# Patient Record
Sex: FEMALE | Race: WHITE | NOT HISPANIC OR LATINO | Employment: FULL TIME | URBAN - METROPOLITAN AREA
[De-identification: names, ages, dates, MRNs, and addresses within clinical notes are randomized per-mention and may not be internally consistent; named-entity substitution may affect disease eponyms.]

---

## 2018-07-26 ENCOUNTER — AMBULATORY - HEALTHEAST (OUTPATIENT)
Dept: MULTI SPECIALTY CLINIC | Facility: CLINIC | Age: 38
End: 2018-07-26

## 2018-07-26 LAB
HPV_EXT - HISTORICAL: NORMAL
PAP SMEAR - HIM PATIENT REPORTED: NORMAL

## 2019-04-19 ENCOUNTER — COMMUNICATION - HEALTHEAST (OUTPATIENT)
Dept: FAMILY MEDICINE | Facility: CLINIC | Age: 39
End: 2019-04-19

## 2019-04-29 ENCOUNTER — COMMUNICATION - HEALTHEAST (OUTPATIENT)
Dept: FAMILY MEDICINE | Facility: CLINIC | Age: 39
End: 2019-04-29

## 2019-04-29 ENCOUNTER — OFFICE VISIT - HEALTHEAST (OUTPATIENT)
Dept: FAMILY MEDICINE | Facility: CLINIC | Age: 39
End: 2019-04-29

## 2019-04-29 DIAGNOSIS — N91.2 AMENORRHEA: ICD-10-CM

## 2019-04-29 DIAGNOSIS — Z34.90 ENCOUNTER FOR SUPERVISION OF NORMAL PREGNANCY, ANTEPARTUM, UNSPECIFIED GRAVIDITY: ICD-10-CM

## 2019-04-29 DIAGNOSIS — B96.89 BACTERIAL VAGINOSIS: ICD-10-CM

## 2019-04-29 DIAGNOSIS — I10 ESSENTIAL HYPERTENSION: ICD-10-CM

## 2019-04-29 DIAGNOSIS — N89.8 VAGINAL DISCHARGE: ICD-10-CM

## 2019-04-29 DIAGNOSIS — N76.0 BACTERIAL VAGINOSIS: ICD-10-CM

## 2019-04-29 LAB
CLUE CELLS: ABNORMAL
HCG UR QL: POSITIVE
TRICHOMONAS, WET PREP: ABNORMAL
YEAST, WET PREP: ABNORMAL

## 2019-04-29 RX ORDER — PNV 119/IRON FUM/FOLIC ACID 29 MG-1 MG
TABLET ORAL
Status: SHIPPED | COMMUNITY
Start: 2019-04-29 | End: 2022-01-21

## 2019-05-08 ENCOUNTER — COMMUNICATION - HEALTHEAST (OUTPATIENT)
Dept: FAMILY MEDICINE | Facility: CLINIC | Age: 39
End: 2019-05-08

## 2019-05-08 DIAGNOSIS — L29.9 ITCHING: ICD-10-CM

## 2019-05-10 ENCOUNTER — HOSPITAL ENCOUNTER (OUTPATIENT)
Dept: ULTRASOUND IMAGING | Facility: CLINIC | Age: 39
Discharge: HOME | End: 2019-05-10
Attending: FAMILY MEDICINE

## 2019-05-10 ENCOUNTER — OFFICE VISIT - HEALTHEAST (OUTPATIENT)
Dept: FAMILY MEDICINE | Facility: CLINIC | Age: 39
End: 2019-05-10

## 2019-05-10 DIAGNOSIS — O20.0 THREATENED ABORTION: ICD-10-CM

## 2019-05-10 DIAGNOSIS — R21 RASH: ICD-10-CM

## 2019-05-10 DIAGNOSIS — I10 ESSENTIAL HYPERTENSION: ICD-10-CM

## 2019-05-10 LAB — HCG SERPL-ACNC: 3148 MLU/ML (ref 0–4)

## 2019-05-11 LAB
ABO/RH(D): NORMAL
ABORH REPEAT: NORMAL

## 2019-05-31 ENCOUNTER — OFFICE VISIT - HEALTHEAST (OUTPATIENT)
Dept: FAMILY MEDICINE | Facility: CLINIC | Age: 39
End: 2019-05-31

## 2019-05-31 DIAGNOSIS — O03.9 MISCARRIAGE: ICD-10-CM

## 2019-05-31 DIAGNOSIS — I10 ESSENTIAL HYPERTENSION: ICD-10-CM

## 2019-05-31 LAB — HCG UR QL: NEGATIVE

## 2019-05-31 ASSESSMENT — MIFFLIN-ST. JEOR: SCORE: 1372.61

## 2019-09-10 ENCOUNTER — COMMUNICATION - HEALTHEAST (OUTPATIENT)
Dept: FAMILY MEDICINE | Facility: CLINIC | Age: 39
End: 2019-09-10

## 2019-09-10 DIAGNOSIS — I10 ESSENTIAL HYPERTENSION: ICD-10-CM

## 2019-09-12 ENCOUNTER — COMMUNICATION - HEALTHEAST (OUTPATIENT)
Dept: FAMILY MEDICINE | Facility: CLINIC | Age: 39
End: 2019-09-12

## 2019-09-23 ENCOUNTER — COMMUNICATION - HEALTHEAST (OUTPATIENT)
Dept: FAMILY MEDICINE | Facility: CLINIC | Age: 39
End: 2019-09-23

## 2020-05-12 ENCOUNTER — COMMUNICATION - HEALTHEAST (OUTPATIENT)
Dept: FAMILY MEDICINE | Facility: CLINIC | Age: 40
End: 2020-05-12

## 2020-05-12 DIAGNOSIS — N96 HISTORY OF RECURRENT MISCARRIAGES: ICD-10-CM

## 2020-05-14 ENCOUNTER — COMMUNICATION - HEALTHEAST (OUTPATIENT)
Dept: FAMILY MEDICINE | Facility: CLINIC | Age: 40
End: 2020-05-14

## 2020-05-15 ENCOUNTER — COMMUNICATION - HEALTHEAST (OUTPATIENT)
Dept: ADMINISTRATIVE | Facility: CLINIC | Age: 40
End: 2020-05-15

## 2020-06-09 ENCOUNTER — RECORDS - HEALTHEAST (OUTPATIENT)
Dept: ADMINISTRATIVE | Facility: OTHER | Age: 40
End: 2020-06-09

## 2020-07-07 ENCOUNTER — RECORDS - HEALTHEAST (OUTPATIENT)
Dept: ADMINISTRATIVE | Facility: OTHER | Age: 40
End: 2020-07-07

## 2020-07-13 ENCOUNTER — RECORDS - HEALTHEAST (OUTPATIENT)
Dept: ADMINISTRATIVE | Facility: OTHER | Age: 40
End: 2020-07-13

## 2020-07-13 LAB — HBA1C MFR BLD: 4.8 % (ref 0–5.6)

## 2020-07-28 ENCOUNTER — AMBULATORY - HEALTHEAST (OUTPATIENT)
Dept: FAMILY MEDICINE | Facility: CLINIC | Age: 40
End: 2020-07-28

## 2020-07-28 ENCOUNTER — AMBULATORY - HEALTHEAST (OUTPATIENT)
Dept: NURSING | Facility: CLINIC | Age: 40
End: 2020-07-28

## 2020-07-28 DIAGNOSIS — Z23 NEED FOR MMR VACCINE: ICD-10-CM

## 2020-07-31 ENCOUNTER — RECORDS - HEALTHEAST (OUTPATIENT)
Dept: ADMINISTRATIVE | Facility: OTHER | Age: 40
End: 2020-07-31

## 2020-09-09 ENCOUNTER — RECORDS - HEALTHEAST (OUTPATIENT)
Dept: ADMINISTRATIVE | Facility: OTHER | Age: 40
End: 2020-09-09

## 2020-09-16 ENCOUNTER — RECORDS - HEALTHEAST (OUTPATIENT)
Dept: ADMINISTRATIVE | Facility: OTHER | Age: 40
End: 2020-09-16

## 2020-09-23 ENCOUNTER — RECORDS - HEALTHEAST (OUTPATIENT)
Dept: ADMINISTRATIVE | Facility: OTHER | Age: 40
End: 2020-09-23

## 2020-09-30 ENCOUNTER — COMMUNICATION - HEALTHEAST (OUTPATIENT)
Dept: FAMILY MEDICINE | Facility: CLINIC | Age: 40
End: 2020-09-30

## 2020-10-19 ENCOUNTER — AMBULATORY - HEALTHEAST (OUTPATIENT)
Dept: FAMILY MEDICINE | Facility: CLINIC | Age: 40
End: 2020-10-19

## 2020-10-19 DIAGNOSIS — Z34.01 ENCOUNTER FOR SUPERVISION OF NORMAL FIRST PREGNANCY IN FIRST TRIMESTER: ICD-10-CM

## 2020-10-20 ENCOUNTER — COMMUNICATION - HEALTHEAST (OUTPATIENT)
Dept: FAMILY MEDICINE | Facility: CLINIC | Age: 40
End: 2020-10-20

## 2020-10-20 DIAGNOSIS — I10 ESSENTIAL HYPERTENSION: ICD-10-CM

## 2020-10-26 ENCOUNTER — HOSPITAL ENCOUNTER (OUTPATIENT)
Dept: ULTRASOUND IMAGING | Facility: CLINIC | Age: 40
Discharge: HOME | End: 2020-10-26
Attending: FAMILY MEDICINE

## 2020-10-26 DIAGNOSIS — Z34.01 ENCOUNTER FOR SUPERVISION OF NORMAL FIRST PREGNANCY IN FIRST TRIMESTER: ICD-10-CM

## 2020-11-10 ENCOUNTER — PRENATAL OFFICE VISIT - HEALTHEAST (OUTPATIENT)
Dept: FAMILY MEDICINE | Facility: CLINIC | Age: 40
End: 2020-11-10

## 2020-11-10 DIAGNOSIS — Z34.01 ENCOUNTER FOR SUPERVISION OF NORMAL FIRST PREGNANCY IN FIRST TRIMESTER: ICD-10-CM

## 2020-11-10 DIAGNOSIS — Z23 NEEDS FLU SHOT: ICD-10-CM

## 2020-11-10 DIAGNOSIS — I10 ESSENTIAL HYPERTENSION: ICD-10-CM

## 2020-11-10 LAB
ALBUMIN SERPL-MCNC: 3.6 G/DL (ref 3.5–5)
ALBUMIN UR-MCNC: NEGATIVE MG/DL
ALP SERPL-CCNC: 61 U/L (ref 45–120)
ALT SERPL W P-5'-P-CCNC: 17 U/L (ref 0–45)
ANION GAP SERPL CALCULATED.3IONS-SCNC: 10 MMOL/L (ref 5–18)
APPEARANCE UR: CLEAR
AST SERPL W P-5'-P-CCNC: 14 U/L (ref 0–40)
BASOPHILS # BLD AUTO: 0 THOU/UL (ref 0–0.2)
BASOPHILS NFR BLD AUTO: 1 % (ref 0–2)
BILIRUB SERPL-MCNC: 0.3 MG/DL (ref 0–1)
BILIRUB UR QL STRIP: NEGATIVE
BUN SERPL-MCNC: 6 MG/DL (ref 8–22)
CALCIUM SERPL-MCNC: 9.2 MG/DL (ref 8.5–10.5)
CHLORIDE BLD-SCNC: 106 MMOL/L (ref 98–107)
CO2 SERPL-SCNC: 21 MMOL/L (ref 22–31)
COLOR UR AUTO: YELLOW
CREAT SERPL-MCNC: 0.62 MG/DL (ref 0.6–1.1)
EOSINOPHIL # BLD AUTO: 0.2 THOU/UL (ref 0–0.4)
EOSINOPHIL NFR BLD AUTO: 3 % (ref 0–6)
ERYTHROCYTE [DISTWIDTH] IN BLOOD BY AUTOMATED COUNT: 12.8 % (ref 11–14.5)
GFR SERPL CREATININE-BSD FRML MDRD: >60 ML/MIN/1.73M2
GLUCOSE BLD-MCNC: 133 MG/DL (ref 70–125)
GLUCOSE UR STRIP-MCNC: NEGATIVE MG/DL
HCT VFR BLD AUTO: 34.1 % (ref 35–47)
HGB BLD-MCNC: 11.9 G/DL (ref 12–16)
HGB UR QL STRIP: NEGATIVE
HIV 1+2 AB+HIV1 P24 AG SERPL QL IA: NEGATIVE
IMM GRANULOCYTES # BLD: 0 THOU/UL
IMM GRANULOCYTES NFR BLD: 1 %
KETONES UR STRIP-MCNC: ABNORMAL MG/DL
LEUKOCYTE ESTERASE UR QL STRIP: NEGATIVE
LYMPHOCYTES # BLD AUTO: 0.9 THOU/UL (ref 0.8–4.4)
LYMPHOCYTES NFR BLD AUTO: 15 % (ref 20–40)
MCH RBC QN AUTO: 28.7 PG (ref 27–34)
MCHC RBC AUTO-ENTMCNC: 34.9 G/DL (ref 32–36)
MCV RBC AUTO: 82 FL (ref 80–100)
MONOCYTES # BLD AUTO: 0.5 THOU/UL (ref 0–0.9)
MONOCYTES NFR BLD AUTO: 8 % (ref 2–10)
NEUTROPHILS # BLD AUTO: 4.6 THOU/UL (ref 2–7.7)
NEUTROPHILS NFR BLD AUTO: 74 % (ref 50–70)
NITRATE UR QL: NEGATIVE
PH UR STRIP: 5.5 [PH] (ref 5–8)
PLATELET # BLD AUTO: 197 THOU/UL (ref 140–440)
PMV BLD AUTO: 11.8 FL (ref 8.5–12.5)
POTASSIUM BLD-SCNC: 4.1 MMOL/L (ref 3.5–5)
PROT SERPL-MCNC: 5.4 G/DL (ref 6–8)
RBC # BLD AUTO: 4.14 MILL/UL (ref 3.8–5.4)
SODIUM SERPL-SCNC: 137 MMOL/L (ref 136–145)
SP GR UR STRIP: >=1.03 (ref 1–1.03)
TSH SERPL DL<=0.005 MIU/L-ACNC: 0.88 UIU/ML (ref 0.3–5)
UROBILINOGEN UR STRIP-ACNC: ABNORMAL
WBC: 6.2 THOU/UL (ref 4–11)

## 2020-11-10 RX ORDER — HYDROCORTISONE 1 %
OINTMENT (GRAM) TOPICAL
Status: SHIPPED | COMMUNITY
Start: 2020-11-10 | End: 2022-01-21

## 2020-11-11 LAB
25(OH)D3 SERPL-MCNC: 31.6 NG/ML (ref 30–80)
25(OH)D3 SERPL-MCNC: 31.6 NG/ML (ref 30–80)
ABO/RH(D): NORMAL
ABORH REPEAT: NORMAL
ANTIBODY SCREEN: NEGATIVE
BACTERIA SPEC CULT: NO GROWTH
HBA1C MFR BLD: 4.8 %
HBV SURFACE AG SERPL QL IA: NEGATIVE
HCV AB SERPL QL IA: NEGATIVE
RUBV IGG SERPL QL IA: NEGATIVE
T PALLIDUM AB SER QL: NEGATIVE

## 2020-11-12 LAB
HPV SOURCE: NORMAL
HUMAN PAPILLOMA VIRUS 16 DNA: NEGATIVE
HUMAN PAPILLOMA VIRUS 18 DNA: NEGATIVE
HUMAN PAPILLOMA VIRUS FINAL DIAGNOSIS: NORMAL
HUMAN PAPILLOMA VIRUS OTHER HR: NEGATIVE
SPECIMEN DESCRIPTION: NORMAL

## 2020-11-13 ENCOUNTER — COMMUNICATION - HEALTHEAST (OUTPATIENT)
Dept: FAMILY MEDICINE | Facility: CLINIC | Age: 40
End: 2020-11-13

## 2020-11-18 LAB
BKR LAB AP ABNORMAL BLEEDING: NO
BKR LAB AP BIRTH CONTROL/HORMONES: NORMAL
BKR LAB AP CERVICAL APPEARANCE: NORMAL
BKR LAB AP GYN ADEQUACY: NORMAL
BKR LAB AP GYN INTERPRETATION: NORMAL
BKR LAB AP HPV REFLEX: NORMAL
BKR LAB AP LMP: NORMAL
BKR LAB AP PATIENT STATUS: NORMAL
BKR LAB AP PREVIOUS ABNORMAL: NORMAL
BKR LAB AP PREVIOUS NORMAL: 2018
HIGH RISK?: NO
PATH REPORT.COMMENTS IMP SPEC: NORMAL
RESULT FLAG (HE HISTORICAL CONVERSION): NORMAL

## 2020-11-23 ENCOUNTER — RECORDS - HEALTHEAST (OUTPATIENT)
Dept: HEALTH INFORMATION MANAGEMENT | Facility: CLINIC | Age: 40
End: 2020-11-23

## 2020-12-08 ENCOUNTER — AMBULATORY - HEALTHEAST (OUTPATIENT)
Dept: MATERNAL FETAL MEDICINE | Facility: HOSPITAL | Age: 40
End: 2020-12-08

## 2020-12-08 ENCOUNTER — PRENATAL OFFICE VISIT - HEALTHEAST (OUTPATIENT)
Dept: FAMILY MEDICINE | Facility: CLINIC | Age: 40
End: 2020-12-08

## 2020-12-08 DIAGNOSIS — O26.90 PREGNANCY, ANTEPARTUM, COMPLICATIONS: ICD-10-CM

## 2020-12-08 DIAGNOSIS — Z34.02 ENCOUNTER FOR SUPERVISION OF NORMAL FIRST PREGNANCY IN SECOND TRIMESTER: ICD-10-CM

## 2020-12-08 DIAGNOSIS — O09.512 PRIMIGRAVIDA OF ADVANCED MATERNAL AGE IN SECOND TRIMESTER: ICD-10-CM

## 2020-12-08 DIAGNOSIS — I10 ESSENTIAL HYPERTENSION: ICD-10-CM

## 2020-12-21 ENCOUNTER — AMBULATORY - HEALTHEAST (OUTPATIENT)
Dept: MATERNAL FETAL MEDICINE | Facility: HOSPITAL | Age: 40
End: 2020-12-21

## 2020-12-23 ENCOUNTER — OFFICE VISIT - HEALTHEAST (OUTPATIENT)
Dept: MATERNAL FETAL MEDICINE | Facility: HOSPITAL | Age: 40
End: 2020-12-23

## 2020-12-23 ENCOUNTER — RECORDS - HEALTHEAST (OUTPATIENT)
Dept: ADMINISTRATIVE | Facility: OTHER | Age: 40
End: 2020-12-23

## 2020-12-23 ENCOUNTER — RECORDS - HEALTHEAST (OUTPATIENT)
Dept: ULTRASOUND IMAGING | Facility: HOSPITAL | Age: 40
End: 2020-12-23

## 2020-12-23 DIAGNOSIS — O16.9 HYPERTENSION AFFECTING PREGNANCY: ICD-10-CM

## 2020-12-23 DIAGNOSIS — O26.90 PREGNANCY RELATED CONDITIONS, UNSPECIFIED, UNSPECIFIED TRIMESTER: ICD-10-CM

## 2020-12-23 DIAGNOSIS — O09.519 ADVANCED MATERNAL AGE, 1ST PREGNANCY: ICD-10-CM

## 2021-01-08 ENCOUNTER — PRENATAL OFFICE VISIT - HEALTHEAST (OUTPATIENT)
Dept: FAMILY MEDICINE | Facility: CLINIC | Age: 41
End: 2021-01-08

## 2021-01-08 DIAGNOSIS — Z34.02 ENCOUNTER FOR SUPERVISION OF NORMAL FIRST PREGNANCY IN SECOND TRIMESTER: ICD-10-CM

## 2021-01-08 DIAGNOSIS — O09.512 PRIMIGRAVIDA OF ADVANCED MATERNAL AGE IN SECOND TRIMESTER: ICD-10-CM

## 2021-01-20 ENCOUNTER — RECORDS - HEALTHEAST (OUTPATIENT)
Dept: ULTRASOUND IMAGING | Facility: HOSPITAL | Age: 41
End: 2021-01-20

## 2021-01-20 ENCOUNTER — RECORDS - HEALTHEAST (OUTPATIENT)
Dept: ADMINISTRATIVE | Facility: OTHER | Age: 41
End: 2021-01-20

## 2021-01-20 ENCOUNTER — OFFICE VISIT - HEALTHEAST (OUTPATIENT)
Dept: MATERNAL FETAL MEDICINE | Facility: HOSPITAL | Age: 41
End: 2021-01-20

## 2021-01-20 DIAGNOSIS — O09.512 PRIMIGRAVIDA OF ADVANCED MATERNAL AGE IN SECOND TRIMESTER: ICD-10-CM

## 2021-01-20 DIAGNOSIS — O16.9 UNSPECIFIED MATERNAL HYPERTENSION, UNSPECIFIED TRIMESTER: ICD-10-CM

## 2021-01-20 DIAGNOSIS — O09.519 SUPERVISION OF ELDERLY PRIMIGRAVIDA, UNSPECIFIED TRIMESTER: ICD-10-CM

## 2021-01-20 DIAGNOSIS — O16.2 HYPERTENSION AFFECTING PREGNANCY IN SECOND TRIMESTER: ICD-10-CM

## 2021-01-26 ENCOUNTER — COMMUNICATION - HEALTHEAST (OUTPATIENT)
Dept: FAMILY MEDICINE | Facility: CLINIC | Age: 41
End: 2021-01-26

## 2021-02-02 ENCOUNTER — COMMUNICATION - HEALTHEAST (OUTPATIENT)
Dept: FAMILY MEDICINE | Facility: CLINIC | Age: 41
End: 2021-02-02

## 2021-02-19 ENCOUNTER — PRENATAL OFFICE VISIT - HEALTHEAST (OUTPATIENT)
Dept: FAMILY MEDICINE | Facility: CLINIC | Age: 41
End: 2021-02-19

## 2021-02-19 DIAGNOSIS — Z34.02 ENCOUNTER FOR SUPERVISION OF NORMAL FIRST PREGNANCY IN SECOND TRIMESTER: ICD-10-CM

## 2021-02-19 DIAGNOSIS — I10 ESSENTIAL HYPERTENSION: ICD-10-CM

## 2021-02-19 DIAGNOSIS — O09.512 PRIMIGRAVIDA OF ADVANCED MATERNAL AGE IN SECOND TRIMESTER: ICD-10-CM

## 2021-02-19 LAB
ALBUMIN UR-MCNC: NEGATIVE MG/DL
APPEARANCE UR: CLEAR
BILIRUB UR QL STRIP: NEGATIVE
COLOR UR AUTO: YELLOW
FASTING STATUS PATIENT QL REPORTED: NO
GLUCOSE 1H P 50 G GLC PO SERPL-MCNC: 127 MG/DL (ref 70–139)
GLUCOSE UR STRIP-MCNC: NEGATIVE MG/DL
HGB BLD-MCNC: 10.6 G/DL (ref 12–16)
HGB UR QL STRIP: NEGATIVE
KETONES UR STRIP-MCNC: NEGATIVE MG/DL
LEUKOCYTE ESTERASE UR QL STRIP: NEGATIVE
NITRATE UR QL: NEGATIVE
PH UR STRIP: 6 [PH] (ref 5–8)
SP GR UR STRIP: >=1.03 (ref 1–1.03)
UROBILINOGEN UR STRIP-ACNC: NORMAL

## 2021-02-20 ENCOUNTER — AMBULATORY - HEALTHEAST (OUTPATIENT)
Dept: FAMILY MEDICINE | Facility: CLINIC | Age: 41
End: 2021-02-20

## 2021-02-20 DIAGNOSIS — O16.2 ELEVATED BLOOD PRESSURE AFFECTING PREGNANCY IN SECOND TRIMESTER, ANTEPARTUM: ICD-10-CM

## 2021-02-20 DIAGNOSIS — I10 ESSENTIAL HYPERTENSION: ICD-10-CM

## 2021-02-20 RX ORDER — LABETALOL 300 MG/1
300 TABLET, FILM COATED ORAL 2 TIMES DAILY
Qty: 180 TABLET | Refills: 0 | Status: SHIPPED | OUTPATIENT
Start: 2021-02-20 | End: 2022-01-21

## 2021-02-21 LAB — BACTERIA SPEC CULT: NO GROWTH

## 2021-02-22 ENCOUNTER — PRENATAL OFFICE VISIT - HEALTHEAST (OUTPATIENT)
Dept: FAMILY MEDICINE | Facility: CLINIC | Age: 41
End: 2021-02-22

## 2021-02-22 DIAGNOSIS — I10 ESSENTIAL HYPERTENSION: ICD-10-CM

## 2021-02-22 DIAGNOSIS — Z34.00 SUPERVISION OF NORMAL FIRST PREGNANCY, ANTEPARTUM: ICD-10-CM

## 2021-02-22 DIAGNOSIS — O16.2 ELEVATED BLOOD PRESSURE AFFECTING PREGNANCY IN SECOND TRIMESTER, ANTEPARTUM: ICD-10-CM

## 2021-02-22 RX ORDER — FERROUS SULFATE 325(65) MG
1 TABLET ORAL
Status: SHIPPED | COMMUNITY
Start: 2021-02-22 | End: 2022-01-21

## 2021-02-24 ENCOUNTER — PRENATAL OFFICE VISIT - HEALTHEAST (OUTPATIENT)
Dept: FAMILY MEDICINE | Facility: CLINIC | Age: 41
End: 2021-02-24

## 2021-02-24 ENCOUNTER — COMMUNICATION - HEALTHEAST (OUTPATIENT)
Dept: FAMILY MEDICINE | Facility: CLINIC | Age: 41
End: 2021-02-24

## 2021-02-24 ENCOUNTER — COMMUNICATION - HEALTHEAST (OUTPATIENT)
Dept: SCHEDULING | Facility: CLINIC | Age: 41
End: 2021-02-24

## 2021-02-24 DIAGNOSIS — O16.3 ELEVATED BLOOD PRESSURE AFFECTING PREGNANCY IN THIRD TRIMESTER, ANTEPARTUM: ICD-10-CM

## 2021-02-24 DIAGNOSIS — O09.513 PRIMIGRAVIDA OF ADVANCED MATERNAL AGE IN THIRD TRIMESTER: ICD-10-CM

## 2021-02-24 DIAGNOSIS — Z34.03 ENCOUNTER FOR SUPERVISION OF NORMAL FIRST PREGNANCY IN THIRD TRIMESTER: ICD-10-CM

## 2021-02-24 DIAGNOSIS — I10 ESSENTIAL HYPERTENSION: ICD-10-CM

## 2021-03-02 ENCOUNTER — COMMUNICATION - HEALTHEAST (OUTPATIENT)
Dept: FAMILY MEDICINE | Facility: CLINIC | Age: 41
End: 2021-03-02

## 2021-05-28 NOTE — TELEPHONE ENCOUNTER
New Appointment Needed  What is the reason for the visit:    Pregnancy Confirmation Appt Needed  When was the first day of your last menstrual cycle?: 03/18/19  Have you done a home pregnancy test?: Yes home test + 4/14/19    Provider Preference: any female provider at [facility]   How soon do you need to be seen?: as able -- Patient is NEW to [facility]  Waitlist offered?: No  Okay to leave a detailed message:  Yes

## 2021-05-28 NOTE — PROGRESS NOTES
PROGRESS NOTE   5/10/2019    SUBJECTIVE:  Arely Saldaña is a 38 y.o. female  who presents for   Chief Complaint   Patient presents with     Rash     Started 5/7, Rash on stomach and arms     Vaginal Bleeding     Started last night, cramping and bleeding like a normal period     Patient came in today for evaluation of a rash.  She got the rash 3 days ago and is wondering whether is due to the new blood pressure medication that we started her on.  It is not really painful or itching for her but it is located on her elbow region bilaterally.  She also has had a little bit on her stomach as well.  She has continued on her new blood pressure medication as when I spoke to her I indicated that it was unlikely that this was the cause of the rash.  She has not had worsening of the rash and has not spread anywhere.  She does feel like the rash is gotten better today as opposed to the last couple of days.  She continues to tolerate labetalol without problems.  She was seen a couple of weeks ago for pregnancy confirmation her blood pressure at that time was quite elevated.  She had been previously on blood pressure medication but had not been taking it for about the last 6 weeks or so.  Her blood pressure today looks much better at 132/70.  She notes that she did start bleeding like.  Yesterday.  She had some spotting yesterday morning when she first woke up but just kind of pinkish in color as the day went on it kept getting heavier.  Overnight she noted bleeding that was entirely red and pretty much like her menstrual cycle.  All day today is been continuing to be bleeding like a menstrual cycle.  She notes that she is cramping like when she has her menstrual cycle but is not excruciatingly painful.  She was supposed to have an ultrasound on Monday.  She should be about 7-1/2 weeks gestation currently.  She is uncertain what her blood type is.  This is her first pregnancy.    Patient Active Problem List   Diagnosis      Essential hypertension       Current Outpatient Medications   Medication Sig Dispense Refill     labetalol (TRANDATE; NORMODYNE) 100 MG tablet Take 1 tablet (100 mg total) by mouth 2 (two) times a day. 60 tablet 0     prenatal no115/iron/folic acid (PRENATAL 19 ORAL)        No current facility-administered medications for this visit.        Allergies   Allergen Reactions     Penicillins Hives     Sulfamethoxazole-Trimethoprim Rash       Past Medical History:   Diagnosis Date     Hypertension     was on nifedipine and HCTZ before pregnancy, had since age early 20's       Past Surgical History:   Procedure Laterality Date     FOOT SURGERY Left age 20    bursa removed from left foot     WISDOM TOOTH EXTRACTION  age 19       Social History     Tobacco Use   Smoking Status Never Smoker   Smokeless Tobacco Never Used       OBJECTIVE:     /70   Pulse 74   Wt 151 lb (68.5 kg)   LMP 03/20/2019 (Exact Date)   SpO2 100%     Physical Exam:  GENERAL APPEARANCE: A&A, NAD, well hydrated, well nourished  SKIN:  Normal skin turgor, no lesions  Exam of her elbow region bilaterally she has a red faint nonraised rash based physically surrounding both of the elbow regions.  The right side is much worse than the left.  There is no evidence for secondary infection.  There is no breaks for excoriation.  She has a very faint rash on her lower abdomen as well.  All of this is very faint in color and appears to be disappearing.  EXTREMITY: no swelling noted.  Full range of motion of all 4 extremities.   NEURO: no gross deficits   PSYCHIATRIC;  Mood appropriate, memory intact.  Appropriately tearful given impending miscarriage and heavy bleeding that she is having during pregnancy.    LABS:     Recent Results (from the past 240 hour(s))   HML ABO/Rh Typing   Result Value Ref Range    HML ABO/Rh Typing B POS     HML ABO/Rh Repeat Typing B POS    Beta-hCG, Quantitative   Result Value Ref Range    Beta-hCG, Quantitative 3,148 (H) 0 - 4  mlU/mL       ASSESSMENT/PLAN:     Threatened  [O20.0]      1. Threatened   - HML ABO/Rh Typing  - Beta-hCG, Quantitative  - US OB < 14 Weeks With Transvaginal; Future    2. Rash    3. Essential hypertension    Patient overall is doing okay.  In terms of the rash I think this is probably her body's response to what is happening which I suspect is probably a miscarriage.  I doubt very much that this is related to her new blood pressure medication especially given the fact that she has continued on blood pressure medication and the rash is gradually improving.  At this point I do not think we need to do anything additional with the rash other than make sure that it continues to improve.  Patient is in agreement with this.  She is not having any itching and is not in distress as a result of the rash.  Her blood pressure looks excellent today compared to what it was a couple of weeks ago.  I would like her to continue on the labetalol twice a day as she has been because that is been really helpful for her blood pressure.  I doubt that very much that is related to the rash that she is had.  The vast majority of her conversation today was spent regarding the bleeding that she is having.  This bleeding started yesterday and has now progressed to basically bleeding similar to her menstrual cycle.  I was very honest with her and told her that I would suspect that she is miscarrying.  She is aware of that as well.  She is not bleeding so bad that she would need to be seen in the emergency room.  We went over the amount of bleeding that she would need to be seen in the emergency room for.  Essentially if she bleeds more than a pad an hour for more than 2 hours in a row she needs to be seen in the emergency room.  Her cramping is that of her regular menstrual cycle but is not excruciating at this point.  Certainly if she gets excruciating cramping she needs to be seen in the emergency room as well.  We discussed  the fact that at this point there really is not a whole lot that any was can do to prevent a miscarriage.  The best thing that I can do for her is to get her hopefully knowing which direction this is going to be.  We discussed the fact that certainly that you can have heavy bleeding and still have an healthy pregnancy but more likely this is going to miscarry.  I am going to draw an hCG today to see what it is and I will also draw blood type.  We discussed the fact that if she is Rh- blood type she does need to come back in for a RhoGam shot.  I will contact her once we know her blood type.  I also did go ahead today and order an ultrasound to be done later today to evaluate fetal well-being.  Her and her  had difficulty getting pregnant and so she is quite devastated by the fact that she is having a miscarriage.  We spent a long time today discussing the fact that we need to get through this miscarriage and then we can deal with why it took them so long to get pregnant and any special treatment that we need with future pregnancies.  She was encouraged that sometimes getting pregnant will cause things to regulate and make it easier for them to get pregnant in the future.  We will hope that that is the case.  Patient did have an ultrasound which did not show any gestational products in the uterus.  It is thought that she is actively miscarrying at this point.  I did call and discussed that with the patient.  All of her questions were answered today.  We should see her back in about 3 weeks for follow-up.  Again I went over things to watch for and things that she should go to the emergency room for.  Her blood type did return to be positive and therefore she does not need RhoGam.  We will see her back in about 3 weeks for follow-up after miscarriage. Total time spent with patient was at least 25 minutes,  of which greater than fifty percent was spent in counselling and coordination of care of the above medical  problems.  Ashely Larios MD

## 2021-05-28 NOTE — TELEPHONE ENCOUNTER
I did call and speak with patient regarding the results of her lab work.  A wet prep did show bacterial vaginosis.  Prescription medication was sent to the pharmacy.  She should call if she has additional problems or concerns.  She does have a follow-up appointment in 2 weeks and will follow-up at that time.  If she has additional problems or concerns prior to that will let me know

## 2021-05-28 NOTE — TELEPHONE ENCOUNTER
Patient's lab results from this morning in regards to her vaginal discharge confirmed that she does have bacterial vaginosis.  I did send a prescription for clindamycin 300 mg tablets to take twice a day for the next 7 days to the pharmacy.  Prescription was sent to [facility] here in [place].  If patient has questions or concerns should certainly let me know otherwise we will see her in follow-up in 2 weeks as previously discussed.

## 2021-05-28 NOTE — PROGRESS NOTES
SUBJECTIVE:   Date of visit: 2019    Arely Saldaña is a 38 y.o.  presents c/o missed period, nausea, breast tenderness for past 1 weeks. Patient's last menstrual period was 2019 (exact date). (sure, normal, came at expected time). Her periods are regular. Home UPT x 2 on 19 were positive, so she believes she is pregnant. She is happy about this.  This is her first pregnancy.  They have been trying for 2 years and had not been unsuccessful with conceiving.  They were in the process of beginning infertility work-up.  She did have an HSG in September and again did not successfully conceived following that.  This is a spontaneous pregnancy and they are overjoyed.  She has started prenatal vitamins.  No FM yet. No bleeding or cramping.  She does not have any exposure to cat litter.  She is aware to avoid using hot tubs or jacuzzi's for which the temperature cannot be adjusted.  She has a history of hypertension but has not been taking her blood pressure medications recently.  She has been on hydrochlorothiazide as well as nifedipine but again has not been taking these for at least the last 2 months.  She also notes that she is been having some vaginal discharge for the last couple of months as well and would like that evaluated.  Please see note below for details of the blood pressure evaluation as well as the vaginal discharge evaluation.    Patient is new patient to the clinic. Please see past medical history, surgical history, social history and family history, all of which were completed in their entirety today.      Current Outpatient Medications:      prenatal no115/iron/folic acid (PRENATAL 19 ORAL), , Disp: , Rfl:      clindamycin (CLEOCIN) 300 MG capsule, Take 1 capsule (300 mg total) by mouth 2 (two) times a day for 7 days., Disp: 14 capsule, Rfl: 0     labetalol (TRANDATE; NORMODYNE) 100 MG tablet, Take 1 tablet (100 mg total) by mouth 2 (two) times a day., Disp: 60 tablet, Rfl: 0  Past  Medical History:   Diagnosis Date     Hypertension     was on nifedipine and HCTZ before pregnancy, had since age early 20's     Social History     Socioeconomic History     Marital status:      Spouse name: Arvin Saldaña     Number of children: 0     Years of education: Not on file     Highest education level: Not on file   Occupational History     Occupation:      Comment: [employer]   Social Needs     Financial resource strain: Not on file     Food insecurity:     Worry: Not on file     Inability: Not on file     Transportation needs:     Medical: Not on file     Non-medical: Not on file   Tobacco Use     Smoking status: Never Smoker     Smokeless tobacco: Never Used   Substance and Sexual Activity     Alcohol use: Not Currently     Comment: once every 2-3 months prior to pregnancy     Drug use: Never     Sexual activity: Yes     Partners: Male     Comment: pregnancy   Lifestyle     Physical activity:     Days per week: Not on file     Minutes per session: Not on file     Stress: Not on file   Relationships     Social connections:     Talks on phone: Not on file     Gets together: Not on file     Attends [employer] service: Not on file     Active member of club or organization: Not on file     Attends meetings of clubs or organizations: Not on file     Relationship status: Not on file     Intimate partner violence:     Fear of current or ex partner: Not on file     Emotionally abused: Not on file     Physically abused: Not on file     Forced sexual activity: Not on file   Other Topics Concern     Not on file   Social History Narrative     Not on file     OB History    Para Term  AB Living   1 0 0 0 0 0   SAB TAB Ectopic Multiple Live Births   0 0 0 0 0      # Outcome Date GA Lbr Sigifredo/2nd Weight Sex Delivery Anes PTL Lv   1 Current                OBJECTIVE:   /90   Pulse 76   Wt 151 lb (68.5 kg)   LMP 2019 (Exact Date)   SpO2 99%   Breastfeeding? No   NAD, A&Ox3.  Normal affect. No respiratory distress. VS normal (see above).    REVIEW OF SYSTEMS:  General:  Denies fever, chills, HA, fatigue, myalgias, weight change    Eyes: Denies vision changes   Ears/Nose/Throat: Denies nasal congestion, rhinorrhea, ear pain or discharge, sore throat, swollen glands  Cardiovascular: Denies CP, palpitations  Respiratory: Denies SOB, cough  Gastrointestinal:  Denies changes in bowel habits, melena, rectal bleeding,  Genitourinary: Denies changes in urine habits/frequency/dysuria, hematuria   Musculoskeletal:  Denies  joint pain or swelling or erythema, edema  Skin: Denies rashes   Neurologic: Denies weakness, paresthesia  Psychiatric: Denies mood changes   Endocrine: Denies polyuria, polydipsia, polyphagia  Heme/Lymphatic: Denies problem with bleeding   Allergic/Immunologic: Denies problem       Recent Results (from the past 240 hour(s))   Pregnancy (Beta-hCG, Qual), Urine   Result Value Ref Range    Pregnancy Test, Urine Positive (!) Negative   Wet Prep, Vaginal   Result Value Ref Range    Yeast Result No yeast seen No yeast seen    Trichomonas No Trichomonas seen No Trichomonas seen    Clue Cells, Wet Prep Clue cells seen (!) No Clue cells seen        ASSESSMENT/PLAN:     Encounter for supervision of normal pregnancy, antepartum, unspecified  [Z34.90]    1. Encounter for supervision of normal pregnancy, antepartum, unspecified   - US OB < 14 Weeks With Transvaginal; Future    2. Amenorrhea  - Pregnancy (Beta-hCG, Qual), Urine    3. Vaginal discharge  - Wet Prep, Vaginal    4. Essential hypertension  - labetalol (TRANDATE; NORMODYNE) 100 MG tablet; Take 1 tablet (100 mg total) by mouth 2 (two) times a day.  Dispense: 60 tablet; Refill: 0    5. Bacterial vaginosis  - clindamycin (CLEOCIN) 300 MG capsule; Take 1 capsule (300 mg total) by mouth 2 (two) times a day for 7 days.  Dispense: 14 capsule; Refill: 0        1) Amenorrhea: secondary to pregnancy - see #2 below.     2)  PRENATAL:  at 5w5d by LMP, giving Estimated Date of Delivery: 19. Declined 1st trimester genetic screening. Education: discussed usual 1st OB education; reviewed prenatal folder, info given on [facility] website. Avoid alcohol and tobacco; minimize caffeine to <2 servings per day; advised regular physical activity; avoid abdominal trauma;  drink >/= 8 cups water/fluids daily; continue PNV.  Discussed morning sickness and the importance of frequent small meals in order to decrease nausea.  If she gets to the point that she is unable to keep food/liquids down, she should contact me as we can provide her with medications to help.    Patient given information on pregnancy today.  Next visit 6 weeks.  Will get an ultrasound to establish EDC.  Order placed today.  Patient will call and schedule ultrasound for about 2 to 3 weeks from now when she is about 8 weeks gestation.  Patient does have exposure to cat litter but her  will clean the cat litter now that she is pregnant.  She overall is feeling well currently.  We talked about eating frequent small meals as a way to avoid morning sickness and she gets to the point that she can eat or drink anything she certainly should let me know.  She does not eat meat and so we talked a lot today about other sources of protein to make sure that she is getting enough protein.  Her  was here today and is very concerned about making sure that she gets enough protein.  We talked about using beans legumes peanut butter etc. to try to make sure that she is getting enough protein intake.    Ashely Larios MD    PROGRESS NOTE   2019    SUBJECTIVE:  Arely Saldaña is a 38 y.o. female  who presents for   Chief Complaint   Patient presents with     Amenorrhea     LMP 3/20/2019     Vaginal Discharge     Discharge for a few months     Hypertension     Has not been taking BP meds for a couple months - patient states they make her constipated     Patient comes in  today for pregnancy confirmation.  Her pregnancy test today was indeed positive.  She notes that she has a history of hypertension.  She had previously been on nifedipine and hydrochlorothiazide but stopped that a couple of months ago.  Her blood pressure today was 142/90.  She notes that she has had high blood pressure since about the age of 27.  She is been on medication since about that time as well.  She is feeling well and has no concerns or complaints today in terms of cardiovascular health.  She denies that she is had any chest pain or shortness of breath or problems with swelling.  She does note that she is had some vaginal discharge for about the last 2 months or so as well.  She is had bacterial vaginosis in the past and this seems very typical for that.  She has a yellow smelly discharge.  It seems like is getting worse but then it also seem like he gets better occasionally.    There is no problem list on file for this patient.      Current Outpatient Medications   Medication Sig Dispense Refill     prenatal no115/iron/folic acid (PRENATAL 19 ORAL)        clindamycin (CLEOCIN) 300 MG capsule Take 1 capsule (300 mg total) by mouth 2 (two) times a day for 7 days. 14 capsule 0     labetalol (TRANDATE; NORMODYNE) 100 MG tablet Take 1 tablet (100 mg total) by mouth 2 (two) times a day. 60 tablet 0     No current facility-administered medications for this visit.        Allergies   Allergen Reactions     Penicillins Hives     Sulfamethoxazole-Trimethoprim Rash       Past Medical History:   Diagnosis Date     Hypertension     was on nifedipine and HCTZ before pregnancy, had since age early 20's       Past Surgical History:   Procedure Laterality Date     FOOT SURGERY Left age 20    bursa removed from left foot     WISDOM TOOTH EXTRACTION  age 19       Social History     Tobacco Use   Smoking Status Never Smoker   Smokeless Tobacco Never Used       OBJECTIVE:     /90   Pulse 76   Wt 151 lb (68.5 kg)   LMP  2019 (Exact Date)   SpO2 99%   Breastfeeding? No     Physical Exam:  GENERAL APPEARANCE: A&A, NAD, well hydrated, well nourished  SKIN:  Normal skin turgor, no lesions/rashes   CV: RRR, no M/G/R   LUNGS: CTAB  Pelvic exam was done.  External genitalia appeared normal.  Speculum was introduced and small amount of vaginal discharge is on the vaginal vault.  This was sent for wet prep.  Discharge had the characteristic spell of bacterial vaginosis.  EXTREMITY: no swelling noted.  Full range of motion of all 4 extremities.   NEURO: no gross deficits   PSYCHIATRIC;  Mood appropriate, memory intact    LABS:     Recent Results (from the past 240 hour(s))   Pregnancy (Beta-hCG, Qual), Urine   Result Value Ref Range    Pregnancy Test, Urine Positive (!) Negative   Wet Prep, Vaginal   Result Value Ref Range    Yeast Result No yeast seen No yeast seen    Trichomonas No Trichomonas seen No Trichomonas seen    Clue Cells, Wet Prep Clue cells seen (!) No Clue cells seen       ASSESSMENT/PLAN:     Encounter for supervision of normal pregnancy, antepartum, unspecified  [Z34.90]      1. Encounter for supervision of normal pregnancy, antepartum, unspecified   - US OB < 14 Weeks With Transvaginal; Future    2. Amenorrhea  - Pregnancy (Beta-hCG, Qual), Urine    3. Vaginal discharge  - Wet Prep, Vaginal    4. Essential hypertension  - labetalol (TRANDATE; NORMODYNE) 100 MG tablet; Take 1 tablet (100 mg total) by mouth 2 (two) times a day.  Dispense: 60 tablet; Refill: 0    5. Bacterial vaginosis  - clindamycin (CLEOCIN) 300 MG capsule; Take 1 capsule (300 mg total) by mouth 2 (two) times a day for 7 days.  Dispense: 14 capsule; Refill: 0    Patient overall seems to be doing okay.  She came in today for pregnancy confirmation but also notes that she had a history of hypertension and has been off of her blood pressure medication for the last 2 months.  Her blood pressure today is elevated at 142/90.  We talked  about the fact that the nifedipine and hydrochlorothiazide that she is been on are not ideal in pregnancy and therefore I would rather have her on labetalol.  We did give her a prescription for labetalol 100 mg twice a day.  I would like to have her come back in in about 2 weeks for recheck of her blood pressure.  If she has additional problems or concerns prior to that we will certainly let me know.  We talked about the importance of making sure that she keeps her blood pressure stable while she is pregnant.  Certainly she is at increased risk for preeclampsia and other complications of the pregnancy both given the fact that she is aged to the 38 as well as the fact that she is starting out with hypertension.  She voiced understanding of that.  In terms of the discharge wet prep did show positive clue cells.  That would fit with the odor that was noted during exam as well.  We will go ahead and treat bacterial vaginosis with clindamycin today.  She should take her oral clindamycin pill twice a day for the next 7 days.  This should resolve the discharge.  If it does not resolve the discharge she certainly should let me know.  We talked about the fact that this can lead to  labor if left alone and so we definitely need to keep treating it if indeed it does not seem like this resolves.  All of their questions were answered today.  We will see her back in 2 weeks for follow-up of her blood pressure as well as her bacterial vaginosis.    Total time spent with patient was at least 45 minutes,  of which greater than fifty percent was spent in counselling and coordination of care regarding her pretension, bacterial vaginosis, and pregnancy.  Ashely Larios MD

## 2021-05-29 ENCOUNTER — HEALTH MAINTENANCE LETTER (OUTPATIENT)
Age: 41
End: 2021-05-29

## 2021-05-29 NOTE — PROGRESS NOTES
PROGRESS NOTE   5/31/2019    SUBJECTIVE:  Arely Saldaña is a 38 y.o. female  who presents for   Chief Complaint   Patient presents with     Miscarriage     She states that she had a miscarriage at the beginning of the month.      Blood Pressure Check     Needs bp med refill.      Patient comes in today for recheck after having had a miscarriage.  She also needs a refill of her blood pressure medications.  Patient notes that she had a miscarriage on 5/10/2019.  She began bleeding spontaneously and bled for about 5 days or so.  She has lots of small clots but then has had no bleeding since then.  She feels like this has completed itself.  She feels like she is back to herself physically and emotionally.  She did have some tough times to begin with emotionally but overall is doing well now.  They have resumed intercourse without problems.  She has not yet gotten her menstrual cycle.  Her and her  had a lot of difficulty getting pregnant.  This was her first pregnancy.  She has always had regular cycles but they have had difficulty conceiving.  It took them over a year of trying before they achieved this pregnancy.  She is continued on prenatal vitamins because she would like to try to continue to have another baby.  She does have a history of hypertension.  When she saw me for pregnancy confirmation we switched her to labetalol.  She seems to be tolerating labetalol well.  Her blood pressure today looks excellent at 136/86.  She does need a refill of her medication today.    Patient Active Problem List   Diagnosis     Essential hypertension       Current Outpatient Medications   Medication Sig Dispense Refill     labetalol (TRANDATE; NORMODYNE) 100 MG tablet Take 1 tablet (100 mg total) by mouth 2 (two) times a day. 180 tablet 0     prenatal no115/iron/folic acid (PRENATAL 19 ORAL)        No current facility-administered medications for this visit.        Allergies   Allergen Reactions     Penicillins Hives      "Sulfamethoxazole-Trimethoprim Rash       Past Medical History:   Diagnosis Date     Hypertension     was on nifedipine and HCTZ before pregnancy, had since age early 20's       Past Surgical History:   Procedure Laterality Date     FOOT SURGERY Left age 20    bursa removed from left foot     WISDOM TOOTH EXTRACTION  age 19       Social History     Tobacco Use   Smoking Status Never Smoker   Smokeless Tobacco Never Used       OBJECTIVE:     /86   Pulse 68   Temp 98.5  F (36.9  C) (Oral)   Ht 5' 6\" (1.676 m)   Wt 149 lb (67.6 kg)   SpO2 97%   BMI 24.05 kg/m      Physical Exam:  GENERAL APPEARANCE: A&A, NAD, well hydrated, well nourished  SKIN:  Normal skin turgor, no lesions/rashes   CV: RRR, no M/G/R   LUNGS: CTAB  ABDOMEN: S&NT, no masses or organomegaly  Pelvic exam was done.  External genitalia appeared normal.  Bimanual exam revealed uterus to be normal size, adnexa without palpable masses.  EXTREMITY: no swelling noted.  Full range of motion of all 4 extremities.   NEURO: no gross deficits   PSYCHIATRIC;  Mood appropriate, memory intact    LABS:     Recent Results (from the past 240 hour(s))   Pregnancy (Beta-hCG, Qual), Urine   Result Value Ref Range    Pregnancy Test, Urine Negative Negative       ASSESSMENT/PLAN:     Miscarriage [O03.9]      1. Miscarriage  - Pregnancy (Beta-hCG, Qual), Urine    2. Essential hypertension  - labetalol (TRANDATE; NORMODYNE) 100 MG tablet; Take 1 tablet (100 mg total) by mouth 2 (two) times a day.  Dispense: 180 tablet; Refill: 0    Patient overall seems to be doing well.  She seems to be better physically as well as emotionally.  She bled for about a week or so after her miscarriage and is not any bleeding since then.  We talked about the fact that she can get a menstrual cycle anywhere between 4 and 6 weeks after her miscarriage.  If she does not have a menstrual cycle within a month from today's date she certainly should let me know.  We talked about the fact that " often if you do conceive a baby that will not things back in the cultures I am hopeful that they will be able to get pregnant again in the near future.  Certainly they need to wait until they are ready to try to go go through a miscarriage again potentially but certainly one miscarriage does not put her at increased risk for another one.  Emotionally she seems to be doing well as is her .  She feels like she is open to pregnancy at this point.  If she were to achieve a pregnancy it would be a new pregnancy as today her pregnancy test was negative.  All of this was explained to her in great detail.  All of her questions were answered.  She does continue on labetalol for her hypertension.  Since they are going to continue to try to have a baby we will keep her on the labetalol.  It seems to be working well and she is tolerating it fine.  Refill of labetalol was given to her today.  We discussed the fact that she does not achieve a pregnancy within 6 months she should definitely make an appointment for follow-up as she is already 38 years of age.  I am hopeful that she will get pregnant in the near future and I will see her as a pregnancy confirmation whenever that happens.  Again if it does not happen she should return in about 6 months for follow-up of her hypertension as well as for further work-up regarding difficulty getting pregnant. Total time spent with patient was at least 25 minutes,  of which greater than fifty percent was spent in counselling and coordination of care of the above medical problems.  Ashely Larios MD

## 2021-06-01 NOTE — TELEPHONE ENCOUNTER
Who is calling:  Patient   Reason for Call:  Lmtcb,  Placed caller in [facility] for transfer to PCP . CMA did relay msg as well.  Date of last appointment with primary care: 05/31/19  Okay to leave a detailed message: Yes

## 2021-06-01 NOTE — TELEPHONE ENCOUNTER
Spoke with patient. We went over the options below from Dr Larios regarding appt times and dates. She would like to keep her appt for the 23rd. H.DeGree, CMA

## 2021-06-01 NOTE — TELEPHONE ENCOUNTER
I called and left message for patient to call back. I would like to speak to her when she returns the call.    I see that she is on my schedule on 9/23/19.  Wondering if she would like to be fit into my schedule earlier?  I can see her on Monday morning (I will be in clinic all morning) or any time I have an opening next week. I can also add her on to the beginning or the end of any day next week that does not already have someone coming in.

## 2021-06-01 NOTE — TELEPHONE ENCOUNTER
Patient had an appointment for pregnancy confirmation today and canceled this appointment.  I did see that on the cancellation and notes that she started bleeding this past weekend.  I did call patient and indeed she did start bleeding this weekend.  She had a miscarriage this spring and her bleeding over the weekend was very typical of that.  It was not quite as heavy as a last miscarriage that she had.  She estimated that she will probably have been about 6 weeks gestation at this point.  The bleeding has decreased significantly and she feels like she is passing this spontaneously.  She overall feels like things are doing okay.  We discussed the fact that she has the positive blood type and therefore does not need any further intervention as regards to that.  I would like to see her back in about 3 weeks for follow-up and to make sure that her pregnancy test is negative once again.  She will call back to make that appointment.  I did tell her that if she was unable to find an appointment she should leave a message for me and I be happy to work her in sometime the week of 10/21/2019.  All of her questions were answered today.  She has additional questions or concerns should certainly let me know.

## 2021-06-01 NOTE — TELEPHONE ENCOUNTER
Refill Approved    Rx renewed per Medication Renewal Policy. Medication was last renewed on 5/31/19.    Martha Norris, [facility] Triage/Med Refill 9/11/2019     Requested Prescriptions   Pending Prescriptions Disp Refills     labetalol (TRANDATE; NORMODYNE) 100 MG tablet 180 tablet 0     Sig: Take 1 tablet (100 mg total) by mouth 2 (two) times a day.       Beta-Blockers Refill Protocol Passed - 9/10/2019 10:27 AM        Passed - PCP or prescribing provider visit in past 12 months or next 3 months     Last office visit with prescriber/PCP: 5/31/2019 Ashely Larios MD OR same dept: 5/31/2019 Ashely Larios MD OR same specialty: 5/31/2019 Ashely Larios MD  Last physical: Visit date not found Last MTM visit: Visit date not found   Next visit within 3 mo: Visit date not found  Next physical within 3 mo: Visit date not found  Prescriber OR PCP: Ashely Larios MD  Last diagnosis associated with med order: 1. Essential hypertension  - labetalol (TRANDATE; NORMODYNE) 100 MG tablet; Take 1 tablet (100 mg total) by mouth 2 (two) times a day.  Dispense: 180 tablet; Refill: 0    If protocol passes may refill for 12 months if within 3 months of last provider visit (or a total of 15 months).             Passed - Blood pressure filed in past 12 months     BP Readings from Last 1 Encounters:   05/31/19 136/86

## 2021-06-03 VITALS — BODY MASS INDEX: 23.95 KG/M2 | WEIGHT: 149 LBS | HEIGHT: 66 IN

## 2021-06-03 VITALS — WEIGHT: 151 LBS

## 2021-06-05 VITALS
BODY MASS INDEX: 28.08 KG/M2 | HEART RATE: 77 BPM | DIASTOLIC BLOOD PRESSURE: 84 MMHG | SYSTOLIC BLOOD PRESSURE: 134 MMHG | WEIGHT: 174 LBS | OXYGEN SATURATION: 98 %

## 2021-06-05 VITALS
DIASTOLIC BLOOD PRESSURE: 78 MMHG | BODY MASS INDEX: 26.47 KG/M2 | SYSTOLIC BLOOD PRESSURE: 134 MMHG | OXYGEN SATURATION: 99 % | WEIGHT: 164 LBS | HEART RATE: 84 BPM

## 2021-06-05 VITALS
BODY MASS INDEX: 28.25 KG/M2 | WEIGHT: 175 LBS | OXYGEN SATURATION: 95 % | SYSTOLIC BLOOD PRESSURE: 118 MMHG | HEART RATE: 85 BPM | DIASTOLIC BLOOD PRESSURE: 82 MMHG

## 2021-06-05 VITALS
OXYGEN SATURATION: 99 % | SYSTOLIC BLOOD PRESSURE: 136 MMHG | BODY MASS INDEX: 25.82 KG/M2 | WEIGHT: 160 LBS | HEART RATE: 103 BPM | DIASTOLIC BLOOD PRESSURE: 76 MMHG

## 2021-06-05 VITALS
SYSTOLIC BLOOD PRESSURE: 136 MMHG | WEIGHT: 157 LBS | BODY MASS INDEX: 25.34 KG/M2 | DIASTOLIC BLOOD PRESSURE: 82 MMHG | OXYGEN SATURATION: 98 % | HEART RATE: 84 BPM

## 2021-06-05 VITALS
BODY MASS INDEX: 28.33 KG/M2 | DIASTOLIC BLOOD PRESSURE: 100 MMHG | SYSTOLIC BLOOD PRESSURE: 160 MMHG | WEIGHT: 175.5 LBS

## 2021-06-08 NOTE — TELEPHONE ENCOUNTER
I called and spoke with patient regarding her message.  I do think she would be best to be evaluated with the fertility clinic given the fact that she is 37-1/2 years old.  Will place referral for fertility clinic.  I did discuss with her that she could use [facility] in [place] or she can [facility].  She would prefer to go to to reproductive medicine in [place] if possible since it is a lot closer to her house.  Will place referral today.  Someone from our office should contact you regarding getting the appointment set up.  If she does not hear from someone in regards to this appointment she certainly should let me know.

## 2021-06-10 NOTE — PROGRESS NOTES
Patient presents to the office today requesting MMR vaccination.  She is in the process of being worked up for infertility and the infertility doctors did a blood test that showed that she was not immune to rubella.  They recommended that she have a MMR vaccination.  That will be given today.  Order was placed.  Patient was reminded that she cannot get pregnant for 3 months after receiving this immunization due to the possibility of birth defects.  Patient continues to work through infertility doctors and thus I suspect the possibility of pregnancy is rather remote but she absolutely needs to use something for birth control for the next 3 months and cannot risk spontaneously getting pregnant having just received the MMR.

## 2021-06-10 NOTE — PROGRESS NOTES
"Patient here for MMR vaccination per infertility clinic. Dr. Larios put in order. Vaccination was given subcutaneously in the left arm. Dr. Larios told me to tell the patient to use protection during intercourse for 3 months which I did but the patient said \"The infertility clinic told me 30 days\" I ask the patient if she would like me to go clarify with Dr. Larios to which she said she did not want me to and that she would clarify with the infertility clinic.       Ann Prince, [facility]    "

## 2021-06-12 NOTE — TELEPHONE ENCOUNTER
Patient has upcoming appointment 11/10/2020.    Please call and let patient know refill has been sent to pharmacy.

## 2021-06-12 NOTE — TELEPHONE ENCOUNTER
I called and spoke with patient today.  She notes that by her ultrasound yesterday she would be 8 weeks 3 days today which gives her an EDC of 5/24/2021.  They were able to see heart tones at 6 weeks as well as 8 weeks gestation.  Patient has been working with the infertility doctors to achieve a pregnancy.  She has had records sent to me in which I will review and will give her a call back.  We have set her up for first OB appointment when she will be 12 weeks gestation and that is on 11/10/2020 at 2 pm.  All of her questions were answered today.

## 2021-06-12 NOTE — TELEPHONE ENCOUNTER
RN cannot approve Refill Request    RN can NOT refill this medication Protocol failed and NO refill given. Last office visit: 5/31/2019 Ashely Larios MD Last Physical: Visit date not found Last MTM visit: Visit date not found Last visit same specialty: 5/31/2019 Ashely Larios MD.  Next visit within 3 mo: Visit date not found  Next physical within 3 mo: Visit date not found      Martha Norris, [facility] Triage/Med Refill 10/22/2020    Requested Prescriptions   Pending Prescriptions Disp Refills     labetaloL (TRANDATE; NORMODYNE) 100 MG tablet [Pharmacy Med Name: LABETALOL HCL 100MG TABS] 180 tablet 1     Sig: TAKE ONE TABLET BY MOUTH TWICE A DAY       Beta-Blockers Refill Protocol Failed - 10/20/2020  9:11 AM        Failed - PCP or prescribing provider visit in past 12 months or next 3 months     Last office visit with prescriber/PCP: 5/31/2019 Ashely Larios MD OR same dept: Visit date not found OR same specialty: 5/31/2019 Ashely Larios MD  Last physical: Visit date not found Last MTM visit: Visit date not found   Next visit within 3 mo: Visit date not found  Next physical within 3 mo: Visit date not found  Prescriber OR PCP: Ashely Larios MD  Last diagnosis associated with med order: 1. Essential hypertension  - labetaloL (TRANDATE; NORMODYNE) 100 MG tablet [Pharmacy Med Name: LABETALOL HCL 100MG TABS]; TAKE ONE TABLET BY MOUTH TWICE A DAY  Dispense: 180 tablet; Refill: 1    If protocol passes may refill for 12 months if within 3 months of last provider visit (or a total of 15 months).             Failed - Blood pressure filed in past 12 months     BP Readings from Last 1 Encounters:   05/31/19 136/86                    Yes

## 2021-06-12 NOTE — PROGRESS NOTES
Patient is currently 9 weeks pregnant after having achieved pregnancy while going to the reproductive endocrinologist at [facility].  She was seen by [facility].  She notes that they were going to do an IUI however she never got her period and was found to be pregnant.  She is now 8 weeks pregnant and therefore is transferring care to me for the rest of her prenatal visits.  Will order ultrasound to establish viability and check fetal wellbeing.  Patient was given the phone number to call and schedule that sometime next week.  She already has a first OB appointment scheduled for about 3 weeks from now.  Patient overall notes that she is feeling okay.  She is feeling tired and her breasts are very sore.  She has occasional periods of nausea but not any can a consistent basis.  She overall is feeling very positive about this.  She will call and schedule for an ultrasound.  If she has troubles scheduling it will certainly let me know via [employer].  I will contact her with results of the ultrasound when it returns.  All of her questions were answered today.

## 2021-06-12 NOTE — PROGRESS NOTES
Infection History   - Live with someone with TB or exposed to TB No  - Patient reported with history of genital herpes No  - Rash or viral illness since LMP   No  - Prior GBS infected child    No  - Hepatitis B or C     No  - Gonorrhea      No  - Chlamydia      No  - HPV       No  - HIV       No  - Syphilis      No  - Other STI's      No  - Other (see comments)    No      Genetic Screening/Teratology Counseling  - Patient's age 35 years or older as of estimated date of delivery  No  - Thalassemia    (Italian, Greek, Mediterranean, or  background) No  - MCV less than 80       No   - Neural tube defects    (meningomyelocele, spina bifida, or anencephaly)  No  - Congenital heart defect      No  - Down syndrome       No  - Dillon-Sachs (Ashkenazi Mosque, [place], [place])   No  - Canavan Disease (Ashkenazi Mosque)    No  - Familial Dysautonomia (Ashkenazi Mosque)    No  - Sickle cell disease or trait ()     No  - Hemophilia or other blood disorders    No  - Muscular dystrophy       No  - Cystic fibrosis       No  - Patric's chorea       No  - Mental retardation/autism       No   (If yes, was the patient tested for fragile X?)     - Other inherited genetic or chromosomal disorders   No  - Maternal metabolic disorders (type 1 diabetes, PKU)  No  - Patient or baby's father had a child with birth defects   No  - Recurrent pregnancy loss, or stillbirth    No  - Medications   (including supplements, vitamins, herbs, OTC drugs)   /illicit/recreational drugs/alcohol since last LMP    list agents and strength, dosing    No  Any other (see comments)      No      Discussed in today's office visit  HIV and other routine prenatal tests     Discussed, will get hepatitis B, HIV, syphilis testing today.  We will also obtain chlamydia and gonorrhea testing at a future time due to shortage of test kits.   Risk Factors Identified by Prenatal history   None identified  Anticipated course of prenatal care    Discussed,  need to see me every 4 weeks until 24 weeks gestation then every 2 weeks until 36 weeks gestation and then every week until delivery was discussed.  Nutrition and weight gain counseling: special diet   Discussed  Toxoplasmosis precautions (Cats/Raw meat)  Discussed, no exposure to cat litter.  Sexual activity       Discussed  Exercise       Discussed  Influenza vaccine      Discussed, will give today.   Smoking counseling      Non smoker  Environmental/work hazards     None identifed  Travel        Discussed  Tobacco (asked, advise, assess, assist and arrange) No tobacco use  Alcohol       No alcohol  Illicit/recreational drugs     Patient denies  Use of any meds   (including supplements, vitamins, herbs, OTC drugs) Discussed  Indications for ultrasound     Discussed, will plan to get ultrasound at 20 weeks gestation to check fetal anatomy.  Domestic violence      Patient denies  Seat Belt Use        Discussed  Childbirth classes/hospital facilities    Discussed, plans to deliver at [facility].  Dental care       Discussed  Avoidance of saunas or hot tubs    Discussed  Teratogens        Discussed  Breast-feeding       Discussed, would like to attempt breast-feeding.  Screening for aneuploidy     Discussed, discussed at length today, will discuss with significant other and if interested will let me know.       Patient is a 40-year-old comes in today for first OB appointment.  She is 12 weeks 1 day today.  She overall feels like things are doing well.  She has not had much illness at all.  She had a little bit of nausea but not to any significant extent.  They have had 2 previous miscarriages.  She did go to the infertility doctor and had testing done.  They were just about to start IUI and then she got pregnant spontaneously.  She was followed with the infertility doctors until she got to 8 weeks longer than her pregnancy care was transferred to me.  She was on progesterone until 10 weeks gestation she overall has  been feeling fine.  We did talk about genetic testing at length today.  We discussed all the different types of genetic testing that there are.  She is age 40 and so does meet the criteria for advanced maternal age.  We will plan to get a level 2 ultrasound at 20 weeks gestation due to the fact that she is age 40.  We talked about the increased risk of chromosomal abnormalities over the age of 35 but certainly the age of 38 is at less risk than the age of 41 or 42.  There are many women who have normal healthy babies at 30-41 or 42 as well.  We discussed paternity testing as well as testing during the second trimester.  She is going to discuss this further with her  and if she is interested in that will certainly let me know.  She did get an MMR vaccination 1 month prior to conception which was given and supervised by the infertility doctors.  She did not get varicella vaccination as she did not want a wait that long before trying to conceive and so we will need a varicella vaccination after pregnancy.  She is not at high risk for chlamydia or gonorrhea and therefore we will delayed getting that testing done until further on in the pregnancy due to the shortage of testing kits currently.  Patient and her  did have genetic testing done and so she is not certain as to whether or not they will get genetic testing done on the baby.  Risk assessment was done today.  Flu shot was given today.  Full physical exam was done today.  We did review all of the records from the infertility doctor at length today as well.  Patient does have a history of hypertension and is on labetalol.  Her blood pressure today looks fine.  We will need to monitor this carefully.  We discussed certainly with her advanced maternal age and the fact that she has chronic hypertension she is at high risk for preeclampsia.  Will have patient start on 81 mg aspirin and take it on a daily basis now that she is 12 weeks gestation.  We will  do metabolic panel today to follow-up on her labetalol usage as well.  She was on progesterone until 10 weeks gestation but now has gone off of that.  All of her questions were answered today.  We will see her back in 4 weeks for her next OB visit. Total time spent with patient was at least 45 minutes,  of which greater than fifty percent was spent in counselling and coordination of care regarding her pregnancy, chronic hypertension, advanced maternal age.    Voice recognition software was used in the creation of this note. Any grammatical or nonsensical errors are due to inherent errors with the software and are not the intention of the writer.     PRENATAL VISIT   FIRST OBSTETRICAL EXAM - OB    Assessment / Impression     Normal first prenatal visit at 12w1d  Discussed orientation, general information, lifestyle, nutrition, exercise,warning signs, resources, lab testing, risk screening discussed with patient.  Questions answered.    Plan:      Initial labs drawn.  Patient will continue on prenatal vitamins.  Problem list reviewed and updated.  Genetic screening test options discussed:  Patient discussed with her  and if they are interested in any kind of genetic testing will certainly let me know.  Role of ultrasound in pregnancy discussed; fetal survey: Plan to get level 2 ultrasound at 20 weeks gestation due to advanced maternal age.  Follow up: Return in about 4 weeks (around 2020) for next prenatal visit.      Subjective:    Arely Saldaña is a 40 y.o.  here today for her First Obstetrical Exam.   OB History    Para Term  AB Living   3 0 0 0 2 0   SAB TAB Ectopic Multiple Live Births   2 0 0 0 0      # Outcome Date GA Lbr Sigifredo/2nd Weight Sex Delivery Anes PTL Lv   3 Current            2 SAB 19 6w0d             Birth Comments: pos test at home, then had bleeding before even seen for pregnancy confirmation   1 SAB 05/10/19 7w2d    SAB         Birth Comments: bled spontaneously, u/s  confirmed no IUP, no ectopic, no complications       Expected Date of Delivery: 5/24/2021, by Patient Reported    Past Medical History:   Diagnosis Date     Hypertension     was on nifedipine and HCTZ before pregnancy, had since age early 20's     Past Surgical History:   Procedure Laterality Date     FOOT SURGERY Left age 20    bursa removed from left foot     WISDOM TOOTH EXTRACTION  age 19     Social History     Tobacco Use     Smoking status: Never Smoker     Smokeless tobacco: Never Used   Substance Use Topics     Alcohol use: Not Currently     Comment: once every 2-3 months prior to pregnancy     Drug use: Never     Current Outpatient Medications   Medication Sig Dispense Refill     cholecalciferol, vitamin D3, (VITAMIN D3) 100 mcg (4,000 unit) cap Take by mouth.       labetaloL (TRANDATE; NORMODYNE) 100 MG tablet TAKE ONE TABLET BY MOUTH TWICE A  tablet 1     prenatal no115/iron/folic acid (PRENATAL 19 ORAL)        No current facility-administered medications for this visit.      Allergies   Allergen Reactions     Penicillins Hives     Sulfamethoxazole-Trimethoprim Rash             High Risk Behavior: Age is <18 or >35 and Hypertension or pre-eclampsia    Review of Systems  General:  Denies problem  Eyes: Denies problem  Ears/Nose/Throat: Denies problem  Cardiovascular: Denies problem  Respiratory:  Denies problem  Gastrointestinal:  Denies problem, Genitourinary: Denies problem  Musculoskeletal:  Denies problem  Skin: Denies problem  Neurologic: Denies problem  Psychiatric: Denies problem  Endocrine: Denies problem  Heme/Lymphatic: Denies problem   Allergic/Immunologic: Denies problem       Objective:   Objective    Vitals:    11/10/20 1355   BP: 136/82   Pulse: 84   SpO2: 98%   Weight: 157 lb (71.2 kg)     Physical Exam:  General Appearance: Alert, cooperative, no distress, appears stated age  Head: Normocephalic, without obvious abnormality, atraumatic  Eyes: PERRL, conjunctiva/corneas clear, EOM's  intact  Ears: Normal TM's and external ear canals, both ears  Nose: Nares normal, septum midline,mucosa normal, no drainage  Throat: Lips, mucosa, and tongue normal; teeth and gums normal  Neck: Supple, symmetrical, trachea midline, no adenopathy;  thyroid: not enlarged, symmetric, no tenderness/mass/nodules  Back: Symmetric, no curvature, ROM normal, no CVA tenderness  Lungs: Clear to auscultation bilaterally, respirations unlabored  Breasts: No breast masses, tenderness, asymmetry, or nipple discharge.  Heart: Regular rate and rhythm, S1 and S2 normal, no murmur, rub, or gallop  Abdomen: Soft, non-tender, bowel sounds active all four quadrants,  no masses, no organomegaly  Pelvic:Normally developed genitalia with no external lesions or eruptions. Vagina and cervix show no lesions, inflammation, discharge or tenderness. No cystocele, No rectocele. Uterus 12 week size. No adnexal mass or tenderness.  Extremities: Extremities normal, atraumatic, no cyanosis or edema  Skin: Skin color, texture, turgor normal, no rashes or lesions  Lymph nodes: Cervical, supraclavicular, and axillary nodes normal  Neurologic: Normal     Lab:   Results for orders placed or performed in visit on 11/10/20   Culture, Urine    Specimen: Urine   Result Value Ref Range    Culture No Growth    Hepatitis C Antibody (Anti-HCV)   Result Value Ref Range    Hepatitis C Ab Negative Negative   HIV Antigen/Antibody Screening Cascade   Result Value Ref Range    HIV Antigen / Antibody Negative Negative   Thyroid Stimulating Hormone (TSH)   Result Value Ref Range    TSH 0.88 0.30 - 5.00 uIU/mL   Urinalysis Macroscopic   Result Value Ref Range    Color, UA Yellow Colorless, Yellow, Straw, Light Yellow    Clarity, UA Clear Clear    Glucose, UA Negative Negative    Bilirubin, UA Negative Negative    Ketones, UA Trace (!) Negative    Specific Gravity, UA >=1.030 1.005 - 1.030    Blood, UA Negative Negative    pH, UA 5.5 5.0 - 8.0    Protein, UA Negative  Negative mg/dL    Urobilinogen, UA 0.2 E.U./dL 0.2 E.U./dL, 1.0 E.U./dL    Nitrite, UA Negative Negative    Leukocytes, UA Negative Negative   Vitamin D, Total (25-Hydroxy)   Result Value Ref Range    Vitamin D, Total (25-Hydroxy) 31.6 30.0 - 80.0 ng/mL   Comprehensive Metabolic Panel   Result Value Ref Range    Sodium 137 136 - 145 mmol/L    Potassium 4.1 3.5 - 5.0 mmol/L    Chloride 106 98 - 107 mmol/L    CO2 21 (L) 22 - 31 mmol/L    Anion Gap, Calculation 10 5 - 18 mmol/L    Glucose 133 (H) 70 - 125 mg/dL    BUN 6 (L) 8 - 22 mg/dL    Creatinine 0.62 0.60 - 1.10 mg/dL    GFR MDRD Af Amer >60 >60 mL/min/1.73m2    GFR MDRD Non Af Amer >60 >60 mL/min/1.73m2    Bilirubin, Total 0.3 0.0 - 1.0 mg/dL    Calcium 9.2 8.5 - 10.5 mg/dL    Protein, Total 5.4 (L) 6.0 - 8.0 g/dL    Albumin 3.6 3.5 - 5.0 g/dL    Alkaline Phosphatase 61 45 - 120 U/L    AST 14 0 - 40 U/L    ALT 17 0 - 45 U/L   Syphilis Screen, Cascade   Result Value Ref Range    Treponema Antibody (Syphilis) Negative Negative   HM1 (CBC with Diff)   Result Value Ref Range    WBC 6.2 4.0 - 11.0 thou/uL    RBC 4.14 3.80 - 5.40 mill/uL    Hemoglobin 11.9 (L) 12.0 - 16.0 g/dL    Hematocrit 34.1 (L) 35.0 - 47.0 %    MCV 82 80 - 100 fL    MCH 28.7 27.0 - 34.0 pg    MCHC 34.9 32.0 - 36.0 g/dL    RDW 12.8 11.0 - 14.5 %    Platelets 197 140 - 440 thou/uL    MPV 11.8 8.5 - 12.5 fL    Neutrophils % 74 (H) 50 - 70 %    Lymphocytes % 15 (L) 20 - 40 %    Monocytes % 8 2 - 10 %    Eosinophils % 3 0 - 6 %    Basophils % 1 0 - 2 %    Immature Granulocyte % 1 (H) <=0 %    Neutrophils Absolute 4.6 2.0 - 7.7 thou/uL    Lymphocytes Absolute 0.9 0.8 - 4.4 thou/uL    Monocytes Absolute 0.5 0.0 - 0.9 thou/uL    Eosinophils Absolute 0.2 0.0 - 0.4 thou/uL    Basophils Absolute 0.0 0.0 - 0.2 thou/uL    Immature Granulocyte Absolute 0.0 <=0.0 thou/uL   Rubella Antibody, IgG   Result Value Ref Range    Rubella Antibody, IgG Negative    Hepatitis B Surface Antigen (HBsAG)   Result Value Ref  Range    Hepatitis B Surface Ag Negative Negative   HML ABO/Rh Typing   Result Value Ref Range    HML ABO/Rh Typing B POS     HML ABO/Rh Repeat Typing B POS    HML Antibody Screen   Result Value Ref Range    HML Antibody Screen Negative    Glycosylated Hemoglobin A1C   Result Value Ref Range    Hemoglobin A1c 4.8 <=5.6 %

## 2021-06-13 NOTE — PROGRESS NOTES
Patient is overall doing well.  She is feeling a lot better than at her last visit and is having more energy.  She is not yet feeling baby movement.  We discussed that that is not that uncommon at this stage of pregnancy with your first child.  Often is between 18 and 20 weeks before you start to feel baby movement with your first baby and sometimes it is even 22 weeks.  I would suspect she was started to feel baby movement over the next few weeks.  She denies that she is having any bleeding.  She denies that she is having any cramping but she thinks she has a little bit of round ligament pain is when she turns in certain directions she has little bit of pain in the lower abdomen area.  We talked about round ligament pain again today and all of her questions were answered.  Patient continues on labetalol for chronic hypertension.  Her blood pressure today is excellent at 136/76.  She is age 40 and does need a level 2 ultrasound at 18 to 20 weeks for chronic hypertension as well as for advanced maternal age.  We will go ahead and place that order today.  Someone should contact her regarding getting that appointment scheduled.  She has started on a 81 mg aspirin a day to try to prevent preeclampsia.  She overall feels like things are doing well.  All of her and her 's questions were answered today.  We will see them back in 4 weeks for her next OB visit.  I am fairly certain that she will have an ultrasound between now and the next time I see her and when I get those ultrasound results back I certainly will let them know.  I did explain to them that [facility] ultrasounds are different and that they will go and get the testing done and then they will leave knowing exactly what this ultrasound showed.  They were quite thankful for that.  Again we will see them back in about 4 weeks for her next OB visit.

## 2021-06-14 NOTE — TELEPHONE ENCOUNTER
I called and spoke with patient. Will reschedule her appointment from 2/5/2021 to 2/19/2021 at 320 pm. She will call if she has any additional problems prior to this. She is overall doing ok and denies any shortness of breath or other worrisome signs due to COVID.

## 2021-06-14 NOTE — PROGRESS NOTES
Patient overall seems to doing well.  She thinks that she started to feel baby movement about a week or week and a half ago or so.  She is not entirely convinced that its baby movement but I am fairly certain that it is.  She denies that she is having any bleeding.  She denies that she is having any contractions.  She did have an ultrasound at [facility] due to advanced maternal age as well as chronic hypertension.  Everything looked fine.  She does need ultrasounds on a regular basis because of her advanced maternal age as well as her chronic hypertension.  Her next ultrasound has been scheduled for 1/20/2021.  She is to continues on labetalol for blood pressure and that seems to be working well.  Her blood pressure today is 134/78.  She also continues on a baby aspirin a day.  She will continue that until 36 weeks gestation at least.  They do not know the sex of this baby.  They have it in an envelope at home but have not yet looked at it.  They are still debating whether or not they want to know if they wanted to be a surprise.  Patient overall seems to be doing well.  We will plan to do 1 hour glucose tolerance test as well as UA UC and hemoglobin next time she is here.  We discussed that at length.  We discussed the reason for increased ultrasound and increased monitoring with this pregnancy as well.  All of both her and her 's questions were answered today.  We will see her back in about 4 weeks for her next OB visit.

## 2021-06-14 NOTE — PROGRESS NOTES
"Please see \"Imaging\" tab under Chart Review for full report.  This ultrasound was performed in the [facility], and may be located under Care Everywhere.    Kitty Roque MD  Maternal Fetal Medicine    "

## 2021-06-15 NOTE — PROGRESS NOTES
Patient comes in today for her regular OB visit.  She overall feels like things are doing okay.  She did have COVID-19 about 2 weeks ago.  She feels like overall things are doing okay.  She feels like she is recovered fairly well from that.  She is feeling some baby movement but she notes that over the last for 5 days it seems like the baby is not moving quite as much as it had in the past.  She continues to feel some little movements here there but not the big kicks that she was feeling before.  Just overall does not feel like baby is moving nearly as much as has in the past.  She denies that she is having any bleeding.  She denies that she is having any contractions or cramping at all.  Her blood pressure initially today was 160/90.  On recheck was 134/84.  She continues on labetalol 100 mg twice a day.  She has history of chronic hypertension.  She denies that she has had any symptoms that be suggestive of preeclampsia.  She is had some mild swelling of her ankles at night but it goes down by the next morning.  She denies that she is having any headache or vision changes or right upper quadrant pain.  1 hour glucose tolerance test as well as UA UC and hemoglobin were done today.  Risk assessment was done today.  Patient did make appointments for the rest of her pregnancy today as well.  We did discuss that she will need Tdap vaccination as well as RPR to be drawn at her next visit.  Patient was sent to labor and delivery from the clinic for evaluation of the decreased fetal movement.  I also did asked them to recheck her blood pressure at that time we may need to do preeclamptic labs.  It is better to do the preeclamptic labs at the hospital as they will get them back much quicker than if I did would to draw them here.  This was all explained to the patient.  When she has her 1 hour glucose tolerance test drawn today she will go to labor and delivery for evaluation of the decreased fetal movement.  While she was  here in the office I did feel baby movement and fetal heart tones were easily appreciated today.  All of her questions were answered today.  If she has additional questions or concerns will let me know.  Patient will be directed to labor and delivery following her lab draw here.

## 2021-06-15 NOTE — TELEPHONE ENCOUNTER
Reason for Call:  Other   Detailed comments: bebeto glass n.p calling from [facility] pt-  Remaining in hospital due to worsening hypertension in pregnancy  Placed on 3 meds otherwise doing well      Phone Number Patient can be reached at: Other phone number:  [phone]    Best Time:     Can we leave a detailed message on this number?: No call back needed    Call taken on 3/2/2021 at 9:51 AM by Comfort Quispe

## 2021-06-15 NOTE — TELEPHONE ENCOUNTER
I did talk with Deepika Perez NP with [facility]. Patient was hospitalized last weekend for elevated blood pressures. She remains hospitalized and is on 3 antihypertensives and her blood pressures are still borderline elevated. They plan to keep her hospitalized until delivery unless her blood pressures improve remarkably. They will continue to keep me posted. Growth ultrasound yesterday showed baby at 24%ile.

## 2021-06-15 NOTE — PROGRESS NOTES
PROGRESS NOTE   2/22/2021    SUBJECTIVE:  Arely Saldaña is a 40 y.o. female  who presents for   Chief Complaint   Patient presents with     Routine Prenatal Visit     27 wk ob     Blood Pressure Check     Patient comes in today for recheck of her blood pressure.  I saw her on Friday for her regular OB visit.  Initially her blood pressure here was elevated but on recheck it was okay.  She was sent to labor and delivery because of decreased fetal movement.  The biophysical profile and NST were entirely normal however her blood pressure was elevated.  Her blood pressure ultimately went up to 212/117.  She was observed overnight and her labetalol was increased from 100 mg twice a day up to 300 mg twice a day.  She comes in today for follow-up.  She notes that she continues to be totally asymptomatic.  She did have a little bit of a headache in the hospital but she thought that was due to not eating.  Since she has been home her headache is gone away and she has not had any right upper quadrant tenderness no swelling no vision changes or other symptoms that would be suggestive of preeclampsia.  All of her preeclamptic labs were negative on Friday evening.  Her blood pressure today looks excellent at 118/82.  She is feeling well.  Baby is back to moving as it normally did prior to the last few days before Friday of last week.     Patient Active Problem List   Diagnosis     Essential hypertension     Supervision of normal first pregnancy, antepartum     ASCUS of cervix with negative high risk HPV     Advanced maternal age, primigravida, antepartum     Elevated blood pressure affecting pregnancy in second trimester, antepartum       Current Outpatient Medications   Medication Sig Dispense Refill     cholecalciferol, vitamin D3, (VITAMIN D3) 100 mcg (4,000 unit) cap Take by mouth.       ferrous sulfate (IRON) 325 (65 FE) MG tablet Take 1 tablet by mouth daily with breakfast.       labetaloL (NORMODYNE) 300 MG tablet Take  1 tablet (300 mg total) by mouth 2 (two) times a day. 180 tablet 0     prenatal no115/iron/folic acid (PRENATAL 19 ORAL)        No current facility-administered medications for this visit.        Allergies   Allergen Reactions     Penicillins Hives     Sulfamethoxazole-Trimethoprim Rash       Past Medical History:   Diagnosis Date     ASCUS of cervix with negative high risk HPV 7/26/2018 2008-2012 NIL annual pap 4/13/15 NIL pap, neg HPV 7/26/18 ASCUS, neg HPV 11/10/20 NIL pap, neg HPV     Hypertension     was on nifedipine and HCTZ before pregnancy, had since age early 20's       Past Surgical History:   Procedure Laterality Date     FOOT SURGERY Left age 20    bursa removed from left foot     WISDOM TOOTH EXTRACTION  age 19       Social History     Tobacco Use   Smoking Status Never Smoker   Smokeless Tobacco Never Used       OBJECTIVE:     /82   Pulse 85   Wt 175 lb (79.4 kg)   SpO2 95%   BMI 28.25 kg/m      Physical Exam:  GENERAL APPEARANCE: A&A, NAD, well hydrated, well nourished  SKIN:  Normal skin turgor, no lesions/rashes   ABDOMEN: S&NT, no masses or organomegaly, gravid uterus appropriate size for gestation, fetal heart tones 148.  EXTREMITY: no swelling noted.  Full range of motion of all 4 extremities.   NEURO: no gross deficits   PSYCHIATRIC;  Mood appropriate, memory intact    LABS:     Recent Results (from the past 240 hour(s))   Culture, Urine    Specimen: Urine   Result Value Ref Range    Culture No Growth    Urinalysis Macroscopic   Result Value Ref Range    Color, UA Yellow Colorless, Yellow, Straw, Light Yellow    Clarity, UA Clear Clear    Glucose, UA Negative Negative    Bilirubin, UA Negative Negative    Ketones, UA Negative Negative    Specific Gravity, UA >=1.030 1.005 - 1.030    Blood, UA Negative Negative    pH, UA 6.0 5.0 - 8.0    Protein, UA Negative Negative mg/dL    Urobilinogen, UA 0.2 E.U./dL 0.2 E.U./dL, 1.0 E.U./dL    Nitrite, UA Negative Negative    Leukocytes, UA  Negative Negative   Glucose,Gestational Challenge (1 Hour)   Result Value Ref Range    Glucose, Gestational Challenge 1hr 127 70 - 139 mg/dL    Patient Fasting > 8hrs? No    Hemoglobin   Result Value Ref Range    Hemoglobin 10.6 (L) 12.0 - 16.0 g/dL   ALT (current @ALT(SGPT)   Result Value Ref Range    ALT 17 0 - 45 U/L   AST (current @AST(SGOT)   Result Value Ref Range    AST 20 0 - 40 U/L   INR   Result Value Ref Range    INR 0.95 0.90 - 1.10   APTT(PTT)   Result Value Ref Range    PTT 24 24 - 37 seconds   HM2(CBC w/o Differential)   Result Value Ref Range    WBC 8.9 4.0 - 11.0 thou/uL    RBC 3.94 3.80 - 5.40 mill/uL    Hemoglobin 11.5 (L) 12.0 - 16.0 g/dL    Hematocrit 33.2 (L) 35.0 - 47.0 %    MCV 84 80 - 100 fL    MCH 29.2 27.0 - 34.0 pg    MCHC 34.6 32.0 - 36.0 g/dL    RDW 12.7 11.0 - 14.5 %    Platelets 221 140 - 440 thou/uL    MPV 11.8 8.5 - 12.5 fL   Type and Screen   Result Value Ref Range    ABORh B POS     Antibody Screen Negative Negative   Creatinine   Result Value Ref Range    Creatinine 0.64 0.60 - 1.10 mg/dL    GFR MDRD Af Amer >60 >60 mL/min/1.73m2    GFR MDRD Non Af Amer >60 >60 mL/min/1.73m2   Uric Acid   Result Value Ref Range    Uric Acid 4.5 2.0 - 7.5 mg/dL   Protein/Creatinine Ratio, Urine Random   Result Value Ref Range     Protein, Random Urine <7 mg/dL    Creatinine, Urine 58.1 mg/dL    Protein/Creatinine Ratio, Random Urine         ASSESSMENT/PLAN:     Essential hypertension [I10]      1. Essential hypertension    2. Elevated blood pressure affecting pregnancy in second trimester, antepartum    3. Supervision of normal first pregnancy, antepartum    Patient comes in today for recheck of her blood pressure.  She is currently 27 weeks gestation in her pregnancy.  She was seen in labor and delivery for decreased fetal movement on Friday night and was found to have elevation in her blood pressure.  She does have a history of essential hypertension and had been on labetalol 100 mg twice a day.   Her blood pressure was monitored after being given 2 doses of IV hydralazine on Friday night.  Saturday morning her blood pressure remained in the acceptable range and she was changed to labetalol 300 mg twice a day.  She has been on labetalol 300 mg twice a day since that time and comes in today for follow-up.  Blood pressure today looks fine at 118/82.  She is tolerating the increase in the medication without problems.  We will continue the medications at labetalol 300 mg twice a day I will have her return to clinic on Friday for recheck of her blood pressure again.  She denies that she is having any symptoms that be suggestive of preeclampsia.  Her preeclamptic labs were negative on Friday night.  If she develops any new symptoms and we did go over all of the symptoms that she can be watching for she should let me know immediately.  All of her questions were answered today.  If she has additional questions or concerns should let me know.  Ashely Larios MD    This note was dictated using voice recognition software. Any grammatical errors, context distortions, or spelling errors are not intentional

## 2021-06-15 NOTE — TELEPHONE ENCOUNTER
Patient is on the Labor and delivery unit now@ [facility] mother and baby unit. (she was originally to be a [facility] patient.)  They are requesting medical records. Patient had an office visit today @ [facility]. Call transferred to  Medical records department.      Nelsy Robledo RN Triage Nurse Advisor 6:49 PM 2/24/2021

## 2021-06-15 NOTE — PROGRESS NOTES
PROGRESS NOTE   2/24/2021    SUBJECTIVE:  Arely Saldaña is a 40 y.o. female  who presents for   Chief Complaint   Patient presents with     Routine Prenatal Visit     27 weeks, 2 days. Blood pressure recheck. Patient is checking her blood pressure at home reading on Saturday was in the 150's/90's range.      Patient comes in today for evaluation and recheck of her blood pressure.  She is currently 27 weeks 2 days gestation.  Patient has a history of chronic hypertension and had been on labetalol 100 mg twice a day.  She came in for her routine OB visit last Friday and was noted to have elevation in blood pressure to 160/90 but on recheck was 134/84.  On that day she also was complaining of decreased fetal movement so she was sent to labor and delivery for evaluation of the decreased fetal movement.  On labor and delivery her blood pressure was once again elevated.  Biophysical profile was done on that day as well as preeclamptic labs which were all normal.  Baby did start to move normally while she was on labor and delivery however patient's blood pressure reached maximum of 212/117.  Patient was given 2 doses of IV hydralazine and monitored overnight.  She was discharged from the hospital on Saturday with increased dose of labetalol 300 mg twice a day for her blood pressure as her blood pressure was in an acceptable range throughout the night and into the early morning on Saturday 2/20/2021.  Patient was seen on Monday morning for recheck of her blood pressure and at that time her blood pressure was 118/82.  Patient comes in today after calling earlier today and stating that she felt lightheaded after she took her blood pressure medication yesterday morning as well as this morning.  When asked her further about that she said she just felt like she had to sit down.  It was not really that the room was spinning just felt like she had to sit down or she was going to fall.  This lasted about 20 minutes each morning  but she called because of the fact that it had been repetitive now for 2 days.  Initially her blood pressure today was 184/100.  On recheck it was 160/100.  She denies that she is having any symptoms of preeclampsia including no swelling no vision changes no right upper quadrant tenderness and she overall is feeling fine.  She is feeling normal baby movement and actually is surprised at how much this baby is moving compared to what it was moving in the middle part of last week.  She denies that she is having any bleeding.  She denies that she is having any contractions.  She did have a pain that sounds like it was more of a stretching pain with it was a constant pain for a couple of hours on Monday night into Tuesday but then the pain went away and she has not had recurrence of that.  She has had no symptoms that would be suggestive of come and go pain or contraction-like pain.    Patient Active Problem List   Diagnosis     Essential hypertension     Supervision of normal first pregnancy, antepartum     ASCUS of cervix with negative high risk HPV     Advanced maternal age, primigravida, antepartum     Elevated blood pressure affecting pregnancy in second trimester, antepartum       Current Outpatient Medications   Medication Sig Dispense Refill     aspirin (ASPIR-81 ORAL) Take by mouth daily.       cholecalciferol, vitamin D3, (VITAMIN D3) 100 mcg (4,000 unit) cap Take by mouth.       ferrous sulfate (IRON) 325 (65 FE) MG tablet Take 1 tablet by mouth daily with breakfast.       labetaloL (NORMODYNE) 300 MG tablet Take 1 tablet (300 mg total) by mouth 2 (two) times a day. 180 tablet 0     prenatal no115/iron/folic acid (PRENATAL 19 ORAL)        No current facility-administered medications for this visit.        Allergies   Allergen Reactions     Penicillins Hives     Sulfamethoxazole-Trimethoprim Rash       Past Medical History:   Diagnosis Date     ASCUS of cervix with negative high risk HPV 7/26/2018 2008-2012  NIL annual pap 4/13/15 NIL pap, neg HPV 7/26/18 ASCUS, neg HPV 11/10/20 NIL pap, neg HPV     Hypertension     was on nifedipine and HCTZ before pregnancy, had since age early 20's       Past Surgical History:   Procedure Laterality Date     FOOT SURGERY Left age 20    bursa removed from left foot     WISDOM TOOTH EXTRACTION  age 19       Social History     Tobacco Use   Smoking Status Never Smoker   Smokeless Tobacco Never Used       OBJECTIVE:     BP (!) 160/100   Wt 175 lb 8 oz (79.6 kg)   Breastfeeding No   BMI 28.33 kg/m      Physical Exam:  GENERAL APPEARANCE: A&A, NAD, well hydrated, well nourished  SKIN:  Normal skin turgor, no lesions/rashes   ABDOMEN: S&NT, no masses or organomegaly, gravid uterus appropriate for gestational age, fetal heart tones 152.  EXTREMITY: Minimal swelling noted.  Full range of motion of all extremities.   NEURO: no gross deficits   PSYCHIATRIC;  Mood appropriate, memory intact    LABS:     Recent Results (from the past 240 hour(s))   Culture, Urine    Specimen: Urine   Result Value Ref Range    Culture No Growth    Urinalysis Macroscopic   Result Value Ref Range    Color, UA Yellow Colorless, Yellow, Straw, Light Yellow    Clarity, UA Clear Clear    Glucose, UA Negative Negative    Bilirubin, UA Negative Negative    Ketones, UA Negative Negative    Specific Gravity, UA >=1.030 1.005 - 1.030    Blood, UA Negative Negative    pH, UA 6.0 5.0 - 8.0    Protein, UA Negative Negative mg/dL    Urobilinogen, UA 0.2 E.U./dL 0.2 E.U./dL, 1.0 E.U./dL    Nitrite, UA Negative Negative    Leukocytes, UA Negative Negative   Glucose,Gestational Challenge (1 Hour)   Result Value Ref Range    Glucose, Gestational Challenge 1hr 127 70 - 139 mg/dL    Patient Fasting > 8hrs? No    Hemoglobin   Result Value Ref Range    Hemoglobin 10.6 (L) 12.0 - 16.0 g/dL   ALT (current @ALT(SGPT)   Result Value Ref Range    ALT 17 0 - 45 U/L   AST (current @AST(SGOT)   Result Value Ref Range    AST 20 0 - 40 U/L    INR   Result Value Ref Range    INR 0.95 0.90 - 1.10   APTT(PTT)   Result Value Ref Range    PTT 24 24 - 37 seconds   HM2(CBC w/o Differential)   Result Value Ref Range    WBC 8.9 4.0 - 11.0 thou/uL    RBC 3.94 3.80 - 5.40 mill/uL    Hemoglobin 11.5 (L) 12.0 - 16.0 g/dL    Hematocrit 33.2 (L) 35.0 - 47.0 %    MCV 84 80 - 100 fL    MCH 29.2 27.0 - 34.0 pg    MCHC 34.6 32.0 - 36.0 g/dL    RDW 12.7 11.0 - 14.5 %    Platelets 221 140 - 440 thou/uL    MPV 11.8 8.5 - 12.5 fL   Type and Screen   Result Value Ref Range    ABORh B POS     Antibody Screen Negative Negative   Creatinine   Result Value Ref Range    Creatinine 0.64 0.60 - 1.10 mg/dL    GFR MDRD Af Amer >60 >60 mL/min/1.73m2    GFR MDRD Non Af Amer >60 >60 mL/min/1.73m2   Uric Acid   Result Value Ref Range    Uric Acid 4.5 2.0 - 7.5 mg/dL   Protein/Creatinine Ratio, Urine Random   Result Value Ref Range     Protein, Random Urine <7 mg/dL    Creatinine, Urine 58.1 mg/dL    Protein/Creatinine Ratio, Random Urine         ASSESSMENT/PLAN:     Elevated blood pressure affecting pregnancy in third trimester, antepartum [O16.3]      1. Elevated blood pressure affecting pregnancy in third trimester, antepartum    2. Primigravida of advanced maternal age in third trimester    3. Encounter for supervision of normal first pregnancy in third trimester    4. Essential hypertension    Patient is a 40-year-old -0-0-0 at 27 weeks 2 days gestation who presents today because of lightheadedness after taking her labetalol yesterday morning and this morning.  The lightheadedness lasted for about 20 minutes.  She felt like she just needed to sit down although she denies that the room was spinning at all.  She has felt well otherwise.  She does have a history of chronic hypertension and had been on labetalol 100 mg twice a day until last Friday when she came in for regular OB visit and her blood pressure was elevated.  Her blood pressure medication was increased to labetalol 300  mg twice a day on Saturday morning, 2021, after being on labor and delivery and her blood pressure being significantly elevated.  Patient was seen on Monday in the clinic here and her blood pressure was better at 118/82.  Patient came in today and blood pressure initially was 184/100 and on repeat was 160/100.  Patient denies that she is having any symptoms of preeclampsia but given the significant elevation in her blood pressure despite the fact that she has been on labetalol 300 mg on a twice a day basis I do think she needs further evaluation.  She will need lab work to be done and unfortunately given the time of day and the urgency to get this lab work done as quickly as possible I am unable to draw the lab work here and I think she does need to be evaluated on labor and delivery.  Patient was directed to [facility] labor and delivery given her gestational age of 27 weeks for further evaluation.  We did discuss that they would monitor her baby as well as monitor her blood pressure.  We will also draw labs again to evaluate for potential preeclampsia in addition to the chronic hypertension.  Patient again denies that she is having any kind of symptoms of preeclampsia currently.  Patient will present now to labor and delivery for further evaluation.  I did call [facility] for OB consult as they had seen her over the weekend when she was on [facility] labor and delivery and did speak with Dr. Vásquez who suggested patient probably needed a MFM consult and therefore I did talk with Dr. Li of [place]  physicians who will follow patient while at [facility].  Did also talk with the charge nurse on [facility] labor and delivery to let them know that she is coming now for evaluation.  Again patient was directed now to labor and delivery at [facility] for further evaluation. At least 70 minutes spent on the day of encounter doing chart review, history and exam, counseling, coordination of care, including discussions  with OB and MFM physicians, documentation and other activities as noted.   Ashely Larios MD    This note was dictated using voice recognition software. Any grammatical errors, context distortions, or spelling errors are not intentional      0 50

## 2021-06-16 PROBLEM — R87.610 ASCUS OF CERVIX WITH NEGATIVE HIGH RISK HPV: Status: ACTIVE | Noted: 2018-07-26

## 2021-06-16 PROBLEM — Z34.00 SUPERVISION OF NORMAL FIRST PREGNANCY, ANTEPARTUM: Status: ACTIVE | Noted: 2020-11-09

## 2021-06-16 PROBLEM — O09.519 ADVANCED MATERNAL AGE, PRIMIGRAVIDA, ANTEPARTUM: Status: ACTIVE | Noted: 2020-12-08

## 2021-06-23 ENCOUNTER — COMMUNICATION - HEALTHEAST (OUTPATIENT)
Dept: FAMILY MEDICINE | Facility: CLINIC | Age: 41
End: 2021-06-23

## 2021-06-23 DIAGNOSIS — I10 ESSENTIAL HYPERTENSION: ICD-10-CM

## 2021-06-26 NOTE — TELEPHONE ENCOUNTER
Patient was in for daughter's well child check at 2 months of age. She continues on nifedipine for her blood pressure. Refill given today to get her through to her postpartum check.   She is feeling well. /76.

## 2021-06-30 ENCOUNTER — MEDICAL CORRESPONDENCE (OUTPATIENT)
Dept: HEALTH INFORMATION MANAGEMENT | Facility: CLINIC | Age: 41
End: 2021-06-30

## 2021-07-03 NOTE — ADDENDUM NOTE
Addendum Note by Re Calabrese at 7/13/2020 11:59 PM     Author: Re Calabrese Service: -- Author Type: --    Filed: 11/23/2020  7:18 AM Encounter Date: 7/13/2020 Status: Signed    : Re Calabrese    Addended by: RE CALABRESE on: 11/23/2020 07:18 AM        Modules accepted: Orders

## 2021-07-04 NOTE — ADDENDUM NOTE
Addendum Note by Ashely Larios MD at 2/24/2021  2:40 PM     Author: Ashely Larios MD Service: -- Author Type: Physician    Filed: 2/25/2021  2:03 PM Encounter Date: 2/24/2021 Status: Signed    : Ashely Larios MD (Physician)    Addended by: ASHELY LARIOS on: 2/25/2021 02:03 PM        Modules accepted: Level of Service

## 2021-07-05 ENCOUNTER — AMBULATORY - HEALTHEAST (OUTPATIENT)
Dept: FAMILY MEDICINE | Facility: CLINIC | Age: 41
End: 2021-07-05

## 2021-07-05 DIAGNOSIS — Z23 NEED FOR VARICELLA VACCINE: ICD-10-CM

## 2021-08-16 ENCOUNTER — MYC MEDICAL ADVICE (OUTPATIENT)
Dept: FAMILY MEDICINE | Facility: CLINIC | Age: 41
End: 2021-08-16

## 2021-08-16 DIAGNOSIS — I10 ESSENTIAL HYPERTENSION: ICD-10-CM

## 2021-08-18 ENCOUNTER — MYC MEDICAL ADVICE (OUTPATIENT)
Dept: FAMILY MEDICINE | Facility: CLINIC | Age: 41
End: 2021-08-18

## 2021-08-18 DIAGNOSIS — I10 ESSENTIAL HYPERTENSION: ICD-10-CM

## 2021-08-25 ENCOUNTER — E-VISIT (OUTPATIENT)
Dept: FAMILY MEDICINE | Facility: CLINIC | Age: 41
End: 2021-08-25
Payer: COMMERCIAL

## 2021-08-25 DIAGNOSIS — J01.90 ACUTE SINUSITIS WITH SYMPTOMS > 10 DAYS: Primary | ICD-10-CM

## 2021-08-25 PROCEDURE — 99421 OL DIG E/M SVC 5-10 MIN: CPT | Performed by: PHYSICIAN ASSISTANT

## 2021-08-25 RX ORDER — DOXYCYCLINE HYCLATE 100 MG
100 TABLET ORAL 2 TIMES DAILY
Qty: 14 TABLET | Refills: 0 | Status: SHIPPED | OUTPATIENT
Start: 2021-08-25 | End: 2021-09-01

## 2021-08-25 NOTE — PATIENT INSTRUCTIONS
Dear Arely Saldaña    After reviewing your responses, I've been able to diagnose you with?sinusitis    Based on your responses and diagnosis, I have prescribed the following to treat your symptoms.    The vast majority of sinus infections are caused by viruses and improve after 12-14 days. This means antibiotics will not help your symptoms and possible give you undesirable side effects like diarrhea and upset stomach.    First, improve your sinus hygiene by:  Flonase/fluticasone nasal spray 2 sprays in each nostril once a day for 1 - 4 weeks.  This may take several days to become effective.   Consider saline nasal rinses  Ibuprofen 400 mg to 600 mg 4 times a day as needed for pain relief and anti-inflammatory  Psuedofed can help if you tolerate it but do not take if you have high blood pressure    If your symptoms are not improving or worsening after 12-14 days since symptom onset and 3-5 days of trying the above then try the Antibiotic (Doxycycline) prescribed because the sinus infection is likely to be bacterial at that point.    It is important that you take?all of?your prescribed medication even if your symptoms are improving after a few doses.? Taking?all of?your medicine helps prevent the symptoms from returning.?     If your symptoms worsen, you develop severe headache, vomiting, high fever (>102), or are not improving in 7 days, please contact your primary care provider for an appointment or visit any of our convenient Walk-in Care or Urgent Care Centers to be seen which can be found on our website?here.?     Thanks again for choosing?us?as your health care partner,?   ?  Ulices Samuel PA-C?     When to Use Antibiotics    Antibiotics are medicines used to treat infections caused by bacteria. They don t work for an illness caused by a virus. And they don't work for an allergic reaction. In fact, taking antibiotics for reasons other than an infection by bacteria can cause problems. You may have side  effects from the medicine. And if you need an antibiotic in the future, it may not work well. This is because the bacteria can become immune to the medicine. You can also get a type of diarrhea that's hard to treat. This diarrhea is called C. diff.   When antibiotics likely won t help  Your healthcare provider won t usually give you antibiotics for the conditions listed below. You can help by not asking for them if you have:     A cold. This type of illness is caused by a virus. It can cause a runny nose, stuffed-up nose, sneezing, coughing, and headache. You may also have mild body aches and low fever. A cold gets better on its own in a few days to a week.    The flu (influenza). This is a respiratory illness caused by a virus. The flu usually goes away on its own in a week or so. It can cause fever, body aches, sore throat, and tiredness.    Bronchitis. This is an infection in the lungs. It is most often caused by a virus. You may have coughing, phlegm, body aches, and a low fever. A common type of bronchitis is known as a chest cold. This is called acute bronchitis. This often happens after you have a respiratory infection like a cold. Bronchitis can take weeks to go away. Antibiotics often don t help.    Most sore throats. Sore throats are most often caused by viruses. Your throat may feel scratchy or achy. It may hurt to swallow. You may also have a low fever and body aches. A sore throat usually gets better in a few days.    Most outer ear infections. An ear infection may be caused by a virus or bacteria. It causes pain in the ear. Antibiotics by mouth usually don t help. Low-dose antibiotic ear drops work much better.    Some inner ear infections. An inner ear infection (otitis media) can be caused by a virus in the ear. It can also cause pain and a high fever. Most older children with low-grade fever don't need to be treated with antibiotics.    Most sinus infections. This is also known as sinusitis. This  kind of infection causes sinus pain and swelling, and a runny nose. In most cases, it goes away on its own. Antibiotics don t make recovery quicker.    Allergic rhinitis. This is a set of symptoms caused by an allergic reaction. You may have sneezing, a runny nose, itchy or watery eyes, or a sore throat. Allergies are not treated with antibiotics.    Low fever. A mild fever that s less than 100.4 F (38 C) most likely doesn t need to be treated with antibiotics.   When antibiotics can help  Antibiotics can be used to treat:                                                       Strep throat. This is a throat infection caused by a certain type of bacteria. Symptoms of strep throat include a sore throat, white patches on the tonsils, red spots on the roof of the mouth, fever, body aches, and nausea and vomiting. Strep throat almost never causes a cough.    Urinary tract infection (UTI). This is an infection of the bladder and the tube that takes urine out of the body. It is caused by bacteria. It can cause burning pain and urine that s cloudy or tinted with blood. UTIs are very common. Antibiotics usually help treat them.    Some outer ear infections. In some cases, a healthcare provider may prescribe antibiotics by mouth for an ear infection. You may need a test to show the cause of the ear infection.    Some sinus infections. In some cases, your healthcare provider may give you antibiotics. He or she may first need to make sure your symptoms aren t caused by something else. This may be a virus, fungus, allergies, or air pollutants such as smoke.   Your healthcare provider may give you antibiotics if you have a condition that can affect your immune system. This includes diabetes or cancer.  Self-care at home  If your infection can t be treated with antibiotics, you can take other steps to feel better. Try the remedies below. In general:     Rest and sleep as much as needed.    Drink water and other clear  fluids.    Don t smoke. Stay away from smoke from other people.    Use over-the-counter medicine such as acetaminophen or ibuprofen to ease pain or fever, as directed by your healthcare provider.  To treat sinus pain or nasal stuffiness:    Put a warm, moist cloth on your face where you feel sinus pain or pressure.    Try a nasal spray with medicine or saline. Use as directed by your healthcare provider.    Breathe in steam from a hot shower.    Use a humidifier or cool mist vaporizer.   To quiet a cough:     Use a humidifier or cool mist vaporizer.    Breathe in steam from a hot shower.    Suck on cough lozenges.   To sooth a sore throat:     Suck on ice chips, frozen ice pops, or lozenges.    Use a sore throat spray.    Use a humidifier or cool mist vaporizer.    Gargle with saltwater.    Drink warm liquids.    Take ibuprofen to reduce swelling and pain.  To ease ear pain:     Hold a warm, moist washcloth on the ear for 10 minutes at a time.  [employer] last reviewed this educational content on 12/1/2019 2000-2021 [employer]. All rights reserved. This information is not intended as a substitute for professional medical care. Always follow your healthcare professional's instructions.          Sinusitis (Antibiotic Treatment)    The sinuses are air-filled spaces within the bones of the face. They connect to the inside of the nose. Sinusitis is an inflammation of the tissue that lines the sinuses. Sinusitis can occur during a cold. It can also happen due to allergies to pollens and other particles in the air. Sinusitis can cause symptoms of sinus congestion and a feeling of fullness. A sinus infection causes fever, headache, and facial pain. There is often green or yellow fluid draining from the nose or into the back of the throat (post-nasal drip). You have been given antibiotics to treat this condition.   Home care    Take the full course of antibiotics as instructed. Don't stop taking them, even  when you feel better.    Drink plenty of water, hot tea, and other liquids as directed by the healthcare provider. This may help thin nasal mucus. It also may help your sinuses drain fluids.    Heat may help soothe painful areas of your face. Use a towel soaked in hot water. Or,  the shower and direct the warm spray onto your face. Using a vaporizer along with a menthol rub at night may also help soothe symptoms.     An expectorant with guaifenesin may help thin nasal mucus and help your sinuses drain fluids. Talk with your provider or pharmacists before taking an over-the-counter (OTC) medicine if you have any questions about it or its side effects..    You can use an OTC decongestant, unless a similar medicine was prescribed to you. Nasal sprays work the fastest. Use one that contains phenylephrine or oxymetazoline. First blow your nose gently. Then use the spray. Don't use these medicines more often than directed on the label. If you do, your symptoms may get worse. You may also take pills that contain pseudoephedrine. Don t use products that combine multiple medicines. This is because side effects may be increased. Read labels. You can also ask the pharmacist for help. (People with high blood pressure should not use decongestants. They can raise blood pressure.) Talk with your provider or pharmacist if you have any questions about the medicine..    OTC antihistamines may help if allergies contributed to your sinusitis. Talk with your provider or pharmacist if you have any questions about the medicine..    Don't use nasal rinses or irrigation during an acute sinus infection, unless your healthcare provider tells you to. Rinsing may spread the infection to other areas in your sinuses.    Use acetaminophen or ibuprofen to control pain, unless another pain medicine was prescribed to you. If you have chronic liver or kidney disease or ever had a stomach ulcer, talk with your healthcare provider before using  these medicines. Never give aspirin to anyone under age 18 who is ill with a fever. It may cause severe liver damage.    Don't smoke. This can make symptoms worse.    Follow-up care  Follow up with your healthcare provider, or as advised.   When to seek medical advice  Call your healthcare provider if any of these occur:     Facial pain or headache that gets worse    Stiff neck    Unusual drowsiness or confusion    Swelling of your forehead or eyelids    Symptoms don't go away in 10 days    Vision problems, such as blurred or double vision    Fever of 100.4 F (38 C) or higher, or as directed by your healthcare provider  Call 911  Call 911 if any of these occur:     Seizure    Trouble breathing    Feeling dizzy or faint    Fingernails, skin or lips look blue, purple , or gray  Prevention  Here are steps you can take to help prevent an infection:     Keep good hand washing habits.    Don t have close contact with people who have sore throats, colds, or other upper respiratory infections.    Don t smoke, and stay away from secondhand smoke.    Stay up to date with of your vaccines.  [employer] app last reviewed this educational content on 12/1/2019 2000-2021 [employer]. All rights reserved. This information is not intended as a substitute for professional medical care. Always follow your healthcare professional's instructions.

## 2021-09-18 ENCOUNTER — HEALTH MAINTENANCE LETTER (OUTPATIENT)
Age: 41
End: 2021-09-18

## 2021-10-29 ENCOUNTER — MEDICAL CORRESPONDENCE (OUTPATIENT)
Dept: HEALTH INFORMATION MANAGEMENT | Facility: CLINIC | Age: 41
End: 2021-10-29
Payer: COMMERCIAL

## 2022-01-21 ENCOUNTER — OFFICE VISIT (OUTPATIENT)
Dept: FAMILY MEDICINE | Facility: CLINIC | Age: 42
End: 2022-01-21
Payer: COMMERCIAL

## 2022-01-21 VITALS
DIASTOLIC BLOOD PRESSURE: 88 MMHG | SYSTOLIC BLOOD PRESSURE: 136 MMHG | HEART RATE: 95 BPM | BODY MASS INDEX: 25.24 KG/M2 | WEIGHT: 156.4 LBS | OXYGEN SATURATION: 99 %

## 2022-01-21 DIAGNOSIS — J01.01 ACUTE RECURRENT MAXILLARY SINUSITIS: Primary | ICD-10-CM

## 2022-01-21 DIAGNOSIS — I10 ESSENTIAL HYPERTENSION: ICD-10-CM

## 2022-01-21 DIAGNOSIS — M25.50 MULTIPLE JOINT PAIN: ICD-10-CM

## 2022-01-21 LAB
ALBUMIN SERPL-MCNC: 3.7 G/DL (ref 3.5–5)
ALP SERPL-CCNC: 123 U/L (ref 45–120)
ALT SERPL W P-5'-P-CCNC: 19 U/L (ref 0–45)
ANION GAP SERPL CALCULATED.3IONS-SCNC: 9 MMOL/L (ref 5–18)
AST SERPL W P-5'-P-CCNC: 15 U/L (ref 0–40)
BILIRUB SERPL-MCNC: 0.3 MG/DL (ref 0–1)
BUN SERPL-MCNC: 9 MG/DL (ref 8–22)
C REACTIVE PROTEIN LHE: 1.7 MG/DL (ref 0–0.8)
CALCIUM SERPL-MCNC: 10.1 MG/DL (ref 8.5–10.5)
CHLORIDE BLD-SCNC: 106 MMOL/L (ref 98–107)
CHOLEST SERPL-MCNC: 178 MG/DL
CO2 SERPL-SCNC: 26 MMOL/L (ref 22–31)
CREAT SERPL-MCNC: 0.71 MG/DL (ref 0.6–1.1)
ERYTHROCYTE [DISTWIDTH] IN BLOOD BY AUTOMATED COUNT: 14.5 % (ref 10–15)
ERYTHROCYTE [SEDIMENTATION RATE] IN BLOOD BY WESTERGREN METHOD: 14 MM/HR (ref 0–20)
FASTING STATUS PATIENT QL REPORTED: ABNORMAL
GFR SERPL CREATININE-BSD FRML MDRD: >90 ML/MIN/1.73M2
GLUCOSE BLD-MCNC: 108 MG/DL (ref 70–125)
HCT VFR BLD AUTO: 34.4 % (ref 35–47)
HDLC SERPL-MCNC: 50 MG/DL
HGB BLD-MCNC: 11 G/DL (ref 11.7–15.7)
LDLC SERPL CALC-MCNC: 73 MG/DL
MCH RBC QN AUTO: 24.4 PG (ref 26.5–33)
MCHC RBC AUTO-ENTMCNC: 32 G/DL (ref 31.5–36.5)
MCV RBC AUTO: 76 FL (ref 78–100)
PLATELET # BLD AUTO: 320 10E3/UL (ref 150–450)
POTASSIUM BLD-SCNC: 4.4 MMOL/L (ref 3.5–5)
PROT SERPL-MCNC: 6 G/DL (ref 6–8)
RBC # BLD AUTO: 4.5 10E6/UL (ref 3.8–5.2)
RHEUMATOID FACT SER NEPH-ACNC: <15 IU/ML (ref 0–30)
SODIUM SERPL-SCNC: 141 MMOL/L (ref 136–145)
TRIGL SERPL-MCNC: 277 MG/DL
TSH SERPL DL<=0.005 MIU/L-ACNC: 1.49 UIU/ML (ref 0.3–5)
WBC # BLD AUTO: 7.3 10E3/UL (ref 4–11)

## 2022-01-21 PROCEDURE — 86140 C-REACTIVE PROTEIN: CPT | Performed by: FAMILY MEDICINE

## 2022-01-21 PROCEDURE — 86618 LYME DISEASE ANTIBODY: CPT | Performed by: FAMILY MEDICINE

## 2022-01-21 PROCEDURE — 99214 OFFICE O/P EST MOD 30 MIN: CPT | Performed by: FAMILY MEDICINE

## 2022-01-21 PROCEDURE — 82306 VITAMIN D 25 HYDROXY: CPT | Performed by: FAMILY MEDICINE

## 2022-01-21 PROCEDURE — 85652 RBC SED RATE AUTOMATED: CPT | Performed by: FAMILY MEDICINE

## 2022-01-21 PROCEDURE — 80061 LIPID PANEL: CPT | Performed by: FAMILY MEDICINE

## 2022-01-21 PROCEDURE — 85027 COMPLETE CBC AUTOMATED: CPT | Performed by: FAMILY MEDICINE

## 2022-01-21 PROCEDURE — 80053 COMPREHEN METABOLIC PANEL: CPT | Performed by: FAMILY MEDICINE

## 2022-01-21 PROCEDURE — 86431 RHEUMATOID FACTOR QUANT: CPT | Performed by: FAMILY MEDICINE

## 2022-01-21 PROCEDURE — 86038 ANTINUCLEAR ANTIBODIES: CPT | Performed by: FAMILY MEDICINE

## 2022-01-21 PROCEDURE — 36415 COLL VENOUS BLD VENIPUNCTURE: CPT | Performed by: FAMILY MEDICINE

## 2022-01-21 PROCEDURE — 84443 ASSAY THYROID STIM HORMONE: CPT | Performed by: FAMILY MEDICINE

## 2022-01-21 RX ORDER — LISINOPRIL 20 MG/1
20 TABLET ORAL DAILY
Qty: 90 TABLET | Refills: 0 | Status: ON HOLD | OUTPATIENT
Start: 2022-01-21 | End: 2022-04-08

## 2022-01-21 RX ORDER — CEFUROXIME AXETIL 500 MG/1
500 TABLET ORAL 2 TIMES DAILY
Qty: 42 TABLET | Refills: 0 | Status: SHIPPED | OUTPATIENT
Start: 2022-01-21 | End: 2022-02-11

## 2022-01-22 LAB — B BURGDOR IGG+IGM SER QL: 0.03

## 2022-01-23 LAB — DEPRECATED CALCIDIOL+CALCIFEROL SERPL-MC: 25 UG/L (ref 30–80)

## 2022-01-23 NOTE — PROGRESS NOTES
PROGRESS NOTE   1/21/2022    SUBJECTIVE:  Arely Saldaña is a 41 year old female who presents for     Chief Complaint   Patient presents with     Health Maintenance     completed two weeks of antibiotics for sinus infection and no improvement.      Patient comes in today for evaluation of multiple problems.  She has a history of hypertension.  She has chronic hypertension and even before she was pregnant and gave birth in April she had problems with her high blood pressure.  She was on medication prior to pregnancy.  During pregnancy she got preeclampsia superimposed on chronic hypertension.  She was hospitalized for 10 weeks and then gave birth at 34 weeks gestation.  She was discharged on nifedipine.  She was taking that probably for the first couple of months after she had a given birth however she stopped that because of side effects that she was having.  She has not been on any medications for her blood pressure since then.  Prior to pregnancy and and in the early part of pregnancy she had been on labetalol.  She did take labetalol yesterday and today 1 dose each day and her blood pressure today looks okay at 136/88 however she admits that she did take the medication so she is wondering how much that had an effect on it.  She notes that when she was on nifedipine she had red skin and it felt hot to the touch.  There was also insurance issues and she could not get it refilled and so she just completely went off of it.  She has checked her blood pressures at home and they have been in the 150s over 90 range or so.  When she was on labetalol she seemed to tolerate that well.  She denies that she is having any chest pain or shortness of breath.  She did have a tubal ligation after the birth of her baby.  She also notes that she has been sick with congestion and what was thought to be a sinus infection since about August or so.  At the end of July or the first part of August when her child started  she  started with congestion.  Baby originally got the infection and then passed it to her and she has been sick since then.  She notes that she did an E-visit on 8/25/2021 which I did review and was given doxycycline.  She took that for 7 days and she got a little bit better but it never went away completely.  She continued to have a cough and a runny nose at that time.  She also had a lot of congestion.  She dealt with that it did not really seem like it was getting any worse but it also did not seem like it was getting any better but then last month the congestion got a lot worse.  She again had the facial pain and the eye pain and so she did virtual well on 12/1/2021.  They gave her doxycycline for 7 days and she also took Mucinex and ibuprofen.  She did not seem to get any better so she contacted them and was given another 7 days of the doxycycline.  She notes that she felt better for about 3 days and then all of her symptoms came back and they have continued.  She notes that the mucus she is having is thinner than it was before but she still has a little bit of a cough and sometimes has a low-grade fever.  Low-grade fever was primarily when she was having symptoms about a month ago it does not seem like that is nearly as bad however she does note that this past Saturday she had shaking chills and then had a fever all of a sudden up to 102.9 but then it went away by the next day.  She has not had any fevers since then.  Her third issue today is that she is 3 if she does not have psoriatic arthritis.  She has never been diagnosed with psoriasis in the past but she has had a flareup of lots of dry skin and 2 of her knuckles seem to have swollen up.  She notes that overall she has more hand pain that she thinks would be normal and knuckle pain especially.  She has some swelling in a lot of her joints and she does not want it to get any worse so she wanted to evaluation before it got much worse.  She notes that her  wrist also clicks more than she thinks that should.  She has not really noticed any pain on a chronic basis.  She notes that she is able to do what she needs to do but is concerned because the joints are swelling and she is young and does not feel like that is something that would be normal at her age.    Patient Active Problem List   Diagnosis     Essential hypertension     ASCUS of cervix with negative high risk HPV       Current Outpatient Medications   Medication Sig Dispense Refill     Calcium Carb-Cholecalciferol (CALTRATE 600+D3 SOFT) 600-800 MG-UNIT CHEW 1 tablet       cefuroxime (CEFTIN) 500 MG tablet Take 1 tablet (500 mg) by mouth 2 times daily for 21 days 42 tablet 0     lisinopril (ZESTRIL) 20 MG tablet Take 1 tablet (20 mg) by mouth daily 90 tablet 0       Allergies   Allergen Reactions     Penicillins Hives     Sulfamethoxazole-Trimethoprim [Sulfamethoxazole W/Trimethoprim] Rash       Past Medical History:   Diagnosis Date     ASCUS of cervix with negative high risk HPV 2018-2012 NIL annual pap 4/13/15 NIL pap, neg HPV 18 ASCUS, neg HPV 11/10/20 NIL pap, neg HPV     Hypertension     was on nifedipine and HCTZ before pregnancy, had since age early 20's       Past Surgical History:   Procedure Laterality Date     C/SECTION, LOW TRANSVERSE  2021    with PPTL     FOOT SURGERY Left age 20    bursa removed from left foot     SALPINGECTOMY Bilateral 2021    with  section     WISDOM TOOTH EXTRACTION  age 19       History   Smoking Status     Never Smoker   Smokeless Tobacco     Never Used       OBJECTIVE:     /88   Pulse 95   Wt 70.9 kg (156 lb 6.4 oz)   LMP 2022 (Exact Date)   SpO2 99%   Breastfeeding No   BMI 25.24 kg/m      Physical Exam:  GENERAL APPEARANCE: A&A, NAD, well hydrated, well nourished  SKIN:  Normal skin turgor, no lesions/rashes   HEENT: moist mucous membranes, no rhinorrhea, PERRLA, TM's clear bilaterally, Throat without significant  erythema or exudate.  She did have some tenderness especially over the frontal sinuses bilaterally.  She had mild tenderness over the maxillary sinuses bilaterally.  NECK: Supple, full ROM, no significant lymphadenopathy or thyromegaly  CV: RRR, no M/G/R   LUNGS: CTAB  She has some swelling over the DIP and the PIP joints on assorted fingers of both hands.  There is no redness or warmth to the touch.  She has full range of motion of all of her fingers and her wrists bilaterally.  EXTREMITY: no swelling noted.  Full range of motion of all 4 extremities.   NEURO: no gross deficits   PSYCHIATRIC;  Mood appropriate, memory intact    LABS:     Recent Results (from the past 240 hour(s))   Comprehensive metabolic panel    Collection Time: 01/21/22  2:35 PM   Result Value Ref Range    Sodium 141 136 - 145 mmol/L    Potassium 4.4 3.5 - 5.0 mmol/L    Chloride 106 98 - 107 mmol/L    Carbon Dioxide (CO2) 26 22 - 31 mmol/L    Anion Gap 9 5 - 18 mmol/L    Urea Nitrogen 9 8 - 22 mg/dL    Creatinine 0.71 0.60 - 1.10 mg/dL    Calcium 10.1 8.5 - 10.5 mg/dL    Glucose 108 70 - 125 mg/dL    Alkaline Phosphatase 123 (H) 45 - 120 U/L    AST 15 0 - 40 U/L    ALT 19 0 - 45 U/L    Protein Total 6.0 6.0 - 8.0 g/dL    Albumin 3.7 3.5 - 5.0 g/dL    Bilirubin Total 0.3 0.0 - 1.0 mg/dL    GFR Estimate >90 >60 mL/min/1.73m2   Lipid panel reflex to direct LDL Fasting    Collection Time: 01/21/22  2:35 PM   Result Value Ref Range    Cholesterol 178 <=199 mg/dL    Triglycerides 277 (H) <=149 mg/dL    Direct Measure HDL 50 >=50 mg/dL    LDL Cholesterol Calculated 73 <=129 mg/dL    Patient Fasting > 8hrs? Unknown    CBC with platelets    Collection Time: 01/21/22  2:35 PM   Result Value Ref Range    WBC Count 7.3 4.0 - 11.0 10e3/uL    RBC Count 4.50 3.80 - 5.20 10e6/uL    Hemoglobin 11.0 (L) 11.7 - 15.7 g/dL    Hematocrit 34.4 (L) 35.0 - 47.0 %    MCV 76 (L) 78 - 100 fL    MCH 24.4 (L) 26.5 - 33.0 pg    MCHC 32.0 31.5 - 36.5 g/dL    RDW 14.5 10.0 -  15.0 %    Platelet Count 320 150 - 450 10e3/uL   CRP inflammation    Collection Time: 01/21/22  2:35 PM   Result Value Ref Range    CRP 1.7 (H) 0.0-<0.8 mg/dL   Erythrocyte sedimentation rate auto    Collection Time: 01/21/22  2:35 PM   Result Value Ref Range    Erythrocyte Sedimentation Rate 14 0 - 20 mm/hr   Lyme Disease Kelly with reflex to WB Serum    Collection Time: 01/21/22  2:35 PM   Result Value Ref Range    Lyme Disease Antibodies Total 0.03 <0.90   Rheumatoid factor    Collection Time: 01/21/22  2:35 PM   Result Value Ref Range    Rheumatoid Factor <15 0 - 30 IU/mL   TSH with free T4 reflex    Collection Time: 01/21/22  2:35 PM   Result Value Ref Range    TSH 1.49 0.30 - 5.00 uIU/mL   Vitamin D Deficiency    Collection Time: 01/21/22  2:35 PM   Result Value Ref Range    Vitamin D, Total (25-Hydroxy) 25 (L) 30 - 80 ug/L      I did thoroughly review the E visit from 8/25/2021 as well as the virtual visit from 12/1/2021 noted in the chart via care everywhere.       ASSESSMENT/PLAN:     Acute recurrent maxillary sinusitis  - CBC with platelets  - cefuroxime (CEFTIN) 500 MG tablet  Dispense: 42 tablet; Refill: 0  - CBC with platelets    Multiple joint pain  - Comprehensive metabolic panel  - CBC with platelets  - Anti Nuclear Kelly IgG by IFA with Reflex  - CRP inflammation  - Erythrocyte sedimentation rate auto  - Lyme Disease Kelly with reflex to WB Serum  - Rheumatoid factor  - TSH with free T4 reflex  - Vitamin D Deficiency  - Comprehensive metabolic panel  - CBC with platelets  - Anti Nuclear Kelly IgG by IFA with Reflex  - CRP inflammation  - Erythrocyte sedimentation rate auto  - Lyme Disease Kelly with reflex to WB Serum  - Rheumatoid factor  - TSH with free T4 reflex  - Vitamin D Deficiency    Essential hypertension  - Comprehensive metabolic panel  - Lipid panel reflex to direct LDL Fasting  - lisinopril (ZESTRIL) 20 MG tablet  Dispense: 90 tablet; Refill: 0  - Comprehensive metabolic panel  - Lipid panel  reflex to direct LDL Fasting    Patient comes in today with several different issues.  She has recurrent issues with pain in her sinuses.  She has a facial pain and out of the blue had a fever about 4 days ago.  She does appear to have an acute recurrent sinusitis.  I am going to go ahead and draw a CBC today to make sure that there is no sign of infection and that I think we should treat her for an extended course with antibiotics and see if that is helpful.  I did give her a prescription for Ceftin 500 mg to take twice a day for 21 days.  She does have an allergy to penicillin but does not member what that caused when she took it and it was several years ago.  She thinks that she has probably been on cephalosporins in the past and so we will treat her with the Ceftin.  If she has difficulties or if she has any kind of a reaction to that we will certainly let me know.  She thinks she may have gotten hives from penicillin but she is not exactly positive.  It definitely was several years ago that she had her reaction.  If the antibiotic is not helpful for her sinus issues then she probably needs to see ENT and we did discuss that in terms of the joints that are swollen and the wrist pain that she is experiencing we will go ahead and draw some lab work and see if we can determine what might be causing this.  We did talk about the fact that psoriatic arthritis usually is associated with psoriasis and psoriasis patches on her skin and I do not see any evidence for that.  We will go ahead today and draw metabolic panel as well as a CBC and a CRP sed rate Lyme titer rheumatoid factor thyroid level vitamin D level and we will contact her with results of those when they return.  In terms of her blood pressures now had a tubal ligation and so is not concerned about the possibility of pregnancy.  We talked about the fact that since she had side effects from nifedipine and she really like to be on a medication that she only  has to take once a day instead of twice a day I think starting on lisinopril might be an appropriate plan.  We will go ahead and check metabolic panel as well as lipid panel today as those have not been checked for quite some time.  I did give her a prescription for lisinopril 20 mg today.  I did give her a 3-month supply but I asked her to take her blood pressures at home and then to MyChart me in about a week and then in 2 weeks with how they are doing.  I am hopeful that this will be helpful for her and she will not have nearly as many side effects.  We did talk about the potential side effects that she could have from lisinopril and she voiced understanding of those.  She has additional problems or concerns will let me know.  We should see her back in about 2 months or so for recheck of the chronic medical issues.  Appointment was made as such. 35 minutes spent on the day of encounter doing chart review, history and exam, counseling, coordination of care, documentation and other activities as noted.   Ashely Larios MD    This note was dictated using voice recognition software. Any grammatical errors, context distortions, or spelling errors are not intentional

## 2022-01-25 DIAGNOSIS — D64.9 ANEMIA, UNSPECIFIED TYPE: Primary | ICD-10-CM

## 2022-01-25 LAB — ANA SER QL IF: NEGATIVE

## 2022-02-14 ENCOUNTER — MYC MEDICAL ADVICE (OUTPATIENT)
Dept: FAMILY MEDICINE | Facility: CLINIC | Age: 42
End: 2022-02-14
Payer: COMMERCIAL

## 2022-02-14 DIAGNOSIS — J32.0 CHRONIC MAXILLARY SINUSITIS: Primary | ICD-10-CM

## 2022-03-10 ENCOUNTER — APPOINTMENT (OUTPATIENT)
Dept: CT IMAGING | Facility: CLINIC | Age: 42
End: 2022-03-10
Attending: EMERGENCY MEDICINE
Payer: COMMERCIAL

## 2022-03-10 ENCOUNTER — APPOINTMENT (OUTPATIENT)
Dept: ULTRASOUND IMAGING | Facility: CLINIC | Age: 42
End: 2022-03-10
Attending: EMERGENCY MEDICINE
Payer: COMMERCIAL

## 2022-03-10 ENCOUNTER — HOSPITAL ENCOUNTER (EMERGENCY)
Facility: CLINIC | Age: 42
Discharge: HOME | End: 2022-03-10
Attending: EMERGENCY MEDICINE | Admitting: EMERGENCY MEDICINE
Payer: COMMERCIAL

## 2022-03-10 VITALS
WEIGHT: 150 LBS | TEMPERATURE: 99.4 F | HEART RATE: 90 BPM | RESPIRATION RATE: 12 BRPM | HEIGHT: 66 IN | DIASTOLIC BLOOD PRESSURE: 75 MMHG | SYSTOLIC BLOOD PRESSURE: 122 MMHG | BODY MASS INDEX: 24.11 KG/M2 | OXYGEN SATURATION: 95 %

## 2022-03-10 DIAGNOSIS — J18.9 PNEUMONIA OF LEFT LUNG DUE TO INFECTIOUS ORGANISM, UNSPECIFIED PART OF LUNG: ICD-10-CM

## 2022-03-10 DIAGNOSIS — K80.20 GALLSTONES: ICD-10-CM

## 2022-03-10 DIAGNOSIS — D64.9 ANEMIA, UNSPECIFIED TYPE: ICD-10-CM

## 2022-03-10 LAB
ALBUMIN SERPL-MCNC: 2.5 G/DL (ref 3.5–5)
ALP SERPL-CCNC: 196 U/L (ref 45–120)
ALT SERPL W P-5'-P-CCNC: 48 U/L (ref 0–45)
ANION GAP SERPL CALCULATED.3IONS-SCNC: 11 MMOL/L (ref 5–18)
AST SERPL W P-5'-P-CCNC: 42 U/L (ref 0–40)
ATRIAL RATE - MUSE: 109 BPM
BASOPHILS # BLD AUTO: 0 10E3/UL (ref 0–0.2)
BASOPHILS NFR BLD AUTO: 0 %
BILIRUB DIRECT SERPL-MCNC: 0.5 MG/DL
BILIRUB SERPL-MCNC: 1.1 MG/DL (ref 0–1)
BUN SERPL-MCNC: 11 MG/DL (ref 8–22)
CALCIUM SERPL-MCNC: 9 MG/DL (ref 8.5–10.5)
CHLORIDE BLD-SCNC: 100 MMOL/L (ref 98–107)
CO2 SERPL-SCNC: 26 MMOL/L (ref 22–31)
CREAT SERPL-MCNC: 0.68 MG/DL (ref 0.6–1.1)
D DIMER PPP FEU-MCNC: 0.92 UG/ML FEU (ref 0–0.5)
DIASTOLIC BLOOD PRESSURE - MUSE: 79 MMHG
EOSINOPHIL # BLD AUTO: 0 10E3/UL (ref 0–0.7)
EOSINOPHIL NFR BLD AUTO: 0 %
ERYTHROCYTE [DISTWIDTH] IN BLOOD BY AUTOMATED COUNT: 15.1 % (ref 10–15)
FLUAV RNA SPEC QL NAA+PROBE: NEGATIVE
FLUBV RNA RESP QL NAA+PROBE: NEGATIVE
GFR SERPL CREATININE-BSD FRML MDRD: >90 ML/MIN/1.73M2
GLUCOSE BLD-MCNC: 115 MG/DL (ref 70–125)
HCG SERPL QL: NEGATIVE
HCT VFR BLD AUTO: 29.7 % (ref 35–47)
HGB BLD-MCNC: 9.4 G/DL (ref 11.7–15.7)
IMM GRANULOCYTES # BLD: 0.2 10E3/UL
IMM GRANULOCYTES NFR BLD: 1 %
INTERPRETATION ECG - MUSE: NORMAL
LIPASE SERPL-CCNC: <9 U/L (ref 0–52)
LYMPHOCYTES # BLD AUTO: 0.5 10E3/UL (ref 0.8–5.3)
LYMPHOCYTES NFR BLD AUTO: 3 %
MCH RBC QN AUTO: 23.8 PG (ref 26.5–33)
MCHC RBC AUTO-ENTMCNC: 31.6 G/DL (ref 31.5–36.5)
MCV RBC AUTO: 75 FL (ref 78–100)
MONOCYTES # BLD AUTO: 0.4 10E3/UL (ref 0–1.3)
MONOCYTES NFR BLD AUTO: 3 %
NEUTROPHILS # BLD AUTO: 13.8 10E3/UL (ref 1.6–8.3)
NEUTROPHILS NFR BLD AUTO: 93 %
NRBC # BLD AUTO: 0 10E3/UL
NRBC BLD AUTO-RTO: 0 /100
P AXIS - MUSE: 29 DEGREES
PLATELET # BLD AUTO: 200 10E3/UL (ref 150–450)
POTASSIUM BLD-SCNC: 3.7 MMOL/L (ref 3.5–5)
PR INTERVAL - MUSE: 124 MS
PROT SERPL-MCNC: 5.4 G/DL (ref 6–8)
QRS DURATION - MUSE: 74 MS
QT - MUSE: 308 MS
QTC - MUSE: 414 MS
R AXIS - MUSE: 38 DEGREES
RBC # BLD AUTO: 3.95 10E6/UL (ref 3.8–5.2)
SARS-COV-2 RNA RESP QL NAA+PROBE: NEGATIVE
SODIUM SERPL-SCNC: 137 MMOL/L (ref 136–145)
SYSTOLIC BLOOD PRESSURE - MUSE: 141 MMHG
T AXIS - MUSE: -18 DEGREES
TROPONIN I SERPL-MCNC: <0.01 NG/ML (ref 0–0.29)
TROPONIN T BLD-MCNC: 0.02 UG/L
VENTRICULAR RATE- MUSE: 109 BPM
WBC # BLD AUTO: 14.9 10E3/UL (ref 4–11)

## 2022-03-10 PROCEDURE — C9803 HOPD COVID-19 SPEC COLLECT: HCPCS

## 2022-03-10 PROCEDURE — 84703 CHORIONIC GONADOTROPIN ASSAY: CPT | Performed by: EMERGENCY MEDICINE

## 2022-03-10 PROCEDURE — 250N000013 HC RX MED GY IP 250 OP 250 PS 637: Performed by: EMERGENCY MEDICINE

## 2022-03-10 PROCEDURE — 83690 ASSAY OF LIPASE: CPT | Performed by: EMERGENCY MEDICINE

## 2022-03-10 PROCEDURE — 93005 ELECTROCARDIOGRAM TRACING: CPT | Performed by: EMERGENCY MEDICINE

## 2022-03-10 PROCEDURE — 85379 FIBRIN DEGRADATION QUANT: CPT | Performed by: EMERGENCY MEDICINE

## 2022-03-10 PROCEDURE — 250N000011 HC RX IP 250 OP 636: Performed by: EMERGENCY MEDICINE

## 2022-03-10 PROCEDURE — 99285 EMERGENCY DEPT VISIT HI MDM: CPT | Mod: 25

## 2022-03-10 PROCEDURE — 36415 COLL VENOUS BLD VENIPUNCTURE: CPT | Performed by: EMERGENCY MEDICINE

## 2022-03-10 PROCEDURE — 84484 ASSAY OF TROPONIN QUANT: CPT | Performed by: EMERGENCY MEDICINE

## 2022-03-10 PROCEDURE — 258N000003 HC RX IP 258 OP 636: Performed by: EMERGENCY MEDICINE

## 2022-03-10 PROCEDURE — 96361 HYDRATE IV INFUSION ADD-ON: CPT

## 2022-03-10 PROCEDURE — 76705 ECHO EXAM OF ABDOMEN: CPT

## 2022-03-10 PROCEDURE — 82310 ASSAY OF CALCIUM: CPT | Performed by: EMERGENCY MEDICINE

## 2022-03-10 PROCEDURE — 85025 COMPLETE CBC W/AUTO DIFF WBC: CPT | Performed by: EMERGENCY MEDICINE

## 2022-03-10 PROCEDURE — 87636 SARSCOV2 & INF A&B AMP PRB: CPT | Performed by: EMERGENCY MEDICINE

## 2022-03-10 PROCEDURE — 96374 THER/PROPH/DIAG INJ IV PUSH: CPT | Mod: 59

## 2022-03-10 PROCEDURE — 82248 BILIRUBIN DIRECT: CPT | Performed by: EMERGENCY MEDICINE

## 2022-03-10 PROCEDURE — 71275 CT ANGIOGRAPHY CHEST: CPT

## 2022-03-10 RX ORDER — ACETAMINOPHEN 325 MG/1
975 TABLET ORAL ONCE
Status: COMPLETED | OUTPATIENT
Start: 2022-03-10 | End: 2022-03-10

## 2022-03-10 RX ORDER — IOPAMIDOL 755 MG/ML
100 INJECTION, SOLUTION INTRAVASCULAR ONCE
Status: COMPLETED | OUTPATIENT
Start: 2022-03-10 | End: 2022-03-10

## 2022-03-10 RX ORDER — LEVOFLOXACIN 750 MG/1
750 TABLET, FILM COATED ORAL DAILY
Qty: 7 TABLET | Refills: 0 | Status: SHIPPED | OUTPATIENT
Start: 2022-03-10 | End: 2022-03-17

## 2022-03-10 RX ORDER — KETOROLAC TROMETHAMINE 15 MG/ML
15 INJECTION, SOLUTION INTRAMUSCULAR; INTRAVENOUS ONCE
Status: COMPLETED | OUTPATIENT
Start: 2022-03-10 | End: 2022-03-10

## 2022-03-10 RX ADMIN — ACETAMINOPHEN 975 MG: 325 TABLET ORAL at 10:52

## 2022-03-10 RX ADMIN — KETOROLAC TROMETHAMINE 15 MG: 15 INJECTION, SOLUTION INTRAMUSCULAR; INTRAVENOUS at 09:07

## 2022-03-10 RX ADMIN — SODIUM CHLORIDE 1000 ML: 9 INJECTION, SOLUTION INTRAVENOUS at 10:53

## 2022-03-10 RX ADMIN — SODIUM CHLORIDE 1000 ML: 9 INJECTION, SOLUTION INTRAVENOUS at 09:07

## 2022-03-10 RX ADMIN — IOPAMIDOL 75 ML: 755 INJECTION, SOLUTION INTRAVENOUS at 10:15

## 2022-03-10 ASSESSMENT — ENCOUNTER SYMPTOMS
DIZZINESS: 0
HEMATURIA: 0
ABDOMINAL PAIN: 0
FEVER: 0
CONFUSION: 0
DYSURIA: 0
SORE THROAT: 0
JOINT SWELLING: 0
VOMITING: 0
COUGH: 1
NAUSEA: 0
SHORTNESS OF BREATH: 0
DIARRHEA: 0
CHILLS: 0

## 2022-03-10 NOTE — ED NOTES
Pt agree's with discharge instructions. Will take antibiotic as prescribed, and follow up with PCP and surgery.

## 2022-03-10 NOTE — DISCHARGE INSTRUCTIONS
Take new antibiotic as directed until gone.  Take your iron supplement daily.  Follow up with primary clinic and see ENT as scheduled.  Make an appointment with general surgery eventually regarding your gallstones.  Return for worsening symptoms as we discussed. Tylenol/ibuprofen for symptoms.

## 2022-03-10 NOTE — ED TRIAGE NOTES
Seen at [facility] 2/20 and diagnosed with pneumonia. covid negative at that.  Has been vaccinated for  Covid.  Started on antibiotics and has been compliant.  Has not been getting any better and now continues to run a fever. Fever has been as high 103.3.  Difficultly swallowing so taking tylenol/ibuprofen has been difficult.  Cough with green sputum

## 2022-03-10 NOTE — ED PROVIDER NOTES
"  Emergency Department Encounter     Evaluation Date & Time:   3/10/2022  7:57 AM    CHIEF COMPLAINT:  Shortness of Breath      Triage Note:Seen at [facility] 2/20 and diagnosed with pneumonia. covid negative at that.  Has been vaccinated for  Covid.  Started on antibiotics and has been compliant.  Has not been getting any better and now continues to run a fever. Fever has been as high 103.3.  Difficultly swallowing so taking tylenol/ibuprofen has been difficult.  Cough with green sputum           ED COURSE & MEDICAL DECISION MAKING:     ED Course as of 03/10/22 1617   Thu Mar 10, 2022   0944 D-dimer elevated, will get CTA to rule out PE.    LFTs mildly elevated.  Lipase and US ordered.  WBC 14 and anemic at 9.4.  Chemistry unremarkable.   1016 Flu and Covid negative. Trop and lipase wnl.     1105 CT neg for PE, but does show left side pneumonia and small right pleural effusion.  US showing gallstones/sludge, but no cholecystitis/obstruction.  Repeat exam shows negative Jaramillo's with benign abdomen.  Pt is primarily chest wall/back with tenderness along these regions.  Pt instructed on close follow up, return precautions at length.     Will start on levaquin for continued PNA, encouraged her to take iron supplement.         Pt here with symptoms ongoing for past couple weeks at least, but also associated with \"sinus infection\"/symptoms since last August, intermittently on antibiotics and recently finished Zpak with another unknown antibx.  Pt still having cough and congestion, but now having right sided chest/back pain that started yesterday, worse with breathing, but also reproduced with movement and palpation of chest wall.  No rash, no leg pain/swelling.  Pt is not hypoxic, but tachy here. Will get labs, d-dimer, EKG.  Ultimately will need pumonary imaging, but hold on ordering until d-dimer returns.  Pt being treated with IVF and toradol. She does have appointment with ENT next week to discuss persistent sinus " symptoms for the first time.    8:11 AM I met with the patient to gather history and to perform my initial exam. I discussed the plan for care while in the Emergency Department. PPE (gloves, face shield, surgical mask, N95 mask) was worn by me during patient encounters while patient wore mask.   9:47 AM I re-evaluated and updated patient.  10:55 AM I re-evaluated and updated patient. We discussed plans for discharge and patient is agreeable.      At the conclusion of the encounter I discussed the results of all the tests and the disposition. The questions were answered. The patient or family acknowledged understanding and was agreeable with the care plan.      MEDICATIONS GIVEN IN THE EMERGENCY DEPARTMENT:  Medications   0.9% sodium chloride BOLUS (0 mLs Intravenous Stopped 3/10/22 1040)   ketorolac (TORADOL) injection 15 mg (15 mg Intravenous Given 3/10/22 0907)   iopamidol (ISOVUE-370) solution 100 mL (75 mLs Intravenous Given 3/10/22 1015)   0.9% sodium chloride BOLUS (0 mLs Intravenous Stopped 3/10/22 1123)   acetaminophen (TYLENOL) tablet 975 mg (975 mg Oral Given 3/10/22 1052)       NEW PRESCRIPTIONS STARTED AT TODAY'S ED VISIT:  Discharge Medication List as of 3/10/2022 11:24 AM      START taking these medications    Details   levofloxacin (LEVAQUIN) 750 MG tablet Take 1 tablet (750 mg) by mouth daily for 7 days, Disp-7 tablet, R-0, E-Prescribe             HPI   HPI     Arely Saldaña is a 41 year old female with a pertinent history of HTN who presents to this ED by walk in for evaluation of shortness of breath.    Per chart review, patient was seen at Urgent care on 2/21/22 for cough, fever, chills, and body aches. It was noted she finished a course of Ceftin on 2/12/22. X ray showed pneumonia of right lower lobe and she was prescribed cefdinir (300 mg_ and azithromycin (250mg).    Patient reports she has had a sinus infection since August and has been follow with her PCP. She has been on various courses of  antibiotics, most recently on a course of azithromycin and another antibiotic that she can not recall that she completed a few days ago. Yesterday, patient developed right sided chest pain that is worse with breathing, movement, and palpation. Associated persistent cough and postnasal drainage. Patient states she has difficulty taking pills at baseline and with increased mucous she has had more difficulty taking medications. She last took Tylenol at 0200 today. No known sick contacts. Patient last tested negative for COVID 1 month ago. She is fully vaccinated for COVID but is not boosted. No history of DVT or PE, no recent travel or surgery. Denies a chance of pregnancy. She otherwise denies nausea, vomiting, diarrhea, abdominal pain, or additional medical concerns or complaints at this time.      REVIEW OF SYSTEMS:  Review of Systems   Constitutional: Negative for chills and fever.   HENT: Positive for postnasal drip. Negative for sore throat.    Eyes: Negative for visual disturbance.   Respiratory: Positive for cough. Negative for shortness of breath.    Cardiovascular: Positive for chest pain (right sided).   Gastrointestinal: Negative for abdominal pain, diarrhea, nausea and vomiting.   Endocrine: Negative for polyuria.   Genitourinary: Negative for dysuria and hematuria.        - urinary changes     Musculoskeletal: Negative for joint swelling.   Skin: Negative for rash.   Neurological: Negative for dizziness.   Psychiatric/Behavioral: Negative for confusion.   All other systems reviewed and are negative.    Medical History     Past Medical History:   Diagnosis Date     ASCUS of cervix with negative high risk HPV 2018     Hypertension        Past Surgical History:   Procedure Laterality Date     C/SECTION, LOW TRANSVERSE  2021    with PPTL     FOOT SURGERY Left age 20    bursa removed from left foot     SALPINGECTOMY Bilateral 2021    with  section     WISDOM TOOTH EXTRACTION  age 19  "      Family History   Problem Relation Age of Onset     Hypertension Mother      Hypertension Father      Diabetes Father        Social History     Tobacco Use     Smoking status: Never Smoker     Smokeless tobacco: Never Used   Substance Use Topics     Alcohol use: Not Currently     Comment: Alcoholic Drinks/day: once every 2-3 months prior to pregnancy     Drug use: Never       levofloxacin (LEVAQUIN) 750 MG tablet  Calcium Carb-Cholecalciferol (CALTRATE 600+D3 SOFT) 600-800 MG-UNIT CHEW  lisinopril (ZESTRIL) 20 MG tablet        Physical Exam     Vitals:  /75   Pulse 90   Temp 99.4  F (37.4  C) (Oral)   Resp 12   Ht 1.676 m (5' 6\")   Wt 68 kg (150 lb)   LMP 02/10/2022 (Approximate)   SpO2 95%   BMI 24.21 kg/m      PHYSICAL EXAM:   Physical Exam  Vitals and nursing note reviewed.   Constitutional:       General: She is not in acute distress.     Appearance: Normal appearance.   HENT:      Head: Normocephalic and atraumatic.      Nose: Nose normal.      Mouth/Throat:      Mouth: Mucous membranes are moist.   Eyes:      Pupils: Pupils are equal, round, and reactive to light.   Neck:      Vascular: No JVD.   Cardiovascular:      Rate and Rhythm: Regular rhythm. Tachycardia present.      Pulses: Normal pulses.           Radial pulses are 2+ on the right side and 2+ on the left side.        Dorsalis pedis pulses are 2+ on the right side and 2+ on the left side.   Pulmonary:      Effort: Pulmonary effort is normal. No respiratory distress.      Breath sounds: Normal breath sounds.   Chest:      Comments: Tenderness to palpation right anterior chest wall, no rash.   Abdominal:      Palpations: Abdomen is soft.      Tenderness: There is no abdominal tenderness.   Musculoskeletal:      Cervical back: Full passive range of motion without pain and neck supple.      Comments: No calf tenderness or swelling b/l   Skin:     General: Skin is warm.      Findings: No rash.   Neurological:      General: No focal " deficit present.      Mental Status: She is alert. Mental status is at baseline.      Comments: Fluent speech, no acute lateralizing deficits   Psychiatric:         Mood and Affect: Mood normal.         Behavior: Behavior normal.         Results     LAB:  All pertinent labs reviewed and interpreted  Labs Ordered and Resulted from Time of ED Arrival to Time of ED Departure   HEPATIC FUNCTION PANEL - Abnormal       Result Value    Bilirubin Total 1.1 (*)     Bilirubin Direct 0.5      Protein Total 5.4 (*)     Albumin 2.5 (*)     Alkaline Phosphatase 196 (*)     AST 42 (*)     ALT 48 (*)    D DIMER QUANTITATIVE - Abnormal    D-Dimer Quantitative 0.92 (*)    CBC WITH PLATELETS AND DIFFERENTIAL - Abnormal    WBC Count 14.9 (*)     RBC Count 3.95      Hemoglobin 9.4 (*)     Hematocrit 29.7 (*)     MCV 75 (*)     MCH 23.8 (*)     MCHC 31.6      RDW 15.1 (*)     Platelet Count 200      % Neutrophils 93      % Lymphocytes 3      % Monocytes 3      % Eosinophils 0      % Basophils 0      % Immature Granulocytes 1      NRBCs per 100 WBC 0      Absolute Neutrophils 13.8 (*)     Absolute Lymphocytes 0.5 (*)     Absolute Monocytes 0.4      Absolute Eosinophils 0.0      Absolute Basophils 0.0      Absolute Immature Granulocytes 0.2      Absolute NRBCs 0.0     BASIC METABOLIC PANEL - Normal    Sodium 137      Potassium 3.7      Chloride 100      Carbon Dioxide (CO2) 26      Anion Gap 11      Urea Nitrogen 11      Creatinine 0.68      Calcium 9.0      Glucose 115      GFR Estimate >90     TROPONIN I - Normal    Troponin I <0.01     HCG QUALITATIVE PREGNANCY - Normal    hCG Serum Qualitative Negative     INFLUENZA A/B & SARS-COV2 PCR MULTIPLEX - Normal    Influenza A PCR Negative      Influenza B PCR Negative      SARS CoV2 PCR Negative     ISTAT TROPONIN POCT - Normal    TROPPC POCT 0.02     LIPASE - Normal    Lipase <9         RADIOLOGY:  Abdomen US, limited (RUQ only)   Final Result   IMPRESSION:   1.  Cholelithiasis and  gallbladder sludge are present. No evidence of cholecystitis nor bile duct dilatation.            CT Chest Pulmonary Embolism w Contrast   Final Result   IMPRESSION:      1.  No pulmonary embolism.      2.  Moderate to large amount of left lung consolidation suggestive of pneumonia. Recommend 6-8 week follow-up PA and lateral radiographs for clearing. A few other mild bilateral opacities.      3.  Small right pleural effusion.      4.  Hepatic steatosis.      5.  Incompletely seen splenomegaly.                         ECG:  Sinus tach, rate 109, normal intervals, no ischemia    I have independently reviewed and interpreted the EKG(s) documented above     PROCEDURES:  Procedures:  none      FINAL IMPRESSION:    ICD-10-CM    1. Pneumonia of left lung due to infectious organism, unspecified part of lung  J18.9    2. Gallstones  K80.20    3. Anemia, unspecified type  D64.9        0 minutes of critical care time      I, Ellen Rebollar, am serving as a scribe to document services personally performed by Dr. Jaime Park, based on my observations and the provider's statements to me. I, Jaime Park, DO attest that Ellen Rebollar is acting in a scribe capacity, has observed my performance of the services and has documented them in accordance with my direction.      Jaime Park DO  Emergency Medicine  [facility] EMERGENCY ROOM  3/10/2022  8:03 AM          Jaime Park MD  03/10/22 1551

## 2022-03-16 ENCOUNTER — OFFICE VISIT (OUTPATIENT)
Dept: OTOLARYNGOLOGY | Facility: CLINIC | Age: 42
End: 2022-03-16
Attending: FAMILY MEDICINE
Payer: COMMERCIAL

## 2022-03-16 DIAGNOSIS — J32.0 CHRONIC MAXILLARY SINUSITIS: ICD-10-CM

## 2022-03-16 PROCEDURE — 99203 OFFICE O/P NEW LOW 30 MIN: CPT | Performed by: OTOLARYNGOLOGY

## 2022-03-16 RX ORDER — LABETALOL 100 MG/1
100 TABLET, FILM COATED ORAL
COMMUNITY
Start: 2022-02-21 | End: 2022-04-03

## 2022-03-16 NOTE — PROGRESS NOTES
HPI: This patient is a 40yo F who presents for evaluation of the sinuses at the request of Dr. Larios. The patient reports chronic sinus infections for several months, the symptoms of which are nasal congestion and rhinorrhea. There have not really been episodes of high fever, localized sinus pain, tooth pain, and pururlent drainage. She states that she has sounded congested throughout this time also. Denies dental grinding/clenching. She did try flonase for <2weeks without any effect.    Past medical history, surgical history, social history, family history, medications, and allergies have been reviewed with the patient and are documented above.    Review of Systems: a 10-system review was performed. Pertinent positives are noted in the HPI and on a separate scanned document in the chart.    PHYSICAL EXAMINATION:  GEN: no acute distress, normocephalic  EYES: extraocular movements are intact, pupils are equal and round. Sclera clear.   EARS: auricles are normally formed. The external auditory canals are clear with minimal to no cerumen. Tympanic membranes are intact bilaterally with no signs of infection, effusion, retractions, or perforations.  NOSE: very hyponasal voice. Anterior nares are patent. There are no masses or lesions. The septum is non-obstructing. No percussive tenderness over the maxillary or frontal sinuses. Polypoid like change of the left middle turbinate vs actual polyp  OC/OP: clear, dentition is in good repair but with wear that is consistent with clenching/grinding. The tongue and palate are fully mobile and symmetric. No masses or lesions.  NECK: soft and supple. No lymphadenopathy or masses. Airway is midline.   NEURO: CN VII and XII symmetric. alert and oriented. No spontaneous nystagmus. Gait is normal.  PULM: breathing comfortably on room air, normal chest expansion with respiration  CARDS: no cyanosis or clubbing. Normal carotid pulses.    IMAGING: none    MEDICAL DECISION-MAKING: This  patient is a 42yo F with chronic nasal congestion and rhinorrhea suggestive of possible obstruction vs polyps. Will send for a CT of the sinuses and will call with the results.

## 2022-03-16 NOTE — LETTER
3/16/2022         RE: Arely Saldaña  [address] [address]        Dear Colleague,    Thank you for referring your patient, Arely Saldaña, to the [facility]. Please see a copy of my visit note below.    HPI: This patient is a 42yo F who presents for evaluation of the sinuses at the request of Dr. Larios. The patient reports chronic sinus infections for several months, the symptoms of which are nasal congestion and rhinorrhea. There have not really been episodes of high fever, localized sinus pain, tooth pain, and pururlent drainage. She states that she has sounded congested throughout this time also. Denies dental grinding/clenching. She did try flonase for <2weeks without any effect.    Past medical history, surgical history, social history, family history, medications, and allergies have been reviewed with the patient and are documented above.    Review of Systems: a 10-system review was performed. Pertinent positives are noted in the HPI and on a separate scanned document in the chart.    PHYSICAL EXAMINATION:  GEN: no acute distress, normocephalic  EYES: extraocular movements are intact, pupils are equal and round. Sclera clear.   EARS: auricles are normally formed. The external auditory canals are clear with minimal to no cerumen. Tympanic membranes are intact bilaterally with no signs of infection, effusion, retractions, or perforations.  NOSE: very hyponasal voice. Anterior nares are patent. There are no masses or lesions. The septum is non-obstructing. No percussive tenderness over the maxillary or frontal sinuses. Polypoid like change of the left middle turbinate vs actual polyp  OC/OP: clear, dentition is in good repair but with wear that is consistent with clenching/grinding. The tongue and palate are fully mobile and symmetric. No masses or lesions.  NECK: soft and supple. No lymphadenopathy or masses. Airway is midline.   NEURO: CN VII and XII  symmetric. alert and oriented. No spontaneous nystagmus. Gait is normal.  PULM: breathing comfortably on room air, normal chest expansion with respiration  CARDS: no cyanosis or clubbing. Normal carotid pulses.    IMAGING: none    MEDICAL DECISION-MAKING: This patient is a 40yo F with chronic nasal congestion and rhinorrhea suggestive of possible obstruction vs polyps. Will send for a CT of the sinuses and will call with the results.          Again, thank you for allowing me to participate in the care of your patient.        Sincerely,        Elvi Rodriguez MD

## 2022-03-29 ENCOUNTER — TELEPHONE (OUTPATIENT)
Dept: OTOLARYNGOLOGY | Facility: CLINIC | Age: 42
End: 2022-03-29
Payer: COMMERCIAL

## 2022-03-29 NOTE — TELEPHONE ENCOUNTER
Spoke with Arely dow regarding surgery scheduling   with Dr. Rodriguez   at [facility] on  date: 2022 Estimated arrival 9:00 AM    Patient was informed of the followin. Patient has been informed to consult their PCP/Cardiologist about stopping their blood thinners about a week before surgery.  2. Pre Op Physical will need to be done within 30 days prior to surgery  3. Required Covid Test with in 4 days prior to surgery: Date: 2022 at 4:00 pm, Location: [facility]  4. Ride required after surgery, can not use [employer] or public transportation.    Patient was informed that failure to do so may result in cancellation of surgery      Post Op: Date: 6/3/2022 at 8:45 am, Location: [facility]      Surgery Letter sent via [facility]

## 2022-03-29 NOTE — LETTER
We've received instruction to get you scheduled for surgery with Dr Rodriguez. We have that arranged as follows:     Surgery Date: 5/23/2022  Location: [address], [address] (3rd floor) [facility]  Arrival Time: 9:00 am (Unless instructed differently by the pre-op call nurse)     Post op Appointment: 6/3/2022 at 8:45 am with      Prep Instructions:     1. Please schedule a pre-op physical with your primary care doctor. This may be virtual or face-to-face depending on your doctors preference. Call them right away to schedule this.    2. PCR-Rated COVID19 testing is required within 4 days of surgery. We have this scheduled for you at [facility], [address] [place] [address], 1st Floor on 5/19/2022 at 4:00 pm. Follow the specific instructions you receive by Mc. If your test is positive, your surgery will be canceled.     3. Nothing to eat or drink for 8 hours before surgery unless instructed differently by the pre-op nurse.    4. If the community sees a new COVID19 surge, your procedure may need to be postponed. We will contact you if this happens.     5. You will need an adult to drive you home and stay with you 24 hours after surgery. Public transportation or [employer] are not permitted.    6. You may have one family member wait in the lobby at the surgery center during your surgery. Visitor restrictions are subject to change, please verify with the pre-op nurse when they call.    7. You will be screened for high-risk exposure to Covid-19 during the pre-op call.  We encourage you to quarantine yourself away from any Covid-19 people for 10 days before surgery to avoid possible last minute cancellations.   When you arrive to the surgery center, you will again be screened for COVID19 symptoms. If you screen positive, your surgery will need to be postponed.    8. We always encourage you to notify your insurance any time you have medical tests or procedures scheduled  including surgery. The number is usually right on the back of your insurance card. Please call [facility] Cost of Care at [phone] for the surgeon fees, and [facility] Cost of Care [phone] for facility fees if you need pricing information.     9. You will receive a call from a pre-op call nurse 1-3 days prior to surgery. She will go over more details with you.     Call our office if you have any questions! Thank you!

## 2022-04-03 ENCOUNTER — APPOINTMENT (OUTPATIENT)
Dept: ULTRASOUND IMAGING | Facility: CLINIC | Age: 42
DRG: 177 | End: 2022-04-03
Attending: EMERGENCY MEDICINE
Payer: COMMERCIAL

## 2022-04-03 ENCOUNTER — APPOINTMENT (OUTPATIENT)
Dept: RADIOLOGY | Facility: CLINIC | Age: 42
DRG: 177 | End: 2022-04-03
Attending: EMERGENCY MEDICINE
Payer: COMMERCIAL

## 2022-04-03 ENCOUNTER — APPOINTMENT (OUTPATIENT)
Dept: CARDIOLOGY | Facility: CLINIC | Age: 42
DRG: 177 | End: 2022-04-03
Payer: COMMERCIAL

## 2022-04-03 ENCOUNTER — APPOINTMENT (OUTPATIENT)
Dept: ULTRASOUND IMAGING | Facility: CLINIC | Age: 42
DRG: 177 | End: 2022-04-03
Payer: COMMERCIAL

## 2022-04-03 ENCOUNTER — APPOINTMENT (OUTPATIENT)
Dept: RADIOLOGY | Facility: CLINIC | Age: 42
DRG: 177 | End: 2022-04-03
Payer: COMMERCIAL

## 2022-04-03 ENCOUNTER — APPOINTMENT (OUTPATIENT)
Dept: CT IMAGING | Facility: CLINIC | Age: 42
DRG: 177 | End: 2022-04-03
Payer: COMMERCIAL

## 2022-04-03 ENCOUNTER — HOSPITAL ENCOUNTER (INPATIENT)
Facility: CLINIC | Age: 42
LOS: 9 days | Discharge: ANOTHER HOSPITAL | DRG: 177 | End: 2022-04-13
Attending: EMERGENCY MEDICINE | Admitting: EMERGENCY MEDICINE
Payer: COMMERCIAL

## 2022-04-03 DIAGNOSIS — R00.0 TACHYCARDIA: ICD-10-CM

## 2022-04-03 DIAGNOSIS — R06.82 TACHYPNEA: ICD-10-CM

## 2022-04-03 DIAGNOSIS — D64.9 ANEMIA: ICD-10-CM

## 2022-04-03 DIAGNOSIS — J90 PLEURAL EFFUSION: ICD-10-CM

## 2022-04-03 DIAGNOSIS — R79.89 ELEVATED LFTS: ICD-10-CM

## 2022-04-03 PROBLEM — R06.00 DYSPNEA: Status: ACTIVE | Noted: 2022-04-03

## 2022-04-03 LAB
ABO/RH(D): NORMAL
ALBUMIN SERPL-MCNC: 2 G/DL (ref 3.5–5)
ALBUMIN SERPL-MCNC: 2.9 G/DL (ref 3.5–5)
ALP SERPL-CCNC: 200 U/L (ref 45–120)
ALP SERPL-CCNC: 268 U/L (ref 45–120)
ALT SERPL W P-5'-P-CCNC: 41 U/L (ref 0–45)
ALT SERPL W P-5'-P-CCNC: 51 U/L (ref 0–45)
ANION GAP SERPL CALCULATED.3IONS-SCNC: 11 MMOL/L (ref 5–18)
ANION GAP SERPL CALCULATED.3IONS-SCNC: 15 MMOL/L (ref 5–18)
ANTIBODY SCREEN: NEGATIVE
APPEARANCE FLD: ABNORMAL
AST SERPL W P-5'-P-CCNC: 39 U/L (ref 0–40)
AST SERPL W P-5'-P-CCNC: 92 U/L (ref 0–40)
ATRIAL RATE - MUSE: 113 BPM
BASOPHILS # BLD AUTO: 0 10E3/UL (ref 0–0.2)
BASOPHILS NFR BLD AUTO: 0 %
BILIRUB SERPL-MCNC: 1.4 MG/DL (ref 0–1)
BILIRUB SERPL-MCNC: 1.4 MG/DL (ref 0–1)
BNP SERPL-MCNC: 21 PG/ML (ref 0–64)
BUN SERPL-MCNC: 12 MG/DL (ref 8–22)
BUN SERPL-MCNC: 13 MG/DL (ref 8–22)
C REACTIVE PROTEIN LHE: 14.7 MG/DL (ref 0–0.8)
CALCIUM SERPL-MCNC: 8.1 MG/DL (ref 8.5–10.5)
CALCIUM SERPL-MCNC: 9.3 MG/DL (ref 8.5–10.5)
CELL COUNT BODY FLUID SOURCE: ABNORMAL
CHLORIDE BLD-SCNC: 104 MMOL/L (ref 98–107)
CHLORIDE BLD-SCNC: 108 MMOL/L (ref 98–107)
CLOT PRESENCE: ABNORMAL
CO2 SERPL-SCNC: 19 MMOL/L (ref 22–31)
CO2 SERPL-SCNC: 20 MMOL/L (ref 22–31)
COLOR FLD: YELLOW
CREAT SERPL-MCNC: 0.65 MG/DL (ref 0.6–1.1)
CREAT SERPL-MCNC: 0.69 MG/DL (ref 0.6–1.1)
DIASTOLIC BLOOD PRESSURE - MUSE: 71 MMHG
EOSINOPHIL # BLD AUTO: 0 10E3/UL (ref 0–0.7)
EOSINOPHIL NFR BLD AUTO: 0 %
ERYTHROCYTE [DISTWIDTH] IN BLOOD BY AUTOMATED COUNT: 15.3 % (ref 10–15)
FLUAV RNA SPEC QL NAA+PROBE: NEGATIVE
FLUBV RNA RESP QL NAA+PROBE: NEGATIVE
GFR SERPL CREATININE-BSD FRML MDRD: >90 ML/MIN/1.73M2
GFR SERPL CREATININE-BSD FRML MDRD: >90 ML/MIN/1.73M2
GLUCOSE BLD-MCNC: 116 MG/DL (ref 70–125)
GLUCOSE BLD-MCNC: 121 MG/DL (ref 70–125)
GLUCOSE BLDC GLUCOMTR-MCNC: 148 MG/DL (ref 70–99)
GLUCOSE BLDC GLUCOMTR-MCNC: 175 MG/DL (ref 70–99)
GLUCOSE BODY FLUID SOURCE: NORMAL
GLUCOSE FLD-MCNC: <5 MG/DL
HCG SERPL QL: NEGATIVE
HCT VFR BLD AUTO: 30.7 % (ref 35–47)
HGB BLD-MCNC: 9.4 G/DL (ref 11.7–15.7)
HOLD SPECIMEN: NORMAL
IMM GRANULOCYTES # BLD: 0 10E3/UL
IMM GRANULOCYTES NFR BLD: 0 %
INR PPP: 1.2 (ref 0.85–1.15)
INTERPRETATION ECG - MUSE: NORMAL
IRON SATN MFR SERPL: 3 % (ref 20–50)
IRON SERPL-MCNC: 9 UG/DL (ref 42–175)
LACTATE SERPL-SCNC: 1.4 MMOL/L (ref 0.7–2)
LACTATE SERPL-SCNC: 2 MMOL/L (ref 0.7–2)
LDH SERPL L TO P-CCNC: 333 U/L (ref 125–220)
LVEF ECHO: NORMAL
LYMPHOCYTES # BLD AUTO: 0.4 10E3/UL (ref 0.8–5.3)
LYMPHOCYTES NFR BLD AUTO: 4 %
Lab: NO
MAGNESIUM SERPL-MCNC: 1.7 MG/DL (ref 1.8–2.6)
MCH RBC QN AUTO: 22.7 PG (ref 26.5–33)
MCHC RBC AUTO-ENTMCNC: 30.6 G/DL (ref 31.5–36.5)
MCV RBC AUTO: 74 FL (ref 78–100)
MONOCYTES # BLD AUTO: 0.5 10E3/UL (ref 0–1.3)
MONOCYTES NFR BLD AUTO: 6 %
NEUTROPHILS # BLD AUTO: 7.8 10E3/UL (ref 1.6–8.3)
NEUTROPHILS NFR BLD AUTO: 90 %
NRBC # BLD AUTO: 0 10E3/UL
NRBC BLD AUTO-RTO: 0 /100
P AXIS - MUSE: 46 DEGREES
PH BODY FLUID SOURCE: NORMAL
PH FLD: 7.5 PH
PLATELET # BLD AUTO: 379 10E3/UL (ref 150–450)
POTASSIUM BLD-SCNC: 3.8 MMOL/L (ref 3.5–5)
POTASSIUM BLD-SCNC: 3.9 MMOL/L (ref 3.5–5)
PR INTERVAL - MUSE: 122 MS
PROT FLD-MCNC: 3.7 G/DL
PROT SERPL-MCNC: 4.4 G/DL (ref 6–8)
PROT SERPL-MCNC: 5.5 G/DL (ref 6–8)
PROT SERPL-MCNC: 5.8 G/DL (ref 6–8)
PROTEIN BODY FLUID SOURCE: NORMAL
QRS DURATION - MUSE: 82 MS
QT - MUSE: 316 MS
QTC - MUSE: 433 MS
R AXIS - MUSE: 29 DEGREES
RBC # BLD AUTO: 4.14 10E6/UL (ref 3.8–5.2)
RETICS # AUTO: 0.07 10E6/UL (ref 0.01–0.11)
RETICS/RBC NFR AUTO: 1.6 % (ref 0.8–2.7)
RHEUMATOID FACT SER NEPH-ACNC: <15 IU/ML (ref 0–30)
SARS-COV-2 RNA RESP QL NAA+PROBE: NEGATIVE
SODIUM SERPL-SCNC: 138 MMOL/L (ref 136–145)
SODIUM SERPL-SCNC: 139 MMOL/L (ref 136–145)
SPECIMEN EXPIRATION DATE: NORMAL
SYSTOLIC BLOOD PRESSURE - MUSE: 117 MMHG
T AXIS - MUSE: -21 DEGREES
TIBC SERPL-MCNC: 279 UG/DL (ref 313–563)
TRANSFERRIN SERPL-MCNC: 223 MG/DL (ref 212–360)
TROPONIN I SERPL-MCNC: <0.01 NG/ML (ref 0–0.29)
TROPONIN I SERPL-MCNC: <0.01 NG/ML (ref 0–0.29)
TSH SERPL DL<=0.005 MIU/L-ACNC: 1.86 UIU/ML (ref 0.3–5)
VENTRICULAR RATE- MUSE: 113 BPM
WBC # BLD AUTO: 8.8 10E3/UL (ref 4–11)

## 2022-04-03 PROCEDURE — G0378 HOSPITAL OBSERVATION PER HR: HCPCS

## 2022-04-03 PROCEDURE — 83986 ASSAY PH BODY FLUID NOS: CPT | Performed by: EMERGENCY MEDICINE

## 2022-04-03 PROCEDURE — 36415 COLL VENOUS BLD VENIPUNCTURE: CPT | Performed by: EMERGENCY MEDICINE

## 2022-04-03 PROCEDURE — 86038 ANTINUCLEAR ANTIBODIES: CPT | Performed by: EMERGENCY MEDICINE

## 2022-04-03 PROCEDURE — 87077 CULTURE AEROBIC IDENTIFY: CPT | Performed by: EMERGENCY MEDICINE

## 2022-04-03 PROCEDURE — 85025 COMPLETE CBC W/AUTO DIFF WBC: CPT | Performed by: PHYSICIAN ASSISTANT

## 2022-04-03 PROCEDURE — 83605 ASSAY OF LACTIC ACID: CPT | Performed by: EMERGENCY MEDICINE

## 2022-04-03 PROCEDURE — 93005 ELECTROCARDIOGRAM TRACING: CPT | Performed by: EMERGENCY MEDICINE

## 2022-04-03 PROCEDURE — 85610 PROTHROMBIN TIME: CPT | Performed by: EMERGENCY MEDICINE

## 2022-04-03 PROCEDURE — 83615 LACTATE (LD) (LDH) ENZYME: CPT | Performed by: EMERGENCY MEDICINE

## 2022-04-03 PROCEDURE — 82784 ASSAY IGA/IGD/IGG/IGM EACH: CPT | Performed by: PHYSICIAN ASSISTANT

## 2022-04-03 PROCEDURE — 84450 TRANSFERASE (AST) (SGOT): CPT | Performed by: EMERGENCY MEDICINE

## 2022-04-03 PROCEDURE — 83605 ASSAY OF LACTIC ACID: CPT | Performed by: PHYSICIAN ASSISTANT

## 2022-04-03 PROCEDURE — 82945 GLUCOSE OTHER FLUID: CPT | Performed by: EMERGENCY MEDICINE

## 2022-04-03 PROCEDURE — 99285 EMERGENCY DEPT VISIT HI MDM: CPT | Mod: 25

## 2022-04-03 PROCEDURE — 87205 SMEAR GRAM STAIN: CPT | Performed by: EMERGENCY MEDICINE

## 2022-04-03 PROCEDURE — 86850 RBC ANTIBODY SCREEN: CPT | Performed by: PHYSICIAN ASSISTANT

## 2022-04-03 PROCEDURE — 83516 IMMUNOASSAY NONANTIBODY: CPT | Performed by: PHYSICIAN ASSISTANT

## 2022-04-03 PROCEDURE — 87149 DNA/RNA DIRECT PROBE: CPT | Performed by: PHYSICIAN ASSISTANT

## 2022-04-03 PROCEDURE — 83540 ASSAY OF IRON: CPT | Performed by: EMERGENCY MEDICINE

## 2022-04-03 PROCEDURE — 86036 ANCA SCREEN EACH ANTIBODY: CPT | Performed by: INTERNAL MEDICINE

## 2022-04-03 PROCEDURE — 76705 ECHO EXAM OF ABDOMEN: CPT

## 2022-04-03 PROCEDURE — 83735 ASSAY OF MAGNESIUM: CPT | Performed by: EMERGENCY MEDICINE

## 2022-04-03 PROCEDURE — 258N000003 HC RX IP 258 OP 636: Performed by: EMERGENCY MEDICINE

## 2022-04-03 PROCEDURE — 85045 AUTOMATED RETICULOCYTE COUNT: CPT | Performed by: EMERGENCY MEDICINE

## 2022-04-03 PROCEDURE — 86901 BLOOD TYPING SEROLOGIC RH(D): CPT | Performed by: PHYSICIAN ASSISTANT

## 2022-04-03 PROCEDURE — 84484 ASSAY OF TROPONIN QUANT: CPT | Performed by: PHYSICIAN ASSISTANT

## 2022-04-03 PROCEDURE — 87636 SARSCOV2 & INF A&B AMP PRB: CPT | Performed by: PHYSICIAN ASSISTANT

## 2022-04-03 PROCEDURE — 86431 RHEUMATOID FACTOR QUANT: CPT | Performed by: EMERGENCY MEDICINE

## 2022-04-03 PROCEDURE — 74177 CT ABD & PELVIS W/CONTRAST: CPT

## 2022-04-03 PROCEDURE — 84703 CHORIONIC GONADOTROPIN ASSAY: CPT | Performed by: PHYSICIAN ASSISTANT

## 2022-04-03 PROCEDURE — 87077 CULTURE AEROBIC IDENTIFY: CPT | Performed by: PHYSICIAN ASSISTANT

## 2022-04-03 PROCEDURE — 84155 ASSAY OF PROTEIN SERUM: CPT | Performed by: EMERGENCY MEDICINE

## 2022-04-03 PROCEDURE — 36415 COLL VENOUS BLD VENIPUNCTURE: CPT | Performed by: PHYSICIAN ASSISTANT

## 2022-04-03 PROCEDURE — C9803 HOPD COVID-19 SPEC COLLECT: HCPCS

## 2022-04-03 PROCEDURE — 84157 ASSAY OF PROTEIN OTHER: CPT | Performed by: EMERGENCY MEDICINE

## 2022-04-03 PROCEDURE — 258N000003 HC RX IP 258 OP 636: Performed by: PHYSICIAN ASSISTANT

## 2022-04-03 PROCEDURE — 99220 PR INITIAL OBSERVATION CARE,LEVEL III: CPT | Performed by: EMERGENCY MEDICINE

## 2022-04-03 PROCEDURE — 71045 X-RAY EXAM CHEST 1 VIEW: CPT | Mod: 77

## 2022-04-03 PROCEDURE — 93306 TTE W/DOPPLER COMPLETE: CPT | Mod: 26 | Performed by: INTERNAL MEDICINE

## 2022-04-03 PROCEDURE — 272N000706 US THORACENTESIS

## 2022-04-03 PROCEDURE — 80053 COMPREHEN METABOLIC PANEL: CPT | Performed by: PHYSICIAN ASSISTANT

## 2022-04-03 PROCEDURE — 250N000013 HC RX MED GY IP 250 OP 250 PS 637: Performed by: EMERGENCY MEDICINE

## 2022-04-03 PROCEDURE — 250N000011 HC RX IP 250 OP 636: Performed by: PHYSICIAN ASSISTANT

## 2022-04-03 PROCEDURE — 999N000157 HC STATISTIC RCP TIME EA 10 MIN

## 2022-04-03 PROCEDURE — 96372 THER/PROPH/DIAG INJ SC/IM: CPT | Performed by: EMERGENCY MEDICINE

## 2022-04-03 PROCEDURE — 71045 X-RAY EXAM CHEST 1 VIEW: CPT

## 2022-04-03 PROCEDURE — 82962 GLUCOSE BLOOD TEST: CPT

## 2022-04-03 PROCEDURE — 96374 THER/PROPH/DIAG INJ IV PUSH: CPT | Mod: 59

## 2022-04-03 PROCEDURE — 250N000011 HC RX IP 250 OP 636: Performed by: EMERGENCY MEDICINE

## 2022-04-03 PROCEDURE — 83880 ASSAY OF NATRIURETIC PEPTIDE: CPT | Performed by: PHYSICIAN ASSISTANT

## 2022-04-03 PROCEDURE — 96361 HYDRATE IV INFUSION ADD-ON: CPT

## 2022-04-03 PROCEDURE — 84484 ASSAY OF TROPONIN QUANT: CPT | Performed by: EMERGENCY MEDICINE

## 2022-04-03 PROCEDURE — 0W993ZZ DRAINAGE OF RIGHT PLEURAL CAVITY, PERCUTANEOUS APPROACH: ICD-10-PCS | Performed by: RADIOLOGY

## 2022-04-03 PROCEDURE — 89050 BODY FLUID CELL COUNT: CPT | Performed by: EMERGENCY MEDICINE

## 2022-04-03 PROCEDURE — 86140 C-REACTIVE PROTEIN: CPT | Performed by: PHYSICIAN ASSISTANT

## 2022-04-03 PROCEDURE — 94660 CPAP INITIATION&MGMT: CPT

## 2022-04-03 PROCEDURE — 96375 TX/PRO/DX INJ NEW DRUG ADDON: CPT

## 2022-04-03 PROCEDURE — 84443 ASSAY THYROID STIM HORMONE: CPT | Performed by: PHYSICIAN ASSISTANT

## 2022-04-03 PROCEDURE — 87070 CULTURE OTHR SPECIMN AEROBIC: CPT | Performed by: EMERGENCY MEDICINE

## 2022-04-03 PROCEDURE — 5A09357 ASSISTANCE WITH RESPIRATORY VENTILATION, LESS THAN 24 CONSECUTIVE HOURS, CONTINUOUS POSITIVE AIRWAY PRESSURE: ICD-10-PCS | Performed by: INTERNAL MEDICINE

## 2022-04-03 PROCEDURE — 93306 TTE W/DOPPLER COMPLETE: CPT

## 2022-04-03 RX ORDER — LISINOPRIL 20 MG/1
20 TABLET ORAL DAILY
Status: DISCONTINUED | OUTPATIENT
Start: 2022-04-03 | End: 2022-04-04

## 2022-04-03 RX ORDER — KETOROLAC TROMETHAMINE 15 MG/ML
15 INJECTION, SOLUTION INTRAMUSCULAR; INTRAVENOUS ONCE
Status: COMPLETED | OUTPATIENT
Start: 2022-04-03 | End: 2022-04-03

## 2022-04-03 RX ORDER — ONDANSETRON 2 MG/ML
4 INJECTION INTRAMUSCULAR; INTRAVENOUS EVERY 6 HOURS PRN
Status: DISCONTINUED | OUTPATIENT
Start: 2022-04-03 | End: 2022-04-13 | Stop reason: HOSPADM

## 2022-04-03 RX ORDER — ONDANSETRON 4 MG/1
4 TABLET, ORALLY DISINTEGRATING ORAL EVERY 6 HOURS PRN
Status: DISCONTINUED | OUTPATIENT
Start: 2022-04-03 | End: 2022-04-13 | Stop reason: HOSPADM

## 2022-04-03 RX ORDER — LORAZEPAM 2 MG/ML
1 INJECTION INTRAMUSCULAR ONCE
Status: COMPLETED | OUTPATIENT
Start: 2022-04-03 | End: 2022-04-03

## 2022-04-03 RX ORDER — DIPHENHYDRAMINE HYDROCHLORIDE 50 MG/ML
25 INJECTION INTRAMUSCULAR; INTRAVENOUS EVERY 6 HOURS PRN
Status: DISCONTINUED | OUTPATIENT
Start: 2022-04-03 | End: 2022-04-13 | Stop reason: HOSPADM

## 2022-04-03 RX ORDER — PIPERACILLIN SODIUM, TAZOBACTAM SODIUM 3; .375 G/15ML; G/15ML
3.38 INJECTION, POWDER, LYOPHILIZED, FOR SOLUTION INTRAVENOUS EVERY 8 HOURS
Status: DISCONTINUED | OUTPATIENT
Start: 2022-04-04 | End: 2022-04-06

## 2022-04-03 RX ORDER — IOPAMIDOL 755 MG/ML
100 INJECTION, SOLUTION INTRAVASCULAR ONCE
Status: COMPLETED | OUTPATIENT
Start: 2022-04-03 | End: 2022-04-03

## 2022-04-03 RX ORDER — PIPERACILLIN SODIUM, TAZOBACTAM SODIUM 3; .375 G/15ML; G/15ML
3.38 INJECTION, POWDER, LYOPHILIZED, FOR SOLUTION INTRAVENOUS ONCE
Status: COMPLETED | OUTPATIENT
Start: 2022-04-03 | End: 2022-04-03

## 2022-04-03 RX ORDER — ACETAMINOPHEN 325 MG/1
325 TABLET ORAL EVERY 4 HOURS PRN
Status: DISCONTINUED | OUTPATIENT
Start: 2022-04-03 | End: 2022-04-06

## 2022-04-03 RX ORDER — PIPERACILLIN SODIUM, TAZOBACTAM SODIUM 3; .375 G/15ML; G/15ML
3.38 INJECTION, POWDER, LYOPHILIZED, FOR SOLUTION INTRAVENOUS EVERY 8 HOURS
Status: DISCONTINUED | OUTPATIENT
Start: 2022-04-03 | End: 2022-04-03 | Stop reason: CLARIF

## 2022-04-03 RX ORDER — FERROUS SULFATE 325(65) MG
325 TABLET ORAL
COMMUNITY

## 2022-04-03 RX ADMIN — KETOROLAC TROMETHAMINE 15 MG: 15 INJECTION, SOLUTION INTRAMUSCULAR; INTRAVENOUS at 14:09

## 2022-04-03 RX ADMIN — LISINOPRIL 20 MG: 20 TABLET ORAL at 18:39

## 2022-04-03 RX ADMIN — IOPAMIDOL 100 ML: 755 INJECTION, SOLUTION INTRAVENOUS at 12:55

## 2022-04-03 RX ADMIN — SODIUM CHLORIDE 1000 ML: 9 INJECTION, SOLUTION INTRAVENOUS at 21:04

## 2022-04-03 RX ADMIN — ACETAMINOPHEN 325 MG: 325 TABLET ORAL at 18:39

## 2022-04-03 RX ADMIN — PIPERACILLIN AND TAZOBACTAM 3.38 G: 3; .375 INJECTION, POWDER, LYOPHILIZED, FOR SOLUTION INTRAVENOUS at 19:53

## 2022-04-03 RX ADMIN — ENOXAPARIN SODIUM 40 MG: 100 INJECTION SUBCUTANEOUS at 23:29

## 2022-04-03 RX ADMIN — SODIUM CHLORIDE 1000 ML: 9 INJECTION, SOLUTION INTRAVENOUS at 11:52

## 2022-04-03 RX ADMIN — VANCOMYCIN HYDROCHLORIDE 1250 MG: 5 INJECTION, POWDER, LYOPHILIZED, FOR SOLUTION INTRAVENOUS at 20:40

## 2022-04-03 RX ADMIN — LORAZEPAM 1 MG: 2 INJECTION INTRAMUSCULAR; INTRAVENOUS at 16:48

## 2022-04-03 ASSESSMENT — ENCOUNTER SYMPTOMS
PALPITATIONS: 0
NEUROLOGICAL NEGATIVE: 1
WHEEZING: 0
SHORTNESS OF BREATH: 1
MUSCULOSKELETAL NEGATIVE: 1
FEVER: 0
GASTROINTESTINAL NEGATIVE: 1
COUGH: 1
CHILLS: 0
FATIGUE: 1

## 2022-04-03 NOTE — PHARMACY-ADMISSION MEDICATION HISTORY
Pharmacy Note - Admission Medication History    Pertinent Provider Information: pt rarely takes her medications, only takes lisinipril if she checks her BP and it is elevated.   ______________________________________________________________________    Prior To Admission (PTA) med list completed and updated in EMR.       PTA Med List   Medication Sig Last Dose     Calcium Carb-Cholecalciferol (CALTRATE 600+D3 SOFT) 600-800 MG-UNIT CHEW Take 1 tablet by mouth daily  Past Month at Unknown time     ferrous sulfate (FEROSUL) 325 (65 Fe) MG tablet Take 325 mg by mouth daily (with breakfast) Past Month at Unknown time     lisinopril (ZESTRIL) 20 MG tablet Take 1 tablet (20 mg) by mouth daily Past Month at Unknown time       Information source(s): Patient    Method of interview communication: in-person    Patient was asked about OTC/herbal products specifically.  PTA med list reflects this.    Based on the pharmacist's assessment, the PTA med list information appears reliable    Allergies were reviewed, assessed, and updated with the patient.      Patient does not use any multi-dose medications prior to admission.     Thank you for the opportunity to participate in the care of this patient.      Maria Ines Reynolds HANK     4/3/2022     3:52 PM

## 2022-04-03 NOTE — PROGRESS NOTES
Pt was transported to ICU on NC 1 lpm NC. Pt is breathing fast with a rate in the mid 40's. Pt was placed back on Bipap and she wanted to take off the Bipap. Pt states that she feels like the Bipap is making her SOB worse. BS are crackles diminished. RT will continue to monitor.    Kelly Moura, RT

## 2022-04-03 NOTE — ED NOTES
Pt was placed on Bipap 10/5 12 40% and tolerating it fairly well. Pt states that she does get clostophobic, RN aware. RT will continue to monitor.    Kelly Moura, RT

## 2022-04-03 NOTE — PROGRESS NOTES
Pulmonary holding note    Received consult.  Reviewed chart and clinical picture.  Rapid called tonight for dyspnea after thoracentesis.  No pneumothorax.    Her fluid is apparently thick and cloudy which is concerning for an empyema.  This may require a chest tube for drainage to empty.    Agree with broad spectrum antibiotics.      Interesting picture of migratory pneumonias, sinus infections, and feeling ill for many months.  Question if this is not a rheumatologic process like vasculitis.  HERBERTH, ANCA, RF, CRP all sent.    Curious tachypnea despite relatively clear lung fields and removal of 800cc.  Could have a component of re-expansion pulmonary edema.  Support with BIPAP and oxygen as needed.    Full consult to occur tomorrow morning.

## 2022-04-03 NOTE — ED TRIAGE NOTES
Arrives to ED with c/o increased SOB that began overnight. Was dx with pneumonia in February. Finished antibiotics in March. Reports CP with deep breathing. Endorses cough with green sputum. Reports low grade fevers.

## 2022-04-03 NOTE — ED PROVIDER NOTES
EMERGENCY DEPARTMENT ENCOUNTER      NAME: Arely Saldaña  AGE: 41 year old female  YOB: 1980  MRN: [id number]  EVALUATION DATE & TIME: 4/3/2022 11:04 AM    PCP: Ashely Larios    ED PROVIDER: Ulices Urbina PA-C      Chief Complaint   Patient presents with     Shortness of Breath         FINAL IMPRESSION:  1. Pleural effusion    2. Tachycardia    3. Tachypnea    4. Anemia    5. Elevated LFTs          MEDICAL DECISION MAKING:    Pertinent Labs & Imaging studies reviewed. (See chart for details)  41 year old female presents to the Emergency Department for evaluation of acute onset of left-sided pleuritic chest pain that started last night.    After obtaining history of present illness, reviewing vitals, examining patient and reviewing previous medical records plan to assess with full sets of blood work, EKG, CT scan of the chest to rule out PE.  Patient currently not hypoxic, however she is tachycardic and tachypneic.  No wheezing on examination.  CT scan returned without evidence of PE or active pneumonia however there are pleural effusions left greater than right noted.  Also evidence of cholelithiasis but no evidence of cholecystitis.  We will further assess with right upper quadrant ultrasound.      I discussed case with pulmonary medicine.  Patient continues to be tachypneic and tachycardic and overall does not appear well.  Based on the pleural effusions recommendations are made to admit the patient for further work-up which could include echocardiogram and possible thoracentesis of the pleural effusions.  Currently antibiotics are being held.  I discussed case with admitting physician, Dr. Roberts she is excepting of the admission and agrees with plan of care.    ED COURSE    I met with the patient, obtained history, performed an initial exam, and discussed options and plan for diagnostics and treatment here in the ED.    1645 patient was reassessed and is nursing staff came and reported that  vital signs continued to show worsening progression.  Heart rate is up, respiratory rate is up.  Patient reports that she is not not feeling any worse than when she came in, in fact she states that she is feeling slightly better.  I did ask the patient if she is someone that uses alcohol daily long-term and she states no she rarely drinks alcohol.  At this point in time because of the pleural effusions and her increased respiratory rate and heart rate we will attempt a short time of BiPAP therapy to see if this helps her.  I will also reassess with a portable chest x-ray to see if anything is significantly changed we will also provide a dose of Ativan so that she tolerates the BiPAP well.  At the conclusion of the encounter I discussed the results of all of the tests and the disposition. The questions were answered. The patient or family acknowledged understanding and was agreeable with the care plan.     MEDICATIONS GIVEN IN THE EMERGENCY:  Medications   0.9% sodium chloride BOLUS (0 mLs Intravenous Stopped 4/3/22 1240)   iopamidol (ISOVUE-370) solution 100 mL (100 mLs Intravenous Given 4/3/22 1255)   ketorolac (TORADOL) injection 15 mg (15 mg Intravenous Given 4/3/22 1409)   LORazepam (ATIVAN) injection 1 mg (1 mg Intravenous Given 4/3/22 1648)       NEW PRESCRIPTIONS STARTED AT TODAY'S ER VISIT  New Prescriptions    No medications on file            =================================================================    HPI    Patient information was obtained from: Patient as well as review of previous medical work-up.  Arely Saldaña is a 41 year old female with a pertinent history of bacterial pneumonia diagnosed on 3/10, as well as anemia who presents to this ED for evaluation of acute onset of left-sided pleuritic chest pain.  Patient reports that the symptoms started last night and have been progressing into this morning.  Patient does admit to a cough that has been present over the last week or so steadily  getting worse.  She denies any fever or chills.  She acknowledges that she was diagnosed with pneumonia on 3/10 finished a course of Levaquin and did show improvement of symptoms but when she stopped the antibiotics symptoms seem to come back.  She denies any lower extremity edema.  She has no previous history of blood clots or clotting disorders.  She does admit to being diagnosed with anemia, patient reports that she does not eat much meat therefore presumed iron deficiency anemia.  She denies any heavy menstrual bleeding.  No complaints of abdominal pain, nausea, vomiting, diarrhea.  Patient is a non-smoker.  No previous history of asthma.      REVIEW OF SYSTEMS   Review of Systems   Constitutional: Positive for fatigue. Negative for chills and fever.   HENT: Negative.    Respiratory: Positive for cough and shortness of breath. Negative for wheezing.    Cardiovascular: Positive for chest pain. Negative for palpitations and leg swelling.   Gastrointestinal: Negative.    Genitourinary: Negative.    Musculoskeletal: Negative.    Skin: Positive for pallor.   Neurological: Negative.    All other systems reviewed and are negative.         PAST MEDICAL HISTORY:  Past Medical History:   Diagnosis Date     ASCUS of cervix with negative high risk HPV 2018-2012 NIL annual pap 4/13/15 NIL pap, neg HPV 18 ASCUS, neg HPV 11/10/20 NIL pap, neg HPV     Hypertension     was on nifedipine and HCTZ before pregnancy, had since age early 20's       PAST SURGICAL HISTORY:  Past Surgical History:   Procedure Laterality Date     C/SECTION, LOW TRANSVERSE  2021    with PPTL     FOOT SURGERY Left age 20    bursa removed from left foot     SALPINGECTOMY Bilateral 2021    with  section     WISDOM TOOTH EXTRACTION  age 19         CURRENT MEDICATIONS:    No current facility-administered medications for this encounter.    Current Outpatient Medications:      Calcium Carb-Cholecalciferol (CALTRATE 600+D3  SOFT) 600-800 MG-UNIT CHEW, Take 1 tablet by mouth daily , Disp: , Rfl:      ferrous sulfate (FEROSUL) 325 (65 Fe) MG tablet, Take 325 mg by mouth daily (with breakfast), Disp: , Rfl:      lisinopril (ZESTRIL) 20 MG tablet, Take 1 tablet (20 mg) by mouth daily (Patient taking differently: Take 20 mg by mouth daily as needed ), Disp: 90 tablet, Rfl: 0      ALLERGIES:  Allergies   Allergen Reactions     Penicillins Hives     Tolerated cefdinir and cefuroxime in 2022     Sulfamethoxazole-Trimethoprim [Sulfamethoxazole W/Trimethoprim] Rash       FAMILY HISTORY:  Family History   Problem Relation Age of Onset     Hypertension Mother      Hypertension Father      Diabetes Father        SOCIAL HISTORY:   Social History     Socioeconomic History     Marital status:      Spouse name: Not on file     Number of children: 0     Years of education: Not on file     Highest education level: Not on file   Occupational History     Not on file   Tobacco Use     Smoking status: Never Smoker     Smokeless tobacco: Never Used   Substance and Sexual Activity     Alcohol use: Not Currently     Comment: Alcoholic Drinks/day: once every 2-3 months prior to pregnancy     Drug use: Never     Sexual activity: Yes     Partners: Male     Birth control/protection: Surgical     Comment: tubal ligation   Other Topics Concern     Not on file   Social History Narrative     Not on file     Social Determinants of Health     Financial Resource Strain: Not on file   Food Insecurity: Not on file   Transportation Needs: Not on file   Physical Activity: Not on file   Stress: Not on file   Social Connections: Not on file   Intimate Partner Violence: Not on file   Housing Stability: Not on file       VITALS:  Patient Vitals for the past 24 hrs:   BP Temp Temp src Pulse Resp SpO2 Height Weight   04/03/22 1630 (!) 140/92 -- -- (!) 137 (!) 40 94 % -- --   04/03/22 1600 138/86 -- -- (!) 129 (!) 49 99 % -- --   04/03/22 1530 135/78 -- -- (!) 125 (!) 31 97 %  "-- --   04/03/22 1500 127/85 -- -- (!) 123 (!) 41 96 % -- --   04/03/22 1430 (!) 138/93 -- -- (!) 131 (!) 36 96 % -- --   04/03/22 1330 128/78 -- -- 110 27 95 % -- --   04/03/22 1230 117/71 -- -- 110 (!) 34 98 % -- --   04/03/22 1200 103/63 -- -- 106 (!) 32 98 % -- --   04/03/22 1130 -- -- -- 100 17 95 % -- --   04/03/22 1115 117/71 -- -- 117 (!) 31 98 % -- --   04/03/22 1101 104/78 97.7  F (36.5  C) Oral 118 24 97 % 1.676 m (5' 6\") 65.8 kg (145 lb)       PHYSICAL EXAM    Physical Exam  Vitals and nursing note reviewed.   Constitutional:       General: She is not in acute distress.     Appearance: She is normal weight. She is ill-appearing. She is not toxic-appearing.   HENT:      Head: Normocephalic.      Right Ear: External ear normal.      Left Ear: External ear normal.      Nose: Nose normal.   Eyes:      Conjunctiva/sclera: Conjunctivae normal.      Pupils: Pupils are equal, round, and reactive to light.   Cardiovascular:      Rate and Rhythm: Tachycardia present.      Pulses: Normal pulses.   Pulmonary:      Effort: No respiratory distress.      Breath sounds: No stridor. No wheezing or rales.      Comments: Patient is tachypneic  Abdominal:      General: Abdomen is flat.      Tenderness: There is no abdominal tenderness. There is no guarding.   Musculoskeletal:         General: No swelling, tenderness, deformity or signs of injury. Normal range of motion.   Skin:     General: Skin is warm and dry.      Coloration: Skin is pale.      Findings: No rash.   Neurological:      General: No focal deficit present.      Mental Status: She is alert and oriented to person, place, and time. Mental status is at baseline.      Sensory: No sensory deficit.      Motor: No weakness.   Psychiatric:         Mood and Affect: Mood normal.          LAB:  All pertinent labs reviewed and interpreted.  Results for orders placed or performed during the hospital encounter of 04/03/22   CT Chest (PE) Abdomen Pelvis w Contrast    " Impression    IMPRESSION:  1.  Negative for pulmonary emboli.    2.  The previous dense consolidative infiltrates in the left lung seen on CT 03/10/2022 appear to near completely resolved.    3.  New small right and moderate left pleural effusions with moderate atelectasis in the lower lobes greater on the left side.    4.  Gallstones.    5.  Mild splenomegaly.   Abdomen US, limited (RUQ only)    Impression    IMPRESSION:  1.  Cholelithiasis. Otherwise normal right upper quadrant ultrasound.       XR Chest Port 1 View    Impression    IMPRESSION:     Moderate to large left and small right pleural effusions are not visibly changed. Related atelectasis including near complete atelectasis of the left lower lobe.    Lower left ventricular heart border is silhouetted. Cardiac silhouette is not enlarged. Azygos vein is not distended and the vascular pedicle width is normal.    No pneumothorax.   Lactic acid whole blood   Result Value Ref Range    Lactic Acid 1.4 0.7 - 2.0 mmol/L   Comprehensive metabolic panel   Result Value Ref Range    Sodium 139 136 - 145 mmol/L    Potassium 3.8 3.5 - 5.0 mmol/L    Chloride 104 98 - 107 mmol/L    Carbon Dioxide (CO2) 20 (L) 22 - 31 mmol/L    Anion Gap 15 5 - 18 mmol/L    Urea Nitrogen 12 8 - 22 mg/dL    Creatinine 0.69 0.60 - 1.10 mg/dL    Calcium 9.3 8.5 - 10.5 mg/dL    Glucose 121 70 - 125 mg/dL    Alkaline Phosphatase 268 (H) 45 - 120 U/L    AST 92 (H) 0 - 40 U/L    ALT 51 (H) 0 - 45 U/L    Protein Total 5.8 (L) 6.0 - 8.0 g/dL    Albumin 2.9 (L) 3.5 - 5.0 g/dL    Bilirubin Total 1.4 (H) 0.0 - 1.0 mg/dL    GFR Estimate >90 >60 mL/min/1.73m2   Result Value Ref Range    Troponin I <0.01 0.00 - 0.29 ng/mL   B-Type Natriuretic Peptide ( [facility] Only)   Result Value Ref Range    BNP 21 0 - 64 pg/mL   CRP inflammation   Result Value Ref Range    CRP 14.7 (H) 0.0-<0.8 mg/dL   Symptomatic; Unknown Influenza A/B & SARS-CoV2 (COVID-19) Virus PCR Multiplex Nasopharyngeal    Specimen:  Nasopharyngeal; Swab   Result Value Ref Range    Influenza A PCR Negative Negative    Influenza B PCR Negative Negative    SARS CoV2 PCR Negative Negative   HCG qualitative Blood   Result Value Ref Range    hCG Serum Qualitative Negative Negative   CBC with platelets and differential   Result Value Ref Range    WBC Count 8.8 4.0 - 11.0 10e3/uL    RBC Count 4.14 3.80 - 5.20 10e6/uL    Hemoglobin 9.4 (L) 11.7 - 15.7 g/dL    Hematocrit 30.7 (L) 35.0 - 47.0 %    MCV 74 (L) 78 - 100 fL    MCH 22.7 (L) 26.5 - 33.0 pg    MCHC 30.6 (L) 31.5 - 36.5 g/dL    RDW 15.3 (H) 10.0 - 15.0 %    Platelet Count 379 150 - 450 10e3/uL    % Neutrophils 90 %    % Lymphocytes 4 %    % Monocytes 6 %    % Eosinophils 0 %    % Basophils 0 %    % Immature Granulocytes 0 %    NRBCs per 100 WBC 0 <1 /100    Absolute Neutrophils 7.8 1.6 - 8.3 10e3/uL    Absolute Lymphocytes 0.4 (L) 0.8 - 5.3 10e3/uL    Absolute Monocytes 0.5 0.0 - 1.3 10e3/uL    Absolute Eosinophils 0.0 0.0 - 0.7 10e3/uL    Absolute Basophils 0.0 0.0 - 0.2 10e3/uL    Absolute Immature Granulocytes 0.0 <=0.4 10e3/uL    Absolute NRBCs 0.0 10e3/uL   TSH with free T4 reflex   Result Value Ref Range    TSH 1.86 0.30 - 5.00 uIU/mL   Echocardiogram Complete   Result Value Ref Range    LVEF  > 65%    Adult Type and Screen   Result Value Ref Range    ABO/RH(D) B POS     Antibody Screen Negative Negative    SPECIMEN EXPIRATION DATE [id number]        RADIOLOGY:  Reviewed all pertinent imaging. Please see official radiology report.  XR Chest Port 1 View   Final Result   IMPRESSION:       Moderate to large left and small right pleural effusions are not visibly changed. Related atelectasis including near complete atelectasis of the left lower lobe.      Lower left ventricular heart border is silhouetted. Cardiac silhouette is not enlarged. Azygos vein is not distended and the vascular pedicle width is normal.      No pneumothorax.      Echocardiogram Complete   Final Result      Abdomen US,  limited ([facility] only)   Final Result   IMPRESSION:   1.  Cholelithiasis. Otherwise normal right upper quadrant ultrasound.            CT Chest (PE) Abdomen Pelvis w Contrast   Final Result   IMPRESSION:   1.  Negative for pulmonary emboli.      2.  The previous dense consolidative infiltrates in the left lung seen on CT 03/10/2022 appear to near completely resolved.      3.  New small right and moderate left pleural effusions with moderate atelectasis in the lower lobes greater on the left side.      4.  Gallstones.      5.  Mild splenomegaly.          EKG:    Performed at: 11:16 AM    EKG showing sinus tachycardia ventricular rate of 113 bpm.  ST segments are grossly normal with no obvious elevation or depression.  T waves are inverted in lead III otherwise upright or flat.  QTC measured at 433.  When compared to previous EKG compared to 1 month ago, no change.    Ulices Urbina PA-C  Emergency Medicine  [facility]     Ulices Urbina PA-C  04/03/22 1533       Ulices Urbina PA-C  04/03/22 1701

## 2022-04-03 NOTE — ED PROVIDER NOTES
Emergency Department Midlevel Supervisory Note     I personally saw the patient and performed a substantive portion of the visit including all aspects of the medical decision making.    ED Course:  2:13 PM Ulices Urbina PA-C staffed patient with me. I agree with their assessment and plan of management, and I will see the patient.  3:15  I met with the patient to introduce myself, gather additional history, perform my initial exam, and discuss the plan.   4:25 PM Patient reevaluated.  More tachycardic and tachypneic.  She reports she feels better.  We will try some ativan and bipap to help, I would worry about respiratory failure if she goes at this rate.    Brief HPI:     Arely Saldaña is a 41 year old female who presents for evaluation of left-sided pleuritic chest pain.  Patient reports that the symptoms started last night and have been progressing into this morning.  Patient does admit to a cough that has been present over the last week or so steadily getting worse.  She denies any fever or chills.  She acknowledges that she was diagnosed with pneumonia on 3/10 finished a course of Levaquin and did show improvement of symptoms but when she stopped the antibiotics symptoms seem to come back.  She denies any lower extremity edema.  She has no previous history of blood clots or clotting disorders.  She does admit to being diagnosed with anemia, patient reports that she does not eat much meat therefore presumed iron deficiency anemia.    I, Brigitte Whitaker, am serving as a scribe to document services personally performed by Lynad Strickland DO, based on my observations and the provider's statements to me.   I, Lynda Strickland DO, attest that Brigitte Whitaker was acting in a scribe capacity, has observed my performance of the services and has documented them in accordance with my direction.    Brief Physical Exam:  Constitutional:  Alert, in no acute distress  EYES: Conjunctivae clear  HENT:  Atraumatic,  normocephalic  Respiratory:  Respirations even, Tachypneic  Cardiovascular:  Tachycardic, good peripheral perfusion  GI: Soft, nondistended, nontender, no palpable masses, no rebound, no guarding   Musculoskeletal:  No edema.  Integument: Warm, Dry, No erythema, No rash.   Neurologic:  Alert & oriented, no focal deficits noted  Psych: Normal mood and affect     MDM:  Patient presents for evaluation of left-sided pleuritic chest pain.  Symptoms started last night.  Recently finished a course of antibiotics for pneumonia.  Patient is tachycardic on arrival, tachypneic.  CT shows bilateral pleural effusions but no focal infiltrate.  No pulmonary embolism.  Troponin negative.  Patient remains quite tachycardic in the ED, tachypneic, will admit.       1. Pleural effusion    2. Tachycardia    3. Tachypnea    4. Anemia    5. Elevated LFTs        Labs and Imaging:  Results for orders placed or performed during the hospital encounter of 04/03/22   CT Chest (PE) Abdomen Pelvis w Contrast    Impression    IMPRESSION:  1.  Negative for pulmonary emboli.    2.  The previous dense consolidative infiltrates in the left lung seen on CT 03/10/2022 appear to near completely resolved.    3.  New small right and moderate left pleural effusions with moderate atelectasis in the lower lobes greater on the left side.    4.  Gallstones.    5.  Mild splenomegaly.   Abdomen US, limited (RUQ only)    Impression    IMPRESSION:  1.  Cholelithiasis. Otherwise normal right upper quadrant ultrasound.       Lactic acid whole blood   Result Value Ref Range    Lactic Acid 1.4 0.7 - 2.0 mmol/L   Comprehensive metabolic panel   Result Value Ref Range    Sodium 139 136 - 145 mmol/L    Potassium 3.8 3.5 - 5.0 mmol/L    Chloride 104 98 - 107 mmol/L    Carbon Dioxide (CO2) 20 (L) 22 - 31 mmol/L    Anion Gap 15 5 - 18 mmol/L    Urea Nitrogen 12 8 - 22 mg/dL    Creatinine 0.69 0.60 - 1.10 mg/dL    Calcium 9.3 8.5 - 10.5 mg/dL    Glucose 121 70 - 125 mg/dL     Alkaline Phosphatase 268 (H) 45 - 120 U/L    AST 92 (H) 0 - 40 U/L    ALT 51 (H) 0 - 45 U/L    Protein Total 5.8 (L) 6.0 - 8.0 g/dL    Albumin 2.9 (L) 3.5 - 5.0 g/dL    Bilirubin Total 1.4 (H) 0.0 - 1.0 mg/dL    GFR Estimate >90 >60 mL/min/1.73m2   Result Value Ref Range    Troponin I <0.01 0.00 - 0.29 ng/mL   B-Type Natriuretic Peptide ([facility] Only)   Result Value Ref Range    BNP 21 0 - 64 pg/mL   CRP inflammation   Result Value Ref Range    CRP 14.7 (H) 0.0-<0.8 mg/dL   Symptomatic; Unknown Influenza A/B & SARS-CoV2 (COVID-19) Virus PCR Multiplex Nasopharyngeal    Specimen: Nasopharyngeal; Swab   Result Value Ref Range    Influenza A PCR Negative Negative    Influenza B PCR Negative Negative    SARS CoV2 PCR Negative Negative   HCG qualitative Blood   Result Value Ref Range    hCG Serum Qualitative Negative Negative   CBC with platelets and differential   Result Value Ref Range    WBC Count 8.8 4.0 - 11.0 10e3/uL    RBC Count 4.14 3.80 - 5.20 10e6/uL    Hemoglobin 9.4 (L) 11.7 - 15.7 g/dL    Hematocrit 30.7 (L) 35.0 - 47.0 %    MCV 74 (L) 78 - 100 fL    MCH 22.7 (L) 26.5 - 33.0 pg    MCHC 30.6 (L) 31.5 - 36.5 g/dL    RDW 15.3 (H) 10.0 - 15.0 %    Platelet Count 379 150 - 450 10e3/uL    % Neutrophils 90 %    % Lymphocytes 4 %    % Monocytes 6 %    % Eosinophils 0 %    % Basophils 0 %    % Immature Granulocytes 0 %    NRBCs per 100 WBC 0 <1 /100    Absolute Neutrophils 7.8 1.6 - 8.3 10e3/uL    Absolute Lymphocytes 0.4 (L) 0.8 - 5.3 10e3/uL    Absolute Monocytes 0.5 0.0 - 1.3 10e3/uL    Absolute Eosinophils 0.0 0.0 - 0.7 10e3/uL    Absolute Basophils 0.0 0.0 - 0.2 10e3/uL    Absolute Immature Granulocytes 0.0 <=0.4 10e3/uL    Absolute NRBCs 0.0 10e3/uL   TSH with free T4 reflex   Result Value Ref Range    TSH 1.86 0.30 - 5.00 uIU/mL   Adult Type and Screen   Result Value Ref Range    ABO/RH(D) B POS     Antibody Screen Negative Negative    SPECIMEN EXPIRATION DATE [id number]      I have reviewed the relevant  laboratory and radiology studies    Procedures:  I was present for the key portions of this procedure: none    Lynda Strickland DO   [facility] EMERGENCY ROOM  [facility] [facility] [address] [phone]      Lynda Strickland DO  04/03/22 1604       Lynda Strickland DO  04/03/22 8006

## 2022-04-04 ENCOUNTER — APPOINTMENT (OUTPATIENT)
Dept: CT IMAGING | Facility: CLINIC | Age: 42
DRG: 177 | End: 2022-04-04
Attending: INTERNAL MEDICINE
Payer: COMMERCIAL

## 2022-04-04 LAB
ACINETOBACTER SPECIES: NOT DETECTED
CITROBACTER SPECIES: NOT DETECTED
CTX-M: NORMAL
ENTEROBACTER SPECIES: NOT DETECTED
ERYTHROCYTE [DISTWIDTH] IN BLOOD BY AUTOMATED COUNT: 15.5 % (ref 10–15)
ESCHERICHIA COLI: NOT DETECTED
HCT VFR BLD AUTO: 25.8 % (ref 35–47)
HGB BLD-MCNC: 7.9 G/DL (ref 11.7–15.7)
IMP: NORMAL
KLEBSIELLA OXYTOCA: NOT DETECTED
KLEBSIELLA PNEUMONIAE: NOT DETECTED
KPC: NORMAL
LD BODY BODY FLUID SOURCE: NORMAL
LDH FLD L TO P-CCNC: 2332 U/L
MCH RBC QN AUTO: 23.3 PG (ref 26.5–33)
MCHC RBC AUTO-ENTMCNC: 30.6 G/DL (ref 31.5–36.5)
MCV RBC AUTO: 76 FL (ref 78–100)
NDM: NORMAL
OXA (DETECTED/NOT DETECTED): NORMAL
PLATELET # BLD AUTO: 278 10E3/UL (ref 150–450)
PROTEUS SPECIES: NOT DETECTED
PSEUDOMONAS AERUGINOSA: NOT DETECTED
RBC # BLD AUTO: 3.39 10E6/UL (ref 3.8–5.2)
VIM: NORMAL
WBC # BLD AUTO: 7.3 10E3/UL (ref 4–11)

## 2022-04-04 PROCEDURE — 258N000003 HC RX IP 258 OP 636: Performed by: EMERGENCY MEDICINE

## 2022-04-04 PROCEDURE — 250N000013 HC RX MED GY IP 250 OP 250 PS 637: Performed by: INTERNAL MEDICINE

## 2022-04-04 PROCEDURE — 250N000009 HC RX 250: Performed by: RADIOLOGY

## 2022-04-04 PROCEDURE — 96376 TX/PRO/DX INJ SAME DRUG ADON: CPT

## 2022-04-04 PROCEDURE — 250N000011 HC RX IP 250 OP 636: Performed by: EMERGENCY MEDICINE

## 2022-04-04 PROCEDURE — 85027 COMPLETE CBC AUTOMATED: CPT | Performed by: EMERGENCY MEDICINE

## 2022-04-04 PROCEDURE — 36415 COLL VENOUS BLD VENIPUNCTURE: CPT | Performed by: EMERGENCY MEDICINE

## 2022-04-04 PROCEDURE — 32557 INSERT CATH PLEURA W/ IMAGE: CPT

## 2022-04-04 PROCEDURE — 258N000003 HC RX IP 258 OP 636: Performed by: INTERNAL MEDICINE

## 2022-04-04 PROCEDURE — G0378 HOSPITAL OBSERVATION PER HR: HCPCS

## 2022-04-04 PROCEDURE — 0W9B30Z DRAINAGE OF LEFT PLEURAL CAVITY WITH DRAINAGE DEVICE, PERCUTANEOUS APPROACH: ICD-10-PCS | Performed by: RADIOLOGY

## 2022-04-04 PROCEDURE — 3E0L3GC INTRODUCTION OF OTHER THERAPEUTIC SUBSTANCE INTO PLEURAL CAVITY, PERCUTANEOUS APPROACH: ICD-10-PCS | Performed by: INTERNAL MEDICINE

## 2022-04-04 PROCEDURE — 99223 1ST HOSP IP/OBS HIGH 75: CPT | Performed by: INTERNAL MEDICINE

## 2022-04-04 PROCEDURE — 250N000011 HC RX IP 250 OP 636: Performed by: INTERNAL MEDICINE

## 2022-04-04 PROCEDURE — 99232 SBSQ HOSP IP/OBS MODERATE 35: CPT | Performed by: INTERNAL MEDICINE

## 2022-04-04 PROCEDURE — 200N000001 HC R&B ICU

## 2022-04-04 PROCEDURE — 250N000011 HC RX IP 250 OP 636: Performed by: RADIOLOGY

## 2022-04-04 RX ORDER — FLUMAZENIL 0.1 MG/ML
0.2 INJECTION, SOLUTION INTRAVENOUS
Status: DISCONTINUED | OUTPATIENT
Start: 2022-04-04 | End: 2022-04-07

## 2022-04-04 RX ORDER — FENTANYL CITRATE 50 UG/ML
25-50 INJECTION, SOLUTION INTRAMUSCULAR; INTRAVENOUS EVERY 5 MIN PRN
Status: DISCONTINUED | OUTPATIENT
Start: 2022-04-04 | End: 2022-04-07

## 2022-04-04 RX ORDER — CODEINE PHOSPHATE AND GUAIFENESIN 10; 100 MG/5ML; MG/5ML
5 SOLUTION ORAL EVERY 4 HOURS PRN
Status: DISCONTINUED | OUTPATIENT
Start: 2022-04-04 | End: 2022-04-13 | Stop reason: HOSPADM

## 2022-04-04 RX ORDER — NALOXONE HYDROCHLORIDE 0.4 MG/ML
0.2 INJECTION, SOLUTION INTRAMUSCULAR; INTRAVENOUS; SUBCUTANEOUS
Status: DISCONTINUED | OUTPATIENT
Start: 2022-04-04 | End: 2022-04-07

## 2022-04-04 RX ORDER — HYDROMORPHONE HCL IN WATER/PF 6 MG/30 ML
.2-.3 PATIENT CONTROLLED ANALGESIA SYRINGE INTRAVENOUS
Status: DISCONTINUED | OUTPATIENT
Start: 2022-04-04 | End: 2022-04-13 | Stop reason: HOSPADM

## 2022-04-04 RX ORDER — NALOXONE HYDROCHLORIDE 0.4 MG/ML
0.4 INJECTION, SOLUTION INTRAMUSCULAR; INTRAVENOUS; SUBCUTANEOUS
Status: DISCONTINUED | OUTPATIENT
Start: 2022-04-04 | End: 2022-04-07

## 2022-04-04 RX ORDER — HYDROCODONE BITARTRATE AND ACETAMINOPHEN 5; 325 MG/1; MG/1
1 TABLET ORAL EVERY 6 HOURS PRN
Status: DISCONTINUED | OUTPATIENT
Start: 2022-04-04 | End: 2022-04-13 | Stop reason: HOSPADM

## 2022-04-04 RX ORDER — LISINOPRIL 5 MG/1
5 TABLET ORAL DAILY
Status: DISCONTINUED | OUTPATIENT
Start: 2022-04-05 | End: 2022-04-13 | Stop reason: HOSPADM

## 2022-04-04 RX ORDER — FERROUS SULFATE 325(65) MG
325 TABLET ORAL EVERY OTHER DAY
Status: DISCONTINUED | OUTPATIENT
Start: 2022-04-04 | End: 2022-04-13 | Stop reason: HOSPADM

## 2022-04-04 RX ORDER — NALOXONE HYDROCHLORIDE 0.4 MG/ML
0.4 INJECTION, SOLUTION INTRAMUSCULAR; INTRAVENOUS; SUBCUTANEOUS
Status: DISCONTINUED | OUTPATIENT
Start: 2022-04-04 | End: 2022-04-13 | Stop reason: HOSPADM

## 2022-04-04 RX ORDER — NALOXONE HYDROCHLORIDE 0.4 MG/ML
0.2 INJECTION, SOLUTION INTRAMUSCULAR; INTRAVENOUS; SUBCUTANEOUS
Status: DISCONTINUED | OUTPATIENT
Start: 2022-04-04 | End: 2022-04-13 | Stop reason: HOSPADM

## 2022-04-04 RX ADMIN — ACETAMINOPHEN 325 MG: 325 TABLET ORAL at 16:05

## 2022-04-04 RX ADMIN — PIPERACILLIN AND TAZOBACTAM 3.38 G: 3; .375 INJECTION, POWDER, LYOPHILIZED, FOR SOLUTION INTRAVENOUS at 21:44

## 2022-04-04 RX ADMIN — ENOXAPARIN SODIUM 40 MG: 100 INJECTION SUBCUTANEOUS at 23:58

## 2022-04-04 RX ADMIN — PIPERACILLIN AND TAZOBACTAM 3.38 G: 3; .375 INJECTION, POWDER, LYOPHILIZED, FOR SOLUTION INTRAVENOUS at 12:36

## 2022-04-04 RX ADMIN — LIDOCAINE HYDROCHLORIDE 6 ML: 10 INJECTION, SOLUTION EPIDURAL; INFILTRATION; INTRACAUDAL; PERINEURAL at 14:51

## 2022-04-04 RX ADMIN — FENTANYL CITRATE 25 MCG: 50 INJECTION, SOLUTION INTRAMUSCULAR; INTRAVENOUS at 14:43

## 2022-04-04 RX ADMIN — PIPERACILLIN AND TAZOBACTAM 3.38 G: 3; .375 INJECTION, POWDER, LYOPHILIZED, FOR SOLUTION INTRAVENOUS at 04:19

## 2022-04-04 RX ADMIN — FERROUS SULFATE TAB 325 MG (65 MG ELEMENTAL FE) 325 MG: 325 (65 FE) TAB at 16:04

## 2022-04-04 RX ADMIN — HYDROCODONE BITARTRATE AND ACETAMINOPHEN 1 TABLET: 5; 325 TABLET ORAL at 16:05

## 2022-04-04 RX ADMIN — MIDAZOLAM HYDROCHLORIDE 0.5 MG: 1 INJECTION, SOLUTION INTRAMUSCULAR; INTRAVENOUS at 14:41

## 2022-04-04 RX ADMIN — VANCOMYCIN HYDROCHLORIDE 1250 MG: 5 INJECTION, POWDER, LYOPHILIZED, FOR SOLUTION INTRAVENOUS at 09:54

## 2022-04-04 RX ADMIN — FENTANYL CITRATE 25 MCG: 50 INJECTION, SOLUTION INTRAMUSCULAR; INTRAVENOUS at 14:49

## 2022-04-04 RX ADMIN — MIDAZOLAM HYDROCHLORIDE 0.5 MG: 1 INJECTION, SOLUTION INTRAMUSCULAR; INTRAVENOUS at 14:48

## 2022-04-04 RX ADMIN — VANCOMYCIN HYDROCHLORIDE 1250 MG: 5 INJECTION, POWDER, LYOPHILIZED, FOR SOLUTION INTRAVENOUS at 20:10

## 2022-04-04 ASSESSMENT — ACTIVITIES OF DAILY LIVING (ADL)
ADLS_ACUITY_SCORE: 10

## 2022-04-04 NOTE — PRE-PROCEDURE
GENERAL PRE-PROCEDURE:   Procedure:  CT guided right pleural drain placement  Date/Time:  4/4/2022 2:22 PM    Written consent obtained?: Yes    Risks and benefits: Risks, benefits and alternatives were discussed    Consent given by:  Patient  Patient states understanding of procedure being performed: Yes    Patient's understanding of procedure matches consent: Yes    Procedure consent matches procedure scheduled: Yes    Expected level of sedation:  Moderate  Appropriately NPO:  Yes  ASA Class:  2  Mallampati  :  Grade 1- soft palate, uvula, tonsillar pillars, and posterior pharyngeal wall visible  Lungs:  Lungs clear with good breath sounds bilaterally  Heart:  Normal heart sounds and rate  History & Physical reviewed:  History and physical reviewed and no updates needed  Statement of review:  I have reviewed the lab findings, diagnostic data, medications, and the plan for sedation

## 2022-04-04 NOTE — PROGRESS NOTES
PRIMARY DIAGNOSIS: PNEUMONIA  OUTPATIENT/OBSERVATION GOALS TO BE MET BEFORE DISCHARGE:  1. Dyspnea improved and O2 sats >88% on RA or back to baseline O2 levels: no  2.   SpO2: 93 %, O2 Device: Room air at rest .    Tolerating oral abx or appropriate plans made outpatient infusion:no    Vitals signs normal or return to baseline: no increase RR .Tachycardic and soft BM .   Short term supplemental O2 needed with activity at home: unable to assess .  Tolerate oral intake to maintain hydration:unable assesst . Pt is NPO except ice chips   Return to near baseline physical activity: no    Discharge Planner Nurse   Safe discharge environment identified: no  Barriers to discharge:needs GI consult for increase LFT and needs chest tube placement .      Entered by: Christina Gonzalez 04/04/2022 12:24 AM     Please review provider order for any additional goals.   Nurse to notify provider when observation goals have been met and patient is ready for discharge.

## 2022-04-04 NOTE — PLAN OF CARE
Major Shift Events:  A&OX4 . Had tylenol for left rib cage pain with good relief . Continued to be tachypenic . RRs in the 30's . On RA . Able to maintain her saturation  above 94% .  Had one liter NS 0.9% for low BP. Abdomin rounds . Last BM 4/2. Voided , Hemoglobin 9.4>> 7.9 . MD notified .   Plan: GI consult . Chest tube placement . Continue to monitor and report any concerns.   For vital signs and complete assessments, please see documentation flowsheets.

## 2022-04-04 NOTE — UTILIZATION REVIEW
Admission Status; Secondary Review Determination   Under the authority of the Utilization Management Committee, the utilization review process indicated a secondary review on Arely Saldaña. The review outcome is based on review of the medical records, discussions with staff, and applying clinical experience noted on the date of the review.   (x) Inpatient Status Appropriate - This patient's medical care is consistent with medical management for inpatient care and reasonable inpatient medical practice.     RATIONALE FOR DETERMINATION   41 yr old female with HTN presented with shortness of breath.  Recently 3 rounds abx for pneumonia.  Bilateral pleural effusions.   Thoracentesis then developed fever 102.  IV abx vanco/zosyn.  At this time positive blood cultures.  No hypoxia but significant tachypnea to 30s at times. Not stable for discharge.    At the time of admission with the information available to the attending physician more than 2 nights Hospital complex care was anticipated, based on patient risk of adverse outcome if treated as outpatient and complex care required. Inpatient admission is appropriate based on the Medicare guidelines.   The information on this document is developed by the utilization review team in order for the business office to ensure compliance. This only denotes the appropriateness of proper admission status and does not reflect the quality of care rendered.   The definitions of Inpatient Status and Observation Status used in making the determination above are those provided in the CMS Coverage Manual, Chapter 1 and Chapter 6, section 70.4.   Sincerely,   Janey Cordero MD  Utilization Review  Physician Advisor  [facility]

## 2022-04-04 NOTE — CONSULTS
[facility]:  PULMONARY CONSULT NOTE     Date / Time of Admission:  4/3/2022 11:04 AM    ID: Arely Saldaña is a 41 year old female, never smoker, with essential hypertension and pneumonia diagnosis since February 2022 who was [facility] with bilateral pleural effusions and pneumonia, now being treated for gram-negative coccobacilli bacteremia and left pleural empyema.    Reason for Consult: Pleural effusion         Assessment: 1.   Left pleural empyema with gram negative coccobacilli.  Patient with persistent pneumonia since 02/2022, failed 2 rounds of outpatient antibiotic therapy, now admitted with recurrent pneumonia and bilateral pleural effusions (L > R).  Underwent thoracentesis 4/3, 800 mL thick yellow fluid extracted, Gram stain showing gram-negative coccobacilli.   2. Recurrent left-sided pneumonia.  Treated with 1 week Abx in 02/2022 and again 1 week Abx in 03/2022 without improvement.  3. Pleuritic chest pain.  Developed evening of 4/3.   Placed on NIPPV due to increased work of breathing with pleuritic chest pain.   No longer required.  4. Gram negative bacteremia.  Blood cultures x 2 on 4/3 with 2/4 bottles + for gram negative coccobacilli.  Speciation pending, will need special lab diagnostics per staff.  5. Essential hypertension.  Patient on lisinopril 20 mg as outpatient, but takes as needed only when BP greater than 130/40.         Plan:     Continue Zosyn/Vancomycin IV Abx for broad-spectrum coverage.    Monitor for hives given history of PCN allergy causing hives.    Follow-up blood cultures.  Repeat blood culture x 1 in the AM.    US chest tube pending today for empyema control.    Plan for tPA/DNAse BID x 6 doses after chest tube placement.    May need to consider surgical consultation pending stability.     Reviewed CXR images with patient/.  Repeat CXR in the AM.    Plan for CT chest after lytic therapy completed.      Discontinue  NIPPV orders.      Pain meds PRN - Vicodin 5/325 mg tabs Q6H ordered PRN.    Reduce lisinopril from 20 mg to 5 mg daily, hold for SBP less than 120.    Patient and/or family were educated on the above recommendations.   Thank you for allowing our service to participate in the care of this patient.  Please call with any questions or concerns. Patient does not required ICU level of care, may step down from the ICU.  Will consider NSAIDs once she has completed lytic therapy.  Until then, PRN tylenol/opioids available.    Total patient care time: 70 minutes, with over 50% spent in counseling/coordination of care.      Brea Upton MD, MPH  Pulmonary/Critical Care Medicine  04/04/2022  11:32 AM       Chief Complaint Chief Complaint   Patient presents with     Shortness of Breath               HPI:   Arely Saldaña is a 41 year old female, never smoker, with essential hypertension and pneumonia diagnosis since February 2022 who was admitted to [facility] on 4/3/2022 with bilateral pleural effusions and pneumonia, now being treated for gram-negative coccobacilli bacteremia and left pleural empyema.  History is provided by the patient, review of medical records.    In brief, patient was diagnosed with pneumonia in February 2022.  At that time,  reports that she was given 2 antibiotics to take for 1 week.  She believes that her symptoms did improve but still had lingering shortness of breath and cough.  In March, worsening symptoms, seen in the ED on 3/10/2022.  CT chest was negative for PE but showed moderate to large amount of left lung consolidation, small right pleural effusion.  She was discharged with 1 week of Levaquin.    States that she was feeling better after the Levaquin was started.  However, began to feel again ill over he last couple days, primarily with shortness of breath and fatigue.  + cough with green-colored sputum since February.  No wheezing.  No rales.  Came to ED for evaluation,  imaging CT and CXR showing pleural effusion, consolidation better.  Started on Zosyn/Vancomycin.  Underwent thoracentesis to left lung, 800 mL removed.  + gram negative coccobacilli in blood cultures and also pleural effusion.     Last night, had episode of fevers, significant dyspnea with breathing.  Transferred to ICU with NIPPV due to work of breathing.          Review of Systems:   Review of Systems:  Pertinent items are noted in HPI.  The Review of Systems is negative other than noted in the HPI        Medical/Surgical History:     Past Medical History:   Diagnosis Date     ASCUS of cervix with negative high risk HPV 2018-2012 NIL annual pap 4/13/15 NIL pap, neg HPV 18 ASCUS, neg HPV 11/10/20 NIL pap, neg HPV     Hypertension     was on nifedipine and HCTZ before pregnancy, had since age early 20's      Past Surgical History:   Procedure Laterality Date     C/SECTION, LOW TRANSVERSE  2021    with PPTL     FOOT SURGERY Left age 20    bursa removed from left foot     SALPINGECTOMY Bilateral 2021    with  section     WISDOM TOOTH EXTRACTION  age 19            Allergies/Medications:   Allergies:     Allergies   Allergen Reactions     Penicillins Hives     Tolerated cefdinir and cefuroxime in      Sulfamethoxazole-Trimethoprim [Sulfamethoxazole W/Trimethoprim] Rash       OUTPATIENT Medications:  Medications Prior to Admission   Medication Sig Dispense Refill Last Dose     Calcium Carb-Cholecalciferol (CALTRATE 600+D3 SOFT) 600-800 MG-UNIT CHEW Take 1 tablet by mouth daily    Past Month at Unknown time     ferrous sulfate (FEROSUL) 325 (65 Fe) MG tablet Take 325 mg by mouth daily (with breakfast)   Past Month at Unknown time     lisinopril (ZESTRIL) 20 MG tablet Take 1 tablet (20 mg) by mouth daily (Patient taking differently: Take 20 mg by mouth daily as needed ) 90 tablet 0 Past Month at Unknown time       INPATIENT Medications: Continuous Infusions:    INPATIENT  "Medications: Scheduled Medications:    enoxaparin ANTICOAGULANT  40 mg Subcutaneous Q24H     ferrous sulfate  325 mg Oral Every Other Day     [START ON 4/5/2022] lisinopril  5 mg Oral Daily     piperacillin-tazobactam  3.375 g Intravenous Q8H     vancomycin  1,250 mg Intravenous Q12H             Family History:        Social History:      Reviewed:    Parents: Alive.  Father with diabetes, hypertension. Mother no issues.    Siblings: 1 brother, 1 sister, alive, healthy.    Children: Alive, healthy.    Other: none.  Family History   Problem Relation Age of Onset     Hypertension Mother      Hypertension Father      Diabetes Father     Reviewed:    Tobacco: Denies    Alcohol: Denies    Drugs: Denies    Other: Living with family. No sick contacts. Works as  - limited work since she has been sick.   Social History     Socioeconomic History     Marital status:      Spouse name: Not on file     Number of children: 0     Years of education: Not on file     Highest education level: Not on file   Occupational History     Not on file   Tobacco Use     Smoking status: Never Smoker     Smokeless tobacco: Never Used   Substance and Sexual Activity     Alcohol use: Not Currently     Comment: Alcoholic Drinks/day: once every 2-3 months prior to pregnancy     Drug use: Never     Sexual activity: Yes     Partners: Male     Birth control/protection: Surgical     Comment: tubal ligation   Other Topics Concern     Not on file   Social History Narrative     Not on file     Social Determinants of Health     Financial Resource Strain: Not on file   Food Insecurity: Not on file   Transportation Needs: Not on file   Physical Activity: Not on file   Stress: Not on file   Social Connections: Not on file   Intimate Partner Violence: Not on file   Housing Stability: Not on file              Objective:   Vitals:  /74   Pulse 116   Temp 98.1  F (36.7  C) (Oral)   Resp 20   Ht 1.676 m (5' 6\")   Wt 65.1 kg (143 lb 8 oz)  "  LMP 03/01/2022   SpO2 93%   BMI 23.16 kg/m    GEN: Does not female, sitting in the bed in no acute distress  HEENT: Normocephalic, atraumatic.  Extraoccular eye movements intact, anicteric sclera. No sinus tenderness to palpation.  Moist mucous membranes.   NECK: Supple.    PULM: Non-labored breathing.  No use of accessory muscles.  Mildly diminished breath sounds at left lung base.  No wheezing or rales.  CVS: Regular rate and rhythm.  Normal S1, S2.  No rubs, murmurs, or gallops.    ABDOMEN: Normoactive bowel sounds.  Non-tender to palpation.  Non-distended.    EXTREMITES:  No clubbing, cyanosis, or edema.    NEURO:  Awake.  Oriented to person, place, time and situation.  Cranial nerves 2-12 grossly intact.  Moving all extremities.      Intake/Output:  I/O last 3 completed shifts:  In: 1550 [I.V.:550; IV Piggyback:1000]  Out: 300 [Urine:300]        Pertinent Studies:   All laboratory data reviewed  Serum Glucose range:   Recent Labs   Lab 04/03/22  2312 04/03/22  1944 04/03/22  1848 04/03/22  1141    175* 148* 121     ABG: No lab results found in last 7 days.  CBC:   Recent Labs   Lab 04/04/22  0501 04/03/22  1141   WBC 7.3 8.8   HGB 7.9* 9.4*   HCT 25.8* 30.7*   MCV 76* 74*    379   NEUTROPHIL  --  90   LYMPH  --  4   MONOCYTE  --  6   EOSINOPHIL  --  0     Chemistry:   Recent Labs   Lab 04/03/22  2312 04/03/22  1747 04/03/22  1141     --  139   POTASSIUM 3.9  --  3.8   CHLORIDE 108*  --  104   CO2 19*  --  20*   BUN 13  --  12   CR 0.65  --  0.69   GFRESTIMATED >90  --  >90   PABLO 8.1*  --  9.3   MAG  --   --  1.7*   PROTTOTAL 4.4* 5.5* 5.8*   ALBUMIN 2.0*  --  2.9*   AST 39  --  92*   ALT 41  --  51*   ALKPHOS 200*  --  268*   BILITOTAL 1.4*  --  1.4*     Coags:  Recent Labs   Lab 04/03/22  1651   INR 1.20*     Cardiac Markers:  Recent Labs   Lab 04/03/22  2312   TROPONINI <0.01      Microbiology:  Blood culture x 2 sets, 4/3: 2 of 4 bottles with gram negative coccobacilli  Blood culture  x 1 set, 4/3: NGTD  Left pleural fluid, 4/3:  Gram stain: 4+ WBC, 4+ gram negative coccobacilli  COVID-19 PCR, 4/3: Negative  Influenza A/B swab, 4/3: Negative        Cardiology/Radiology:   Cardiac: All cardiac studies reviewed by me.    EKG:  Reviewed    TTE, 4/3/22:  Summary: Left ventricular size, wall motion and function are normal. The ejection fraction is > 65%. Normal right ventricle size and systolic function. No hemodynamically significant valvular abnormalities on 2D or color flow imaging.    Radiology: All imaging studies reviewed by me.    Chest X-Ray, 4/3:  The chest is stable with again seen minimal right pleural effusion and mild left pleural effusion with some underlying infiltrate/atelectasis in both lung bases and extending into the left midlung. The chest is otherwise negative.    CT chst PE study, Abd/pelvis, 4/3:   ANGIOGRAM CHEST: Pulmonary arteries are normal caliber and negative for pulmonary emboli. Thoracic aorta is negative for dissection. No CT evidence of right heart strain.   LUNGS AND PLEURA: The previous large dense consolidative infiltrate in the left upper lobe and left lower lobe seen on 03/10/2022 has near completely resolved. New small right and moderate left pleural effusions with compressive appearing atelectasis in the lung bases.  MEDIASTINUM/AXILLAE: Normal.  CORONARY ARTERY CALCIFICATION: None.  HEPATOBILIARY: Gallstones. Normal liver.  PANCREAS: Normal.  SPLEEN: Mild splenomegaly measuring 12 cm.  ADRENAL GLANDS: Normal.  KIDNEYS/BLADDER: Normal.   BOWEL: Moderate amount of stool throughout the entire colon suggestive of constipation.  LYMPH NODES: Normal.  VASCULATURE: Unremarkable.  PELVIC ORGANS: Normal.  MUSCULOSKELETAL: Normal.    IMPRESSION: 1.  Negative for pulmonary emboli.  2.  The previous dense consolidative infiltrates in the left lung seen on CT 03/10/2022 appear to near completely resolved.  3.  New small right and moderate left pleural effusions with  moderate atelectasis in the lower lobes greater on the left side. 4.  Gallstones. 5.  Mild splenomegaly.

## 2022-04-04 NOTE — PROGRESS NOTES
PRIMARY DIAGNOSIS: SOB/Pleural effusion .  OUTPATIENT/OBSERVATION GOALS TO BE MET BEFORE DISCHARGE:  ADLs back to baseline:NO   1. Activity and level of assistance: Bedrest  Pain status: 4/10    Return to near baseline physical activity: no     Discharge Planner Nurse   Safe discharge environment identified: no   Barriers to discharge: needs chest tube placement .        Entered by: Christina Gonzalez 04/03/2022 9:46 PM     Please review provider order for any additional goals.   Nurse to notify provider when observation goals have been met and patient is ready for discharge.

## 2022-04-04 NOTE — PROGRESS NOTES
PRIMARY DIAGNOSIS: PNEUMONIA  OUTPATIENT/OBSERVATION GOALS TO BE MET BEFORE DISCHARGE:  Dyspnea improved and O2 sats >88% on RA or back to baseline O2 levels: SpO2: 93 %, O2 Device: No    1. Tolerating oral abx or appropriate plans made outpatient infusion: No , on IV antibiotic   2. Vitals signs normal or return to baseline: no . Tachycardic,tachypenic   3. Short term supplemental O2 needed with activity at home: Tolerate oral intake to maintain hydration:NO  Return to near baseline physical activity: no     Discharge Planner Nurse   Safe discharge environment identified: No  Barriers to discharge: needs chest tube placement and GI Consult .        Entered by: Christina Gonzalez 04/04/2022 5:12 AM     Please review provider order for any additional goals.   Nurse to notify provider when observation goals have been met and patient is ready for discharge.

## 2022-04-04 NOTE — PROGRESS NOTES
Patient Name: Arely Saldaña  Medical Record Number: [id number]  Today's Date: 4/4/2022    Procedure: left Chest tube placement  Proceduralist: Dr. Perez,   Pathology present:     Patient in CT room: 1418  Procedure Start: 1445  Procedure end: 1510  Sedation medications administered: 1 mg versed, 50 mcg fentanyl.  Patient out of room: 1518    Report given to: [facility] LEANDRO Gregory.    Other Notes: Pt arrived to IR room 1 from [facility]. Consent reviewed. Pt denies any questions or concerns regarding procedure. Pt positioned supine and monitored per protocol. Pt tolerated procedure without any noted complications. Pt transferred back to [facility].  Bedside handoff with TIMOTEO Gregory.

## 2022-04-04 NOTE — CONSULTS
GI CONSULT NOTE      Name: Arely Saldaña  Medical Record #: [id number]  YOB: 1980  Date of Admission: 4/3/2022  Date/Time: 4/4/2022/9:40 AM     CHIEF COMPLAINT: elevated LFTs     HISTORY OF PRESENT ILLNESS: This is a 41 year old year old White patient seen at the request of Dr. Iverson with a history of htn and multiple rounds of antibiotics in the past 6 months for sinusitis and pneumonia.  She presented to the ER yesterday with SOB and cough, and after thoracentesis she had Tmax of 102.8 last evening.  She had a sharp pleuritic pain in her left pain yesterday which is improved today, and she denies having any abdominal pain or correlation with eating.  Last month she had vomiting with cough.  She has used Tylenol 650 mg every 4 hours for fever, but usually less that 8 tabs (2.6 grams) daily per .  No alcohol for >9 months.  No known family history of GI or liver problems.  No use of IV drugs or tattoos.  She had been taking a probiotic, elderberry for about a month, and chamomile tea but no other herbal supplements.    She has lost 10 pounds unintentionally and states she was eating less with pneumonia.  She has mild constipation on oral iron which has been recommended for a couple of years.  She denies having black or bloody stools.  No hematemesis.  No heavy menses.    PAST MEDICAL HISTORY:  Past Medical History:   Diagnosis Date     ASCUS of cervix with negative high risk HPV 7/26/2018 2008-2012 NIL annual pap 4/13/15 NIL pap, neg HPV 7/26/18 ASCUS, neg HPV 11/10/20 NIL pap, neg HPV     Hypertension     was on nifedipine and HCTZ before pregnancy, had since age early 20's       FAMILY HISTORY:  Family History   Problem Relation Age of Onset     Hypertension Mother      Hypertension Father      Diabetes Father        SOCIAL HISTORY:  Social History     Socioeconomic History     Marital status:      Spouse name: Not on file     Number of children: 0     Years of education: Not on  "file     Highest education level: Not on file   Occupational History     Not on file   Tobacco Use     Smoking status: Never Smoker     Smokeless tobacco: Never Used   Substance and Sexual Activity     Alcohol use: Not Currently     Comment: Alcoholic Drinks/day: once every 2-3 months prior to pregnancy     Drug use: Never     Sexual activity: Yes     Partners: Male     Birth control/protection: Surgical     Comment: tubal ligation   Other Topics Concern     Not on file   Social History Narrative     Not on file     Social Determinants of Health     Financial Resource Strain: Not on file   Food Insecurity: Not on file   Transportation Needs: Not on file   Physical Activity: Not on file   Stress: Not on file   Social Connections: Not on file   Intimate Partner Violence: Not on file   Housing Stability: Not on file       MEDICATIONS PRIOR TO ADMISSION:   Medications Prior to Admission   Medication Sig Dispense Refill Last Dose     Calcium Carb-Cholecalciferol (CALTRATE 600+D3 SOFT) 600-800 MG-UNIT CHEW Take 1 tablet by mouth daily    Past Month at Unknown time     ferrous sulfate (FEROSUL) 325 (65 Fe) MG tablet Take 325 mg by mouth daily (with breakfast)   Past Month at Unknown time     lisinopril (ZESTRIL) 20 MG tablet Take 1 tablet (20 mg) by mouth daily (Patient taking differently: Take 20 mg by mouth daily as needed ) 90 tablet 0 Past Month at Unknown time          ALLERGIES: Penicillins and Sulfamethoxazole-trimethoprim [sulfamethoxazole w/trimethoprim]    REVIEW OF SYSTEMS (ROS): Complete review of systems negative other than listed in HPI.    PHYSICAL EXAM:    /78 (BP Location: Left arm)   Pulse 111   Temp 97.6  F (36.4  C) (Oral)   Resp (!) 46   Ht 1.676 m (5' 6\")   Wt 65.1 kg (143 lb 8 oz)   LMP 03/01/2022   SpO2 91%   BMI 23.16 kg/m      GENERAL: Pleasant, no obvious distress,  at her bedside    SKIN: No jaundice    NECK: Supple without adenopathy    EYES: No scleral icterus    LUNGS: " Clear to auscultation anteriorly    HEART: Regular rhythm with mild tachycardia, S1 and S2 present, no lower extremity edema    ABDOMEN: Soft, non-distended, non-tender, no guarding/rebound/mass, bowel sounds normal, no obvious organomegaly    MUSKULOSKELETAL:  Warm and well perfused, moves all extremities well    NEUROLOGIC: Alert and oriented    PSYCHIATRIC: Normal affect    LAB DATA:  Recent Labs   Lab Test 04/04/22  0501 04/03/22  1651 04/03/22  1141 03/10/22  0855 06/30/21  1518 02/19/21 2010   WBC 7.3  --  8.8 14.9*   < > 8.9   HGB 7.9*  --  9.4* 9.4*   < > 11.5*   MCV 76*  --  74* 75*   < > 84     --  379 200   < > 221   INR  --  1.20*  --   --   --  0.95    < > = values in this interval not displayed.     Recent Labs   Lab Test 04/03/22 2312 04/03/22  1944 04/03/22  1848 04/03/22  1141 03/10/22  0855     --   --  139 137   POTASSIUM 3.9  --   --  3.8 3.7   CHLORIDE 108*  --   --  104 100   CO2 19*  --   --  20* 26   BUN 13  --   --  12 11   CR 0.65  --   --  0.69 0.68   ANIONGAP 11  --   --  15 11   PABLO 8.1*  --   --  9.3 9.0    175* 148* 121 115     Recent Labs   Lab Test 04/03/22 2312 04/03/22  1141 03/10/22  0855 01/21/22  1435 06/30/21  1518 02/19/21 2010 02/19/21  1527 11/10/20  1538   ALBUMIN 2.0* 2.9* 2.5*   < > 4.5  --   --   --    BILITOTAL 1.4* 1.4* 1.1*   < > 0.6  --   --   --    ALT 41 51* 48*   < > 35   < >  --   --    AST 39 92* 42*   < > 21   < >  --   --    ALKPHOS 200* 268* 196*   < > 99  --   --   --    PROTEIN  --   --   --   --  Negative  --  Negative Negative   LIPASE  --   --  <9  --   --   --   --   --     < > = values in this interval not displayed.     Iron 9 (low), transferrin  (low), transferrin sat 3% (low)    IMAGING:  XR Chest 1 View    Result Date: 4/3/2022  EXAM: XR CHEST 1 VIEW LOCATION: [facility] DATE/TIME: 4/3/2022 6:59 PM INDICATION: dyspnea; status post left thoracentesis COMPARISON: AP chest radiograph from  earlier today     IMPRESSION: Considerable decrease in the amount of left pleural fluid. The left upper lobe is better inflated. The residual opacity in the left basal chest has a sharp interface which parallels the left ventricular heart border within which there are no air bronchograms consistent with lobar atelectasis of the left lower lobe. There is minimal atelectasis or pleural fluid in the left lateral costophrenic sulcus. Unchanged atelectasis/pleural fluid along the right hemidiaphragm.     Echocardiogram Complete    Result Date: 4/3/2022  [id number]^FABIANO^ARY^[place] [address]  Name: SHANI PETIT MRN: [id number] : 1980 Study Date: 2022 02:38 PM Age: 41 yrs Gender: Female Patient Location: [facility] Reason For Study: SOB Ordering Physician: ARY MARIO Performed By: [facility]  BSA: 1.7 m2 Height: 66 in Weight: 145 lb HR: 120 BP: 138/93 mmHg ______________________________________________________________________________ Procedure Complete Portable Echo Adult. ______________________________________________________________________________ Interpretation Summary  Left ventricular size, wall motion and function are normal. The ejection fraction is > 65%. Normal right ventricle size and systolic function. No hemodynamically significant valvular abnormalities on 2D or color flow imaging. ______________________________________________________________________________ Left Ventricle Left ventricular size, wall motion and function are normal. The ejection fraction is > 65%. There is normal left ventricular wall thickness. Left ventricular diastolic function is normal. No regional wall motion abnormalities noted.  Right Ventricle Normal right ventricle size and systolic function.  Atria Normal left atrial size. Right atrial size is normal. There is no color Doppler evidence of an atrial shunt.  Mitral Valve Mitral valve leaflets appear  normal. There is no evidence of mitral stenosis or clinically significant mitral regurgitation.  Tricuspid Valve Tricuspid valve leaflets appear normal. There is no evidence of tricuspid stenosis or clinically significant tricuspid regurgitation. Right ventricle systolic pressure estimate normal.  Aortic Valve Aortic valve leaflets appear normal. There is no evidence of aortic stenosis or clinically significant aortic regurgitation.  Pulmonic Valve The pulmonic valve is not well seen, but is grossly normal. This degree of valvular regurgitation is within normal limits.  Vessels The aorta root is normal. Normal size ascending aorta. IVC diameter <2.1 cm collapsing >50% with sniff suggests a normal RA pressure of 3 mmHg.  Pericardium There is no pericardial effusion.  ______________________________________________________________________________ MMode/2D Measurements & Calculations IVSd: 0.68 cm LVIDd: 3.8 cm LVIDs: 2.3 cm LVPWd: 1.0 cm FS: 39.1 %  LV mass(C)d: 91.8 grams LV mass(C)dI: 52.6 grams/m2 LA dimension: 2.4 cm LVOT diam: 2.0 cm LVOT area: 3.1 cm2 LA Volume Indexed (AL/bp): 26.0 ml/m2 RWT: 0.55  Time Measurements MM HR: 120.0 BPM  Doppler Measurements & Calculations MV E max qamar: 59.1 cm/sec MV A max qamar: 91.3 cm/sec MV E/A: 0.65 MV dec slope: 559.2 cm/sec2 MV dec time: 0.11 sec Ao V2 max: 155.5 cm/sec Ao max PG: 10.0 mmHg Ao V2 mean: 103.6 cm/sec Ao mean P.9 mmHg Ao V2 VTI: 23.0 cm JERONIMO(I,D): 2.8 cm2 JERONIMO(V,D): 2.8 cm2 LV V1 max P.1 mmHg LV V1 max: 142.1 cm/sec LV V1 VTI: 20.9 cm SV(LVOT): 64.8 ml SI(LVOT): 37.2 ml/m2 PA acc time: 0.09 sec AV Qamar Ratio (DI): 0.91 JERONIMO Index (cm2/m2): 1.6 E/E' av.1 Lateral E/e': 3.8 Medial E/e': 6.3  ______________________________________________________________________________ Report approved by: Fransisco Belcher 2022 03:46 PM       Abdomen US, limited (RUQ only)    Result Date: 4/3/2022  EXAM: US ABDOMEN LIMITED LOCATION: [facility]  HOSPITAL DATE/TIME: 4/3/2022 1:51 PM INDICATION: abnormal LFTs COMPARISON: CT from earlier today TECHNIQUE: Limited abdominal ultrasound. FINDINGS: GALLBLADDER: Cholelithiasis. BILE DUCTS: No biliary dilatation. The common duct measures 6 mm. LIVER: Normal parenchyma with smooth contour. No focal mass. RIGHT KIDNEY: No hydronephrosis. PANCREAS: The visualized portions are normal. No ascites.     IMPRESSION: 1.  Cholelithiasis. Otherwise normal right upper quadrant ultrasound.     US Thoracentesis    Result Date: 4/3/2022  EXAM: 1. LEFT THORACENTESIS 2. ULTRASOUND GUIDANCE LOCATION: [facility] DATE/TIME: 4/3/2022 6:17 PM INDICATION: Pleural effusion. PROCEDURE: Informed consent obtained. Time out performed. The chest was prepped and draped in sterile fashion. 10 mL of 1 % lidocaine was infused into the local soft tissues. Under direct ultrasound guidance, a 5 Maori catheter system was placed into the pleural effusion. 800 mL of thick cloudy yellow fluid were removed and sent to lab, if requested. Patient tolerated procedure well. Ultrasound imaging was obtained and placed in the patient's permanent medical record.     IMPRESSION: Status post left ultrasound-guided thoracentesis. Reference CPT Code: 29125    XR Chest Port 1 View    Result Date: 4/3/2022  EXAM: XR CHEST PORT 1 VIEW LOCATION: [facility] DATE/TIME: 4/3/2022 11:29 PM INDICATION: Shortness of breath COMPARISON: 04/03/2022     IMPRESSION: The chest is stable with again seen minimal right pleural effusion and mild left pleural effusion with some underlying infiltrate/atelectasis in both lung bases and extending into the left midlung. The chest is otherwise negative.    XR Chest Port 1 View    Result Date: 4/3/2022  EXAM: XR CHEST PORT 1 VIEW LOCATION: [facility] DATE/TIME: 4/3/2022 4:38 PM INDICATION: worsening sob COMPARISON: CT pulmonary angiogram 04/03/2022 at 1256 hours      IMPRESSION: Moderate to large left and small right pleural effusions are not visibly changed. Related atelectasis including near complete atelectasis of the left lower lobe. Lower left ventricular heart border is silhouetted. Cardiac silhouette is not enlarged. Azygos vein is not distended and the vascular pedicle width is normal. No pneumothorax.    CT Chest (PE) Abdomen Pelvis w Contrast    Result Date: 4/3/2022  EXAM: CT CHEST PE ABDOMEN PELVIS W CONTRAST LOCATION: [facility] DATE/TIME: 4/3/2022 12:49 PM INDICATION: Chest and abdominal pain. Elevated liver function tests. PE suspected, high prob COMPARISON: 03/20/2022 CT chest. TECHNIQUE: CT chest pulmonary angiogram and routine CT abdomen pelvis with IV contrast. Arterial phase through the chest and venous phase through the abdomen and pelvis. Multiplanar reformats and MIP reconstructions were performed. Dose reduction techniques were used. CONTRAST: 100ml Isovue 370 FINDINGS: ANGIOGRAM CHEST: Pulmonary arteries are normal caliber and negative for pulmonary emboli. Thoracic aorta is negative for dissection. No CT evidence of right heart strain. LUNGS AND PLEURA: The previous large dense consolidative infiltrate in the left upper lobe and left lower lobe seen on 03/10/2022 has near completely resolved. New small right and moderate left pleural effusions with compressive appearing atelectasis in the lung bases. MEDIASTINUM/AXILLAE: Normal. CORONARY ARTERY CALCIFICATION: None. HEPATOBILIARY: Gallstones. Normal liver. PANCREAS: Normal. SPLEEN: Mild splenomegaly measuring 12 cm. ADRENAL GLANDS: Normal. KIDNEYS/BLADDER: Normal. BOWEL: Moderate amount of stool throughout the entire colon suggestive of constipation. LYMPH NODES: Normal. VASCULATURE: Unremarkable. PELVIC ORGANS: Normal. MUSCULOSKELETAL: Normal.     IMPRESSION: 1.  Negative for pulmonary emboli. 2.  The previous dense consolidative infiltrates in the left lung seen on CT  03/10/2022 appear to near completely resolved. 3.  New small right and moderate left pleural effusions with moderate atelectasis in the lower lobes greater on the left side. 4.  Gallstones. 5.  Mild splenomegaly.      ASSESSMENT:  1.  Elevated LFTs- mild and mostly alk phos.  This could be a cholestatic drug injury related to multiple rounds of antibiotics in recent months, or possibly mild cholestasis related to sepsis.  LFTs were completely normal 6/2021 and with only minimal elevation in alk phos (123) in Jan 2022.  She does not have any abdominal pain, and biliary obstruction seems unlikely given the normal duct size on imaging and lack of pain.  Other liver disease also seems unlikely.  2.  Anemia- no overt bleeding described other than some spotting or hematuria.  No mention of GI bleeding.  Iron levels low but IBC also low so likely anemia of chronic disease.    Active Problems:    Tachypnea    Anemia    Pleural effusion    Tachycardia    Elevated LFTs    Dyspnea    PLAN:  Discussed with Dr. Tamir Platt.  Approximately 35 minutes of total time was spent providing patient care, including patient evaluation, reviewing documentation/test results, and .    1.  Trend LFTs.  2.  Consider checking additional liver serologies if enzymes don't improve.  3.  Treatment of pulmonary process and antibiotics per ICU/pumonary team.  4.  We'll follow along for now.      Tammy Magallon PA-C  [facility]  [phone]    CC: [facility] HospitalRic Peggy Ann    _______________________________________________________________  [facility] Attending  Patient is seen anddiscussed with the PA/CNP, see note above.  No history of liver problems, no RUQ pain.  Exam: Abdomen soft, non-tender, no hepatosplenomegaly  Labs and imaging reviewed.  Assessment/Plan:  Minimal LFT elevations mainly cholestatic picture, improving, without signs of biliary obstruction.  Probably secondary to acute illness with bacteremia,  multiple antibiotics recently.  Follow LFTs, no need for in depth liver workup at this time.      15 minutes of time reviewing her chart, discussing this patient, and interviewing and examining her.      Tamir Platt MD  [facility]  Office: [phone]  Cell:[phone]

## 2022-04-04 NOTE — H&P
[facility] MEDICINE ADMISSION HISTORY AND PHYSICAL     Assessment & Plan       JESSICA is a 41 year old female with a history of HTN presenting to the ER for dyspnea, cough.    By history she has been feeling poorly since August,  She has had several  Episodes of sinus infection treated via the clinic. This year she has had  3 rounds of antibiotics for pneuomias. Pt states she never feels great after  This.     Today she became acutely short of breath.  Denies fevers, though has a mild  Cough and has chest pain with deep inspiration.    Denies travel, has 1 cat and denies occupational exposure.    ER evaluation with CT chest confirms bilateral pleural effusions with  Left greater than right.  No PE.  Pt has no fever, white count though has some  Mild tachycardia.  EKG is normal sinus without concerning STT wave changes.  LFT's are elevated and she has microcytic anemia.    Pt needs admission for diagnostic work up, treatment plan.    # Pleural effusions left greater than right:  Transudate versus exudate; no                Loculations on imaging; will need         Diagnostic/therapeutic tap           Consult pulmonary; check echo,                                                      BNP normal; if ph is low or white                                                                           Cells may need a chest tube, trend                                                                            troponins    #Pneumonia:  CT chest on 3/10 showed a large left sided infilitrate;                             She has also had a right sided pneumonia on imaging                           This year (migratory and recurrent infections)                           Pt spiked a 102 temp after her thoracocentesis that                            Occurred just after admission; will cover for     Immunocompromised states with vanco, zosyn      #pansinusitis  Seen in CT sinus 3/10; would consider vasculitis    #microcytic anemia Check indices, retic count  # elevated liver function tests:  She does have gallstones but                                                         No abodminal symptoms; I would                                                        Add HERBERTH, RF to start an autoimmune                                                         Work-up; consult GI for input          Clinically Significant Risk Factors Present on Admission            # Anion Gap Metabolic Acidosis: AG = 15 mmol/L (Ref range: 5 - 18 mmol/L) on admission, will monitor and treat as appropriate  # Hypoalbuminemia: Albumin = 2.9 g/dL (Ref range: 3.5 - 5.0 g/dL) on admission, will monitor as appropriate   # Coagulation Defect: INR = 1.20 (Ref range: 0.85 - 1.15) and/or PTT = N/A on admission, will monitor for bleeding          DVTP: lovenox  Code Status: Full Code  Disposition: Inpatient   Expected LOS: 3 days   Goals for the hospitalization: diagnostics, therapeutics  Disposition Plan            The patient's care was discussed with the Patient.  Chief Complaint above     HISTORY     Above    Past Medical History     Past Medical History:  2018: ASCUS of cervix with negative high risk HPV      Comment:  [id number] NIL annual pap 4/13/15 NIL pap, neg HPV                18 ASCUS, neg HPV 11/10/20 NIL pap, neg HPV  No date: Hypertension      Comment:  was on nifedipine and HCTZ before pregnancy, had since                age early 20's     Surgical History     Past Surgical History:   Procedure Laterality Date     C/SECTION, LOW TRANSVERSE  2021    with PPTL     FOOT SURGERY Left age 20    bursa removed from left foot     SALPINGECTOMY Bilateral 2021    with  section     WISDOM TOOTH EXTRACTION  age 19     Family History    Reviewed, and   Family History   Problem Relation Age of Onset     Hypertension Mother      Hypertension Father      Diabetes Father         Social History      Social History     Tobacco Use      Smoking status: Never Smoker     Smokeless tobacco: Never Used   Substance Use Topics     Alcohol use: Not Currently     Comment: Alcoholic Drinks/day: once every 2-3 months prior to pregnancy     Drug use: Never        Allergies     Allergies   Allergen Reactions     Penicillins Hives     Tolerated cefdinir and cefuroxime in 2022     Sulfamethoxazole-Trimethoprim [Sulfamethoxazole W/Trimethoprim] Rash     Prior to Admission Medications      Prior to Admission Medications   Prescriptions Last Dose Informant Patient Reported? Taking?   Calcium Carb-Cholecalciferol (CALTRATE 600+D3 SOFT) 600-800 MG-UNIT CHEW Past Month at Unknown time  Yes Yes   Sig: Take 1 tablet by mouth daily    ferrous sulfate (FEROSUL) 325 (65 Fe) MG tablet Past Month at Unknown time  Yes Yes   Sig: Take 325 mg by mouth daily (with breakfast)   lisinopril (ZESTRIL) 20 MG tablet Past Month at Unknown time  No Yes   Sig: Take 1 tablet (20 mg) by mouth daily   Patient taking differently: Take 20 mg by mouth daily as needed       Facility-Administered Medications: None      Review of Systems     A 12 point comprehensive review of systems was negative except as noted above in HPI.    PHYSICAL EXAMINATION       Vitals      Temp:  [97.7  F (36.5  C)-102.8  F (39.3  C)] 101.4  F (38.6  C)  Pulse:  [100-155] 106  Resp:  [17-49] 38  BP: ()/(45-96) 107/59  SpO2:  [92 %-99 %] 95 %    Examination     GENERAL:  Alert, appears comfortable, in no acute distress, appears stated age   HEAD:  Normocephalic, without obvious abnormality, atraumatic   EYES:  PERRL, conjunctiva/corneas clear, no scleral icterus, EOM's intact   NOSE: Nares normal, septum midline, mucosa normal, no drainage   THROAT: Lips, mucosa, and tongue normal; teeth and gums normal, mouth moist   NECK: Supple, symmetrical, trachea midline   BACK:   Symmetric, no curvature, ROM normal   LUNGS:   Clear to auscultation bilaterally, no rales, rhonchi, or wheezing, symmetric chest rise on  inhalation, respirations unlabored   CHEST WALL:  No tenderness or deformity   HEART:  Regular rate and rhythm, S1 and S2 normal, no murmur, rub, or gallop    ABDOMEN:   Soft, non-tender, bowel sounds active all four quadrants, no masses, no organomegaly, no rebound or guarding   EXTREMITIES: Extremities normal, atraumatic, no cyanosis or edema    SKIN: Dry to touch, no exanthems in the visualized areas   NEURO: Alert, oriented x 4, moves all four extremities freely, non-focal   PSYCH: Cooperative, behavior is appropriate      Pertinent Lab     Most Recent 3 BMP's:Recent Labs   Lab Test 04/03/22  1944 04/03/22  1848 04/03/22  1141 03/10/22  0855 01/21/22  1435   NA  --   --  139 137 141   POTASSIUM  --   --  3.8 3.7 4.4   CHLORIDE  --   --  104 100 106   CO2  --   --  20* 26 26   BUN  --   --  12 11 9   CR  --   --  0.69 0.68 0.71   ANIONGAP  --   --  15 11 9   PABLO  --   --  9.3 9.0 10.1   * 148* 121 115 108         Pertinent Radiology     Radiology Results:   Recent Results (from the past 24 hour(s))   CT Chest (PE) Abdomen Pelvis w Contrast    Narrative    EXAM: CT CHEST PE ABDOMEN PELVIS W CONTRAST  LOCATION: [facility]  DATE/TIME: 4/3/2022 12:49 PM    INDICATION: Chest and abdominal pain. Elevated liver function tests. PE suspected, high prob  COMPARISON: 03/20/2022 CT chest.  TECHNIQUE: CT chest pulmonary angiogram and routine CT abdomen pelvis with IV contrast. Arterial phase through the chest and venous phase through the abdomen and pelvis. Multiplanar reformats and MIP reconstructions were performed. Dose reduction   techniques were used.   CONTRAST: 100ml Isovue 370     FINDINGS:  ANGIOGRAM CHEST: Pulmonary arteries are normal caliber and negative for pulmonary emboli. Thoracic aorta is negative for dissection. No CT evidence of right heart strain.     LUNGS AND PLEURA: The previous large dense consolidative infiltrate in the left upper lobe and left lower lobe seen on  03/10/2022 has near completely resolved. New small right and moderate left pleural effusions with compressive appearing atelectasis in   the lung bases.    MEDIASTINUM/AXILLAE: Normal.    CORONARY ARTERY CALCIFICATION: None.    HEPATOBILIARY: Gallstones. Normal liver.    PANCREAS: Normal.    SPLEEN: Mild splenomegaly measuring 12 cm.    ADRENAL GLANDS: Normal.    KIDNEYS/BLADDER: Normal.    BOWEL: Moderate amount of stool throughout the entire colon suggestive of constipation.    LYMPH NODES: Normal.    VASCULATURE: Unremarkable.    PELVIC ORGANS: Normal.    MUSCULOSKELETAL: Normal.      Impression    IMPRESSION:  1.  Negative for pulmonary emboli.    2.  The previous dense consolidative infiltrates in the left lung seen on CT 03/10/2022 appear to near completely resolved.    3.  New small right and moderate left pleural effusions with moderate atelectasis in the lower lobes greater on the left side.    4.  Gallstones.    5.  Mild splenomegaly.   Abdomen US, limited (RUQ only)    Narrative    EXAM: US ABDOMEN LIMITED  LOCATION: [facility]  DATE/TIME: 4/3/2022 1:51 PM    INDICATION: abnormal LFTs  COMPARISON: CT from earlier today  TECHNIQUE: Limited abdominal ultrasound.    FINDINGS:    GALLBLADDER: Cholelithiasis.    BILE DUCTS: No biliary dilatation. The common duct measures 6 mm.    LIVER: Normal parenchyma with smooth contour. No focal mass.    RIGHT KIDNEY: No hydronephrosis.    PANCREAS: The visualized portions are normal.    No ascites.      Impression    IMPRESSION:  1.  Cholelithiasis. Otherwise normal right upper quadrant ultrasound.       Echocardiogram Complete   Result Value    LVEF  > 65%    Narrative    [id number]  [id number]  [id number]  [id number]^FABIANO^[place] [address]     Name: SHANI PETIT  MRN: [id number]  : 1980  Study Date: 2022 02:38 PM  Age: 41 yrs  Gender: Female  Patient Location: [facility]  Reason For  Study: SOB  Ordering Physician: ARY MARIO  Performed By: CMP     BSA: 1.7 m2  Height: 66 in  Weight: 145 lb  HR: 120  BP: 138/93 mmHg  ______________________________________________________________________________  Procedure  Complete Portable Echo Adult.  ______________________________________________________________________________  Interpretation Summary     Left ventricular size, wall motion and function are normal. The ejection  fraction is > 65%.  Normal right ventricle size and systolic function.  No hemodynamically significant valvular abnormalities on 2D or color flow  imaging.  ______________________________________________________________________________  Left Ventricle  Left ventricular size, wall motion and function are normal. The ejection  fraction is > 65%. There is normal left ventricular wall thickness. Left  ventricular diastolic function is normal. No regional wall motion  abnormalities noted.     Right Ventricle  Normal right ventricle size and systolic function.     Atria  Normal left atrial size. Right atrial size is normal. There is no color  Doppler evidence of an atrial shunt.     Mitral Valve  Mitral valve leaflets appear normal. There is no evidence of mitral stenosis  or clinically significant mitral regurgitation.     Tricuspid Valve  Tricuspid valve leaflets appear normal. There is no evidence of tricuspid  stenosis or clinically significant tricuspid regurgitation. Right ventricle  systolic pressure estimate normal.     Aortic Valve  Aortic valve leaflets appear normal. There is no evidence of aortic stenosis  or clinically significant aortic regurgitation.     Pulmonic Valve  The pulmonic valve is not well seen, but is grossly normal. This degree of  valvular regurgitation is within normal limits.     Vessels  The aorta root is normal. Normal size ascending aorta. IVC diameter <2.1 cm  collapsing >50% with sniff suggests a normal RA pressure of 3 mmHg.     Pericardium  There  is no pericardial effusion.     ______________________________________________________________________________  MMode/2D Measurements & Calculations  IVSd: 0.68 cm  LVIDd: 3.8 cm  LVIDs: 2.3 cm  LVPWd: 1.0 cm  FS: 39.1 %     LV mass(C)d: 91.8 grams  LV mass(C)dI: 52.6 grams/m2  LA dimension: 2.4 cm  LVOT diam: 2.0 cm  LVOT area: 3.1 cm2  LA Volume Indexed (AL/bp): 26.0 ml/m2  RWT: 0.55     Time Measurements  MM HR: 120.0 BPM     Doppler Measurements & Calculations  MV E max qamar: 59.1 cm/sec  MV A max qamar: 91.3 cm/sec  MV E/A: 0.65  MV dec slope: 559.2 cm/sec2  MV dec time: 0.11 sec  Ao V2 max: 155.5 cm/sec  Ao max PG: 10.0 mmHg  Ao V2 mean: 103.6 cm/sec  Ao mean P.9 mmHg  Ao V2 VTI: 23.0 cm  JERONIMO(I,D): 2.8 cm2  JERONIMO(V,D): 2.8 cm2  LV V1 max P.1 mmHg  LV V1 max: 142.1 cm/sec  LV V1 VTI: 20.9 cm  SV(LVOT): 64.8 ml  SI(LVOT): 37.2 ml/m2  PA acc time: 0.09 sec  AV Qamar Ratio (DI): 0.91  JERONIMO Index (cm2/m2): 1.6  E/E' av.1  Lateral E/e': 3.8  Medial E/e': 6.3     ______________________________________________________________________________  Report approved by: Fransisco Belcher 2022 03:46 PM         XR Chest Port 1 View    Narrative    EXAM: XR CHEST PORT 1 VIEW  LOCATION: [facility]  DATE/TIME: 4/3/2022 4:38 PM    INDICATION: worsening sob  COMPARISON: CT pulmonary angiogram 2022 at 1256 hours      Impression    IMPRESSION:     Moderate to large left and small right pleural effusions are not visibly changed. Related atelectasis including near complete atelectasis of the left lower lobe.    Lower left ventricular heart border is silhouetted. Cardiac silhouette is not enlarged. Azygos vein is not distended and the vascular pedicle width is normal.    No pneumothorax.   US Thoracentesis    Narrative    EXAM:   1. LEFT THORACENTESIS  2. ULTRASOUND GUIDANCE  LOCATION: [facility]  DATE/TIME: 4/3/2022 6:17 PM    INDICATION: Pleural  effusion.    PROCEDURE: Informed consent obtained. Time out performed. The chest was prepped and draped in sterile fashion. 10 mL of 1 % lidocaine was infused into the local soft tissues. Under direct ultrasound guidance, a 5 Martiniquais catheter system was placed into   the pleural effusion.     800 mL of thick cloudy yellow fluid were removed and sent to lab, if requested.    Patient tolerated procedure well.    Ultrasound imaging was obtained and placed in the patient's permanent medical record.      Impression    IMPRESSION:  Status post left ultrasound-guided thoracentesis.    Reference CPT Code: 19692   XR Chest 1 View    Narrative    EXAM: XR CHEST 1 VIEW  LOCATION: [facility]  DATE/TIME: 4/3/2022 6:59 PM    INDICATION: dyspnea; status post left thoracentesis  COMPARISON: AP chest radiograph from earlier today      Impression    IMPRESSION:     Considerable decrease in the amount of left pleural fluid. The left upper lobe is better inflated.     The residual opacity in the left basal chest has a sharp interface which parallels the left ventricular heart border within which there are no air bronchograms consistent with lobar atelectasis of the left lower lobe. There is minimal atelectasis or   pleural fluid in the left lateral costophrenic sulcus.    Unchanged atelectasis/pleural fluid along the right hemidiaphragm.         EKG Results: Sinus tachycardia   ST & T wave abnormality, consider lateral ischemia   Abnormal ECG     Advance Care Planning        Maria Ines Iverson MD  Hospitalist  [facility]  [facility]   Phone: #[phone]

## 2022-04-04 NOTE — PROCEDURES
[facility]    Procedure: CT guided left pleural drain placement    Date/Time: 4/4/2022 3:20 PM  Performed by: Alton Perez MD  Authorized by: Alton Perez MD       UNIVERSAL PROTOCOL   Site Marked: Yes  Prior Images Obtained and Reviewed:  Yes  Required items: Required blood products, implants, devices and special equipment available    Patient identity confirmed:  Verbally with patient, arm band, provided demographic data and hospital-assigned identification number  Patient was reevaluated immediately before administering moderate or deep sedation or anesthesia  Confirmation Checklist:  Patient's identity using two indicators, relevant allergies, procedure was appropriate and matched the consent or emergent situation and correct equipment/implants were available  Time out: Immediately prior to the procedure a time out was called    Universal Protocol: the Joint Commission Universal Protocol was followed    Preparation: Patient was prepped and draped in usual sterile fashion       ANESTHESIA    Anesthesia: Local infiltration  Local Anesthetic:  Lidocaine 1% without epinephrine      SEDATION  Patient Sedated: Yes    Sedation Type:  Moderate (conscious) sedation  Sedation:  Fentanyl and midazolam  Vital signs: Vital signs monitored during sedation    See dictated procedure note for full details.  Findings: Uncomplicated 12 Libyan nonlocking left pleural drain placement. Patient received 1 mg of IV versed and 50 mcg of IV fentanyl for 20 minutes.     Specimens: none    Complications: None    Condition: Stable    Plan: Drain placed to pleurevac at -20 cm of H20.       PROCEDURE    Patient Tolerance:  Patient tolerated the procedure well with no immediate complications  Length of time physician/provider present for 1:1 monitoring during sedation: 20

## 2022-04-04 NOTE — PLAN OF CARE
Problem: Infection (Pneumonia)  Goal: Resolution of Infection Signs and Symptoms  Outcome: Ongoing, Not Progressing     Problem: Respiratory Compromise (Pneumonia)  Goal: Effective Oxygenation and Ventilation  Outcome: Ongoing, Progressing  Intervention: Promote Airway Secretion Clearance  Recent Flowsheet Documentation  Taken 4/4/2022 0800 by Bri Bustamante RN  Cough And Deep Breathing: done independently per patient   Goal Outcome Evaluation:  Pt pale, has exertional SOB with getting to/from commode.  On room air;  lung very diminished in bases and L)side.  Pt has harsh frequent productive cough of yellow/green sputum.  Instruct pt regarding NPO status for U/S guided placement of pleural chest tube. Tenative time to go to radiology is 1400;  also, pt afebrile ; lab called and pt already has positive blood culture, drawn yesterday. Notified dr Chávez and Dr Upton ICU.

## 2022-04-04 NOTE — PHARMACY-VANCOMYCIN DOSING SERVICE
"Pharmacy Vancomycin Initial Note  Date of Service April 3, 2022  Patient's  1980  41 year old, female    Indication: Sepsis    Current estimated CrCl = Estimated Creatinine Clearance: 114.6 mL/min (based on SCr of 0.69 mg/dL).    Creatinine for last 3 days  4/3/2022: 11:41 AM Creatinine 0.69 mg/dL    Recent Vancomycin Level(s) for last 3 days  No results found for requested labs within last 72 hours.      Vancomycin IV Administrations (past 72 hours)      No vancomycin orders with administrations in past 72 hours.                Nephrotoxins and other renal medications (From now, onward)    Start     Dose/Rate Route Frequency Ordered Stop    22 0055  piperacillin-tazobactam (ZOSYN) 3.375 g vial to attach to  mL bag        Note to Pharmacy: Extended infusion dosing to start 6 hours after initial infusion.   \"Followed by\" Linked Group Details    3.375 g  over 240 Minutes Intravenous EVERY 8 HOURS 22 1855      22 1930  vancomycin 1250 mg in 0.9% NaCl 250 mL intermittent infusion 1,250 mg         1,250 mg  over 90 Minutes Intravenous EVERY 12 HOURS 22 1914      22 1900  piperacillin-tazobactam (ZOSYN) 3.375 g vial to attach to  mL bag        \"Followed by\" Linked Group Details    3.375 g  over 30 Minutes Intravenous ONCE 22 1855      22 1730  lisinopril (ZESTRIL) tablet 20 mg         20 mg Oral DAILY 22 1717            Contrast Orders - past 72 hours (72h ago, onward)            None          [employer] Prediction of Planned Initial Vancomycin Regimen  Loading dose: N/A  Regimen: 1250 mg IV every 12 hours.  Start time: 19:17 on 2022  Exposure target: AUC24 (range)400-600 mg/L.hr   AUC24,ss: 569 mg/L.hr  Probability of AUC24 > 400: 83 %  Ctrough,ss: 16.8 mg/L  Probability of Ctrough,ss > 20: 36 %  Probability of nephrotoxicity (Lodise TOBIAS ): 12 %        Plan:  1. Start vancomycin  1250 mg IV q12h.   2. Vancomycin monitoring method: " AUC  3. Vancomycin therapeutic monitoring goal: 400-600 mg*h/L  4. Pharmacy will check vancomycin levels as appropriate in 1-3 Days.    5. Serum creatinine levels will be ordered daily for the first week of therapy and at least twice weekly for subsequent weeks.      Vicky Schultz [facility]

## 2022-04-04 NOTE — PROGRESS NOTES
Pulmonary Medicine update  Patient underwent left chest tube placement today.  700 mL already removed after arrival.  Per staff, patient endorsing localized pain and also pleurisy with cough.    Plan:    Keep chest tube to suction - orders placed    Repeat CXR in the AM    Monitor I/O from chest tube.  Pending output, will plan for tPA/DNase in the AM.    Dilaudid IV PRN for additional pain control.    Can consider IV Toradol 15-30 mg IV Q6H x 2 doses if still having problems overnight.    Guaifenesin, Guaifenesin/codeine PRN ordered as needed for cough.    Advanced to regular diet.    Brea Upton MD, MPH  Pulmonary & Critical Care Medicine  04/04/2022  6:17 PM

## 2022-04-04 NOTE — PROGRESS NOTES
[facility] Daily Progress Note    Assessment/Plan:  41-year-old female with history of hypertension, recurrent sinusitis symptoms previously treated community-acquired pneumonia involving right lower lobe February 21, 2022 with azithromycin and cefdinir, with repeat CT scan on March 10, 2022 revealing moderate to large left lung consolidation suggestive of pneumonia.  She was initiated on 7-day course of levofloxacin.      She presented to [facility] emergency room for complaints of shortness of breath and cough.  CT scan was performed and revealed complete resolution of left lung infiltrates previously noted on March 10.  Patient did have bilateral effusions with moderate to large effusion on the left.  She underwent thoracentesis on April 3, 2022.  Blood culture and Gram stain from thoracentesis growing cocci bacilli.  Pulmonology critical care was consulted after patient developed respiratory distress following thoracentesis.  She was transferred to ICU and placed on BiPAP.  She is now saturating normally on room air.  Pulmonology plans for ultrasound-guided chest tube placement on 4/4/2022    Bilateral pleural effusion, Left>Right.  suspect left sided empyema.   Gram negative bacteremia.  Previously treated community acquired pneumonia.  CRP elevated    CT chest reveals almost complete resolution of infiltrates of left lung.   Bilateral effusions, with left greater than right.   Thoracentesis on 4/3/22, Gram stain coccobacilli  Blood culture from 4/3/22 GN coccobacilli, 1 of 2 bottles.   US guided chest tube placement ordered.   Continue Zosyn and vancomycin, pharmacy to dose.   Appreciate pulmonology recommendations.    Essential hypertension  Lisinopril 20 mg daily  Hold parameters written.     Microcytic anemia  On iron supplementation on admission.   Hemoglobin 7.9 g/dL.   Order ferrous sulfate every other day  Repeat CBC in a.m.    Diet: NPO for Medical/Clinical Reasons Except for: Meds, Ice Chips  DVT Prophylaxis:   Enoxaparin (Lovenox) SQ  Code Status: Full Code    Active Problems:    Tachypnea    Anemia    Pleural effusion    Tachycardia    Elevated LFTs    Dyspnea     LOS: 0 days     Barriers to discharge: US guided chest tube, culture results.   Discharge Disposition: Home.    Subjective:  Ms. Saldaña reports frequent cough.  Denies chest pain or shortness of breath currently.  On room air at time of my visit.  Dr. Upton was present during my visit, and all questions and concerns were addressed to patient and 's satisfaction.       enoxaparin ANTICOAGULANT  40 mg Subcutaneous Q24H     ferrous sulfate  325 mg Oral Every Other Day     [START ON 4/5/2022] lisinopril  5 mg Oral Daily     piperacillin-tazobactam  3.375 g Intravenous Q8H     vancomycin  1,250 mg Intravenous Q12H       Objective:  Vital signs in last 24 hours:  Temp:  [97.6  F (36.4  C)-102.8  F (39.3  C)] 97.6  F (36.4  C)  Pulse:  [] 111  Resp:  [17-49] 46  BP: ()/(45-96) 115/78  SpO2:  [91 %-99 %] 91 %  Weight:   Weight:   @THISENCWEIGHTS(1)@  Weight change:   Body mass index is 23.16 kg/m .    Intake/Output last 3 shifts:  I/O last 3 completed shifts:  In: 1450 [I.V.:450; IV Piggyback:1000]  Out: 300 [Urine:300]  Intake/Output this shift:  No intake/output data recorded.    Review of Systems:   As per subjective, all others negative.    Physical Exam:    GENERAL:  Alert, appears comfortable, in no acute distress, appears stated age   HEAD:  Normocephalic, without obvious abnormality, atraumatic   NECK: Supple, symmetrical, trachea midline   BACK:   Symmetric, no curvature, ROM normal   LUNGS:   Diminished breath sounds left lower lung field, no rales,rhonchi, or wheezing, symmetric chest rise on inhalation, respirations unlabored   CHEST WALL:  No tenderness or deformity   HEART:  Regular rate and rhythm, S1 and S2 normal, no murmur, rub, or gallop    ABDOMEN:   Soft, non-tender, bowel sounds active all four quadrants, no masses, no  organomegaly, no rebound or guarding   EXTREMITIES: Extremities normal, atraumatic, no cyanosis or edema    SKIN: Dry to touch, no exanthems in the visualized areas   NEURO: Alert, oriented x3, moves all four extremities freely, non-focal   PSYCH: Cooperative, behavior is appropriate      Cardiographics:   I personally reviewed.  ECG: Sinus tachycardia, T wave inversion leads III, AVF, no acute ST changes.   Echocardiogram:   Left ventricular size, wall motion and function are normal. The ejection  fraction is > 65%.  Normal right ventricle size and systolic function.  No hemodynamically significant valvular abnormalities on 2D or color flow  imaging.    Imaging:  Personally Reviewed.  Results for orders placed or performed during the hospital encounter of 04/03/22   CT Chest (PE) Abdomen Pelvis w Contrast    Impression    IMPRESSION:  1.  Negative for pulmonary emboli.  2.  The previous dense consolidative infiltrates in the left lung seen on CT 03/10/2022 appear to near completely resolved.  3.  New small right and moderate left pleural effusions with moderate atelectasis in the lower lobes greater on the left side.  4.  Gallstones.  5.  Mild splenomegaly.   Abdomen US, limited (RUQ only)    Impression    IMPRESSION:  1.  Cholelithiasis. Otherwise normal right upper quadrant ultrasound.     XR Chest Port 1 View    Impression    IMPRESSION:   Moderate to large left and small right pleural effusions are not visibly changed. Related atelectasis including near complete atelectasis of the left lower lobe.  Lower left ventricular heart border is silhouetted. Cardiac silhouette is not enlarged. Azygos vein is not distended and the vascular pedicle width is normal.  No pneumothorax.   US Thoracentesis    Impression    IMPRESSION:  Status post left ultrasound-guided thoracentesis.  Reference CPT Code: 52093   XR Chest 1 View    Impression    IMPRESSION:   Considerable decrease in the amount of left pleural fluid. The left  upper lobe is better inflated.   The residual opacity in the left basal chest has a sharp interface which parallels the left ventricular heart border within which there are no air bronchograms consistent with lobar atelectasis of the left lower lobe. There is minimal atelectasis or   pleural fluid in the left lateral costophrenic sulcus.  Unchanged atelectasis/pleural fluid along the right hemidiaphragm.   XR Chest Port 1 View    Impression    IMPRESSION: The chest is stable with again seen minimal right pleural effusion and mild left pleural effusion with some underlying infiltrate/atelectasis in both lung bases and extending into the left midlung. The chest is otherwise negative.   Echocardiogram Complete   Result Value Ref Range    LVEF  > 65%      Lab Results:  Personally Reviewed.   Recent Labs   Lab 04/04/22  0501 04/03/22  1141   WBC 7.3 8.8   HGB 7.9* 9.4*   HCT 25.8* 30.7*    379     Recent Labs   Lab 04/03/22  2312 04/03/22  1141    139   CO2 19* 20*   BUN 13 12   ALBUMIN 2.0* 2.9*   ALKPHOS 200* 268*   ALT 41 51*   AST 39 92*     Recent Labs   Lab 04/03/22  1651   INR 1.20*       I reviewed all labs and imaging studies as of this date and I reviewed all current inpatient medications and updated them    Ede Chávez DO, MS  [facility] Service  Internal Medicine

## 2022-04-04 NOTE — PLAN OF CARE
RN received report from ED, patient on bipap at 40%, when patient got to the floor she could not tolerate the bipap due to anxiety. RT placed patient on 2L of O2., O2 at 94%  Respirations were 44. HR was 140s and temp of 102.8. BP 160s. Tylenol administered and lisinopril given.   MD paged.     Patient then went down for thoracentesis. 800 mL were removed. Patient stated she has in severe pain. HR in the 150s, RR 40. RAPID was called.         Patient put on oxymask  At 4L. Chest Xray completed, blood cultures done and antibiotics started.     Patient then brought to ICU.

## 2022-04-04 NOTE — CONSULTS
Care Management Initial Consult    General Information  Assessment completed with: Patient, Spouse or significant other,    Type of CM/SW Visit: Initial Assessment    Primary Care Provider verified and updated as needed: Yes   Readmission within the last 30 days: no previous admission in last 30 days (Pt was in the ED 3/10)         Advance Care Planning: Advance Care Planning Reviewed: other (see comments) (not interested at this time)          Communication Assessment  Patient's communication style: spoken language (English or Bilingual)    Hearing Difficulty or Deaf: no   Wear Glasses or Blind: no    Cognitive  Cognitive/Neuro/Behavioral: WDL                      Living Environment:   People in home: child(charlie), dependent, spouse     Current living Arrangements: house      Able to return to prior arrangements: yes       Family/Social Support:  Care provided by:    Provides care for:    Marital Status:              Description of Support System:           Current Resources:   Patient receiving home care services: No     Community Resources: None  Equipment currently used at home: none  Supplies currently used at home: None    Employment/Financial:  Employment Status: employed full-time        Financial Concerns:             Lifestyle & Psychosocial Needs:  Social Determinants of Health     Tobacco Use: Low Risk      Smoking Tobacco Use: Never Smoker     Smokeless Tobacco Use: Never Used   Alcohol Use: Not on file   Financial Resource Strain: Not on file   Food Insecurity: Not on file   Transportation Needs: Not on file   Physical Activity: Not on file   Stress: Not on file   Social Connections: Not on file   Intimate Partner Violence: Not on file   Depression: Not at risk     PHQ-2 Score: 0   Housing Stability: Not on file       Additional Information:  CM visited with Pt and with spouse. Pt declines needs for discharge. CM to continue to monitor for discharge planning needs.     KEVIN Gallardo

## 2022-04-05 ENCOUNTER — APPOINTMENT (OUTPATIENT)
Dept: RADIOLOGY | Facility: CLINIC | Age: 42
DRG: 177 | End: 2022-04-05
Attending: INTERNAL MEDICINE
Payer: COMMERCIAL

## 2022-04-05 LAB
ALBUMIN SERPL-MCNC: 1.9 G/DL (ref 3.5–5)
ALP SERPL-CCNC: 155 U/L (ref 45–120)
ALT SERPL W P-5'-P-CCNC: 23 U/L (ref 0–45)
ANA SER QL IF: NEGATIVE
ANCA AB PATTERN SER IF-IMP: NORMAL
ANION GAP SERPL CALCULATED.3IONS-SCNC: 10 MMOL/L (ref 5–18)
AST SERPL W P-5'-P-CCNC: 8 U/L (ref 0–40)
BILIRUB DIRECT SERPL-MCNC: 0.2 MG/DL
BILIRUB SERPL-MCNC: 0.4 MG/DL (ref 0–1)
BUN SERPL-MCNC: 13 MG/DL (ref 8–22)
C-ANCA TITR SER IF: NORMAL {TITER}
CALCIUM SERPL-MCNC: 8.9 MG/DL (ref 8.5–10.5)
CHLORIDE BLD-SCNC: 108 MMOL/L (ref 98–107)
CO2 SERPL-SCNC: 21 MMOL/L (ref 22–31)
CREAT SERPL-MCNC: 0.61 MG/DL (ref 0.6–1.1)
ERYTHROCYTE [DISTWIDTH] IN BLOOD BY AUTOMATED COUNT: 15.7 % (ref 10–15)
GFR SERPL CREATININE-BSD FRML MDRD: >90 ML/MIN/1.73M2
GLUCOSE BLD-MCNC: 88 MG/DL (ref 70–125)
HCT VFR BLD AUTO: 25 % (ref 35–47)
HGB BLD-MCNC: 7.5 G/DL (ref 11.7–15.7)
HIV 1+2 AB+HIV1 P24 AG SERPL QL IA: NEGATIVE
IGA SERPL-MCNC: <5 MG/DL (ref 65–400)
IGG SERPL-MCNC: <109 MG/DL (ref 700–1700)
MCH RBC QN AUTO: 22.5 PG (ref 26.5–33)
MCHC RBC AUTO-ENTMCNC: 30 G/DL (ref 31.5–36.5)
MCV RBC AUTO: 75 FL (ref 78–100)
PLATELET # BLD AUTO: 366 10E3/UL (ref 150–450)
POTASSIUM BLD-SCNC: 3.8 MMOL/L (ref 3.5–5)
PROT SERPL-MCNC: 4.7 G/DL (ref 6–8)
RBC # BLD AUTO: 3.34 10E6/UL (ref 3.8–5.2)
SODIUM SERPL-SCNC: 139 MMOL/L (ref 136–145)
WBC # BLD AUTO: 9.1 10E3/UL (ref 4–11)

## 2022-04-05 PROCEDURE — 250N000011 HC RX IP 250 OP 636: Performed by: INTERNAL MEDICINE

## 2022-04-05 PROCEDURE — 250N000009 HC RX 250: Performed by: INTERNAL MEDICINE

## 2022-04-05 PROCEDURE — 71045 X-RAY EXAM CHEST 1 VIEW: CPT

## 2022-04-05 PROCEDURE — 82248 BILIRUBIN DIRECT: CPT | Performed by: INTERNAL MEDICINE

## 2022-04-05 PROCEDURE — 85014 HEMATOCRIT: CPT | Performed by: INTERNAL MEDICINE

## 2022-04-05 PROCEDURE — 80074 ACUTE HEPATITIS PANEL: CPT | Performed by: INTERNAL MEDICINE

## 2022-04-05 PROCEDURE — 250N000013 HC RX MED GY IP 250 OP 250 PS 637: Performed by: INTERNAL MEDICINE

## 2022-04-05 PROCEDURE — 82784 ASSAY IGA/IGD/IGG/IGM EACH: CPT | Performed by: INTERNAL MEDICINE

## 2022-04-05 PROCEDURE — 82785 ASSAY OF IGE: CPT | Performed by: INTERNAL MEDICINE

## 2022-04-05 PROCEDURE — 99233 SBSQ HOSP IP/OBS HIGH 50: CPT | Performed by: INTERNAL MEDICINE

## 2022-04-05 PROCEDURE — 87389 HIV-1 AG W/HIV-1&-2 AB AG IA: CPT | Performed by: INTERNAL MEDICINE

## 2022-04-05 PROCEDURE — 36415 COLL VENOUS BLD VENIPUNCTURE: CPT | Performed by: INTERNAL MEDICINE

## 2022-04-05 PROCEDURE — 99232 SBSQ HOSP IP/OBS MODERATE 35: CPT | Performed by: INTERNAL MEDICINE

## 2022-04-05 PROCEDURE — 120N000004 HC R&B MS OVERFLOW

## 2022-04-05 PROCEDURE — 258N000003 HC RX IP 258 OP 636: Performed by: INTERNAL MEDICINE

## 2022-04-05 PROCEDURE — 82728 ASSAY OF FERRITIN: CPT | Performed by: INTERNAL MEDICINE

## 2022-04-05 PROCEDURE — 99223 1ST HOSP IP/OBS HIGH 75: CPT | Performed by: INTERNAL MEDICINE

## 2022-04-05 PROCEDURE — 87040 BLOOD CULTURE FOR BACTERIA: CPT | Performed by: INTERNAL MEDICINE

## 2022-04-05 RX ADMIN — VANCOMYCIN HYDROCHLORIDE 1250 MG: 5 INJECTION, POWDER, LYOPHILIZED, FOR SOLUTION INTRAVENOUS at 09:32

## 2022-04-05 RX ADMIN — ACETAMINOPHEN 325 MG: 325 TABLET ORAL at 22:11

## 2022-04-05 RX ADMIN — PIPERACILLIN AND TAZOBACTAM 3.38 G: 3; .375 INJECTION, POWDER, LYOPHILIZED, FOR SOLUTION INTRAVENOUS at 04:09

## 2022-04-05 RX ADMIN — PIPERACILLIN AND TAZOBACTAM 3.38 G: 3; .375 INJECTION, POWDER, LYOPHILIZED, FOR SOLUTION INTRAVENOUS at 13:29

## 2022-04-05 RX ADMIN — ACETAMINOPHEN 325 MG: 325 TABLET ORAL at 14:16

## 2022-04-05 RX ADMIN — PIPERACILLIN AND TAZOBACTAM 3.38 G: 3; .375 INJECTION, POWDER, LYOPHILIZED, FOR SOLUTION INTRAVENOUS at 20:26

## 2022-04-05 RX ADMIN — HYDROCODONE BITARTRATE AND ACETAMINOPHEN 1 TABLET: 5; 325 TABLET ORAL at 23:56

## 2022-04-05 RX ADMIN — GUAIFENESIN 10 ML: 100 SOLUTION ORAL at 17:06

## 2022-04-05 RX ADMIN — LISINOPRIL 5 MG: 5 TABLET ORAL at 09:45

## 2022-04-05 RX ADMIN — DORNASE ALFA 50 ML: 1 SOLUTION RESPIRATORY (INHALATION) at 14:16

## 2022-04-05 RX ADMIN — HYDROCODONE BITARTRATE AND ACETAMINOPHEN 1 TABLET: 5; 325 TABLET ORAL at 16:28

## 2022-04-05 RX ADMIN — DORNASE ALFA 50 ML: 1 SOLUTION RESPIRATORY (INHALATION) at 22:11

## 2022-04-05 RX ADMIN — ENOXAPARIN SODIUM 40 MG: 100 INJECTION SUBCUTANEOUS at 22:11

## 2022-04-05 ASSESSMENT — ACTIVITIES OF DAILY LIVING (ADL)
ADLS_ACUITY_SCORE: 10

## 2022-04-05 NOTE — PROGRESS NOTES
"GI Progress Note  Arely Saldaña  -49     Subjective:   Sitting up in bed.    Coughing, but says breathing is getting better.  Reviewed LFT's.     Objective:   /72   Pulse 101   Temp 97.6  F (36.4  C) (Oral)   Resp 18   Ht 1.676 m (5' 6\")   Wt 64.9 kg (143 lb)   LMP 03/01/2022   SpO2 95%   BMI 23.08 kg/m    Body mass index is 23.08 kg/m .   Gen: No acute distress  Cardio: RRR  GI: Non-distended, BS positive, soft, non-tender. No guarding.    Laboratory  Recent Labs   Lab 04/05/22  0506 04/04/22  0501 04/03/22  1141   WBC 9.1 7.3 8.8   RBC 3.34* 3.39* 4.14   HGB 7.5* 7.9* 9.4*   HCT 25.0* 25.8* 30.7*   MCV 75* 76* 74*   MCH 22.5* 23.3* 22.7*   MCHC 30.0* 30.6* 30.6*   RDW 15.7* 15.5* 15.3*    278 379      Recent Labs   Lab 04/05/22  0506 04/03/22 2312 04/03/22  1141    138 139   CO2 21* 19* 20*   BUN 13 13 12     Recent Labs   Lab 04/05/22  0506 04/03/22 2312 04/03/22  1141   ALKPHOS 155* 200* 268*   AST 8 39 92*   ALT 23 41 51*     Lab Results   Component Value Date    INR 1.20 (H) 04/03/2022    INR 0.95 02/19/2021       XR Chest 1 View    Result Date: 4/3/2022  EXAM: XR CHEST 1 VIEW LOCATION: [facility] DATE/TIME: 4/3/2022 6:59 PM INDICATION: dyspnea; status post left thoracentesis COMPARISON: AP chest radiograph from earlier today     IMPRESSION: Considerable decrease in the amount of left pleural fluid. The left upper lobe is better inflated. The residual opacity in the left basal chest has a sharp interface which parallels the left ventricular heart border within which there are no air bronchograms consistent with lobar atelectasis of the left lower lobe. There is minimal atelectasis or pleural fluid in the left lateral costophrenic sulcus. Unchanged atelectasis/pleural fluid along the right hemidiaphragm.     Echocardiogram Complete    Result Date: 4/3/2022  [id number]^FABIANO^ARY^GEOVANNI  [address] " [place] [address]  Name: SHANI PETIT MRN: [id number] : 1980 Study Date: 2022 02:38 PM Age: 41 yrs Gender: Female Patient Location: [facility] Reason For Study: SOB Ordering Physician: ARY MARIO Performed By: ANABELL  BSA: 1.7 m2 Height: 66 in Weight: 145 lb HR: 120 BP: 138/93 mmHg ______________________________________________________________________________ Procedure Complete Portable Echo Adult. ______________________________________________________________________________ Interpretation Summary  Left ventricular size, wall motion and function are normal. The ejection fraction is > 65%. Normal right ventricle size and systolic function. No hemodynamically significant valvular abnormalities on 2D or color flow imaging. ______________________________________________________________________________ Left Ventricle Left ventricular size, wall motion and function are normal. The ejection fraction is > 65%. There is normal left ventricular wall thickness. Left ventricular diastolic function is normal. No regional wall motion abnormalities noted.  Right Ventricle Normal right ventricle size and systolic function.  Atria Normal left atrial size. Right atrial size is normal. There is no color Doppler evidence of an atrial shunt.  Mitral Valve Mitral valve leaflets appear normal. There is no evidence of mitral stenosis or clinically significant mitral regurgitation.  Tricuspid Valve Tricuspid valve leaflets appear normal. There is no evidence of tricuspid stenosis or clinically significant tricuspid regurgitation. Right ventricle systolic pressure estimate normal.  Aortic Valve Aortic valve leaflets appear normal. There is no evidence of aortic stenosis or clinically significant aortic regurgitation.  Pulmonic Valve The pulmonic valve is not well seen, but is grossly normal. This degree of valvular regurgitation is within normal limits.  Vessels The aorta root is normal. Normal size ascending aorta. IVC  diameter <2.1 cm collapsing >50% with sniff suggests a normal RA pressure of 3 mmHg.  Pericardium There is no pericardial effusion.  ______________________________________________________________________________ MMode/2D Measurements & Calculations IVSd: 0.68 cm LVIDd: 3.8 cm LVIDs: 2.3 cm LVPWd: 1.0 cm FS: 39.1 %  LV mass(C)d: 91.8 grams LV mass(C)dI: 52.6 grams/m2 LA dimension: 2.4 cm LVOT diam: 2.0 cm LVOT area: 3.1 cm2 LA Volume Indexed (AL/bp): 26.0 ml/m2 RWT: 0.55  Time Measurements MM HR: 120.0 BPM  Doppler Measurements & Calculations MV E max qamar: 59.1 cm/sec MV A max qamar: 91.3 cm/sec MV E/A: 0.65 MV dec slope: 559.2 cm/sec2 MV dec time: 0.11 sec Ao V2 max: 155.5 cm/sec Ao max PG: 10.0 mmHg Ao V2 mean: 103.6 cm/sec Ao mean P.9 mmHg Ao V2 VTI: 23.0 cm JERONIMO(I,D): 2.8 cm2 JERONIMO(V,D): 2.8 cm2 LV V1 max P.1 mmHg LV V1 max: 142.1 cm/sec LV V1 VTI: 20.9 cm SV(LVOT): 64.8 ml SI(LVOT): 37.2 ml/m2 PA acc time: 0.09 sec AV Qamar Ratio (DI): 0.91 JERONIMO Index (cm2/m2): 1.6 E/E' av.1 Lateral E/e': 3.8 Medial E/e': 6.3  ______________________________________________________________________________ Report approved by: Fransisco Belcher 2022 03:46 PM       Abdomen US, limited (RUQ only)    Result Date: 4/3/2022  EXAM: US ABDOMEN LIMITED LOCATION: [facility] DATE/TIME: 4/3/2022 1:51 PM INDICATION: abnormal LFTs COMPARISON: CT from earlier today TECHNIQUE: Limited abdominal ultrasound. FINDINGS: GALLBLADDER: Cholelithiasis. BILE DUCTS: No biliary dilatation. The common duct measures 6 mm. LIVER: Normal parenchyma with smooth contour. No focal mass. RIGHT KIDNEY: No hydronephrosis. PANCREAS: The visualized portions are normal. No ascites.     IMPRESSION: 1.  Cholelithiasis. Otherwise normal right upper quadrant ultrasound.     US Thoracentesis    Result Date: 4/3/2022  EXAM: 1. LEFT THORACENTESIS 2. ULTRASOUND GUIDANCE LOCATION: [facility] DATE/TIME: 4/3/2022  6:17 PM INDICATION: Pleural effusion. PROCEDURE: Informed consent obtained. Time out performed. The chest was prepped and draped in sterile fashion. 10 mL of 1 % lidocaine was infused into the local soft tissues. Under direct ultrasound guidance, a 5 Ivorian catheter system was placed into the pleural effusion. 800 mL of thick cloudy yellow fluid were removed and sent to lab, if requested. Patient tolerated procedure well. Ultrasound imaging was obtained and placed in the patient's permanent medical record.     IMPRESSION: Status post left ultrasound-guided thoracentesis. Reference CPT Code: 51771    XR Chest Port 1 View    Result Date: 4/5/2022  EXAM: XR CHEST PORT 1 VIEW LOCATION: [facility] DATE/TIME: 4/5/2022 5:43 AM INDICATION: follow up empyema, pneumonia. COMPARISON: 04/03/2022. CT chest tube placement of 04/04/2022.     IMPRESSION: There is a new chest tube at the left medial lung base. There has been decrease in left pleural effusion and there is some residual of effusion and atelectasis at the left lung base. No pneumothorax. Mild atelectasis at the right lung base. Normal heart size and pulmonary vascularity.    XR Chest Port 1 View    Result Date: 4/3/2022  EXAM: XR CHEST PORT 1 VIEW LOCATION: [facility] DATE/TIME: 4/3/2022 11:29 PM INDICATION: Shortness of breath COMPARISON: 04/03/2022     IMPRESSION: The chest is stable with again seen minimal right pleural effusion and mild left pleural effusion with some underlying infiltrate/atelectasis in both lung bases and extending into the left midlung. The chest is otherwise negative.    XR Chest Port 1 View    Result Date: 4/3/2022  EXAM: XR CHEST PORT 1 VIEW LOCATION: [facility] DATE/TIME: 4/3/2022 4:38 PM INDICATION: worsening sob COMPARISON: CT pulmonary angiogram 04/03/2022 at 1256 hours     IMPRESSION: Moderate to large left and small right pleural effusions are not visibly  changed. Related atelectasis including near complete atelectasis of the left lower lobe. Lower left ventricular heart border is silhouetted. Cardiac silhouette is not enlarged. Azygos vein is not distended and the vascular pedicle width is normal. No pneumothorax.    CT Chest Tube with Cath Placement    Result Date: 4/4/2022  EXAM: 1. PERCUTANEOUS CHEST TUBE PLACEMENT IN THE LEFT PLEURAL SPACE 2. CT GUIDANCE 3. CONSCIOUS SEDATION LOCATION: [facility] DATE/TIME: 4/4/2022 2:56 PM INDICATION: Left pleural effusion with concern for empyema TECHNIQUE: Dose reduction techniques were used. PROCEDURE: Informed consent obtained. Site marked. Prior images reviewed. Required items made available. Patient identity confirmed verbally and with arm band. Patient reevaluated immediately before administering sedation. Universal protocol was followed. Time out performed. The site was prepped and draped in sterile fashion. 10 mL of 1% lidocaine was infused into the local soft tissues. Using standard technique and under direct CT guidance, a 12 Maori chest tube catheter was inserted into the pleural space. The catheter was fixed in place with sutures and adhesive device, and the tubing was banded. Chest tube placed to Pleur-evac suction. BLOOD LOSS: Minimal. The patient tolerated the procedure well. No immediate complications. SEDATION: Versed 1 mg. Fentanyl 50 mcg. The procedure was performed with administration intravenous conscious sedation with appropriate preoperative, intraoperative, and postoperative evaluation. 20 minutes of supervised face to face conscious sedation time was provided by a radiology nurse under my direct supervision.     IMPRESSION: 1.  Successful CT-guided chest tube placement into the left pleural space. Reference CPT Codes: 85616, 00234    CT Chest (PE) Abdomen Pelvis w Contrast    Result Date: 4/3/2022  EXAM: CT CHEST PE ABDOMEN PELVIS W CONTRAST LOCATION: [facility]  [facility] DATE/TIME: 4/3/2022 12:49 PM INDICATION: Chest and abdominal pain. Elevated liver function tests. PE suspected, high prob COMPARISON: 03/20/2022 CT chest. TECHNIQUE: CT chest pulmonary angiogram and routine CT abdomen pelvis with IV contrast. Arterial phase through the chest and venous phase through the abdomen and pelvis. Multiplanar reformats and MIP reconstructions were performed. Dose reduction techniques were used. CONTRAST: 100ml Isovue 370 FINDINGS: ANGIOGRAM CHEST: Pulmonary arteries are normal caliber and negative for pulmonary emboli. Thoracic aorta is negative for dissection. No CT evidence of right heart strain. LUNGS AND PLEURA: The previous large dense consolidative infiltrate in the left upper lobe and left lower lobe seen on 03/10/2022 has near completely resolved. New small right and moderate left pleural effusions with compressive appearing atelectasis in the lung bases. MEDIASTINUM/AXILLAE: Normal. CORONARY ARTERY CALCIFICATION: None. HEPATOBILIARY: Gallstones. Normal liver. PANCREAS: Normal. SPLEEN: Mild splenomegaly measuring 12 cm. ADRENAL GLANDS: Normal. KIDNEYS/BLADDER: Normal. BOWEL: Moderate amount of stool throughout the entire colon suggestive of constipation. LYMPH NODES: Normal. VASCULATURE: Unremarkable. PELVIC ORGANS: Normal. MUSCULOSKELETAL: Normal.     IMPRESSION: 1.  Negative for pulmonary emboli. 2.  The previous dense consolidative infiltrates in the left lung seen on CT 03/10/2022 appear to near completely resolved. 3.  New small right and moderate left pleural effusions with moderate atelectasis in the lower lobes greater on the left side. 4.  Gallstones. 5.  Mild splenomegaly.        Assessment:   1.  Elevated LFTs - mild. Cholestatic pattern. Today's labs showing improvement.   Has been on multiple rounds of antibiotics in recent months.  Cholestasis related to illness/sepsis another factor.  Does not have clinical concerns for cholangitis.  Biliary  "obstruction seems unlikely at this point in absence of RUQ pain or any duct dilation on imaging.   2.  Anemia- no overt GI bleeding.  Had some spotting and hematuria.  Serum iron low. IBC low.  MCV 75. On iron supplement prior to admission.    3. Bilateral pleural effusions, gram neg bacteremia.  On abx. Followed by pulm.     Patient Active Problem List   Diagnosis     Essential hypertension     ASCUS of cervix with negative high risk HPV     Tachypnea     Anemia     Pleural effusion     Tachycardia     Elevated LFTs     Dyspnea      Plan:   1. Trend LFT\"s.  If LFT's keep improving, then no need to check additional serologies.  2. On iron supplementation.   GI will follow peripherally.   Thank you.  Ernst Schultz PA-C  [facility]  [phone]     "

## 2022-04-05 NOTE — PROGRESS NOTES
[facility]:  PULMONARY PROGRESS NOTE     Date / Time of Admission:  4/3/2022 11:04 AM    ID: Arely Saldaña is a 41 year old female, never smoker, with essential hypertension and pneumonia diagnosis since February 2022 who was admitted to [facility] on 4/3/2022 with bilateral pleural effusions and pneumonia, now being treated for gram-negative coccobacilli bacteremia and left pleural empyema with Haemophilus influenzae non-typable.    Reason for consult: Pleural effusion.          Assessment: 1.   Left pleural empyema with Haemophilus influenzae nontypable s/p thoracentesis 4/3, s/p chest tube 4/4.  Patient with persistent pneumonia since 02/2022, failed 2 rounds of outpatient antibiotic therapy, now admitted with recurrent pneumonia and bilateral pleural effusions (L > R).  Underwent thoracentesis 4/3, 800 mL thick yellow fluid extracted, Gram stain showing gram-negative coccobacilli.  Chest tube placed 4/4, ~ 750 mL removed soonafter insertion.  2. Gram negative bacteremia with Haemophilus influenzae.  Blood cultures x 2 on 4/3 with 2/4 bottles + for gram negative coccobacilli. Recurrent left-sided pneumonia.  Treated with 1 week Abx in 02/2022 and again 1 week Abx in 03/2022 without improvement.  3. Pleuritic chest pain.  Developed evening of 4/3.  Transiently placed on NIPPV due to increased work of breathing with pleuritic chest pain.    4. Essential hypertension.  Patient on lisinopril 20 mg as outpatient, but takes as needed only when BP greater than 130/40.         Plan:     Continue Zosyn IV for Abx coverage while awaiting speciation.     Monitor for hives given history of PCN allergy causing hives.    Discontinue Vancomycin IV for now.      Follow-up final blood cultures for speciation.  Repeat blood culture x 1 today to see if there has been clearance.  Repeat again tomorrow.    Chest x-ray reviewed - chest tube in position, effusion decreased but still present.     Will  initiate alteplase 2x/day x 6 doses with plan to repeat CT imaging after.  Hopefully will not require surgical intervention.    ID consultation to assist with long-term empyema therapy and antibiotic management.      Oral intake for GI prophylaxis, Lovenox for DVT prophylaxis.    Patient and/or family were educated on the above recommendations.   Thank you for allowing our service to participate in the care of this patient.  Please call with any questions or concerns.    Total patient care time: 35 minutes, with over 50% spent in counseling/coordination of care.      Brea Upton MD, MPH  Pulmonary/Critical Care Medicine  04/05/2022  2:18 PM        Subjective/Interval History:   4/3:  Thoracentesis, 800 mL thick yellow fluid removed.   4/4:  Pleural effusion + gram stain w/ GN coccobacilli.  BCx + with GN coccobacilli - later speciated as H. Influenzae nontypable. L chest tube placed.      Pain after chest tube placement, improved with pain meds.    Currently pain 2/10 primarily with movements/cough.    Denies wheezing, hemoptysis.  Cough slightly improved.     Review of Systems:  Pertinent items are noted in HPI.  The Review of Systems is negative other than noted in the HPI        Allergies/Medications:   Allergies:     Allergies   Allergen Reactions     Penicillins Hives     Tolerated cefdinir and cefuroxime in 2022     Sulfamethoxazole-Trimethoprim [Sulfamethoxazole W/Trimethoprim] Rash       Continuous Infusions:    Scheduled Medications:    alteplase (ACTIVASE) 10 mg and dornase 5 mg in NS syringe for chest tube instillation  50 mL Chest Tube BID     enoxaparin ANTICOAGULANT  40 mg Subcutaneous Q24H     ferrous sulfate  325 mg Oral Every Other Day     lisinopril  5 mg Oral Daily     piperacillin-tazobactam  3.375 g Intravenous Q8H     vancomycin  1,250 mg Intravenous Q12H            Objective:   Vitals:  /80 (BP Location: Left arm)   Pulse 101   Temp 100.1  F (37.8  C)   Resp 16   Ht 1.676 m (5'  "6\")   Wt 64.9 kg (143 lb)   LMP 03/01/2022   SpO2 93%   BMI 23.08 kg/m    GEN: Pleasant female, sitting in the bed in no acute distress  HEENT: Normocephalic, atraumatic.  Extraoccular eye movements intact, anicteric sclera.  Moist mucous membranes.   NECK: Supple.    CHEST:  L posterior chest tube, to suction, no air leaks.  PULM: Non-labored breathing.  No use of accessory muscles.  Mildly diminished breath sounds at left lung base.  No wheezing or rales.  CVS: Regular rate and rhythm.  Normal S1, S2.  No rubs, murmurs, or gallops.    ABDOMEN: Normoactive bowel sounds.  Non-tender to palpation.  Non-distended.    EXTREMITES:  No clubbing, cyanosis, or edema.    NEURO:  Awake.  Oriented to person, place, time and situation.  Cranial nerves 2-12 grossly intact.  Moving all extremities.      Intake/Output:  I/O last 3 completed shifts:  In: 1200 [P.O.:600; I.V.:600]  Out: 930 [Urine:650; Chest Tube:280]        Pertinent Studies:   All laboratory data reviewed  Serum Glucose range:   Recent Labs   Lab 04/05/22  0506 04/03/22 2312 04/03/22  1944 04/03/22  1848 04/03/22  1141   GLC 88 116 175* 148* 121     ABG: No lab results found in last 7 days.  CBC:   Recent Labs   Lab 04/05/22  0506 04/04/22  0501 04/03/22  1141   WBC 9.1 7.3 8.8   HGB 7.5* 7.9* 9.4*   HCT 25.0* 25.8* 30.7*   MCV 75* 76* 74*    278 379   NEUTROPHIL  --   --  90   LYMPH  --   --  4   MONOCYTE  --   --  6   EOSINOPHIL  --   --  0     Chemistry:   Recent Labs   Lab 04/05/22  0506 04/03/22 2312 04/03/22  1747 04/03/22  1141    138  --  139   POTASSIUM 3.8 3.9  --  3.8   CHLORIDE 108* 108*  --  104   CO2 21* 19*  --  20*   BUN 13 13  --  12   CR 0.61 0.65  --  0.69   GFRESTIMATED >90 >90  --  >90   PABLO 8.9 8.1*  --  9.3   MAG  --   --   --  1.7*   PROTTOTAL 4.7* 4.4* 5.5* 5.8*   ALBUMIN 1.9* 2.0*  --  2.9*   AST 8 39  --  92*   ALT 23 41  --  51*   ALKPHOS 155* 200*  --  268*   BILITOTAL 0.4 1.4*  --  1.4*     Coags:  Recent Labs "   Lab 04/03/22  1651   INR 1.20*     Cardiac Markers:  Recent Labs   Lab 04/03/22  2312   TROPONINI <0.01     Left pleural fluid, 4/3:    [phone]    Microbiology:   Blood culture x 2 sets, 4/3: 2 of 4 bottles with gram negative coccobacilli.  1 of 2 bottles - growing Haemophilus influenzae nontypable.  Blood culture x 1 set, 4/3: 1 of 2 bottles - growing Haemophilus influenzae nontypable.  Left pleural fluid, 4/3:  Gram stain: 4+ WBC, 4+ gram negative coccobacilli.  Cx:  4+ Haemophilus influenzae nontypable.  COVID-19 PCR, 4/3: Negative  Influenza A/B swab, 4/3: Negative        Cardiology/Radiology:   Cardiac: All cardiac studies reviewed by me.    EKG:  Reviewed    TTE, 4/3/22:  Summary: Left ventricular size, wall motion and function are normal. The ejection fraction is > 65%. Normal right ventricle size and systolic function. No hemodynamically significant valvular abnormalities on 2D or color flow imaging.     Radiology: All imaging studies reviewed by me.    Chest X-Ray, 4/5:  There is a new chest tube at the left medial lung base. There has been decrease in left pleural effusion and there is some residual of effusion and atelectasis at the left lung base. No pneumothorax. Mild atelectasis at the right lung base. Normal heart size and pulmonary vascularity.    CT chst PE study, Abd/pelvis, 4/3:   ANGIOGRAM CHEST: Pulmonary arteries are normal caliber and negative for pulmonary emboli. Thoracic aorta is negative for dissection. No CT evidence of right heart strain.   LUNGS AND PLEURA: The previous large dense consolidative infiltrate in the left upper lobe and left lower lobe seen on 03/10/2022 has near completely resolved. New small right and moderate left pleural effusions with compressive appearing atelectasis in the lung bases.  MEDIASTINUM/AXILLAE: Normal.  CORONARY ARTERY CALCIFICATION: None.  HEPATOBILIARY: Gallstones. Normal liver.  PANCREAS: Normal.  SPLEEN: Mild splenomegaly measuring 12 cm.   ADRENAL GLANDS: Normal.  KIDNEYS/BLADDER: Normal.   BOWEL: Moderate amount of stool throughout the entire colon suggestive of constipation.  LYMPH NODES: Normal.  VASCULATURE: Unremarkable.  PELVIC ORGANS: Normal.  MUSCULOSKELETAL: Normal.    IMPRESSION: 1.  Negative for pulmonary emboli.  2.  The previous dense consolidative infiltrates in the left lung seen on CT 03/10/2022 appear to near completely resolved.  3.  New small right and moderate left pleural effusions with moderate atelectasis in the lower lobes greater on the left side. 4.  Gallstones. 5.  Mild splenomegaly.

## 2022-04-05 NOTE — PLAN OF CARE
Problem: Infection (Pneumonia)  Goal: Resolution of Infection Signs and Symptoms  Outcome: Ongoing, Progressing     Problem: Fluid Imbalance (Pneumonia)  Goal: Fluid Balance  Outcome: Ongoing, Progressing     Pt is alert and oriented x4, VSS, afebrile, pt denies pain, LS coarse/diminished in the bases, pt is on RA, infrequent productive cough. Up to bedside commode with assist x1. Chest tube to -20 continuous suction no air leak noted. Will continue to monitor.     Vanessa Lancaster, RN

## 2022-04-05 NOTE — PROGRESS NOTES
[facility] Daily Progress Note    Assessment/Plan:  41-year-old female with history of hypertension, recurrent sinusitis symptoms previously treated community-acquired pneumonia involving right lower lobe February 21, 2022 with azithromycin and cefdinir, with repeat CT scan on March 10, 2022 revealing moderate to large left lung consolidation suggestive of pneumonia.  She was initiated on 7-day course of levofloxacin.       She presented to [facility] emergency room for complaints of shortness of breath and cough.  CT scan was performed and revealed complete resolution of left lung infiltrates previously noted on March 10.  Patient did have bilateral effusions with moderate to large effusion on the left.  She underwent thoracentesis on April 3, 2022.  Blood culture and Gram stain from thoracentesis growing cocci bacilli.  Pulmonology critical care was consulted after patient developed respiratory distress following thoracentesis.  She was transferred to ICU and placed on BiPAP.  She is now saturating normally on room air.  Underwent US guided chest tube placement on 4/4/22 and patient is tolerating it well.      Bilateral pleural effusion, Left>Right.  suspect left sided empyema.   Gram negative bacteremia.  US guided chest tube placement on 4/4/22.  Previously treated community acquired pneumonia.  CRP elevated 14.7 on 4/3/22.   CT chest reveals almost complete resolution of infiltrates of left lung.   Thoracentesis on 4/3/22, Gram stain/culture Haemophilus influenzae  Blood culture from 4/3/22 GN coccobacilli, 1 of 2 bottles.   Continue Zosyn and vancomycin, pharmacy to dose.   Appreciate pulmonology recommendations.     Elevated liver function tests.   Mild elevation.   Continue to trend liver function tests.     Chronic sinusitis.   Plan for ENT procedure as outpatient.     Essential hypertension  Lisinopril 20 mg daily  Hold parameters written.      Microcytic anemia  iron supplementation on admission.   Hemoglobin 7.5  g/dL.   Continue ferrous sulfate every other day  Repeat CBC in a.m.    Diet: Regular Diet Adult  DVT Prophylaxis:  Enoxaparin (Lovenox) SQ  Code Status: Full Code    Active Problems:    Tachypnea    Anemia    Pleural effusion    Tachycardia    Elevated LFTs    Dyspnea     LOS: 1 day     Barriers to discharge: Chest tube management, culture results.   Discharge Disposition: Home.    Subjective:  Arely is laying in bed.  She reports good pain control and denies chest pain.  Has productive cough with green sputum.  Continues to have sinus congestion, which is a chronic complaint for her.        enoxaparin ANTICOAGULANT  40 mg Subcutaneous Q24H     ferrous sulfate  325 mg Oral Every Other Day     lisinopril  5 mg Oral Daily     piperacillin-tazobactam  3.375 g Intravenous Q8H     vancomycin  1,250 mg Intravenous Q12H       Objective:  Vital signs in last 24 hours:  Temp:  [97.6  F (36.4  C)-100.5  F (38.1  C)] 97.6  F (36.4  C)  Pulse:  [] 101  Resp:  [14-45] 18  BP: (101-140)/(56-88) 128/72  SpO2:  [87 %-100 %] 95 %  Weight:   Weight:   @THISENCWEIGHTS(1)@  Weight change:   Body mass index is 23.08 kg/m .    Intake/Output last 3 shifts:  I/O last 3 completed shifts:  In: 1200 [P.O.:600; I.V.:600]  Out: 930 [Urine:650; Chest Tube:280]  Intake/Output this shift:  I/O this shift:  In: 50 [P.O.:50]  Out: 200 [Urine:150; Chest Tube:50]    Review of Systems:   As per subjective, all others negative.    Physical Exam:    GENERAL:  Alert, appears comfortable, in no acute distress, appears stated age   HEAD:  Normocephalic, without obvious abnormality, atraumatic   NECK: Supple, symmetrical, trachea midline   BACK:   Symmetric, no curvature, ROM normal   LUNGS:   Clear to auscultation bilaterally, no rales, rhonchi, or wheezing, symmetric chest rise on inhalation, respirations unlabored, left sided chest tube in place, draining serosanguinous drainage.    CHEST WALL:  No tenderness or deformity   HEART:  Regular rate and  rhythm, S1 and S2 normal, no murmur, rub, or gallop    ABDOMEN:   Soft, non-tender, bowel sounds active all four quadrants, no masses, no organomegaly, no rebound or guarding   EXTREMITIES: Extremities normal, atraumatic, no cyanosis or edema    SKIN: Dry to touch, no exanthems in the visualized areas   NEURO: Alert, oriented x3, moves all four extremities freely, non-focal   PSYCH: Cooperative, behavior is appropriate      Imaging:  CT Chest Tube with Cath Placement    Impression    IMPRESSION:  1.  Successful CT-guided chest tube placement into the left pleural space.  Reference CPT Codes: 32299, 73606   XR Chest Port 1 View    Impression    IMPRESSION: There is a new chest tube at the left medial lung base. There has been decrease in left pleural effusion and there is some residual of effusion and atelectasis at the left lung base. No pneumothorax. Mild atelectasis at the right lung base.   Normal heart size and pulmonary vascularity.   Echocardiogram Complete   Result Value Ref Range    LVEF  > 65%        Lab Results:  Personally Reviewed.   Recent Labs   Lab 04/05/22  0506 04/04/22  0501 04/03/22  1141   WBC 9.1 7.3 8.8   HGB 7.5* 7.9* 9.4*   HCT 25.0* 25.8* 30.7*    278 379     Recent Labs   Lab 04/05/22  0506 04/03/22  2312 04/03/22  1141    138 139   CO2 21* 19* 20*   BUN 13 13 12   ALBUMIN 1.9* 2.0* 2.9*   ALKPHOS 155* 200* 268*   ALT 23 41 51*   AST 8 39 92*     Recent Labs   Lab 04/03/22  1651   INR 1.20*       I reviewed all labs and imaging studies as of this date and I reviewed all current inpatient medications and updated them    Ede Chávez DO, MS  [facility]  Internal Medicine

## 2022-04-05 NOTE — CONSULTS
[facility]    Infectious Disease Consultation     Date of Admission:  4/3/2022  Date of Consult (When I saw the patient): 04/05/22    Assessment & Plan   Arely Saldaña is a 41 year old female who was admitted on 4/3/2022.       Impression:    1. Empyema, left-sided  2. 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 Recurrent left-sided pneumonia  4. sinusitis since August/September 2021  5. Hypertension, pleuritic chest pain, tachypnea  6. Status post thoracentesis and chest tube placement on 4/4/2022  7. Elevated liver enzymes, improving.  Splenomegaly  8. Transthoracic echocardiogram did not show vegetations.    Recommendations    1. Follow repeat blood culture results  2. Continue Zosyn.  May be able to de-escalate further based on final susceptibility results  3. Monitor CBC, CMP check IgG levels, screen  4. May need VATS for source control, resolution of empyema  5. Chest tube management per pulmonary team  6. Likely need prolonged antibiotic course, depending on clinical response and repeat culture results  7. Patient has an ENT appointment and procedure scheduled for May 2022  8. Appreciate input from pulmonary critical care consultants.  9. Thank you for consulting infectious disease will continue to follow with you.  Patient updated    Josefina Andrade MD  [facility]  [phone]    CT chest 4/3/2022: Prior to chest tube placement            Reason for Consult   Reason for consult: I was asked to evaluate this patient for empyema, Haemophilus influenza bacteremia  Case discussed in person with pulmonary and critical care physician who has requested ID consultation.    Primary Care Physician   Ashely Larios    Chief Complaint   Dyspnea cough    History is obtained from the patient and medical  records    History of Present Illness   Arely Saldaña is a 41 year old female who presents with fever cough dyspnea not feeling well since about 2021  Patient has repeated several courses of antibiotics as listed below including cefuroxime, levofloxacin, doxycycline.  Patient stated that she could not feel quite back to baseline even after antibiotic therapy.  Over the last few weeks she has had worsening shortness of breath cough pleuritic pain while coughing, dyspnea, not feeling well overall and presented to the hospital.  She was found to have empyema, and a chest tube was placed.  Patient still feels unwell, has shortness of breath and chest pain.  She is scheduled to see ENT for procedure in May 2022 and was previously seen by ENT.  Denies having recurrent bacterial infections prior to the recent episode.  She has been diagnosed with nasal polyps and ENT is following  He was antibiotic courses.    Past Medical History   I have reviewed this patient's medical history and updated it with pertinent information if needed.   Past Medical History:   Diagnosis Date     ASCUS of cervix with negative high risk HPV 2018-2012 NIL annual pap 4/13/15 NIL pap, neg HPV 18 ASCUS, neg HPV 11/10/20 NIL pap, neg HPV     Hypertension     was on nifedipine and HCTZ before pregnancy, had since age early 20's       Past Surgical History   I have reviewed this patient's surgical history and updated it with pertinent information if needed.  Past Surgical History:   Procedure Laterality Date     C/SECTION, LOW TRANSVERSE  2021    with PPTL     FOOT SURGERY Left age 20    bursa removed from left foot     SALPINGECTOMY Bilateral 2021    with  section     WISDOM TOOTH EXTRACTION  age 19       Prior to Admission Medications   Prior to Admission Medications   Prescriptions Last Dose Informant Patient Reported? Taking?   Calcium Carb-Cholecalciferol (CALTRATE 600+D3 SOFT) 600-800 MG-UNIT CHEW  Past Month at Unknown time  Yes Yes   Sig: Take 1 tablet by mouth daily    ferrous sulfate (FEROSUL) 325 (65 Fe) MG tablet Past Month at Unknown time  Yes Yes   Sig: Take 325 mg by mouth daily (with breakfast)   lisinopril (ZESTRIL) 20 MG tablet Past Month at Unknown time  No Yes   Sig: Take 1 tablet (20 mg) by mouth daily   Patient taking differently: Take 20 mg by mouth daily as needed       Facility-Administered Medications: None     Allergies   Allergies   Allergen Reactions     Penicillins Hives     Tolerated cefdinir and cefuroxime in 2022     Sulfamethoxazole-Trimethoprim [Sulfamethoxazole W/Trimethoprim] Rash       Immunization History   Immunization History   Administered Date(s) Administered     COVID-19,PF,Moderna 08/04/2021, 09/01/2021     Historical DTP/aP 1982, 1982, 02/01/1983, 09/10/1986, 07/01/1987     Influenza (IIV3) PF 02/25/2020     Influenza Vaccine IM > 6 months Valent IIV4 (Alfuria,Fluzone) 11/10/2020     MMR 09/10/1986, 04/05/1994, 07/28/2020, 04/15/2021     Meningococcal (Menomune ) 03/28/2003     OPV, trivalent, live 1982, 1982, 02/01/1983, 09/10/1986     Td (Adult), Adsorbed 08/04/1998, 01/01/2000     Tdap (Adacel,Boostrix) 08/12/2010, 02/27/2021       Social History   I have reviewed this patient's social history and updated it with pertinent information if needed. Arely EDWARDS Piotr  reports that she has never smoked. She has never used smokeless tobacco. She reports previous alcohol use. She reports that she does not use drugs.    Family History   I have reviewed this patient's family history and updated it with pertinent information if needed.   Family History   Problem Relation Age of Onset     Hypertension Mother      Hypertension Father      Diabetes Father        Review of Systems   The 10 point Review of Systems is negative other than noted in the HPI or here.     Physical Exam   Temp: 100.1  F (37.8  C) Temp src: Oral BP: 136/80 Pulse: 101   Resp: 16 SpO2: 93  % O2 Device: None (Room air) Oxygen Delivery: 1 LPM  Vital Signs with Ranges  Temp:  [97.6  F (36.4  C)-100.5  F (38.1  C)] 100.1  F (37.8  C)  Pulse:  [] 101  Resp:  [16-30] 16  BP: (101-140)/(56-87) 136/80  SpO2:  [93 %-100 %] 93 %  143 lbs 0 oz  Body mass index is 23.08 kg/m .    GENERAL APPEARANCE:  awake, seen in ICU, mild distress, tachypnea  EYES: Eyes grossly normal to inspection, PERRL and conjunctivae and sclerae normal  HENT: Dry mucosa  NECK: Supple  RESP: Tachypnea, decreased breath sounds on left, chest tube  CV: S1-S2 tachycardia  LYMPHATICS: No severe lymphedema  ABDOMEN: Firm, pleuritic chest pain  MS: extremities normal- no gross deformities noted  SKIN: no suspicious lesions or rashes  Neuro: Awake, deconditioned , able to answer questions      Data   Lab Results   Component Value Date    WBC 9.1 04/05/2022    WBC 7.3 04/04/2022    WBC 8.8 04/03/2022    WBC 14.9 (H) 03/10/2022    WBC 7.3 01/21/2022    HGB 7.5 (L) 04/05/2022    HGB 7.9 (L) 04/04/2022    HGB 9.4 (L) 04/03/2022    HGB 9.4 (L) 03/10/2022    HGB 11.0 (L) 01/21/2022    HCT 25.0 (L) 04/05/2022    HCT 25.8 (L) 04/04/2022    HCT 30.7 (L) 04/03/2022    HCT 29.7 (L) 03/10/2022    HCT 34.4 (L) 01/21/2022     04/05/2022     04/04/2022     04/03/2022     03/10/2022     01/21/2022     04/05/2022     04/03/2022     04/03/2022     03/10/2022     01/21/2022    POTASSIUM 3.8 04/05/2022    POTASSIUM 3.9 04/03/2022    POTASSIUM 3.8 04/03/2022    POTASSIUM 3.7 03/10/2022    POTASSIUM 4.4 01/21/2022    CHLORIDE 108 (H) 04/05/2022    CHLORIDE 108 (H) 04/03/2022    CHLORIDE 104 04/03/2022    CHLORIDE 100 03/10/2022    CHLORIDE 106 01/21/2022    CO2 21 (L) 04/05/2022    CO2 19 (L) 04/03/2022    CO2 20 (L) 04/03/2022    CO2 26 03/10/2022    CO2 26 01/21/2022    BUN 13 04/05/2022    BUN 13 04/03/2022    BUN 12 04/03/2022    BUN 11 03/10/2022    BUN 9 01/21/2022    CR 0.61 04/05/2022    CR  0.65 04/03/2022    CR 0.69 04/03/2022    CR 0.68 03/10/2022    CR 0.71 01/21/2022    GLC 88 04/05/2022     04/03/2022     (H) 04/03/2022     (H) 04/03/2022     04/03/2022    SED 14 01/21/2022    DD 0.92 (H) 03/10/2022    AST 8 04/05/2022    AST 39 04/03/2022    AST 92 (H) 04/03/2022    AST 42 (H) 03/10/2022    AST 15 01/21/2022    ALT 23 04/05/2022    ALT 41 04/03/2022    ALT 51 (H) 04/03/2022    ALT 48 (H) 03/10/2022    ALT 19 01/21/2022    ALKPHOS 155 (H) 04/05/2022    ALKPHOS 200 (H) 04/03/2022    ALKPHOS 268 (H) 04/03/2022    ALKPHOS 196 (H) 03/10/2022    ALKPHOS 123 (H) 01/21/2022    BILITOTAL 0.4 04/05/2022    BILITOTAL 1.4 (H) 04/03/2022    BILITOTAL 1.4 (H) 04/03/2022    BILITOTAL 1.1 (H) 03/10/2022    BILITOTAL 0.3 01/21/2022    INR 1.20 (H) 04/03/2022    INR 0.95 02/19/2021     MICRO:    04/05/2022 0506 04/05/2022 0530 Blood Culture Peripheral Blood [[id number]]   Peripheral Blood    In process Component Value   No component results           04/03/2022 1915 04/05/2022 1438 Blood Culture Arm, Left [[id number]]   (Abnormal)   Blood from Arm, Left    Preliminary result Component Value   Culture Positive on the 1st day of incubation Abnormal  P    Haemophilus influenzae nontypable Panic  P    1 of 2 bottles   Corrected on April 4, 2022/7:25 PM by Michael Philip   Previously reported as: gram positive cocci in pairs and chains. Corrected to gram negative cocobacilli.     Susceptibilities done on previous cultures           04/03/2022 1849 04/03/2022 2108 Cell count with differential fluid [[id number]]    (Abnormal)   Pleural fluid from Pleural Cavity, Left    Final result Component Value   No component results           04/03/2022 1849 04/05/2022 1141 Pleural fluid Aerobic Bacterial Culture Routine with Gram Stain [[id number]]   (Abnormal)   Pleural fluid from Pleural Cavity, Left    Preliminary result Component Value   Culture Culture in progress P    4+ Haemophilus  influenzae nontypable Abnormal  P   Gram Stain Result 4+ Gram negative coccobacilli Abnormal  P    4+ WBC seen Abnormal  P    Predominantly PMNs           04/03/2022 1849 04/03/2022 1946 Glucose fluid [[id number]]    Pleural fluid from Pleural Cavity, Left    Final result Component Value Units   Glucose Fluid Source Pleural Cavity, Left    left pleural fluid   left pleural fluid   left pleural fluid   left pleural fluid   Glucose fluid <5 mg/dL           04/03/2022 1849 04/04/2022 0003 Lactate dehydrogenase fluid [[id number]]    Pleural fluid from Pleural Cavity, Left    Final result Component Value Units   LD Fluid Source Pleural Cavity, Left    left pleural fluid   left pleural fluid   Lactate dehydrogenase fluid 2,332 U/L   Collect pleural fluid when Thoracentesis procedure           04/03/2022 1849 04/03/2022 2246 Protein fluid [[id number]]    Pleural fluid from Pleural Cavity, Left    Final result Component Value Units   Protein Fluid Source Pleural Cavity, Left    left pleural fluid   left pleural fluid   Protein Total Fluid 3.7 g/dL           04/03/2022 1849 04/03/2022 2014 pH fluid [[id number]]    Pleural fluid from Pleural Cavity, Left    Final result Component Value Units   pH Fluid Source Pleural Cavity, Left    left pleural fluid   left pleural fluid   pH Fluid 7.5 pH           04/03/2022 1849 04/03/2022 2108 Cell Count Body Fluid [[id number]]    (Abnormal)   Pleural fluid from Pleural Cavity, Left    Final result Component Value   Color Yellow   Clarity Turbid Abnormal    Cell Count Fluid Source Pleural Cavity, Left   left pleural fluid   left pleural fluid   left pleural fluid   left pleural fluid   left pleural fluid   left pleural fluid   left pleural fluid   left pleural fluid   left pleural fluid   left pleural fluid   left pleural fluid   Clot Presence Large Clot (>Half Volume)   Specimen clotted. Slide reviewed, no   abnormal cells seen.    Cytology Ordered No           04/03/2022 2986  04/05/2022 1438 Blood Culture Peripheral Blood [[id number]]   (Abnormal)   Peripheral Blood    Preliminary result Component Value   Culture Positive on the 1st day of incubation Abnormal  P    Haemophilus influenzae nontypable Panic  P    1 of 2 bottles   Susceptibilities done on previous cultures           04/03/2022 1141 04/05/2022 1437 Blood Culture Line, venous [[id number]]   (Abnormal)   Blood from Line, venous    Preliminary result Component Value   Culture Positive on the 1st day of incubation Abnormal  P    Haemophilus influenzae nontypable Panic  P    1 of 2 bottles           04/03/2022 1141 04/03/2022 1233 Symptomatic; Unknown Influenza A/B & SARS-CoV2 (COVID-19) Virus PCR Multiplex Nasopharyngeal [[id number]]    Swab from Nasopharyngeal    Final result Component Value   Influenza A PCR Negative   Influenza B PCR Negative   SARS CoV2 PCR Negative   NEGATIVE: SARS-CoV-2 (COVID-19) RNA not detected, presumed negative.           04/03/2022 1141 04/04/2022 1122 Verigene GN Panel [[id number]]    Blood from Line, venous    Final result Component Value   Acinetobacter species Not Detected   Citrobacter species Not Detected   Enterobacter species Not Detected   Proteus species Not Detected   Escherichia coli Not Detected   Klebsiella pneumoniae Not Detected   Klebsiella oxytoca Not Detected   Pseudomonas aeruginosa Not Detected   CTX-M NA   KPC NA   NDM NA   VIM NA   IMP NA   OXA NA           03/10/2022 0907 03/10/2022 0958 Symptomatic; Unknown Influenza A/B & SARS-CoV2 (COVID-19) Virus PCR Multiplex Nasopharyngeal [[id number]]    Swab from Nasopharyngeal    Final result Component Value   Influenza A PCR Negative   Influenza B PCR Negative   SARS CoV2 PCR Negative   NEGATIVE: SARS-CoV-2 (COVID-19) RNA not detected, presumed negative.           04/11/2021 2219 01/21/2022 1342 COVID-19 VIRUS (CORONAVIRUS) BY PCR (EXTERNAL RESULT) [[id number]]    Specimen   Specimen type and source: Oth...    Final  result Component Value   COVID-19 Virus by PCR (External Result) Negative   All PCR tests are subject to false negative result due to variability in viral load and collection technique. A negative result does not rule out a SARS-CoV-2 infection. Clinical correlation required.                   RADIOLOGY:    XR Chest 1 View    Result Date: 4/3/2022  EXAM: XR CHEST 1 VIEW LOCATION: [facility] DATE/TIME: 4/3/2022 6:59 PM INDICATION: dyspnea; status post left thoracentesis COMPARISON: AP chest radiograph from earlier today     IMPRESSION: Considerable decrease in the amount of left pleural fluid. The left upper lobe is better inflated. The residual opacity in the left basal chest has a sharp interface which parallels the left ventricular heart border within which there are no air bronchograms consistent with lobar atelectasis of the left lower lobe. There is minimal atelectasis or pleural fluid in the left lateral costophrenic sulcus. Unchanged atelectasis/pleural fluid along the right hemidiaphragm.     Echocardiogram Complete    Result Date: 4/3/2022  [id number]^FABIANO^ARY^[place] [address]  Name: SHANI PETIT MRN: [id number] : 1980 Study Date: 2022 02:38 PM Age: 41 yrs Gender: Female Patient Location: [facility] Reason For Study: SOB Ordering Physician: ARY MARIO Performed By: ANABELL  BSA: 1.7 m2 Height: 66 in Weight: 145 lb HR: 120 BP: 138/93 mmHg ______________________________________________________________________________ Procedure Complete Portable Echo Adult. ______________________________________________________________________________ Interpretation Summary  Left ventricular size, wall motion and function are normal. The ejection fraction is > 65%. Normal right ventricle size and systolic function. No hemodynamically significant valvular abnormalities on 2D or color flow imaging.  ______________________________________________________________________________ Left Ventricle Left ventricular size, wall motion and function are normal. The ejection fraction is > 65%. There is normal left ventricular wall thickness. Left ventricular diastolic function is normal. No regional wall motion abnormalities noted.  Right Ventricle Normal right ventricle size and systolic function.  Atria Normal left atrial size. Right atrial size is normal. There is no color Doppler evidence of an atrial shunt.  Mitral Valve Mitral valve leaflets appear normal. There is no evidence of mitral stenosis or clinically significant mitral regurgitation.  Tricuspid Valve Tricuspid valve leaflets appear normal. There is no evidence of tricuspid stenosis or clinically significant tricuspid regurgitation. Right ventricle systolic pressure estimate normal.  Aortic Valve Aortic valve leaflets appear normal. There is no evidence of aortic stenosis or clinically significant aortic regurgitation.  Pulmonic Valve The pulmonic valve is not well seen, but is grossly normal. This degree of valvular regurgitation is within normal limits.  Vessels The aorta root is normal. Normal size ascending aorta. IVC diameter <2.1 cm collapsing >50% with sniff suggests a normal RA pressure of 3 mmHg.  Pericardium There is no pericardial effusion.  ______________________________________________________________________________ MMode/2D Measurements & Calculations IVSd: 0.68 cm LVIDd: 3.8 cm LVIDs: 2.3 cm LVPWd: 1.0 cm FS: 39.1 %  LV mass(C)d: 91.8 grams LV mass(C)dI: 52.6 grams/m2 LA dimension: 2.4 cm LVOT diam: 2.0 cm LVOT area: 3.1 cm2 LA Volume Indexed (AL/bp): 26.0 ml/m2 RWT: 0.55  Time Measurements MM HR: 120.0 BPM  Doppler Measurements & Calculations MV E max freda: 59.1 cm/sec MV A max freda: 91.3 cm/sec MV E/A: 0.65 MV dec slope: 559.2 cm/sec2 MV dec time: 0.11 sec Ao V2 max: 155.5 cm/sec Ao max PG: 10.0 mmHg Ao V2 mean: 103.6 cm/sec Ao mean P.9  mmHg Ao V2 VTI: 23.0 cm JERONIMO(I,D): 2.8 cm2 JERONIMO(V,D): 2.8 cm2 LV V1 max P.1 mmHg LV V1 max: 142.1 cm/sec LV V1 VTI: 20.9 cm SV(LVOT): 64.8 ml SI(LVOT): 37.2 ml/m2 PA acc time: 0.09 sec AV Qamar Ratio (DI): 0.91 JERONIMO Index (cm2/m2): 1.6 E/E' av.1 Lateral E/e': 3.8 Medial E/e': 6.3  ______________________________________________________________________________ Report approved by: Fransisco Belcher 2022 03:46 PM       Abdomen US, limited (RUQ only)    Result Date: 4/3/2022  EXAM: US ABDOMEN LIMITED LOCATION: [facility] DATE/TIME: 4/3/2022 1:51 PM INDICATION: abnormal LFTs COMPARISON: CT from earlier today TECHNIQUE: Limited abdominal ultrasound. FINDINGS: GALLBLADDER: Cholelithiasis. BILE DUCTS: No biliary dilatation. The common duct measures 6 mm. LIVER: Normal parenchyma with smooth contour. No focal mass. RIGHT KIDNEY: No hydronephrosis. PANCREAS: The visualized portions are normal. No ascites.     IMPRESSION: 1.  Cholelithiasis. Otherwise normal right upper quadrant ultrasound.     Abdomen US, limited (RUQ only)    Result Date: 3/10/2022  EXAM: US ABDOMEN LIMITED LOCATION: [facility] DATE/TIME: 3/10/2022 10:16 AM INDICATION: Abnormal liver function tests and right upper quadrant pain. COMPARISON: 1. TECHNIQUE: Limited abdominal ultrasound. FINDINGS: GALLBLADDER: Cholelithiasis and gallbladder sludge are noted. No wall thickening nor pericholecystic fluid. BILE DUCTS: No biliary dilatation. The common duct measures 6 mm. LIVER: Normal parenchyma with smooth contour. No focal mass. RIGHT KIDNEY: No hydronephrosis. PANCREAS: The visualized portions are normal. No ascites.     IMPRESSION: 1.  Cholelithiasis and gallbladder sludge are present. No evidence of cholecystitis nor bile duct dilatation.     US Thoracentesis    Result Date: 4/3/2022  EXAM: 1. LEFT THORACENTESIS 2. ULTRASOUND GUIDANCE LOCATION: [facility] DATE/TIME:  4/3/2022 6:17 PM INDICATION: Pleural effusion. PROCEDURE: Informed consent obtained. Time out performed. The chest was prepped and draped in sterile fashion. 10 mL of 1 % lidocaine was infused into the local soft tissues. Under direct ultrasound guidance, a 5 Ghanaian catheter system was placed into the pleural effusion. 800 mL of thick cloudy yellow fluid were removed and sent to lab, if requested. Patient tolerated procedure well. Ultrasound imaging was obtained and placed in the patient's permanent medical record.     IMPRESSION: Status post left ultrasound-guided thoracentesis. Reference CPT Code: 76151    XR Chest Port 1 View    Result Date: 4/5/2022  EXAM: XR CHEST PORT 1 VIEW LOCATION: [facility] DATE/TIME: 4/5/2022 5:43 AM INDICATION: follow up empyema, pneumonia. COMPARISON: 04/03/2022. CT chest tube placement of 04/04/2022.     IMPRESSION: There is a new chest tube at the left medial lung base. There has been decrease in left pleural effusion and there is some residual of effusion and atelectasis at the left lung base. No pneumothorax. Mild atelectasis at the right lung base. Normal heart size and pulmonary vascularity.    XR Chest Port 1 View    Result Date: 4/3/2022  EXAM: XR CHEST PORT 1 VIEW LOCATION: [facility] DATE/TIME: 4/3/2022 11:29 PM INDICATION: Shortness of breath COMPARISON: 04/03/2022     IMPRESSION: The chest is stable with again seen minimal right pleural effusion and mild left pleural effusion with some underlying infiltrate/atelectasis in both lung bases and extending into the left midlung. The chest is otherwise negative.    XR Chest Port 1 View    Result Date: 4/3/2022  EXAM: XR CHEST PORT 1 VIEW LOCATION: [facility] DATE/TIME: 4/3/2022 4:38 PM INDICATION: worsening sob COMPARISON: CT pulmonary angiogram 04/03/2022 at 1256 hours     IMPRESSION: Moderate to large left and small right pleural effusions are not  visibly changed. Related atelectasis including near complete atelectasis of the left lower lobe. Lower left ventricular heart border is silhouetted. Cardiac silhouette is not enlarged. Azygos vein is not distended and the vascular pedicle width is normal. No pneumothorax.    CT Chest Pulmonary Embolism w Contrast    Result Date: 3/10/2022  EXAM: CT CHEST PULMONARY EMBOLISM W CONTRAST LOCATION: [facility] DATE/TIME: 3/10/2022 10:08 AM INDICATION: Shortness of breath. Cough. COMPARISON: 02/21/2022 radiograph. TECHNIQUE: CT chest pulmonary angiogram during arterial phase injection of IV contrast. Multiplanar reformats and MIP reconstructions were performed. Dose reduction techniques were used. CONTRAST: 75ml Isovue 370. FINDINGS: ANGIOGRAM CHEST: Regions of motion artifact. No pulmonary embolism seen. Nonaneurysmal aorta without dissection. LUNGS AND PLEURA: Moderate to large amount of heterogeneously enhancing left upper and lower lobe perihilar predominant consolidation with example area measuring 5.6 x 5.8 cm (series 6, image 108). Minimal opacities elsewhere bilaterally. Mild basilar atelectasis. Small right pleural effusion. No pneumothorax. MEDIASTINUM/AXILLAE: Mild adenopathy, presumed reactive. Normal heart size. No pericardial effusion. CORONARY ARTERY CALCIFICATION: Compromised by motion. UPPER ABDOMEN: Hepatic steatosis. Incompletely seen spleen appears mildly enlarged, measuring roughly 13.3 cm in AP dimension. MUSCULOSKELETAL: Nothing acute     IMPRESSION: 1.  No pulmonary embolism. 2.  Moderate to large amount of left lung consolidation suggestive of pneumonia. Recommend 6-8 week follow-up PA and lateral radiographs for clearing. A few other mild bilateral opacities. 3.  Small right pleural effusion. 4.  Hepatic steatosis. 5.  Incompletely seen splenomegaly.     CT Chest Tube with Cath Placement    Result Date: 4/4/2022  EXAM: 1. PERCUTANEOUS CHEST TUBE PLACEMENT IN THE LEFT  PLEURAL SPACE 2. CT GUIDANCE 3. CONSCIOUS SEDATION LOCATION: [facility] DATE/TIME: 4/4/2022 2:56 PM INDICATION: Left pleural effusion with concern for empyema TECHNIQUE: Dose reduction techniques were used. PROCEDURE: Informed consent obtained. Site marked. Prior images reviewed. Required items made available. Patient identity confirmed verbally and with arm band. Patient reevaluated immediately before administering sedation. Universal protocol was followed. Time out performed. The site was prepped and draped in sterile fashion. 10 mL of 1% lidocaine was infused into the local soft tissues. Using standard technique and under direct CT guidance, a 12 Moldovan chest tube catheter was inserted into the pleural space. The catheter was fixed in place with sutures and adhesive device, and the tubing was banded. Chest tube placed to Pleur-evac suction. BLOOD LOSS: Minimal. The patient tolerated the procedure well. No immediate complications. SEDATION: Versed 1 mg. Fentanyl 50 mcg. The procedure was performed with administration intravenous conscious sedation with appropriate preoperative, intraoperative, and postoperative evaluation. 20 minutes of supervised face to face conscious sedation time was provided by a radiology nurse under my direct supervision.     IMPRESSION: 1.  Successful CT-guided chest tube placement into the left pleural space. Reference CPT Codes: 22600, 87431    CT Chest (PE) Abdomen Pelvis w Contrast    Result Date: 4/3/2022  EXAM: CT CHEST PE ABDOMEN PELVIS W CONTRAST LOCATION: [facility] DATE/TIME: 4/3/2022 12:49 PM INDICATION: Chest and abdominal pain. Elevated liver function tests. PE suspected, high prob COMPARISON: 03/20/2022 CT chest. TECHNIQUE: CT chest pulmonary angiogram and routine CT abdomen pelvis with IV contrast. Arterial phase through the chest and venous phase through the abdomen and pelvis. Multiplanar reformats and MIP reconstructions  were performed. Dose reduction techniques were used. CONTRAST: 100ml Isovue 370 FINDINGS: ANGIOGRAM CHEST: Pulmonary arteries are normal caliber and negative for pulmonary emboli. Thoracic aorta is negative for dissection. No CT evidence of right heart strain. LUNGS AND PLEURA: The previous large dense consolidative infiltrate in the left upper lobe and left lower lobe seen on 03/10/2022 has near completely resolved. New small right and moderate left pleural effusions with compressive appearing atelectasis in the lung bases. MEDIASTINUM/AXILLAE: Normal. CORONARY ARTERY CALCIFICATION: None. HEPATOBILIARY: Gallstones. Normal liver. PANCREAS: Normal. SPLEEN: Mild splenomegaly measuring 12 cm. ADRENAL GLANDS: Normal. KIDNEYS/BLADDER: Normal. BOWEL: Moderate amount of stool throughout the entire colon suggestive of constipation. LYMPH NODES: Normal. VASCULATURE: Unremarkable. PELVIC ORGANS: Normal. MUSCULOSKELETAL: Normal.     IMPRESSION: 1.  Negative for pulmonary emboli. 2.  The previous dense consolidative infiltrates in the left lung seen on CT 03/10/2022 appear to near completely resolved. 3.  New small right and moderate left pleural effusions with moderate atelectasis in the lower lobes greater on the left side. 4.  Gallstones. 5.  Mild splenomegaly.    CT Sinus w/o Contrast    Result Date: 3/16/2022  EXAM DATE:         03/16/2022 EXAM: CT SINUSES WITHOUT CONTRAST LOCATION: [facility] DATE/TIME: 3/16/2022 12:45 PM INDICATION: Sinus infection COMPARISON: None. TECHNIQUE: Routine without contrast. Multiplanar reformats. Dose reduction techniques were used. FINDINGS: FRONTAL SINUSES:  Subtotal opacification of the frontal sinuses bilaterally with some partially aerated secretions. ETHMOID SINUSES:  Subtotal opacification of ethmoid air cells bilaterally, left greater than right. Some partially aerated secretions noted within posterior ethmoid locules bilaterally. SPHENOID SINUSES:  Moderate mucosal thickening involving both sphenoid sinuses with bilateral air-fluid levels. MAXILLARY SINUSES: Moderate circumferential mucosal thickening and subtotal opacification of the bilateral maxillary sinuses, left greater than right. Bilateral air-fluid levels in some partially aerated secretions. The floors of the maxillary sinuses are intact. NASAL CAVITY/SKULL BASE: Rightward bowing of the nasal septum without focal defect. Partially paradoxical curvature of the middle turbinates. The cribriform plate is intact and symmetric. The anterior clinoid processes are not pneumatized. PARANASAL SINUS DRAINAGE PATHWAYS:  Opacification of the bilateral frontoethmoidal recesses, ostiomeatal units, and sphenoethmoidal recesses. NON-SINUS STRUCTURES: No abnormality of the visualized orbits or intracranial compartment. IMPRESSION: 1.  Findings of pansinusitis as above. 2.  Rightward nasal septal deviation.

## 2022-04-06 ENCOUNTER — HOSPITAL ENCOUNTER (OUTPATIENT)
Facility: AMBULATORY SURGERY CENTER | Age: 42
End: 2022-04-06
Attending: OTOLARYNGOLOGY
Payer: COMMERCIAL

## 2022-04-06 DIAGNOSIS — J32.0 CHRONIC MAXILLARY SINUSITIS: ICD-10-CM

## 2022-04-06 LAB
ALBUMIN SERPL-MCNC: 1.8 G/DL (ref 3.5–5)
ALP SERPL-CCNC: 142 U/L (ref 45–120)
ALT SERPL W P-5'-P-CCNC: 16 U/L (ref 0–45)
AST SERPL W P-5'-P-CCNC: 10 U/L (ref 0–40)
BACTERIA BLD CULT: ABNORMAL
BILIRUB DIRECT SERPL-MCNC: 0.1 MG/DL
BILIRUB SERPL-MCNC: 0.4 MG/DL (ref 0–1)
ERYTHROCYTE [DISTWIDTH] IN BLOOD BY AUTOMATED COUNT: 15.8 % (ref 10–15)
HAV IGM SERPL QL IA: NEGATIVE
HBV CORE IGM SERPL QL IA: NEGATIVE
HBV SURFACE AG SERPL QL IA: NONREACTIVE
HCT VFR BLD AUTO: 28.9 % (ref 35–47)
HCV AB SERPL QL IA: NEGATIVE
HGB BLD-MCNC: 8.7 G/DL (ref 11.7–15.7)
LACTATE SERPL-SCNC: 1.5 MMOL/L (ref 0.7–2)
MCH RBC QN AUTO: 22.7 PG (ref 26.5–33)
MCHC RBC AUTO-ENTMCNC: 30.1 G/DL (ref 31.5–36.5)
MCV RBC AUTO: 75 FL (ref 78–100)
PLATELET # BLD AUTO: 402 10E3/UL (ref 150–450)
PROT SERPL-MCNC: 4.1 G/DL (ref 6–8)
RBC # BLD AUTO: 3.84 10E6/UL (ref 3.8–5.2)
WBC # BLD AUTO: 9.5 10E3/UL (ref 4–11)

## 2022-04-06 PROCEDURE — 250N000013 HC RX MED GY IP 250 OP 250 PS 637: Performed by: INTERNAL MEDICINE

## 2022-04-06 PROCEDURE — 87040 BLOOD CULTURE FOR BACTERIA: CPT | Performed by: INTERNAL MEDICINE

## 2022-04-06 PROCEDURE — 36415 COLL VENOUS BLD VENIPUNCTURE: CPT | Performed by: INTERNAL MEDICINE

## 2022-04-06 PROCEDURE — 250N000011 HC RX IP 250 OP 636: Performed by: INTERNAL MEDICINE

## 2022-04-06 PROCEDURE — 85027 COMPLETE CBC AUTOMATED: CPT | Performed by: INTERNAL MEDICINE

## 2022-04-06 PROCEDURE — 99232 SBSQ HOSP IP/OBS MODERATE 35: CPT | Performed by: INTERNAL MEDICINE

## 2022-04-06 PROCEDURE — 99233 SBSQ HOSP IP/OBS HIGH 50: CPT | Performed by: INTERNAL MEDICINE

## 2022-04-06 PROCEDURE — 250N000009 HC RX 250: Performed by: INTERNAL MEDICINE

## 2022-04-06 PROCEDURE — 258N000003 HC RX IP 258 OP 636: Performed by: INTERNAL MEDICINE

## 2022-04-06 PROCEDURE — 83605 ASSAY OF LACTIC ACID: CPT | Performed by: INTERNAL MEDICINE

## 2022-04-06 PROCEDURE — 120N000001 HC R&B MED SURG/OB

## 2022-04-06 PROCEDURE — 82040 ASSAY OF SERUM ALBUMIN: CPT | Performed by: PHYSICIAN ASSISTANT

## 2022-04-06 RX ORDER — METRONIDAZOLE 500 MG/100ML
500 INJECTION, SOLUTION INTRAVENOUS EVERY 12 HOURS
Status: DISCONTINUED | OUTPATIENT
Start: 2022-04-06 | End: 2022-04-06

## 2022-04-06 RX ORDER — CEFTRIAXONE 2 G/1
2 INJECTION, POWDER, FOR SOLUTION INTRAMUSCULAR; INTRAVENOUS EVERY 24 HOURS
Status: DISCONTINUED | OUTPATIENT
Start: 2022-04-06 | End: 2022-04-12

## 2022-04-06 RX ORDER — CLINDAMYCIN PHOSPHATE 600 MG/50ML
600 INJECTION, SOLUTION INTRAVENOUS EVERY 8 HOURS
Status: DISCONTINUED | OUTPATIENT
Start: 2022-04-06 | End: 2022-04-12

## 2022-04-06 RX ORDER — ACETAMINOPHEN 325 MG/1
650 TABLET ORAL EVERY 4 HOURS PRN
Status: DISCONTINUED | OUTPATIENT
Start: 2022-04-06 | End: 2022-04-13 | Stop reason: HOSPADM

## 2022-04-06 RX ADMIN — HYDROMORPHONE HYDROCHLORIDE 0.2 MG: 0.2 INJECTION, SOLUTION INTRAMUSCULAR; INTRAVENOUS; SUBCUTANEOUS at 14:27

## 2022-04-06 RX ADMIN — CEFTRIAXONE SODIUM 2 G: 2 INJECTION, POWDER, FOR SOLUTION INTRAMUSCULAR; INTRAVENOUS at 09:38

## 2022-04-06 RX ADMIN — DORNASE ALFA 50 ML: 1 SOLUTION RESPIRATORY (INHALATION) at 21:06

## 2022-04-06 RX ADMIN — GUAIFENESIN 10 ML: 100 SOLUTION ORAL at 09:38

## 2022-04-06 RX ADMIN — LISINOPRIL 5 MG: 5 TABLET ORAL at 09:39

## 2022-04-06 RX ADMIN — PIPERACILLIN AND TAZOBACTAM 3.38 G: 3; .375 INJECTION, POWDER, LYOPHILIZED, FOR SOLUTION INTRAVENOUS at 04:58

## 2022-04-06 RX ADMIN — METRONIDAZOLE 500 MG: 500 INJECTION, SOLUTION INTRAVENOUS at 09:40

## 2022-04-06 RX ADMIN — ACETAMINOPHEN 650 MG: 325 TABLET ORAL at 19:47

## 2022-04-06 RX ADMIN — CLINDAMYCIN PHOSPHATE 600 MG: 600 INJECTION, SOLUTION INTRAVENOUS at 16:18

## 2022-04-06 RX ADMIN — DORNASE ALFA 50 ML: 1 SOLUTION RESPIRATORY (INHALATION) at 09:39

## 2022-04-06 RX ADMIN — FERROUS SULFATE TAB 325 MG (65 MG ELEMENTAL FE) 325 MG: 325 (65 FE) TAB at 09:38

## 2022-04-06 RX ADMIN — CLINDAMYCIN PHOSPHATE 600 MG: 600 INJECTION, SOLUTION INTRAVENOUS at 23:07

## 2022-04-06 RX ADMIN — HYDROCODONE BITARTRATE AND ACETAMINOPHEN 1 TABLET: 5; 325 TABLET ORAL at 09:39

## 2022-04-06 RX ADMIN — ACETAMINOPHEN 325 MG: 325 TABLET ORAL at 16:51

## 2022-04-06 RX ADMIN — HYDROMORPHONE HYDROCHLORIDE 0.2 MG: 0.2 INJECTION, SOLUTION INTRAMUSCULAR; INTRAVENOUS; SUBCUTANEOUS at 22:02

## 2022-04-06 RX ADMIN — ENOXAPARIN SODIUM 40 MG: 100 INJECTION SUBCUTANEOUS at 23:07

## 2022-04-06 ASSESSMENT — ACTIVITIES OF DAILY LIVING (ADL)
ADLS_ACUITY_SCORE: 10

## 2022-04-06 NOTE — PROGRESS NOTES
[facility]    Referral received  from Laurence Vick CM, for potential home IV antibiotics.    Benefits verified. Pt has coverage for IV antibiotics under her [employer] plan.  She is covered at 100%.    Once it's determined that the patient will for sure need home IV antibiotics, writer will speak with pt to review benefits, home infusion and to offer choice of agency/home infusion provider.    Thank you for the referral.    Ayse Tenorio RN, BSN  [facility] Liaison  [phone] (Mon through Fri, 8:00 am-5:00 pm)  [phone] (Office)

## 2022-04-06 NOTE — PROGRESS NOTES
[facility]:  PULMONARY PROGRESS NOTE     Date / Time of Admission:  4/3/2022 11:04 AM    ID: Arely Saldaña is a 41 year old female, never smoker, with essential hypertension and pneumonia diagnosis since February 2022 who was admitted to [facility] on 4/3/2022 with bilateral pleural effusions and pneumonia, now being treated for gram-negative coccobacilli bacteremia and left pleural empyema with Haemophilus influenzae non-typable.    Reason for consult: Pleural effusion.          Assessment: 1.   Left pleural empyema with Haemophilus influenzae nontypable s/p thoracentesis 4/3, s/p chest tube 4/4.  Patient with persistent pneumonia since 02/2022, failed 2 rounds of outpatient antibiotic therapy, now admitted with recurrent pneumonia and bilateral pleural effusions (L > R).  Underwent thoracentesis 4/3, 800 mL thick yellow fluid extracted, Gram stain showing gram-negative coccobacilli.  Chest tube placed 4/4, ~ 750 mL removed soonafter insertion.  Initiated on tPA 4/4 (BID dosing x 6 doses).  2. Gram negative bacteremia with Haemophilus influenzae.  Blood cultures x 2 on 4/3 with 2/4 bottles + for gram negative coccobacilli.  Vanco IV discontinued 4/5. Recurrent left-sided pneumonia.  Treated with 1 week Abx in 02/2022 and again 1 week Abx in 03/2022 without improvement.  3. Pleuritic chest pain.  Developed evening of 4/3.  Transiently placed on NIPPV due to increased work of breathing with pleuritic chest pain.    4. Essential hypertension.  Patient on lisinopril 20 mg as outpatient, but takes as needed only when BP greater than 130/40.         Plan:     ID consultation appreciated to assist with long-term empyema therapy and antibiotic management.       Continue Zosyn IV for Abx coverage - speciation available, will discuss with ID team re: Abx preferences.    Monitor for hives given history of PCN allergy causing hives.    Follow-up repeat blood cultures from yesterday and  today..    Continue intrapleural alteplase 2x/day x 6 doses (start 4/5)    Repeat CXR tomorrow for follow-up.   Plan for repeat CT chest on Friday after alteplase therapy completed. Hopefully will not require surgical intervention.    Oral intake for GI prophylaxis, Lovenox for DVT prophylaxis.    Patient and/or family were educated on the above recommendations.   Thank you for allowing our service to participate in the care of this patient.  Please call with any questions or concerns.    Total patient care time: 35 minutes, with over 50% spent in counseling/coordination of care.      Brea Upton MD, MPH  Pulmonary/Critical Care Medicine  04/06/2022  10:43 AM        Subjective/Interval History:   4/3:  Thoracentesis, 800 mL thick yellow fluid removed.   4/4:  Pleural effusion + gram stain w/ GN coccobacilli.  BCx + with GN coccobacilli - later speciated as H. Influenzae nontypable. L chest tube placed.    4/5:  CXR reviewed, persistent effusion.  Initiated tPA therapy into chest tube.  Pleural fluid yellow prior to tPA.    Doing well overall, some increase in discomfort with chest tube.  Fluid is now pink-tinged with visible fibrin clots in tubing.   Denies wheezing, hemoptysis.  Cough slightly improved - greenish in morning and more light yellow in afternoon.     I/O: 1170 mL from chest tube in last 24 hours.    Review of Systems:  Pertinent items are noted in HPI.  The Review of Systems is negative other than noted in the HPI        Allergies/Medications:   Allergies:     Allergies   Allergen Reactions     Sulfamethoxazole-Trimethoprim [Sulfamethoxazole W/Trimethoprim] Rash       Continuous Infusions:    Scheduled Medications:    alteplase (ACTIVASE) 10 mg and dornase 5 mg in NS syringe for chest tube instillation  50 mL Chest Tube BID     cefTRIAXone  2 g Intravenous Q24H     enoxaparin ANTICOAGULANT  40 mg Subcutaneous Q24H     ferrous sulfate  325 mg Oral Every Other Day     lisinopril  5 mg Oral Daily      "metroNIDAZOLE  500 mg Intravenous Q12H            Objective:   Vitals:  /84   Pulse 108   Temp 99.2  F (37.3  C) (Oral)   Resp 18   Ht 1.676 m (5' 6\")   Wt 67.5 kg (148 lb 14.4 oz)   LMP 03/01/2022   SpO2 91%   BMI 24.03 kg/m    GEN: Pleasant female, sitting in the bed in no acute distress  HEENT: Normocephalic, atraumatic.  Extraoccular eye movements intact, anicteric sclera.  Moist mucous membranes.   NECK: Supple.    CHEST:  L posterior chest tube, to suction, no air leaks.  Mild tenderness around site.  PULM: Non-labored breathing.  No use of accessory muscles.  Mildly diminished breath sounds at left lung base.  No wheezing or rales.  CVS: Regular rate and rhythm.  Normal S1, S2.  No rubs, murmurs, or gallops.    ABDOMEN: Normoactive bowel sounds.  Non-tender to palpation.  Non-distended.    EXTREMITES:  No clubbing, cyanosis, or edema.    NEURO:  Awake.  Oriented to person, place, time and situation.  Cranial nerves 2-12 grossly intact.  Moving all extremities.      Intake/Output:  I/O last 3 completed shifts:  In: 570 [P.O.:570]  Out: 1320 [Urine:150; Chest Tube:1170]        Pertinent Studies:   All laboratory data reviewed  Serum Glucose range:   Recent Labs   Lab 04/05/22  0506 04/03/22  2312 04/03/22  1944 04/03/22  1848 04/03/22  1141   GLC 88 116 175* 148* 121     ABG: No lab results found in last 7 days.  CBC:   Recent Labs   Lab 04/06/22  0449 04/05/22  0506 04/04/22  0501 04/03/22  1141   WBC 9.5 9.1 7.3 8.8   HGB 8.7* 7.5* 7.9* 9.4*   HCT 28.9* 25.0* 25.8* 30.7*   MCV 75* 75* 76* 74*    366 278 379   NEUTROPHIL  --   --   --  90   LYMPH  --   --   --  4   MONOCYTE  --   --   --  6   EOSINOPHIL  --   --   --  0     Chemistry:   Recent Labs   Lab 04/06/22  0449 04/05/22  0506 04/03/22  2312 04/03/22  1747 04/03/22  1141   NA  --  139 138  --  139   POTASSIUM  --  3.8 3.9  --  3.8   CHLORIDE  --  108* 108*  --  104   CO2  --  21* 19*  --  20*   BUN  --  13 13  --  12   CR  --  0.61 " 0.65  --  0.69   GFRESTIMATED  --  >90 >90  --  >90   PABLO  --  8.9 8.1*  --  9.3   MAG  --   --   --   --  1.7*   PROTTOTAL 4.1* 4.7* 4.4*   < > 5.8*   ALBUMIN 1.8* 1.9* 2.0*  --  2.9*   AST 10 8 39  --  92*   ALT 16 23 41  --  51*   ALKPHOS 142* 155* 200*  --  268*   BILITOTAL 0.4 0.4 1.4*  --  1.4*    < > = values in this interval not displayed.     Coags:  Recent Labs   Lab 04/03/22  1651   INR 1.20*     Cardiac Markers:  Recent Labs   Lab 04/03/22  2312   TROPONINI <0.01     Left pleural fluid, 4/3:    [phone]    Microbiology:   Blood culture x 2 set, 4/6: Collected/pending.  Blood culture x 1, 4/5: NGTD  Blood culture x 2 sets, 4/3: 2 of 4 bottles with gram negative coccobacilli.  1 of 2 bottles - growing Haemophilus influenzae nontypable.  Blood culture x 1 set, 4/3: 1 of 2 bottles - growing Haemophilus influenzae nontypable.  Left pleural fluid, 4/3:  Gram stain: 4+ WBC, 4+ gram negative coccobacilli.  Cx:  4+ Haemophilus influenzae nontypable.  COVID-19 PCR, 4/3: Negative  Influenza A/B swab, 4/3: Negative        Cardiology/Radiology:   Cardiac: All cardiac studies reviewed by me.    EKG:  Reviewed    TTE, 4/3/22:  Summary: Left ventricular size, wall motion and function are normal. The ejection fraction is > 65%. Normal right ventricle size and systolic function. No hemodynamically significant valvular abnormalities on 2D or color flow imaging.     Radiology: All imaging studies reviewed by me.    Chest X-Ray, 4/5:  There is a new chest tube at the left medial lung base. There has been decrease in left pleural effusion and there is some residual of effusion and atelectasis at the left lung base. No pneumothorax. Mild atelectasis at the right lung base. Normal heart size and pulmonary vascularity.    CT chst PE study, Abd/pelvis, 4/3:   ANGIOGRAM CHEST: Pulmonary arteries are normal caliber and negative for pulmonary emboli. Thoracic aorta is negative for dissection. No CT evidence of right heart  strain.   LUNGS AND PLEURA: The previous large dense consolidative infiltrate in the left upper lobe and left lower lobe seen on 03/10/2022 has near completely resolved. New small right and moderate left pleural effusions with compressive appearing atelectasis in the lung bases.  MEDIASTINUM/AXILLAE: Normal.  CORONARY ARTERY CALCIFICATION: None.  HEPATOBILIARY: Gallstones. Normal liver.  PANCREAS: Normal.  SPLEEN: Mild splenomegaly measuring 12 cm.  ADRENAL GLANDS: Normal.  KIDNEYS/BLADDER: Normal.   BOWEL: Moderate amount of stool throughout the entire colon suggestive of constipation.  LYMPH NODES: Normal.  VASCULATURE: Unremarkable.  PELVIC ORGANS: Normal.  MUSCULOSKELETAL: Normal.    IMPRESSION: 1.  Negative for pulmonary emboli.  2.  The previous dense consolidative infiltrates in the left lung seen on CT 03/10/2022 appear to near completely resolved.  3.  New small right and moderate left pleural effusions with moderate atelectasis in the lower lobes greater on the left side. 4.  Gallstones. 5.  Mild splenomegaly.

## 2022-04-06 NOTE — PROGRESS NOTES
"GI Progress Note  Arely Saldaña  -36     Subjective:   Sitting up in bed.    Coughing, but says breathing is getting better.  Reviewed LFT's - continue to improve.  Informed her of low immunoglobulin levels, which were insightfully checked by ID.       Objective:   /80 (BP Location: Left arm)   Pulse 108   Temp 99.2  F (37.3  C) (Oral)   Resp 18   Ht 1.676 m (5' 6\")   Wt 67.5 kg (148 lb 14.4 oz)   LMP 03/01/2022   SpO2 91%   BMI 24.03 kg/m    Body mass index is 24.03 kg/m .   Gen: No acute distress  Cardio: RRR  GI: Non-distended, BS positive, soft, non-tender. No guarding.    Laboratory  Recent Labs   Lab 04/06/22 0449 04/05/22  0506 04/04/22  0501   WBC 9.5 9.1 7.3   RBC 3.84 3.34* 3.39*   HGB 8.7* 7.5* 7.9*   HCT 28.9* 25.0* 25.8*   MCV 75* 75* 76*   MCH 22.7* 22.5* 23.3*   MCHC 30.1* 30.0* 30.6*   RDW 15.8* 15.7* 15.5*    366 278      Recent Labs   Lab 04/05/22  0506 04/03/22  2312 04/03/22  1141    138 139   CO2 21* 19* 20*   BUN 13 13 12     Recent Labs   Lab 04/06/22 0449 04/05/22 0506 04/03/22 2312   ALKPHOS 142* 155* 200*   AST 10 8 39   ALT 16 23 41     Lab Results   Component Value Date    INR 1.20 (H) 04/03/2022    INR 0.95 02/19/2021       XR Chest 1 View    Result Date: 4/3/2022  EXAM: XR CHEST 1 VIEW LOCATION: [facility] DATE/TIME: 4/3/2022 6:59 PM INDICATION: dyspnea; status post left thoracentesis COMPARISON: AP chest radiograph from earlier today     IMPRESSION: Considerable decrease in the amount of left pleural fluid. The left upper lobe is better inflated. The residual opacity in the left basal chest has a sharp interface which parallels the left ventricular heart border within which there are no air bronchograms consistent with lobar atelectasis of the left lower lobe. There is minimal atelectasis or pleural fluid in the left lateral costophrenic sulcus. Unchanged atelectasis/pleural fluid along the right hemidiaphragm. "     Echocardiogram Complete    Result Date: 4/3/2022  [id number]^FABIANO^ARY^[place] [address]  Name: SHANI PETIT MRN: [id number] : 1980 Study Date: 2022 02:38 PM Age: 41 yrs Gender: Female Patient Location: [facility] Reason For Study: SOB Ordering Physician: ARY MARIO Performed By: ANABELL  BSA: 1.7 m2 Height: 66 in Weight: 145 lb HR: 120 BP: 138/93 mmHg ______________________________________________________________________________ Procedure Complete Portable Echo Adult. ______________________________________________________________________________ Interpretation Summary  Left ventricular size, wall motion and function are normal. The ejection fraction is > 65%. Normal right ventricle size and systolic function. No hemodynamically significant valvular abnormalities on 2D or color flow imaging. ______________________________________________________________________________ Left Ventricle Left ventricular size, wall motion and function are normal. The ejection fraction is > 65%. There is normal left ventricular wall thickness. Left ventricular diastolic function is normal. No regional wall motion abnormalities noted.  Right Ventricle Normal right ventricle size and systolic function.  Atria Normal left atrial size. Right atrial size is normal. There is no color Doppler evidence of an atrial shunt.  Mitral Valve Mitral valve leaflets appear normal. There is no evidence of mitral stenosis or clinically significant mitral regurgitation.  Tricuspid Valve Tricuspid valve leaflets appear normal. There is no evidence of tricuspid stenosis or clinically significant tricuspid regurgitation. Right ventricle systolic pressure estimate normal.  Aortic Valve Aortic valve leaflets appear normal. There is no evidence of aortic stenosis or clinically significant aortic regurgitation.  Pulmonic Valve The pulmonic valve is not well seen, but is  grossly normal. This degree of valvular regurgitation is within normal limits.  Vessels The aorta root is normal. Normal size ascending aorta. IVC diameter <2.1 cm collapsing >50% with sniff suggests a normal RA pressure of 3 mmHg.  Pericardium There is no pericardial effusion.  ______________________________________________________________________________ MMode/2D Measurements & Calculations IVSd: 0.68 cm LVIDd: 3.8 cm LVIDs: 2.3 cm LVPWd: 1.0 cm FS: 39.1 %  LV mass(C)d: 91.8 grams LV mass(C)dI: 52.6 grams/m2 LA dimension: 2.4 cm LVOT diam: 2.0 cm LVOT area: 3.1 cm2 LA Volume Indexed (AL/bp): 26.0 ml/m2 RWT: 0.55  Time Measurements MM HR: 120.0 BPM  Doppler Measurements & Calculations MV E max qamar: 59.1 cm/sec MV A max qamar: 91.3 cm/sec MV E/A: 0.65 MV dec slope: 559.2 cm/sec2 MV dec time: 0.11 sec Ao V2 max: 155.5 cm/sec Ao max PG: 10.0 mmHg Ao V2 mean: 103.6 cm/sec Ao mean P.9 mmHg Ao V2 VTI: 23.0 cm JERONIMO(I,D): 2.8 cm2 JERONIMO(V,D): 2.8 cm2 LV V1 max P.1 mmHg LV V1 max: 142.1 cm/sec LV V1 VTI: 20.9 cm SV(LVOT): 64.8 ml SI(LVOT): 37.2 ml/m2 PA acc time: 0.09 sec AV Qamar Ratio (DI): 0.91 JERONIMO Index (cm2/m2): 1.6 E/E' av.1 Lateral E/e': 3.8 Medial E/e': 6.3  ______________________________________________________________________________ Report approved by: Fransisco Belcher 2022 03:46 PM       Abdomen US, limited (RUQ only)    Result Date: 4/3/2022  EXAM: US ABDOMEN LIMITED LOCATION: [facility] DATE/TIME: 4/3/2022 1:51 PM INDICATION: abnormal LFTs COMPARISON: CT from earlier today TECHNIQUE: Limited abdominal ultrasound. FINDINGS: GALLBLADDER: Cholelithiasis. BILE DUCTS: No biliary dilatation. The common duct measures 6 mm. LIVER: Normal parenchyma with smooth contour. No focal mass. RIGHT KIDNEY: No hydronephrosis. PANCREAS: The visualized portions are normal. No ascites.     IMPRESSION: 1.  Cholelithiasis. Otherwise normal right upper quadrant ultrasound.     US  Thoracentesis    Result Date: 4/3/2022  EXAM: 1. LEFT THORACENTESIS 2. ULTRASOUND GUIDANCE LOCATION: [facility] DATE/TIME: 4/3/2022 6:17 PM INDICATION: Pleural effusion. PROCEDURE: Informed consent obtained. Time out performed. The chest was prepped and draped in sterile fashion. 10 mL of 1 % lidocaine was infused into the local soft tissues. Under direct ultrasound guidance, a 5 Czech catheter system was placed into the pleural effusion. 800 mL of thick cloudy yellow fluid were removed and sent to lab, if requested. Patient tolerated procedure well. Ultrasound imaging was obtained and placed in the patient's permanent medical record.     IMPRESSION: Status post left ultrasound-guided thoracentesis. Reference CPT Code: 47896    XR Chest Port 1 View    Result Date: 4/5/2022  EXAM: XR CHEST PORT 1 VIEW LOCATION: [facility] DATE/TIME: 4/5/2022 5:43 AM INDICATION: follow up empyema, pneumonia. COMPARISON: 04/03/2022. CT chest tube placement of 04/04/2022.     IMPRESSION: There is a new chest tube at the left medial lung base. There has been decrease in left pleural effusion and there is some residual of effusion and atelectasis at the left lung base. No pneumothorax. Mild atelectasis at the right lung base. Normal heart size and pulmonary vascularity.    XR Chest Port 1 View    Result Date: 4/3/2022  EXAM: XR CHEST PORT 1 VIEW LOCATION: [facility] DATE/TIME: 4/3/2022 11:29 PM INDICATION: Shortness of breath COMPARISON: 04/03/2022     IMPRESSION: The chest is stable with again seen minimal right pleural effusion and mild left pleural effusion with some underlying infiltrate/atelectasis in both lung bases and extending into the left midlung. The chest is otherwise negative.    XR Chest Port 1 View    Result Date: 4/3/2022  EXAM: XR CHEST PORT 1 VIEW LOCATION: [facility] DATE/TIME: 4/3/2022 4:38 PM INDICATION: worsening  sob COMPARISON: CT pulmonary angiogram 04/03/2022 at 1256 hours     IMPRESSION: Moderate to large left and small right pleural effusions are not visibly changed. Related atelectasis including near complete atelectasis of the left lower lobe. Lower left ventricular heart border is silhouetted. Cardiac silhouette is not enlarged. Azygos vein is not distended and the vascular pedicle width is normal. No pneumothorax.    CT Chest Tube with Cath Placement    Result Date: 4/4/2022  EXAM: 1. PERCUTANEOUS CHEST TUBE PLACEMENT IN THE LEFT PLEURAL SPACE 2. CT GUIDANCE 3. CONSCIOUS SEDATION LOCATION: [facility] DATE/TIME: 4/4/2022 2:56 PM INDICATION: Left pleural effusion with concern for empyema TECHNIQUE: Dose reduction techniques were used. PROCEDURE: Informed consent obtained. Site marked. Prior images reviewed. Required items made available. Patient identity confirmed verbally and with arm band. Patient reevaluated immediately before administering sedation. Universal protocol was followed. Time out performed. The site was prepped and draped in sterile fashion. 10 mL of 1% lidocaine was infused into the local soft tissues. Using standard technique and under direct CT guidance, a 12 Czech chest tube catheter was inserted into the pleural space. The catheter was fixed in place with sutures and adhesive device, and the tubing was banded. Chest tube placed to Pleur-evac suction. BLOOD LOSS: Minimal. The patient tolerated the procedure well. No immediate complications. SEDATION: Versed 1 mg. Fentanyl 50 mcg. The procedure was performed with administration intravenous conscious sedation with appropriate preoperative, intraoperative, and postoperative evaluation. 20 minutes of supervised face to face conscious sedation time was provided by a radiology nurse under my direct supervision.     IMPRESSION: 1.  Successful CT-guided chest tube placement into the left pleural space. Reference CPT Codes: 47269,  41202    CT Chest (PE) Abdomen Pelvis w Contrast    Result Date: 4/3/2022  EXAM: CT CHEST PE ABDOMEN PELVIS W CONTRAST LOCATION: [facility] DATE/TIME: 4/3/2022 12:49 PM INDICATION: Chest and abdominal pain. Elevated liver function tests. PE suspected, high prob COMPARISON: 03/20/2022 CT chest. TECHNIQUE: CT chest pulmonary angiogram and routine CT abdomen pelvis with IV contrast. Arterial phase through the chest and venous phase through the abdomen and pelvis. Multiplanar reformats and MIP reconstructions were performed. Dose reduction techniques were used. CONTRAST: 100ml Isovue 370 FINDINGS: ANGIOGRAM CHEST: Pulmonary arteries are normal caliber and negative for pulmonary emboli. Thoracic aorta is negative for dissection. No CT evidence of right heart strain. LUNGS AND PLEURA: The previous large dense consolidative infiltrate in the left upper lobe and left lower lobe seen on 03/10/2022 has near completely resolved. New small right and moderate left pleural effusions with compressive appearing atelectasis in the lung bases. MEDIASTINUM/AXILLAE: Normal. CORONARY ARTERY CALCIFICATION: None. HEPATOBILIARY: Gallstones. Normal liver. PANCREAS: Normal. SPLEEN: Mild splenomegaly measuring 12 cm. ADRENAL GLANDS: Normal. KIDNEYS/BLADDER: Normal. BOWEL: Moderate amount of stool throughout the entire colon suggestive of constipation. LYMPH NODES: Normal. VASCULATURE: Unremarkable. PELVIC ORGANS: Normal. MUSCULOSKELETAL: Normal.     IMPRESSION: 1.  Negative for pulmonary emboli. 2.  The previous dense consolidative infiltrates in the left lung seen on CT 03/10/2022 appear to near completely resolved. 3.  New small right and moderate left pleural effusions with moderate atelectasis in the lower lobes greater on the left side. 4.  Gallstones. 5.  Mild splenomegaly.        Assessment:   1.  Elevated LFTs - mild. Cholestatic pattern. Improving nicely.  Just minimal alk phos elevation now.  Has been on  multiple rounds of antibiotics in recent months.  Cholestasis related to illness/sepsis another factor.  Does not have clinical concerns for cholangitis.  Biliary obstruction seems unlikely at this point in absence of RUQ pain or any duct dilation on imaging.   2.  Anemia- this could be due to CVID, as CVID results in villous atrophy in some patients.    No overt GI bleeding.  Had some spotting and hematuria.    Serum iron low. IBC low.  MCV 75. On iron supplement prior to admission, though sporadic per our discussion.    3. Empyema, left.  H flu bacteremia.  Followed by ID and pulm.   4. Immunoglobulin deficiency.  Helpfully discovered per ID.  As above, this could also explain iron deficiency.       Patient Active Problem List   Diagnosis     Essential hypertension     ASCUS of cervix with negative high risk HPV     Tachypnea     Anemia     Pleural effusion     Tachycardia     Elevated LFTs     Dyspnea     Chronic maxillary sinusitis      Plan:   1. No further liver work-up, as LFT's much improved.   2. Recommend EGD with small bowel biopsies to evaluate villous atrophy once pulmonary status allows.  This will be as outpatient.  3. Recommend IV iron replacement while here.       Thank you.  Ernst Schultz PA-C  [facility]  [phone]

## 2022-04-06 NOTE — CONSULTS
[facility]    Infectious Disease Consultation     Date of Admission:  4/3/2022  Date of Consult (When I saw the patient): 04/05/22    Assessment & Plan   Arely Saldaña is a 41 year old female who was admitted on 4/3/2022.       Impression:    1. Empyema, left-sided  2. 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 Recurrent left-sided pneumonia  4. sinusitis since August/September 2021  5. Hypertension, pleuritic chest pain, tachypnea  6. Status post thoracentesis and chest tube placement on 4/4/2022  7. Elevated liver enzymes, improving.  Splenomegaly  8. Transthoracic echocardiogram did not show vegetations.    Recommendations    1. Follow repeat blood culture results  2. Continue Zosyn.  May be able to de-escalate further based on final susceptibility results  3. Monitor CBC, CMP check IgG levels, screen  4. May need VATS for source control, resolution of empyema  5. Chest tube management per pulmonary team  6. Likely need prolonged antibiotic course, depending on clinical response and repeat culture results  7. Patient has an ENT appointment and procedure scheduled for May 2022  8. Appreciate input from pulmonary critical care consultants.  9. Thank you for consulting infectious disease will continue to follow with you.  Patient updated    Josefina Andrade MD  [facility]  [phone]    CT chest 4/3/2022: Prior to chest tube placement            Reason for Consult   Reason for consult: I was asked to evaluate this patient for empyema, Haemophilus influenza bacteremia  Case discussed in person with pulmonary and critical care physician who has requested ID consultation.    Primary Care Physician   Ashely Larios    Chief Complaint   Dyspnea cough    History is obtained from the patient and medical  records    History of Present Illness   Arely Saldaña is a 41 year old female who presents with fever cough dyspnea not feeling well since about 2021  Patient has repeated several courses of antibiotics as listed below including cefuroxime, levofloxacin, doxycycline.  Patient stated that she could not feel quite back to baseline even after antibiotic therapy.  Over the last few weeks she has had worsening shortness of breath cough pleuritic pain while coughing, dyspnea, not feeling well overall and presented to the hospital.  She was found to have empyema, and a chest tube was placed.  Patient still feels unwell, has shortness of breath and chest pain.  She is scheduled to see ENT for procedure in May 2022 and was previously seen by ENT.  Denies having recurrent bacterial infections prior to the recent episode.  She has been diagnosed with nasal polyps and ENT is following  He was antibiotic courses.    Past Medical History   I have reviewed this patient's medical history and updated it with pertinent information if needed.   Past Medical History:   Diagnosis Date     ASCUS of cervix with negative high risk HPV 2018-2012 NIL annual pap 4/13/15 NIL pap, neg HPV 18 ASCUS, neg HPV 11/10/20 NIL pap, neg HPV     Hypertension     was on nifedipine and HCTZ before pregnancy, had since age early 20's       Past Surgical History   I have reviewed this patient's surgical history and updated it with pertinent information if needed.  Past Surgical History:   Procedure Laterality Date     C/SECTION, LOW TRANSVERSE  2021    with PPTL     FOOT SURGERY Left age 20    bursa removed from left foot     SALPINGECTOMY Bilateral 2021    with  section     WISDOM TOOTH EXTRACTION  age 19       Prior to Admission Medications   Prior to Admission Medications   Prescriptions Last Dose Informant Patient Reported? Taking?   Calcium Carb-Cholecalciferol (CALTRATE 600+D3 SOFT) 600-800 MG-UNIT CHEW  Past Month at Unknown time  Yes Yes   Sig: Take 1 tablet by mouth daily    ferrous sulfate (FEROSUL) 325 (65 Fe) MG tablet Past Month at Unknown time  Yes Yes   Sig: Take 325 mg by mouth daily (with breakfast)   lisinopril (ZESTRIL) 20 MG tablet Past Month at Unknown time  No Yes   Sig: Take 1 tablet (20 mg) by mouth daily   Patient taking differently: Take 20 mg by mouth daily as needed       Facility-Administered Medications: None     Allergies   Allergies   Allergen Reactions     Penicillins Hives     Tolerated cefdinir and cefuroxime in 2022     Sulfamethoxazole-Trimethoprim [Sulfamethoxazole W/Trimethoprim] Rash       Immunization History   Immunization History   Administered Date(s) Administered     COVID-19,PF,Moderna 08/04/2021, 09/01/2021     Historical DTP/aP 1982, 1982, 02/01/1983, 09/10/1986, 07/01/1987     Influenza (IIV3) PF 02/25/2020     Influenza Vaccine IM > 6 months Valent IIV4 (Alfuria,Fluzone) 11/10/2020     MMR 09/10/1986, 04/05/1994, 07/28/2020, 04/15/2021     Meningococcal (Menomune ) 03/28/2003     OPV, trivalent, live 1982, 1982, 02/01/1983, 09/10/1986     Td (Adult), Adsorbed 08/04/1998, 01/01/2000     Tdap (Adacel,Boostrix) 08/12/2010, 02/27/2021       Social History   I have reviewed this patient's social history and updated it with pertinent information if needed. Arely EDWARDS Piotr  reports that she has never smoked. She has never used smokeless tobacco. She reports previous alcohol use. She reports that she does not use drugs.    Family History   I have reviewed this patient's family history and updated it with pertinent information if needed.   Family History   Problem Relation Age of Onset     Hypertension Mother      Hypertension Father      Diabetes Father        Review of Systems   The 10 point Review of Systems is negative other than noted in the HPI or here.     Physical Exam   Temp: 99.2  F (37.3  C) Temp src: Oral BP: 122/80 Pulse: 108   Resp: 18 SpO2: 91  % O2 Device: None (Room air)    Vital Signs with Ranges  Temp:  [98.7  F (37.1  C)-100.1  F (37.8  C)] 99.2  F (37.3  C)  Pulse:  [] 108  Resp:  [16-18] 18  BP: (118-136)/(66-82) 122/80  SpO2:  [91 %-97 %] 91 %  148 lbs 14.4 oz  Body mass index is 24.03 kg/m .    GENERAL APPEARANCE:  awake, seen in ICU, mild distress, tachypnea  EYES: Eyes grossly normal to inspection, PERRL and conjunctivae and sclerae normal  HENT: Dry mucosa  NECK: Supple  RESP: Tachypnea, decreased breath sounds on left, chest tube  CV: S1-S2 tachycardia  LYMPHATICS: No severe lymphedema  ABDOMEN: Firm, pleuritic chest pain  MS: extremities normal- no gross deformities noted  SKIN: no suspicious lesions or rashes  Neuro: Awake, deconditioned , able to answer questions      Data   Lab Results   Component Value Date    WBC 9.5 04/06/2022    WBC 9.1 04/05/2022    WBC 7.3 04/04/2022    WBC 8.8 04/03/2022    WBC 14.9 (H) 03/10/2022    HGB 8.7 (L) 04/06/2022    HGB 7.5 (L) 04/05/2022    HGB 7.9 (L) 04/04/2022    HGB 9.4 (L) 04/03/2022    HGB 9.4 (L) 03/10/2022    HCT 28.9 (L) 04/06/2022    HCT 25.0 (L) 04/05/2022    HCT 25.8 (L) 04/04/2022    HCT 30.7 (L) 04/03/2022    HCT 29.7 (L) 03/10/2022     04/06/2022     04/05/2022     04/04/2022     04/03/2022     03/10/2022     04/05/2022     04/03/2022     04/03/2022     03/10/2022     01/21/2022    POTASSIUM 3.8 04/05/2022    POTASSIUM 3.9 04/03/2022    POTASSIUM 3.8 04/03/2022    POTASSIUM 3.7 03/10/2022    POTASSIUM 4.4 01/21/2022    CHLORIDE 108 (H) 04/05/2022    CHLORIDE 108 (H) 04/03/2022    CHLORIDE 104 04/03/2022    CHLORIDE 100 03/10/2022    CHLORIDE 106 01/21/2022    CO2 21 (L) 04/05/2022    CO2 19 (L) 04/03/2022    CO2 20 (L) 04/03/2022    CO2 26 03/10/2022    CO2 26 01/21/2022    BUN 13 04/05/2022    BUN 13 04/03/2022    BUN 12 04/03/2022    BUN 11 03/10/2022    BUN 9 01/21/2022    CR 0.61 04/05/2022    CR 0.65 04/03/2022     CR 0.69 04/03/2022    CR 0.68 03/10/2022    CR 0.71 01/21/2022    GLC 88 04/05/2022     04/03/2022     (H) 04/03/2022     (H) 04/03/2022     04/03/2022    SED 14 01/21/2022    DD 0.92 (H) 03/10/2022    AST 10 04/06/2022    AST 8 04/05/2022    AST 39 04/03/2022    AST 92 (H) 04/03/2022    AST 42 (H) 03/10/2022    ALT 16 04/06/2022    ALT 23 04/05/2022    ALT 41 04/03/2022    ALT 51 (H) 04/03/2022    ALT 48 (H) 03/10/2022    ALKPHOS 142 (H) 04/06/2022    ALKPHOS 155 (H) 04/05/2022    ALKPHOS 200 (H) 04/03/2022    ALKPHOS 268 (H) 04/03/2022    ALKPHOS 196 (H) 03/10/2022    BILITOTAL 0.4 04/06/2022    BILITOTAL 0.4 04/05/2022    BILITOTAL 1.4 (H) 04/03/2022    BILITOTAL 1.4 (H) 04/03/2022    BILITOTAL 1.1 (H) 03/10/2022    INR 1.20 (H) 04/03/2022    INR 0.95 02/19/2021     MICRO:    04/05/2022 0506 04/05/2022 0530 Blood Culture Peripheral Blood [[id number]]   Peripheral Blood    In process Component Value   No component results           04/03/2022 1915 04/05/2022 1438 Blood Culture Arm, Left [[id number]]   (Abnormal)   Blood from Arm, Left    Preliminary result Component Value   Culture Positive on the 1st day of incubation Abnormal  P    Haemophilus influenzae nontypable Panic  P    1 of 2 bottles   Corrected on April 4, 2022/7:25 PM by Michael Philip   Previously reported as: gram positive cocci in pairs and chains. Corrected to gram negative cocobacilli.     Susceptibilities done on previous cultures           04/03/2022 1849 04/03/2022 2108 Cell count with differential fluid [[id number]]    (Abnormal)   Pleural fluid from Pleural Cavity, Left    Final result Component Value   No component results           04/03/2022 1849 04/05/2022 1141 Pleural fluid Aerobic Bacterial Culture Routine with Gram Stain [[id number]]   (Abnormal)   Pleural fluid from Pleural Cavity, Left    Preliminary result Component Value   Culture Culture in progress P    4+ Haemophilus influenzae  nontypable Abnormal  P   Gram Stain Result 4+ Gram negative coccobacilli Abnormal  P    4+ WBC seen Abnormal  P    Predominantly PMNs           04/03/2022 1849 04/03/2022 1946 Glucose fluid [[id number]]    Pleural fluid from Pleural Cavity, Left    Final result Component Value Units   Glucose Fluid Source Pleural Cavity, Left    left pleural fluid   left pleural fluid   left pleural fluid   left pleural fluid   Glucose fluid <5 mg/dL           04/03/2022 1849 04/04/2022 0003 Lactate dehydrogenase fluid [[id number]]    Pleural fluid from Pleural Cavity, Left    Final result Component Value Units   LD Fluid Source Pleural Cavity, Left    left pleural fluid   left pleural fluid   Lactate dehydrogenase fluid 2,332 U/L   Collect pleural fluid when Thoracentesis procedure           04/03/2022 1849 04/03/2022 2246 Protein fluid [[id number]]    Pleural fluid from Pleural Cavity, Left    Final result Component Value Units   Protein Fluid Source Pleural Cavity, Left    left pleural fluid   left pleural fluid   Protein Total Fluid 3.7 g/dL           04/03/2022 1849 04/03/2022 2014 pH fluid [[id number]]    Pleural fluid from Pleural Cavity, Left    Final result Component Value Units   pH Fluid Source Pleural Cavity, Left    left pleural fluid   left pleural fluid   pH Fluid 7.5 pH           04/03/2022 1849 04/03/2022 2108 Cell Count Body Fluid [[id number]]    (Abnormal)   Pleural fluid from Pleural Cavity, Left    Final result Component Value   Color Yellow   Clarity Turbid Abnormal    Cell Count Fluid Source Pleural Cavity, Left   left pleural fluid   left pleural fluid   left pleural fluid   left pleural fluid   left pleural fluid   left pleural fluid   left pleural fluid   left pleural fluid   left pleural fluid   left pleural fluid   left pleural fluid   Clot Presence Large Clot (>Half Volume)   Specimen clotted. Slide reviewed, no   abnormal cells seen.    Cytology Ordered No           04/03/2022 1154 04/05/2022  1438 Blood Culture Peripheral Blood [[id number]]   (Abnormal)   Peripheral Blood    Preliminary result Component Value   Culture Positive on the 1st day of incubation Abnormal  P    Haemophilus influenzae nontypable Panic  P    1 of 2 bottles   Susceptibilities done on previous cultures           04/03/2022 1141 04/05/2022 1437 Blood Culture Line, venous [[id number]]   (Abnormal)   Blood from Line, venous    Preliminary result Component Value   Culture Positive on the 1st day of incubation Abnormal  P    Haemophilus influenzae nontypable Panic  P    1 of 2 bottles           04/03/2022 1141 04/03/2022 1233 Symptomatic; Unknown Influenza A/B & SARS-CoV2 (COVID-19) Virus PCR Multiplex Nasopharyngeal [[id number]]    Swab from Nasopharyngeal    Final result Component Value   Influenza A PCR Negative   Influenza B PCR Negative   SARS CoV2 PCR Negative   NEGATIVE: SARS-CoV-2 (COVID-19) RNA not detected, presumed negative.           04/03/2022 1141 04/04/2022 1122 Verigene GN Panel [[id number]]    Blood from Line, venous    Final result Component Value   Acinetobacter species Not Detected   Citrobacter species Not Detected   Enterobacter species Not Detected   Proteus species Not Detected   Escherichia coli Not Detected   Klebsiella pneumoniae Not Detected   Klebsiella oxytoca Not Detected   Pseudomonas aeruginosa Not Detected   CTX-M NA   KPC NA   NDM NA   VIM NA   IMP NA   OXA NA           03/10/2022 0907 03/10/2022 0958 Symptomatic; Unknown Influenza A/B & SARS-CoV2 (COVID-19) Virus PCR Multiplex Nasopharyngeal [[id number]]    Swab from Nasopharyngeal    Final result Component Value   Influenza A PCR Negative   Influenza B PCR Negative   SARS CoV2 PCR Negative   NEGATIVE: SARS-CoV-2 (COVID-19) RNA not detected, presumed negative.           04/11/2021 2219 01/21/2022 1342 COVID-19 VIRUS (CORONAVIRUS) BY PCR (EXTERNAL RESULT) [[id number]]    Specimen   Specimen type and source: Oth...    Final result Component  Value   COVID-19 Virus by PCR (External Result) Negative   All PCR tests are subject to false negative result due to variability in viral load and collection technique. A negative result does not rule out a SARS-CoV-2 infection. Clinical correlation required.                   RADIOLOGY:    XR Chest 1 View    Result Date: 4/3/2022  EXAM: XR CHEST 1 VIEW LOCATION: [facility] DATE/TIME: 4/3/2022 6:59 PM INDICATION: dyspnea; status post left thoracentesis COMPARISON: AP chest radiograph from earlier today     IMPRESSION: Considerable decrease in the amount of left pleural fluid. The left upper lobe is better inflated. The residual opacity in the left basal chest has a sharp interface which parallels the left ventricular heart border within which there are no air bronchograms consistent with lobar atelectasis of the left lower lobe. There is minimal atelectasis or pleural fluid in the left lateral costophrenic sulcus. Unchanged atelectasis/pleural fluid along the right hemidiaphragm.     Echocardiogram Complete    Result Date: 4/3/2022  [id number]^FABIANO^ARY^[place] [address]  Name: SHANI PETIT MRN: [id number] : 1980 Study Date: 2022 02:38 PM Age: 41 yrs Gender: Female Patient Location: [facility] Reason For Study: SOB Ordering Physician: ARY MARIO Performed By: ANABELL  BSA: 1.7 m2 Height: 66 in Weight: 145 lb HR: 120 BP: 138/93 mmHg ______________________________________________________________________________ Procedure Complete Portable Echo Adult. ______________________________________________________________________________ Interpretation Summary  Left ventricular size, wall motion and function are normal. The ejection fraction is > 65%. Normal right ventricle size and systolic function. No hemodynamically significant valvular abnormalities on 2D or color flow imaging.  ______________________________________________________________________________ Left Ventricle Left ventricular size, wall motion and function are normal. The ejection fraction is > 65%. There is normal left ventricular wall thickness. Left ventricular diastolic function is normal. No regional wall motion abnormalities noted.  Right Ventricle Normal right ventricle size and systolic function.  Atria Normal left atrial size. Right atrial size is normal. There is no color Doppler evidence of an atrial shunt.  Mitral Valve Mitral valve leaflets appear normal. There is no evidence of mitral stenosis or clinically significant mitral regurgitation.  Tricuspid Valve Tricuspid valve leaflets appear normal. There is no evidence of tricuspid stenosis or clinically significant tricuspid regurgitation. Right ventricle systolic pressure estimate normal.  Aortic Valve Aortic valve leaflets appear normal. There is no evidence of aortic stenosis or clinically significant aortic regurgitation.  Pulmonic Valve The pulmonic valve is not well seen, but is grossly normal. This degree of valvular regurgitation is within normal limits.  Vessels The aorta root is normal. Normal size ascending aorta. IVC diameter <2.1 cm collapsing >50% with sniff suggests a normal RA pressure of 3 mmHg.  Pericardium There is no pericardial effusion.  ______________________________________________________________________________ MMode/2D Measurements & Calculations IVSd: 0.68 cm LVIDd: 3.8 cm LVIDs: 2.3 cm LVPWd: 1.0 cm FS: 39.1 %  LV mass(C)d: 91.8 grams LV mass(C)dI: 52.6 grams/m2 LA dimension: 2.4 cm LVOT diam: 2.0 cm LVOT area: 3.1 cm2 LA Volume Indexed (AL/bp): 26.0 ml/m2 RWT: 0.55  Time Measurements MM HR: 120.0 BPM  Doppler Measurements & Calculations MV E max freda: 59.1 cm/sec MV A max freda: 91.3 cm/sec MV E/A: 0.65 MV dec slope: 559.2 cm/sec2 MV dec time: 0.11 sec Ao V2 max: 155.5 cm/sec Ao max PG: 10.0 mmHg Ao V2 mean: 103.6 cm/sec Ao mean P.9  mmHg Ao V2 VTI: 23.0 cm JERONIMO(I,D): 2.8 cm2 JERONIMO(V,D): 2.8 cm2 LV V1 max P.1 mmHg LV V1 max: 142.1 cm/sec LV V1 VTI: 20.9 cm SV(LVOT): 64.8 ml SI(LVOT): 37.2 ml/m2 PA acc time: 0.09 sec AV Qamar Ratio (DI): 0.91 JERONIMO Index (cm2/m2): 1.6 E/E' av.1 Lateral E/e': 3.8 Medial E/e': 6.3  ______________________________________________________________________________ Report approved by: Fransisco Belcher 2022 03:46 PM       Abdomen US, limited (RUQ only)    Result Date: 4/3/2022  EXAM: US ABDOMEN LIMITED LOCATION: [facility] DATE/TIME: 4/3/2022 1:51 PM INDICATION: abnormal LFTs COMPARISON: CT from earlier today TECHNIQUE: Limited abdominal ultrasound. FINDINGS: GALLBLADDER: Cholelithiasis. BILE DUCTS: No biliary dilatation. The common duct measures 6 mm. LIVER: Normal parenchyma with smooth contour. No focal mass. RIGHT KIDNEY: No hydronephrosis. PANCREAS: The visualized portions are normal. No ascites.     IMPRESSION: 1.  Cholelithiasis. Otherwise normal right upper quadrant ultrasound.     Abdomen US, limited (RUQ only)    Result Date: 3/10/2022  EXAM: US ABDOMEN LIMITED LOCATION: [facility] DATE/TIME: 3/10/2022 10:16 AM INDICATION: Abnormal liver function tests and right upper quadrant pain. COMPARISON: 1. TECHNIQUE: Limited abdominal ultrasound. FINDINGS: GALLBLADDER: Cholelithiasis and gallbladder sludge are noted. No wall thickening nor pericholecystic fluid. BILE DUCTS: No biliary dilatation. The common duct measures 6 mm. LIVER: Normal parenchyma with smooth contour. No focal mass. RIGHT KIDNEY: No hydronephrosis. PANCREAS: The visualized portions are normal. No ascites.     IMPRESSION: 1.  Cholelithiasis and gallbladder sludge are present. No evidence of cholecystitis nor bile duct dilatation.     US Thoracentesis    Result Date: 4/3/2022  EXAM: 1. LEFT THORACENTESIS 2. ULTRASOUND GUIDANCE LOCATION: [facility] DATE/TIME:  4/3/2022 6:17 PM INDICATION: Pleural effusion. PROCEDURE: Informed consent obtained. Time out performed. The chest was prepped and draped in sterile fashion. 10 mL of 1 % lidocaine was infused into the local soft tissues. Under direct ultrasound guidance, a 5 Irish catheter system was placed into the pleural effusion. 800 mL of thick cloudy yellow fluid were removed and sent to lab, if requested. Patient tolerated procedure well. Ultrasound imaging was obtained and placed in the patient's permanent medical record.     IMPRESSION: Status post left ultrasound-guided thoracentesis. Reference CPT Code: 24466    XR Chest Port 1 View    Result Date: 4/5/2022  EXAM: XR CHEST PORT 1 VIEW LOCATION: [facility] DATE/TIME: 4/5/2022 5:43 AM INDICATION: follow up empyema, pneumonia. COMPARISON: 04/03/2022. CT chest tube placement of 04/04/2022.     IMPRESSION: There is a new chest tube at the left medial lung base. There has been decrease in left pleural effusion and there is some residual of effusion and atelectasis at the left lung base. No pneumothorax. Mild atelectasis at the right lung base. Normal heart size and pulmonary vascularity.    XR Chest Port 1 View    Result Date: 4/3/2022  EXAM: XR CHEST PORT 1 VIEW LOCATION: [facility] DATE/TIME: 4/3/2022 11:29 PM INDICATION: Shortness of breath COMPARISON: 04/03/2022     IMPRESSION: The chest is stable with again seen minimal right pleural effusion and mild left pleural effusion with some underlying infiltrate/atelectasis in both lung bases and extending into the left midlung. The chest is otherwise negative.    XR Chest Port 1 View    Result Date: 4/3/2022  EXAM: XR CHEST PORT 1 VIEW LOCATION: [facility] DATE/TIME: 4/3/2022 4:38 PM INDICATION: worsening sob COMPARISON: CT pulmonary angiogram 04/03/2022 at 1256 hours     IMPRESSION: Moderate to large left and small right pleural effusions are not  visibly changed. Related atelectasis including near complete atelectasis of the left lower lobe. Lower left ventricular heart border is silhouetted. Cardiac silhouette is not enlarged. Azygos vein is not distended and the vascular pedicle width is normal. No pneumothorax.    CT Chest Pulmonary Embolism w Contrast    Result Date: 3/10/2022  EXAM: CT CHEST PULMONARY EMBOLISM W CONTRAST LOCATION: [facility] DATE/TIME: 3/10/2022 10:08 AM INDICATION: Shortness of breath. Cough. COMPARISON: 02/21/2022 radiograph. TECHNIQUE: CT chest pulmonary angiogram during arterial phase injection of IV contrast. Multiplanar reformats and MIP reconstructions were performed. Dose reduction techniques were used. CONTRAST: 75ml Isovue 370. FINDINGS: ANGIOGRAM CHEST: Regions of motion artifact. No pulmonary embolism seen. Nonaneurysmal aorta without dissection. LUNGS AND PLEURA: Moderate to large amount of heterogeneously enhancing left upper and lower lobe perihilar predominant consolidation with example area measuring 5.6 x 5.8 cm (series 6, image 108). Minimal opacities elsewhere bilaterally. Mild basilar atelectasis. Small right pleural effusion. No pneumothorax. MEDIASTINUM/AXILLAE: Mild adenopathy, presumed reactive. Normal heart size. No pericardial effusion. CORONARY ARTERY CALCIFICATION: Compromised by motion. UPPER ABDOMEN: Hepatic steatosis. Incompletely seen spleen appears mildly enlarged, measuring roughly 13.3 cm in AP dimension. MUSCULOSKELETAL: Nothing acute     IMPRESSION: 1.  No pulmonary embolism. 2.  Moderate to large amount of left lung consolidation suggestive of pneumonia. Recommend 6-8 week follow-up PA and lateral radiographs for clearing. A few other mild bilateral opacities. 3.  Small right pleural effusion. 4.  Hepatic steatosis. 5.  Incompletely seen splenomegaly.     CT Chest Tube with Cath Placement    Result Date: 4/4/2022  EXAM: 1. PERCUTANEOUS CHEST TUBE PLACEMENT IN THE LEFT  PLEURAL SPACE 2. CT GUIDANCE 3. CONSCIOUS SEDATION LOCATION: [facility] DATE/TIME: 4/4/2022 2:56 PM INDICATION: Left pleural effusion with concern for empyema TECHNIQUE: Dose reduction techniques were used. PROCEDURE: Informed consent obtained. Site marked. Prior images reviewed. Required items made available. Patient identity confirmed verbally and with arm band. Patient reevaluated immediately before administering sedation. Universal protocol was followed. Time out performed. The site was prepped and draped in sterile fashion. 10 mL of 1% lidocaine was infused into the local soft tissues. Using standard technique and under direct CT guidance, a 12 Chadian chest tube catheter was inserted into the pleural space. The catheter was fixed in place with sutures and adhesive device, and the tubing was banded. Chest tube placed to Pleur-evac suction. BLOOD LOSS: Minimal. The patient tolerated the procedure well. No immediate complications. SEDATION: Versed 1 mg. Fentanyl 50 mcg. The procedure was performed with administration intravenous conscious sedation with appropriate preoperative, intraoperative, and postoperative evaluation. 20 minutes of supervised face to face conscious sedation time was provided by a radiology nurse under my direct supervision.     IMPRESSION: 1.  Successful CT-guided chest tube placement into the left pleural space. Reference CPT Codes: 98652, 57175    CT Chest (PE) Abdomen Pelvis w Contrast    Result Date: 4/3/2022  EXAM: CT CHEST PE ABDOMEN PELVIS W CONTRAST LOCATION: [facility] DATE/TIME: 4/3/2022 12:49 PM INDICATION: Chest and abdominal pain. Elevated liver function tests. PE suspected, high prob COMPARISON: 03/20/2022 CT chest. TECHNIQUE: CT chest pulmonary angiogram and routine CT abdomen pelvis with IV contrast. Arterial phase through the chest and venous phase through the abdomen and pelvis. Multiplanar reformats and MIP reconstructions  were performed. Dose reduction techniques were used. CONTRAST: 100ml Isovue 370 FINDINGS: ANGIOGRAM CHEST: Pulmonary arteries are normal caliber and negative for pulmonary emboli. Thoracic aorta is negative for dissection. No CT evidence of right heart strain. LUNGS AND PLEURA: The previous large dense consolidative infiltrate in the left upper lobe and left lower lobe seen on 03/10/2022 has near completely resolved. New small right and moderate left pleural effusions with compressive appearing atelectasis in the lung bases. MEDIASTINUM/AXILLAE: Normal. CORONARY ARTERY CALCIFICATION: None. HEPATOBILIARY: Gallstones. Normal liver. PANCREAS: Normal. SPLEEN: Mild splenomegaly measuring 12 cm. ADRENAL GLANDS: Normal. KIDNEYS/BLADDER: Normal. BOWEL: Moderate amount of stool throughout the entire colon suggestive of constipation. LYMPH NODES: Normal. VASCULATURE: Unremarkable. PELVIC ORGANS: Normal. MUSCULOSKELETAL: Normal.     IMPRESSION: 1.  Negative for pulmonary emboli. 2.  The previous dense consolidative infiltrates in the left lung seen on CT 03/10/2022 appear to near completely resolved. 3.  New small right and moderate left pleural effusions with moderate atelectasis in the lower lobes greater on the left side. 4.  Gallstones. 5.  Mild splenomegaly.    CT Sinus w/o Contrast    Result Date: 3/16/2022  EXAM DATE:         03/16/2022 EXAM: CT SINUSES WITHOUT CONTRAST LOCATION: [facility] DATE/TIME: 3/16/2022 12:45 PM INDICATION: Sinus infection COMPARISON: None. TECHNIQUE: Routine without contrast. Multiplanar reformats. Dose reduction techniques were used. FINDINGS: FRONTAL SINUSES:  Subtotal opacification of the frontal sinuses bilaterally with some partially aerated secretions. ETHMOID SINUSES:  Subtotal opacification of ethmoid air cells bilaterally, left greater than right. Some partially aerated secretions noted within posterior ethmoid locules bilaterally. SPHENOID SINUSES:  Moderate mucosal thickening involving both sphenoid sinuses with bilateral air-fluid levels. MAXILLARY SINUSES: Moderate circumferential mucosal thickening and subtotal opacification of the bilateral maxillary sinuses, left greater than right. Bilateral air-fluid levels in some partially aerated secretions. The floors of the maxillary sinuses are intact. NASAL CAVITY/SKULL BASE: Rightward bowing of the nasal septum without focal defect. Partially paradoxical curvature of the middle turbinates. The cribriform plate is intact and symmetric. The anterior clinoid processes are not pneumatized. PARANASAL SINUS DRAINAGE PATHWAYS:  Opacification of the bilateral frontoethmoidal recesses, ostiomeatal units, and sphenoethmoidal recesses. NON-SINUS STRUCTURES: No abnormality of the visualized orbits or intracranial compartment. IMPRESSION: 1.  Findings of pansinusitis as above. 2.  Rightward nasal septal deviation.

## 2022-04-06 NOTE — CARE PLAN
Patient transferred to unit at about 1420. RN administered IV dilaudid at 1427 for pain in the left chest area of chest tube. Chest tube to suction. Please continue to monitor. RR were in the 40. Now 32.

## 2022-04-06 NOTE — PROGRESS NOTES
Pt started c/o extreme trembling chills approximately 10 mins after starting her flagyl infusion. RN paused the infusion and rechecked on pt about 15 mins after pausing the infusion and pt stated that the chills have subsided. Pts temp checked 98.8. Pt denied SOB, HA, Pain. RN spoke w pharmacist who is paging ID provider. RN also paged hospitalist and notified intensivisits.

## 2022-04-06 NOTE — PLAN OF CARE
Problem: Plan of Care - These are the overarching goals to be used throughout the patient stay.    Goal: Plan of Care Review/Shift Note  Description: The Plan of Care Review/Shift note should be completed every shift.  The Outcome Evaluation is a brief statement about your assessment that the patient is improving, declining, or no change.  This information will be displayed automatically on your shift note.  Outcome: Ongoing, Progressing     Problem: Infection (Pneumonia)  Goal: Resolution of Infection Signs and Symptoms  Outcome: Ongoing, Progressing   Goal Outcome Evaluation:      Pt A/Ox4, VSS. On RA. L chest tube -20, no air leak. TPA placed. Output charted. Pt pain from chest tube tx c norco. Tylenol for mild fever. Pt resting comfortable. Continue to monitor.

## 2022-04-06 NOTE — PROGRESS NOTES
[facility]    Infectious Disease Progress Note    04/06/2022     Chart reviewed  Patient seen and examined    Assessment & Plan   Arely Saldaña is a 41 year old female who was admitted on 4/3/2022.     1. Empyema, left-sided  2. 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 Recurrent left-sided pneumonia  4. sinusitis since August/September 2021  5. Hypertension, pleuritic chest pain, tachypnea  6. Status post thoracentesis and chest tube placement on 4/4/2022  7. Elevated liver enzymes, improving.  Splenomegaly  8. Transthoracic echocardiogram did not show vegetations.    Recommendations     1. Follow repeat blood culture results  2. Based on susceptibility results: switch to Ceftriaxone and Metronidazole. Addendum. Allergic reaction like symptoms with Metronidazole first dose on 4/6/2022. Stopped Metronidazole and started Clindamycin  3. Monitor CBC, CMP check IgG levels, screen  4. May need VATS for source control, resolution of empyema  5. Chest tube management per pulmonary team  6. Likely need prolonged antibiotic course, depending on clinical response and repeat culture results  7. Patient has an ENT appointment and procedure scheduled for May 2022  8. Appreciate input from Pharm.D.    Josefina Andrade MD  [facility]  [phone]     Susceptibility     Haemophilus influenzae nontypable     HANNA     Amoxicillin/Clavulanate 0.75 ug/mL Susceptible     Ampicillin 0.38 ug/mL Susceptible     Ceftriaxone <=0.016 ug/mL Susceptible     Levofloxacin 0.032 ug/mL Susceptible     Meropenem 0.125 ug/mL Susceptible                CT chest 4/3/2022: Prior to chest tube placement                 Interval History   Chart reviewed  Patient transferred out of ICU.  Breathing slightly better.  Still tachycardic.  Cough present.   Chest tube in place    Physical Exam   Temp: 99.2  F (37.3  C) Temp src: Oral BP: 122/80 Pulse: 108   Resp: 18 SpO2: 91 % O2 Device: None (Room air)    Vitals:    04/04/22 0600 04/05/22 0730 04/06/22 0500   Weight: 65.1 kg (143 lb 8 oz) 64.9 kg (143 lb) 67.5 kg (148 lb 14.4 oz)     Vital Signs with Ranges  Temp:  [98.7  F (37.1  C)-100.1  F (37.8  C)] 99.2  F (37.3  C)  Pulse:  [] 108  Resp:  [16-18] 18  BP: (118-136)/(66-82) 122/80  SpO2:  [91 %-97 %] 91 %    Exam on 04/06/2022    GENERAL APPEARANCE:  awake, seen in ICU, mild distress, tachypnea  EYES: Eyes grossly normal to inspection, PERRL and conjunctivae and sclerae normal  HENT: Dry mucosa  NECK: Supple  RESP: Tachypnea, decreased breath sounds on left, chest tube  CV: S1-S2 tachycardia  LYMPHATICS: No severe lymphedema  ABDOMEN: Firm, pleuritic chest pain  MS: extremities normal- no gross deformities noted  SKIN: no suspicious lesions or rashes  Neuro: Awake, deconditioned , able to answer questions    Medications       alteplase (ACTIVASE) 10 mg and dornase 5 mg in NS syringe for chest tube instillation  50 mL Chest Tube BID     enoxaparin ANTICOAGULANT  40 mg Subcutaneous Q24H     ferrous sulfate  325 mg Oral Every Other Day     lisinopril  5 mg Oral Daily     piperacillin-tazobactam  3.375 g Intravenous Q8H       Data   All microbiology laboratory data reviewed.  Recent Labs   Lab Test 04/06/22  0449 04/05/22  0506 04/04/22  0501   WBC 9.5 9.1 7.3   HGB 8.7* 7.5* 7.9*   HCT 28.9* 25.0* 25.8*   MCV 75* 75* 76*    366 278     Recent Labs   Lab Test 04/05/22  0506 04/03/22  2312 04/03/22  1141   CR 0.61 0.65 0.69     Recent Labs   Lab Test 01/21/22  1435   SED 14     MICRO  04/06/2022 0455 04/06/2022 0516 Blood Culture Hand, Right [[id number]]   Blood from Hand, Right    In process Component Value   No component results              04/06/2022 0449 04/06/2022 0516 Blood Culture Peripheral Blood [[id number]]   Peripheral Blood    In process  Component Value   No component results              04/05/2022 0506 04/06/2022 0017 Blood Culture Peripheral Blood [[id number]]   Peripheral Blood    Preliminary result Component Value   Culture No growth after 12 hours P              04/03/2022 1915 04/05/2022 1438 Blood Culture Arm, Left [[id number]]   (Abnormal)   Blood from Arm, Left    Preliminary result Component Value   Culture Positive on the 1st day of incubation Abnormal  P    Haemophilus influenzae nontypable Panic  P    1 of 2 bottles   Corrected on April 4, 2022/7:25 PM by Michael Philip   Previously reported as: gram positive cocci in pairs and chains. Corrected to gram negative cocobacilli.     Susceptibilities done on previous cultures              04/03/2022 1849 04/03/2022 2108 Cell count with differential fluid [[id number]]    (Abnormal)   Pleural fluid from Pleural Cavity, Left    Final result Component Value   No component results           04/03/2022 1849 04/06/2022 0845 Pleural fluid Aerobic Bacterial Culture Routine with Gram Stain [[id number]]     (Abnormal)   Pleural fluid from Pleural Cavity, Left    Final result Component Value   Culture 4+ Haemophilus influenzae nontypable Abnormal    Gram Stain Result 4+ Gram negative coccobacilli Abnormal     4+ WBC seen Abnormal     Predominantly PMNs         Susceptibility     Haemophilus influenzae nontypable     HANNA     Amoxicillin/Clavulanate 0.75 ug/mL Susceptible     Ampicillin 0.38 ug/mL Susceptible     Ceftriaxone <=0.016 ug/mL Susceptible     Levofloxacin 0.032 ug/mL Susceptible                   04/03/2022 1849 04/03/2022 1946 Glucose fluid [[id number]]    Pleural fluid from Pleural Cavity, Left    Final result Component Value Units   Glucose Fluid Source Pleural Cavity, Left    left pleural fluid   left pleural fluid   left pleural fluid   left pleural fluid   Glucose fluid <5 mg/dL           04/03/2022 1849 04/04/2022 0003 Lactate dehydrogenase fluid [[id number]]    Pleural  fluid from Pleural Cavity, Left    Final result Component Value Units   LD Fluid Source Pleural Cavity, Left    left pleural fluid   left pleural fluid   Lactate dehydrogenase fluid 2,332 U/L   Collect pleural fluid when Thoracentesis procedure           04/03/2022 1849 04/03/2022 2246 Protein fluid [[id number]]    Pleural fluid from Pleural Cavity, Left    Final result Component Value Units   Protein Fluid Source Pleural Cavity, Left    left pleural fluid   left pleural fluid   Protein Total Fluid 3.7 g/dL           04/03/2022 1849 04/03/2022 2014 pH fluid [[id number]]    Pleural fluid from Pleural Cavity, Left    Final result Component Value Units   pH Fluid Source Pleural Cavity, Left    left pleural fluid   left pleural fluid   pH Fluid 7.5 pH           04/03/2022 1849 04/03/2022 2108 Cell Count Body Fluid [[id number]]    (Abnormal)   Pleural fluid from Pleural Cavity, Left    Final result Component Value   Color Yellow   Clarity Turbid Abnormal    Cell Count Fluid Source Pleural Cavity, Left   left pleural fluid   left pleural fluid   left pleural fluid   left pleural fluid   left pleural fluid   left pleural fluid   left pleural fluid   left pleural fluid   left pleural fluid   left pleural fluid   left pleural fluid   Clot Presence Large Clot (>Half Volume)   Specimen clotted. Slide reviewed, no   abnormal cells seen.    Cytology Ordered No           04/03/2022 1154 04/05/2022 1438 Blood Culture Peripheral Blood [[id number]]   (Abnormal)   Peripheral Blood    Preliminary result Component Value   Culture Positive on the 1st day of incubation Abnormal  P    Haemophilus influenzae nontypable Panic  P    1 of 2 bottles   Susceptibilities done on previous cultures              04/03/2022 1141 04/06/2022 0845 Blood Culture Line, venous [[id number]]     (Abnormal)   Blood from Line, venous    Final result Component Value   Culture Positive on the 1st day of incubation Abnormal     Haemophilus influenzae  nontypable Panic     1 of 2 bottles         Susceptibility     Haemophilus influenzae nontypable     HANNA     Amoxicillin/Clavulanate 0.75 ug/mL Susceptible     Ampicillin 0.38 ug/mL Susceptible     Ceftriaxone <=0.016 ug/mL Susceptible     Levofloxacin 0.032 ug/mL Susceptible     Meropenem 0.125 ug/mL Susceptible                         RADIOLOGY:  XR Chest 1 View    Result Date: 4/3/2022  EXAM: XR CHEST 1 VIEW LOCATION: [facility] DATE/TIME: 4/3/2022 6:59 PM INDICATION: dyspnea; status post left thoracentesis COMPARISON: AP chest radiograph from earlier today     IMPRESSION: Considerable decrease in the amount of left pleural fluid. The left upper lobe is better inflated. The residual opacity in the left basal chest has a sharp interface which parallels the left ventricular heart border within which there are no air bronchograms consistent with lobar atelectasis of the left lower lobe. There is minimal atelectasis or pleural fluid in the left lateral costophrenic sulcus. Unchanged atelectasis/pleural fluid along the right hemidiaphragm.     Echocardiogram Complete    Result Date: 4/3/2022  [id number]^FABIANO^ARY^[place] [address]  Name: SHANI PETIT MRN: [id number] : 1980 Study Date: 2022 02:38 PM Age: 41 yrs Gender: Female Patient Location: [facility] Reason For Study: SOB Ordering Physician: ARY MARIO Performed By: ANABELL  BSA: 1.7 m2 Height: 66 in Weight: 145 lb HR: 120 BP: 138/93 mmHg ______________________________________________________________________________ Procedure Complete Portable Echo Adult. ______________________________________________________________________________ Interpretation Summary  Left ventricular size, wall motion and function are normal. The ejection fraction is > 65%. Normal right ventricle size and systolic function. No hemodynamically significant valvular abnormalities on  2D or color flow imaging. ______________________________________________________________________________ Left Ventricle Left ventricular size, wall motion and function are normal. The ejection fraction is > 65%. There is normal left ventricular wall thickness. Left ventricular diastolic function is normal. No regional wall motion abnormalities noted.  Right Ventricle Normal right ventricle size and systolic function.  Atria Normal left atrial size. Right atrial size is normal. There is no color Doppler evidence of an atrial shunt.  Mitral Valve Mitral valve leaflets appear normal. There is no evidence of mitral stenosis or clinically significant mitral regurgitation.  Tricuspid Valve Tricuspid valve leaflets appear normal. There is no evidence of tricuspid stenosis or clinically significant tricuspid regurgitation. Right ventricle systolic pressure estimate normal.  Aortic Valve Aortic valve leaflets appear normal. There is no evidence of aortic stenosis or clinically significant aortic regurgitation.  Pulmonic Valve The pulmonic valve is not well seen, but is grossly normal. This degree of valvular regurgitation is within normal limits.  Vessels The aorta root is normal. Normal size ascending aorta. IVC diameter <2.1 cm collapsing >50% with sniff suggests a normal RA pressure of 3 mmHg.  Pericardium There is no pericardial effusion.  ______________________________________________________________________________ MMode/2D Measurements & Calculations IVSd: 0.68 cm LVIDd: 3.8 cm LVIDs: 2.3 cm LVPWd: 1.0 cm FS: 39.1 %  LV mass(C)d: 91.8 grams LV mass(C)dI: 52.6 grams/m2 LA dimension: 2.4 cm LVOT diam: 2.0 cm LVOT area: 3.1 cm2 LA Volume Indexed (AL/bp): 26.0 ml/m2 RWT: 0.55  Time Measurements MM HR: 120.0 BPM  Doppler Measurements & Calculations MV E max freda: 59.1 cm/sec MV A max freda: 91.3 cm/sec MV E/A: 0.65 MV dec slope: 559.2 cm/sec2 MV dec time: 0.11 sec Ao V2 max: 155.5 cm/sec Ao max PG: 10.0 mmHg Ao V2 mean: 103.6  cm/sec Ao mean P.9 mmHg Ao V2 VTI: 23.0 cm JERONIMO(I,D): 2.8 cm2 JERONIMO(V,D): 2.8 cm2 LV V1 max P.1 mmHg LV V1 max: 142.1 cm/sec LV V1 VTI: 20.9 cm SV(LVOT): 64.8 ml SI(LVOT): 37.2 ml/m2 PA acc time: 0.09 sec AV Qamar Ratio (DI): 0.91 JERONIMO Index (cm2/m2): 1.6 E/E' av.1 Lateral E/e': 3.8 Medial E/e': 6.3  ______________________________________________________________________________ Report approved by: Fransisco Belcher 2022 03:46 PM       Abdomen US, limited (RUQ only)    Result Date: 4/3/2022  EXAM: US ABDOMEN LIMITED LOCATION: [facility] DATE/TIME: 4/3/2022 1:51 PM INDICATION: abnormal LFTs COMPARISON: CT from earlier today TECHNIQUE: Limited abdominal ultrasound. FINDINGS: GALLBLADDER: Cholelithiasis. BILE DUCTS: No biliary dilatation. The common duct measures 6 mm. LIVER: Normal parenchyma with smooth contour. No focal mass. RIGHT KIDNEY: No hydronephrosis. PANCREAS: The visualized portions are normal. No ascites.     IMPRESSION: 1.  Cholelithiasis. Otherwise normal right upper quadrant ultrasound.     Abdomen US, limited (RUQ only)    Result Date: 3/10/2022  EXAM: US ABDOMEN LIMITED LOCATION: [facility] DATE/TIME: 3/10/2022 10:16 AM INDICATION: Abnormal liver function tests and right upper quadrant pain. COMPARISON: 1. TECHNIQUE: Limited abdominal ultrasound. FINDINGS: GALLBLADDER: Cholelithiasis and gallbladder sludge are noted. No wall thickening nor pericholecystic fluid. BILE DUCTS: No biliary dilatation. The common duct measures 6 mm. LIVER: Normal parenchyma with smooth contour. No focal mass. RIGHT KIDNEY: No hydronephrosis. PANCREAS: The visualized portions are normal. No ascites.     IMPRESSION: 1.  Cholelithiasis and gallbladder sludge are present. No evidence of cholecystitis nor bile duct dilatation.     US Thoracentesis    Result Date: 4/3/2022  EXAM: 1. LEFT THORACENTESIS 2. ULTRASOUND GUIDANCE LOCATION: [facility]  HOSPITAL DATE/TIME: 4/3/2022 6:17 PM INDICATION: Pleural effusion. PROCEDURE: Informed consent obtained. Time out performed. The chest was prepped and draped in sterile fashion. 10 mL of 1 % lidocaine was infused into the local soft tissues. Under direct ultrasound guidance, a 5 Togolese catheter system was placed into the pleural effusion. 800 mL of thick cloudy yellow fluid were removed and sent to lab, if requested. Patient tolerated procedure well. Ultrasound imaging was obtained and placed in the patient's permanent medical record.     IMPRESSION: Status post left ultrasound-guided thoracentesis. Reference CPT Code: 09955    XR Chest Port 1 View    Result Date: 4/5/2022  EXAM: XR CHEST PORT 1 VIEW LOCATION: [facility] DATE/TIME: 4/5/2022 5:43 AM INDICATION: follow up empyema, pneumonia. COMPARISON: 04/03/2022. CT chest tube placement of 04/04/2022.     IMPRESSION: There is a new chest tube at the left medial lung base. There has been decrease in left pleural effusion and there is some residual of effusion and atelectasis at the left lung base. No pneumothorax. Mild atelectasis at the right lung base. Normal heart size and pulmonary vascularity.    XR Chest Port 1 View    Result Date: 4/3/2022  EXAM: XR CHEST PORT 1 VIEW LOCATION: [facility] DATE/TIME: 4/3/2022 11:29 PM INDICATION: Shortness of breath COMPARISON: 04/03/2022     IMPRESSION: The chest is stable with again seen minimal right pleural effusion and mild left pleural effusion with some underlying infiltrate/atelectasis in both lung bases and extending into the left midlung. The chest is otherwise negative.    XR Chest Port 1 View    Result Date: 4/3/2022  EXAM: XR CHEST PORT 1 VIEW LOCATION: [facility] DATE/TIME: 4/3/2022 4:38 PM INDICATION: worsening sob COMPARISON: CT pulmonary angiogram 04/03/2022 at 1256 hours     IMPRESSION: Moderate to large left and small right pleural  effusions are not visibly changed. Related atelectasis including near complete atelectasis of the left lower lobe. Lower left ventricular heart border is silhouetted. Cardiac silhouette is not enlarged. Azygos vein is not distended and the vascular pedicle width is normal. No pneumothorax.    CT Chest Pulmonary Embolism w Contrast    Result Date: 3/10/2022  EXAM: CT CHEST PULMONARY EMBOLISM W CONTRAST LOCATION: [facility] DATE/TIME: 3/10/2022 10:08 AM INDICATION: Shortness of breath. Cough. COMPARISON: 02/21/2022 radiograph. TECHNIQUE: CT chest pulmonary angiogram during arterial phase injection of IV contrast. Multiplanar reformats and MIP reconstructions were performed. Dose reduction techniques were used. CONTRAST: 75ml Isovue 370. FINDINGS: ANGIOGRAM CHEST: Regions of motion artifact. No pulmonary embolism seen. Nonaneurysmal aorta without dissection. LUNGS AND PLEURA: Moderate to large amount of heterogeneously enhancing left upper and lower lobe perihilar predominant consolidation with example area measuring 5.6 x 5.8 cm (series 6, image 108). Minimal opacities elsewhere bilaterally. Mild basilar atelectasis. Small right pleural effusion. No pneumothorax. MEDIASTINUM/AXILLAE: Mild adenopathy, presumed reactive. Normal heart size. No pericardial effusion. CORONARY ARTERY CALCIFICATION: Compromised by motion. UPPER ABDOMEN: Hepatic steatosis. Incompletely seen spleen appears mildly enlarged, measuring roughly 13.3 cm in AP dimension. MUSCULOSKELETAL: Nothing acute     IMPRESSION: 1.  No pulmonary embolism. 2.  Moderate to large amount of left lung consolidation suggestive of pneumonia. Recommend 6-8 week follow-up PA and lateral radiographs for clearing. A few other mild bilateral opacities. 3.  Small right pleural effusion. 4.  Hepatic steatosis. 5.  Incompletely seen splenomegaly.     CT Chest Tube with Cath Placement    Result Date: 4/4/2022  EXAM: 1. PERCUTANEOUS CHEST TUBE  PLACEMENT IN THE LEFT PLEURAL SPACE 2. CT GUIDANCE 3. CONSCIOUS SEDATION LOCATION: [facility] DATE/TIME: 4/4/2022 2:56 PM INDICATION: Left pleural effusion with concern for empyema TECHNIQUE: Dose reduction techniques were used. PROCEDURE: Informed consent obtained. Site marked. Prior images reviewed. Required items made available. Patient identity confirmed verbally and with arm band. Patient reevaluated immediately before administering sedation. Universal protocol was followed. Time out performed. The site was prepped and draped in sterile fashion. 10 mL of 1% lidocaine was infused into the local soft tissues. Using standard technique and under direct CT guidance, a 12 Cymraes chest tube catheter was inserted into the pleural space. The catheter was fixed in place with sutures and adhesive device, and the tubing was banded. Chest tube placed to Pleur-evac suction. BLOOD LOSS: Minimal. The patient tolerated the procedure well. No immediate complications. SEDATION: Versed 1 mg. Fentanyl 50 mcg. The procedure was performed with administration intravenous conscious sedation with appropriate preoperative, intraoperative, and postoperative evaluation. 20 minutes of supervised face to face conscious sedation time was provided by a radiology nurse under my direct supervision.     IMPRESSION: 1.  Successful CT-guided chest tube placement into the left pleural space. Reference CPT Codes: 22671, 19706    CT Chest (PE) Abdomen Pelvis w Contrast    Result Date: 4/3/2022  EXAM: CT CHEST PE ABDOMEN PELVIS W CONTRAST LOCATION: [facility] DATE/TIME: 4/3/2022 12:49 PM INDICATION: Chest and abdominal pain. Elevated liver function tests. PE suspected, high prob COMPARISON: 03/20/2022 CT chest. TECHNIQUE: CT chest pulmonary angiogram and routine CT abdomen pelvis with IV contrast. Arterial phase through the chest and venous phase through the abdomen and pelvis. Multiplanar reformats  and MIP reconstructions were performed. Dose reduction techniques were used. CONTRAST: 100ml Isovue 370 FINDINGS: ANGIOGRAM CHEST: Pulmonary arteries are normal caliber and negative for pulmonary emboli. Thoracic aorta is negative for dissection. No CT evidence of right heart strain. LUNGS AND PLEURA: The previous large dense consolidative infiltrate in the left upper lobe and left lower lobe seen on 03/10/2022 has near completely resolved. New small right and moderate left pleural effusions with compressive appearing atelectasis in the lung bases. MEDIASTINUM/AXILLAE: Normal. CORONARY ARTERY CALCIFICATION: None. HEPATOBILIARY: Gallstones. Normal liver. PANCREAS: Normal. SPLEEN: Mild splenomegaly measuring 12 cm. ADRENAL GLANDS: Normal. KIDNEYS/BLADDER: Normal. BOWEL: Moderate amount of stool throughout the entire colon suggestive of constipation. LYMPH NODES: Normal. VASCULATURE: Unremarkable. PELVIC ORGANS: Normal. MUSCULOSKELETAL: Normal.     IMPRESSION: 1.  Negative for pulmonary emboli. 2.  The previous dense consolidative infiltrates in the left lung seen on CT 03/10/2022 appear to near completely resolved. 3.  New small right and moderate left pleural effusions with moderate atelectasis in the lower lobes greater on the left side. 4.  Gallstones. 5.  Mild splenomegaly.    CT Sinus w/o Contrast    Result Date: 3/16/2022  EXAM DATE:         03/16/2022 EXAM: CT SINUSES WITHOUT CONTRAST LOCATION: [facility] DATE/TIME: 3/16/2022 12:45 PM INDICATION: Sinus infection COMPARISON: None. TECHNIQUE: Routine without contrast. Multiplanar reformats. Dose reduction techniques were used. FINDINGS: FRONTAL SINUSES:  Subtotal opacification of the frontal sinuses bilaterally with some partially aerated secretions. ETHMOID SINUSES:  Subtotal opacification of ethmoid air cells bilaterally, left greater than right. Some partially aerated secretions noted within posterior ethmoid locules  bilaterally. SPHENOID SINUSES: Moderate mucosal thickening involving both sphenoid sinuses with bilateral air-fluid levels. MAXILLARY SINUSES: Moderate circumferential mucosal thickening and subtotal opacification of the bilateral maxillary sinuses, left greater than right. Bilateral air-fluid levels in some partially aerated secretions. The floors of the maxillary sinuses are intact. NASAL CAVITY/SKULL BASE: Rightward bowing of the nasal septum without focal defect. Partially paradoxical curvature of the middle turbinates. The cribriform plate is intact and symmetric. The anterior clinoid processes are not pneumatized. PARANASAL SINUS DRAINAGE PATHWAYS:  Opacification of the bilateral frontoethmoidal recesses, ostiomeatal units, and sphenoethmoidal recesses. NON-SINUS STRUCTURES: No abnormality of the visualized orbits or intracranial compartment. IMPRESSION: 1.  Findings of pansinusitis as above. 2.  Rightward nasal septal deviation.       Total Time Spent 35 minutes with >50% of the time spent in counseling, education and care coordination, antibiotic management.

## 2022-04-06 NOTE — PLAN OF CARE
Problem: Infection (Pneumonia)  Goal: Resolution of Infection Signs and Symptoms  Outcome: Ongoing, Progressing    Pt is alert and oriented x4, VSS, afebrile, prn norco given for chest tube site pain pain, LS diminished  pt is on RA, infrequent productive cough. Up to bedside commode with assist x1. Chest tube to -20 continuous suction with no air leak noted. Will continue to monitor.     Vanessa Lancaster RN

## 2022-04-06 NOTE — PROGRESS NOTES
[facility] Daily Progress Note    Assessment/Plan:  41-year-old female with history of hypertension, recurrent sinusitis symptoms previously treated community-acquired pneumonia involving right lower lobe February 21, 2022 with azithromycin and cefdinir, with repeat CT scan on March 10, 2022 revealing moderate to large left lung consolidation suggestive of pneumonia.  She was initiated on 7-day course of levofloxacin.       She presented to [facility] emergency room for complaints of shortness of breath and cough.  CT scan was performed and revealed complete resolution of left lung infiltrates previously noted on March 10.  Patient did have bilateral effusions with moderate to large effusion on the left.  She underwent thoracentesis on April 3, 2022.  Blood culture and Gram stain from thoracentesis growing cocci bacilli.  Pulmonology critical care was consulted after patient developed respiratory distress following thoracentesis.  She was transferred to ICU and placed on BiPAP.  She is now saturating normally on room air.  Underwent US guided chest tube placement on 4/4/22 and patient is tolerating it well.      Bilateral pleural effusion, Left>Right.  suspect left sided empyema.   Haemophilus bacteremia.  US guided chest tube placement on 4/4/22.  Previously treated community acquired pneumonia.  CRP elevated 14.7 on 4/3/22.   CT chest reveals almost complete resolution of infiltrates of left lung.   Thoracentesis on 4/3/22, Gram stain/culture Haemophilus influenzae  Blood culture from 4/3/22 GN coccobacilli, 2 of 4 bottles.   Blood culture from 4/5/22 no growth to date.   Continue Zosyn 3.375 g IV q8h.   Metronidazole 500 mg q12h started on 4/6/22.   Vancomycin discontinued 4/4-4/6/22.   TPA initiated on 4/4/22 BID for 6 doses.  Repeat chest Xray on 4/7/22.   Appreciate pulmonology recommendations.     Elevated liver function tests.   Mild elevation.   Hepatitis panel negative.   Liver function tests improved.   GI follow  up for outpatient EGD to evaluate for villous atrophy.      Chronic sinusitis.   Plan for ENT procedure as outpatient.      Essential hypertension  Lisinopril 20 mg daily  Hold parameters written.      Iron deficiency anemia.   iron supplementation on admission.   Hemoglobin improved from 7.5 to 8.7 g/dL.   Continue ferrous sulfate 325 mg every other day  Repeat CBC in a.m.     Diet: Regular Diet Adult  DVT Prophylaxis:  Enoxaparin (Lovenox) SQ  Code Status: Full Code    Active Problems:    Tachypnea    Anemia    Pleural effusion    Tachycardia    Elevated LFTs    Dyspnea     LOS: 2 days     Barriers to discharge: Chest tube management, repeat imaging for resolution of empyema.  ID, pulmonology recommendations.   Discharge Disposition: Home.    Subjective:  Arely reports pain well controlled. Denies new events overnight.   Patient with pink tinged fluid on chest tube.  Denies shortness of breath, or wheezing.  Cough improved and cough medicine is helping.       alteplase (ACTIVASE) 10 mg and dornase 5 mg in NS syringe for chest tube instillation  50 mL Chest Tube BID     cefTRIAXone  2 g Intravenous Q24H     enoxaparin ANTICOAGULANT  40 mg Subcutaneous Q24H     ferrous sulfate  325 mg Oral Every Other Day     lisinopril  5 mg Oral Daily     metroNIDAZOLE  500 mg Intravenous Q12H       Objective:  Vital signs in last 24 hours:  Temp:  [98.7  F (37.1  C)-100.1  F (37.8  C)] 99.2  F (37.3  C)  Pulse:  [] 108  Resp:  [16-18] 18  BP: (118-136)/(66-84) 127/84  SpO2:  [91 %-97 %] 91 %  Weight:   Weight:   @THISENCWEIGHTS(1)@  Weight change:   Body mass index is 24.03 kg/m .    Intake/Output last 3 shifts:  I/O last 3 completed shifts:  In: 570 [P.O.:570]  Out: 1320 [Urine:150; Chest Tube:1170]  Intake/Output this shift:  I/O this shift:  In: -   Out: 450 [Urine:450]    Review of Systems:   As per subjective, all others negative.    Physical Exam:    GENERAL:  Alert, appears comfortable, in no acute distress, appears  stated age   HEAD:  Normocephalic, without obvious abnormality, atraumatic   NECK: Supple, symmetrical, trachea midline   LUNGS:   Clear to auscultation bilaterally, diminished breath sounds at left lung base, respirations unlabored   CHEST WALL:  No tenderness or deformity, left chest tube to suction.  No erythema noted.  Minimal tenderness around insertion site.    HEART:  Regular rate and rhythm, S1 and S2 normal, no murmur, rub, or gallop    ABDOMEN:   Soft, non-tender, bowel sounds active all four quadrants, no masses, no organomegaly, no rebound or guarding   EXTREMITIES: Extremities normal, atraumatic, no cyanosis or edema    SKIN: Dry to touch, no exanthems in the visualized areas   NEURO: Alert, oriented x3, moves all four extremities freely, non-focal   PSYCH: Cooperative, behavior is appropriate      Cardiographics:   I personally reviewed.  Cardiac monitoring.  Sinus rhythm.    Imaging:  XR Chest Port 1 View    Impression    IMPRESSION: There is a new chest tube at the left medial lung base. There has been decrease in left pleural effusion and there is some residual of effusion and atelectasis at the left lung base. No pneumothorax. Mild atelectasis at the right lung base.   Normal heart size and pulmonary vascularity.   Echocardiogram Complete   Result Value Ref Range    LVEF  > 65%        Lab Results:  Personally Reviewed.   Recent Labs   Lab 04/06/22  0449 04/05/22  0506 04/04/22  0501   WBC 9.5 9.1 7.3   HGB 8.7* 7.5* 7.9*   HCT 28.9* 25.0* 25.8*    366 278     Recent Labs   Lab 04/06/22  0449 04/05/22  0506 04/03/22  2312 04/03/22  1141   NA  --  139 138 139   CO2  --  21* 19* 20*   BUN  --  13 13 12   ALBUMIN 1.8* 1.9* 2.0* 2.9*   ALKPHOS 142* 155* 200* 268*   ALT 16 23 41 51*   AST 10 8 39 92*     Recent Labs   Lab 04/03/22  1651   INR 1.20*       I reviewed all labs and imaging studies as of this date and I reviewed all current inpatient medications and updated them    Ede Chávez  , MS  [facility]  Internal Medicine

## 2022-04-06 NOTE — PROGRESS NOTES
Pt A/Ox4. VSS. On RA. Pain controlled w PRN norco. Pt denies SOB. PTA placed, clamped x2H, output 50ml. Dr Upton made aware of output after unclamping. RN irrigated per Dr Upton, no additional output. Per Dr Upton to monitor and plan for TPA again tonight 2100. Pt transferred to  with belongings and spouse present.

## 2022-04-07 ENCOUNTER — APPOINTMENT (OUTPATIENT)
Dept: RADIOLOGY | Facility: CLINIC | Age: 42
DRG: 177 | End: 2022-04-07
Attending: INTERNAL MEDICINE
Payer: COMMERCIAL

## 2022-04-07 LAB
ERYTHROCYTE [DISTWIDTH] IN BLOOD BY AUTOMATED COUNT: 15.7 % (ref 10–15)
HCT VFR BLD AUTO: 29.4 % (ref 35–47)
HGB BLD-MCNC: 9.1 G/DL (ref 11.7–15.7)
HOLD SPECIMEN: NORMAL
IGE SERPL-ACNC: <2 KU/L (ref 0–114)
LACTATE SERPL-SCNC: 1.4 MMOL/L (ref 0.7–2)
MCH RBC QN AUTO: 22.8 PG (ref 26.5–33)
MCHC RBC AUTO-ENTMCNC: 31 G/DL (ref 31.5–36.5)
MCV RBC AUTO: 74 FL (ref 78–100)
PHOSPHATE SERPL-MCNC: 3.9 MG/DL (ref 2.5–4.5)
PLATELET # BLD AUTO: 407 10E3/UL (ref 150–450)
PREALB SERPL IA-MCNC: 7 MG/DL (ref 19–38)
RBC # BLD AUTO: 4 10E6/UL (ref 3.8–5.2)
WBC # BLD AUTO: 9.1 10E3/UL (ref 4–11)

## 2022-04-07 PROCEDURE — 99223 1ST HOSP IP/OBS HIGH 75: CPT | Performed by: INTERNAL MEDICINE

## 2022-04-07 PROCEDURE — 250N000013 HC RX MED GY IP 250 OP 250 PS 637: Performed by: INTERNAL MEDICINE

## 2022-04-07 PROCEDURE — 88185 FLOWCYTOMETRY/TC ADD-ON: CPT | Performed by: INTERNAL MEDICINE

## 2022-04-07 PROCEDURE — 84100 ASSAY OF PHOSPHORUS: CPT | Performed by: INTERNAL MEDICINE

## 2022-04-07 PROCEDURE — 36415 COLL VENOUS BLD VENIPUNCTURE: CPT | Performed by: INTERNAL MEDICINE

## 2022-04-07 PROCEDURE — 86355 B CELLS TOTAL COUNT: CPT | Performed by: INTERNAL MEDICINE

## 2022-04-07 PROCEDURE — 250N000009 HC RX 250: Performed by: INTERNAL MEDICINE

## 2022-04-07 PROCEDURE — 250N000011 HC RX IP 250 OP 636: Performed by: INTERNAL MEDICINE

## 2022-04-07 PROCEDURE — 99233 SBSQ HOSP IP/OBS HIGH 50: CPT | Performed by: INTERNAL MEDICINE

## 2022-04-07 PROCEDURE — 88184 FLOWCYTOMETRY/ TC 1 MARKER: CPT | Performed by: INTERNAL MEDICINE

## 2022-04-07 PROCEDURE — 85027 COMPLETE CBC AUTOMATED: CPT | Performed by: INTERNAL MEDICINE

## 2022-04-07 PROCEDURE — 258N000003 HC RX IP 258 OP 636: Performed by: INTERNAL MEDICINE

## 2022-04-07 PROCEDURE — 71045 X-RAY EXAM CHEST 1 VIEW: CPT

## 2022-04-07 PROCEDURE — 88189 FLOWCYTOMETRY/READ 16 & >: CPT | Performed by: PATHOLOGY

## 2022-04-07 PROCEDURE — 84134 ASSAY OF PREALBUMIN: CPT | Performed by: INTERNAL MEDICINE

## 2022-04-07 PROCEDURE — 83605 ASSAY OF LACTIC ACID: CPT | Performed by: INTERNAL MEDICINE

## 2022-04-07 PROCEDURE — 99232 SBSQ HOSP IP/OBS MODERATE 35: CPT | Performed by: INTERNAL MEDICINE

## 2022-04-07 PROCEDURE — 120N000001 HC R&B MED SURG/OB

## 2022-04-07 RX ADMIN — CEFTRIAXONE SODIUM 2 G: 2 INJECTION, POWDER, FOR SOLUTION INTRAMUSCULAR; INTRAVENOUS at 09:17

## 2022-04-07 RX ADMIN — ENOXAPARIN SODIUM 40 MG: 100 INJECTION SUBCUTANEOUS at 22:00

## 2022-04-07 RX ADMIN — DORNASE ALFA 50 ML: 1 SOLUTION RESPIRATORY (INHALATION) at 21:05

## 2022-04-07 RX ADMIN — CLINDAMYCIN PHOSPHATE 600 MG: 600 INJECTION, SOLUTION INTRAVENOUS at 22:18

## 2022-04-07 RX ADMIN — CLINDAMYCIN PHOSPHATE 600 MG: 600 INJECTION, SOLUTION INTRAVENOUS at 14:20

## 2022-04-07 RX ADMIN — DORNASE ALFA 50 ML: 1 SOLUTION RESPIRATORY (INHALATION) at 09:05

## 2022-04-07 RX ADMIN — HYDROMORPHONE HYDROCHLORIDE 0.2 MG: 0.2 INJECTION, SOLUTION INTRAMUSCULAR; INTRAVENOUS; SUBCUTANEOUS at 21:57

## 2022-04-07 RX ADMIN — CLINDAMYCIN PHOSPHATE 600 MG: 600 INJECTION, SOLUTION INTRAVENOUS at 06:44

## 2022-04-07 RX ADMIN — GUAIFENESIN AND CODEINE PHOSPHATE 5 ML: 10; 100 LIQUID ORAL at 13:29

## 2022-04-07 RX ADMIN — IRON SUCROSE 300 MG: 20 INJECTION, SOLUTION INTRAVENOUS at 19:52

## 2022-04-07 RX ADMIN — GUAIFENESIN 10 ML: 100 SOLUTION ORAL at 09:49

## 2022-04-07 ASSESSMENT — ACTIVITIES OF DAILY LIVING (ADL)
ADLS_ACUITY_SCORE: 7
ADLS_ACUITY_SCORE: 10
ADLS_ACUITY_SCORE: 7
DRESSING/BATHING_DIFFICULTY: NO
WALKING_OR_CLIMBING_STAIRS_DIFFICULTY: NO
ADLS_ACUITY_SCORE: 7
ADLS_ACUITY_SCORE: 7
DOING_ERRANDS_INDEPENDENTLY_DIFFICULTY: NO
ADLS_ACUITY_SCORE: 7
ADLS_ACUITY_SCORE: 10
ADLS_ACUITY_SCORE: 7
ADLS_ACUITY_SCORE: 7
TOILETING_ISSUES: NO
CONCENTRATING,_REMEMBERING_OR_MAKING_DECISIONS_DIFFICULTY: NO
ADLS_ACUITY_SCORE: 7
ADLS_ACUITY_SCORE: 10
WEAR_GLASSES_OR_BLIND: NO
ADLS_ACUITY_SCORE: 7
ADLS_ACUITY_SCORE: 10
FALL_HISTORY_WITHIN_LAST_SIX_MONTHS: NO
ADLS_ACUITY_SCORE: 10
ADLS_ACUITY_SCORE: 7
ADLS_ACUITY_SCORE: 10
CHANGE_IN_FUNCTIONAL_STATUS_SINCE_ONSET_OF_CURRENT_ILLNESS/INJURY: YES
ADLS_ACUITY_SCORE: 10
ADLS_ACUITY_SCORE: 7
DIFFICULTY_COMMUNICATING: NO
DIFFICULTY_EATING/SWALLOWING: NO
ADLS_ACUITY_SCORE: 7
ADLS_ACUITY_SCORE: 10
ADLS_ACUITY_SCORE: 10
ADLS_ACUITY_SCORE: 7

## 2022-04-07 NOTE — PROGRESS NOTES
"GI - Chart check    LFT\"s improved, per yesterday's note.  Immunoglobulin levels consistent with CVID.  This can cause villous atrophy of hte small bowel, resulting in iron deficiency anemia.  This would compromise oral iron absorption.  Thus, suggest IV iron infusion while here.  Pt will have EGD with small bowel biopsies in about 2 months (after pulmonary status improves).  We will contact her to help arrange the EGD.     Nothing further from GI.  We will sign off though please contact if future needs arise.   Thank you.  Ernst Schultz PA-C   [facility]     "

## 2022-04-07 NOTE — PLAN OF CARE
Problem: Infection (Pneumonia)  Goal: Resolution of Infection Signs and Symptoms  Outcome: Ongoing, Progressing   Goal Outcome Evaluation:        Patient was febrile, tachypneic and tachycardic this shift, Dr Chávez and Dr Fisher were updated. Blood cultures were drawn, fever was treated with tylenol and ice packs. Maintaining oxygen saturation in above 92% on room air. Alteplase treatment to chest tube was done by swat nurse. Patient was able to tolerate 15 minutes sitting up, supine and right side. Unable to tolerate lay on left side.

## 2022-04-07 NOTE — PROGRESS NOTES
Chest tube suction lowered from 80 to 40 due to small air leak at higher suction. SWAT RN assisted. No leaks observed in tubing. Pressure dressing reinforced. Pt not feeling SOB, chest pain, or site pain. Chest tube patent, draining blood tinged fluid. Will continue to monitor.

## 2022-04-07 NOTE — PLAN OF CARE
Problem: Plan of Care - These are the overarching goals to be used throughout the patient stay.    Goal: Absence of Hospital-Acquired Illness or Injury  Intervention: Identify and Manage Fall Risk  Recent Flowsheet Documentation  Taken 4/7/2022 0905 by Randee Montiel RN  Safety Promotion/Fall Prevention:    activity supervised    assistive device/personal items within reach    bed alarm on    clutter free environment maintained    fall prevention program maintained    increase visualization of patient    safety round/check completed    supervised activity  Intervention: Prevent Skin Injury  Recent Flowsheet Documentation  Taken 4/7/2022 1000 by Randee Montiel RN  Body Position:    turned    side-lying  Intervention: Prevent and Manage VTE (Venous Thromboembolism) Risk  Recent Flowsheet Documentation  Taken 4/7/2022 0905 by Randee Montiel RN  Activity Management: activity adjusted per tolerance  Goal: Readiness for Transition of Care  Intervention: Mutually Develop Transition Plan  Recent Flowsheet Documentation  Taken 4/7/2022 0800 by Randee Montiel RN  Equipment Currently Used at Home: none     Problem: Respiratory Compromise (Pneumonia)  Goal: Effective Oxygenation and Ventilation  Intervention: Promote Airway Secretion Clearance  Recent Flowsheet Documentation  Taken 4/7/2022 0905 by Randee Montiel RN  Cough And Deep Breathing: done with encouragement    Patient alert and oriented. Tachypneic and tachycardic but all other VSS. Patient denied pain. Chest tube clamped at 0900 for TPA administration. Patient coughing frequently and productively and reports it's worse in the morning and will lessen throughout the day. PRN guaifenesin given at 0950 for cough and guaifenesin/codeine at 1330. ID spoke to patient regarding IVIG treatment. Holding until after CT tomorrow. Possible VATS procedure pending CT results. Total chest tube output 470 mL.

## 2022-04-07 NOTE — PROGRESS NOTES
[facility] Daily Progress Note    Assessment/Plan:  sinusitis symptoms previously treated community-acquired pneumonia involving right lower lobe February 21, 2022 with azithromycin and cefdinir, with repeat CT scan on March 10, 2022 revealing moderate to large left lung consolidation suggestive of pneumonia.  She was initiated on 7-day course of levofloxacin.       She presented to [facility] emergency room for complaints of shortness of breath and cough.  CT scan was performed and revealed complete resolution of left lung infiltrates previously noted on March 10.  Patient did have bilateral effusions with moderate to large effusion on the left.  She underwent thoracentesis on April 3, 2022.  Blood culture and Gram stain from thoracentesis growing cocci bacilli.  Pulmonology critical care was consulted after patient developed respiratory distress following thoracentesis.  She was transferred to ICU and placed on BiPAP.  Underwent US guided chest tube placement on 4/4/22 and patient is tolerating it well.  Repeat chest x-ray on April 7, 2022 shows decreased layering left effusion and associated atelectasis.  Plan for repeat CT scan on April 8, 2022 per pulmonology recommendations.     Bilateral pleural effusion, Left>Right.  suspect left sided empyema.   Haemophilus bacteremia.  US guided chest tube placement on 4/4/22.  Previously treated community acquired pneumonia.  CRP elevated 14.7 on 4/3/22.   CT chest reveals almost complete resolution of infiltrates of left lung.   Thoracentesis on 4/3/22, Gram stain/culture Haemophilus influenzae  Blood culture from 4/3/22 Haemophilus influenzae, 2 of 4 bottles.   Blood culture from 4/5/22 no growth to date.   Blood culture from 4/7/2022 no growth to date    Ceftriaxone 2 g IV every 24 hours  Clindamycin 600 mg every 8 hours  Vancomycin discontinued 4/4-4/6/22.   TPA initiated on 4/4/22 BID for 6 doses.  Chest CT scan planned for 84/8/2022  Appreciate pulmonology  recommendations.     Elevated liver function tests.   Mild elevation.   Hepatitis panel negative.   Liver function tests improved.   GI follow up for outpatient EGD to evaluate for villous atrophy.      Chronic sinusitis.   Plan for ENT procedure as outpatient.      Essential hypertension  Lisinopril 20 mg daily  Hold parameters written.      Iron deficiency anemia.   iron supplementation on admission.   Hemoglobin improved from 7.5 to 9.1 g/dL.   Continue ferrous sulfate 325 mg every other day    Diet: Regular Diet Adult  DVT Prophylaxis:  Enoxaparin (Lovenox) SQ  Code Status: Full Code    Active Problems:    Tachypnea    Anemia    Pleural effusion    Tachycardia    Elevated LFTs    Dyspnea     LOS: 3 days     Barriers to discharge: Repeat CT scan, documentation of resolution of empyema, pulmonology recommendations.  Discharge Disposition: Likely home with family    Subjective:  Arely has no new complaints today.  Denies fever chills overnight or this morning.  Patient had T-max of 102.8  F yesterday afternoon.  She continues to have good output from chest tube.  Has increased pain with mobilization out of bed, transitions, but does not wish for change in pain medication at this time      alteplase (ACTIVASE) 10 mg and dornase 5 mg in NS syringe for chest tube instillation  50 mL Chest Tube BID     cefTRIAXone  2 g Intravenous Q24H     clindamycin  600 mg Intravenous Q8H     enoxaparin ANTICOAGULANT  40 mg Subcutaneous Q24H     ferrous sulfate  325 mg Oral Every Other Day     lisinopril  5 mg Oral Daily       Objective:  Vital signs in last 24 hours:  Temp:  [98.4  F (36.9  C)-102.8  F (39.3  C)] 98.4  F (36.9  C)  Pulse:  [104-135] 109  Resp:  [24-44] 24  BP: (109-132)/(65-88) 115/79  SpO2:  [89 %-96 %] 92 %  Weight:   Weight:   @THISENCWEIGHTS(1)@  Weight change:   Body mass index is 24.03 kg/m .    Intake/Output last 3 shifts:  I/O last 3 completed shifts:  In: 262 [I.V.:262]  Out: 1700 [Urine:700; Chest  Tube:1000]  Intake/Output this shift:  I/O this shift:  In: -   Out: 530 [Urine:350; Chest Tube:180]    Review of Systems:   As per subjective, all others negative.    Physical Exam:      GENERAL:  Alert, appears comfortable, in no acute distress, appears stated age   HEAD:  Normocephalic, without obvious abnormality, atraumatic   NECK: Supple, symmetrical, trachea midline   LUNGS:   Clear to auscultation bilaterally, diminished breath sounds at left lung base, respirations unlabored   CHEST WALL:  No tenderness or deformity, left chest tube to suction.  No erythema noted.  Minimal tenderness around insertion site.    HEART:  Regular rate and rhythm, S1 and S2 normal, no murmur, rub, or gallop    ABDOMEN:   Soft, non-tender, bowel sounds active all four quadrants, no masses, no organomegaly, no rebound or guarding   EXTREMITIES: Extremities normal, atraumatic, no cyanosis or edema    SKIN: Dry to touch, no exanthems in the visualized areas   NEURO: Alert, oriented x3, moves all four extremities freely, non-focal   PSYCH: Cooperative, behavior is appropriate        Imaging:  Personally Reviewed.  Results for orders placed or performed during the hospital encounter of 04/03/22   CT Chest (PE) Abdomen Pelvis w Contrast    Impression    IMPRESSION:  1.  Negative for pulmonary emboli.    2.  The previous dense consolidative infiltrates in the left lung seen on CT 03/10/2022 appear to near completely resolved.    3.  New small right and moderate left pleural effusions with moderate atelectasis in the lower lobes greater on the left side.    4.  Gallstones.    5.  Mild splenomegaly.   Abdomen US, limited (RUQ only)    Impression    IMPRESSION:  1.  Cholelithiasis. Otherwise normal right upper quadrant ultrasound.       XR Chest Port 1 View    Impression    IMPRESSION:     Moderate to large left and small right pleural effusions are not visibly changed. Related atelectasis including near complete atelectasis of the left  lower lobe.    Lower left ventricular heart border is silhouetted. Cardiac silhouette is not enlarged. Azygos vein is not distended and the vascular pedicle width is normal.    No pneumothorax.   US Thoracentesis    Impression    IMPRESSION:  Status post left ultrasound-guided thoracentesis.    Reference CPT Code: 47658   XR Chest 1 View    Impression    IMPRESSION:     Considerable decrease in the amount of left pleural fluid. The left upper lobe is better inflated.     The residual opacity in the left basal chest has a sharp interface which parallels the left ventricular heart border within which there are no air bronchograms consistent with lobar atelectasis of the left lower lobe. There is minimal atelectasis or   pleural fluid in the left lateral costophrenic sulcus.    Unchanged atelectasis/pleural fluid along the right hemidiaphragm.       XR Chest Port 1 View    Impression    IMPRESSION: The chest is stable with again seen minimal right pleural effusion and mild left pleural effusion with some underlying infiltrate/atelectasis in both lung bases and extending into the left midlung. The chest is otherwise negative.   CT Chest Tube with Cath Placement    Impression    IMPRESSION:  1.  Successful CT-guided chest tube placement into the left pleural space.    Reference CPT Codes: 71665, 43566   XR Chest Port 1 View    Impression    IMPRESSION: There is a new chest tube at the left medial lung base. There has been decrease in left pleural effusion and there is some residual of effusion and atelectasis at the left lung base. No pneumothorax. Mild atelectasis at the right lung base.   Normal heart size and pulmonary vascularity.   XR Chest Port 1 View    Impression    IMPRESSION: Slight retraction of the left basilar chest tube. Continued decrease in the layering left effusion and associated atelectasis. There is a similar small amount of air within the pleural space medially on the retrocardiac region compatible  with   recent tube placement. Trace right effusion and associated atelectasis. Heart size is normal.   Echocardiogram Complete   Result Value Ref Range    LVEF  > 65%        Lab Results:  Personally Reviewed.   Recent Labs   Lab 04/07/22  0435 04/06/22  0449 04/05/22  0506   WBC 9.1 9.5 9.1   HGB 9.1* 8.7* 7.5*   HCT 29.4* 28.9* 25.0*    402 366     Recent Labs   Lab 04/06/22  0449 04/05/22  0506 04/03/22  2312 04/03/22  1141   NA  --  139 138 139   CO2  --  21* 19* 20*   BUN  --  13 13 12   ALBUMIN 1.8* 1.9* 2.0* 2.9*   ALKPHOS 142* 155* 200* 268*   ALT 16 23 41 51*   AST 10 8 39 92*     Recent Labs   Lab 04/03/22  1651   INR 1.20*       I reviewed all labs and imaging studies as of this date and I reviewed all current inpatient medications and updated them    Ede Chávez DO, MS  [facility]  Internal Medicine

## 2022-04-07 NOTE — CONSULTS
[facility] Hematology and Oncology Inpatient Consult Note    Patient: Arely Saldaña  MRN: [id number]  Date of Service: 4/7/2022      Reason for Visit    I was consulted by Dr. Andrade  regarding Severe hypogammaglobulinemia, anemia. CVID? Admitted with Empyema.    Assessment/Plan      #.  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  Microcytic anemia with likely iron deficiency and anemia of chronic inflammation.  History of iron deficiency with intermittent use of oral iron and intolerance to oral iron due to stomach pain.  #.  Left-sided empyema, post chest tube placed  #.  H. influenzae bacteremia  #.  Recurrent sinusitis  #.  Mild splenomegaly of unclear clinical significance at this point.     Markedly reduced IgG level of <109, IgA of < 5. HIV negative.  Clinically no palpable adenopathy.  She has mild splenomegaly of 12 cm.  Clinically not consistent with lymphoma or other lymphoproliferative disease.  Awaiting flow cytometry result to rule out secondary immunodeficiency.   Offered IVIG due to current level of undetectable IgG level and recurrent and severe bacterial infections.  I reviewed the side effects.  She agreed to proceed but she want to start tomorrow.   She has microcytic anemia.  She has low in iron saturation, however TIBC is suppressed as well, therefore it is likely combined presentation of iron deficiency anemia as well as anemia of inflammation.    Plan:  -We will give IVIG in the setting of severe hypogammaglobulinemia with severe infections bacteremia, empyema.    -IV sucrose 300 mg x 1  -We will need follow-up on splenomegaly  -Will need a consultation with immunologist/allergist for further evaluation of severe hypogammaglobulinemia concerning for CVID.    ___________________________________________________________________________        History  Ms. Arely Saldaña is a very pleasant 41 year old female who is currently in the hospital with H.  influenzae bacteremia and empyema of the left lung.  She reported that she is in her usual health without history of recurrent functions up until 2021.  She started having recurrent sinus infection and she was treated with 3 courses of antibiotic at that time.  In February, she was diagnosed with pneumonia and she completed 2 courses of antibiotics.  She came to the hospital with worsening shortness of breath and found bilateral pleural effusion and pneumonia and subsequently found a left empyema.  She had a chest tube drain.  At the time of my encounter, she is feeling slightly better.  She still have some shortness of breath.  She feels her abdomen is bloated.  No diarrhea.  She eats a lot of seafood but no other meat in general.  She has regular menstrual cycles.  She has history of iron deficiency and she takes oral iron periodically.  She cannot take iron all the time due to stomach upset.  She has lost about 7 pounds since February attributed to pneumonia.  She has fever and her last fever was yesterday.  No night sweats.    No personal history of malignancy.  No family history of immunodeficiency.    She is .  They have 1-year-old daughter.  Her daughter is healthy.  She does not smoke.  She does not drink alcohol.  She is a .    Review of systems.  Apart from describing in history, the remainder of comprehensive ROS was negative.      Past History  Past Medical History:   Diagnosis Date     ASCUS of cervix with negative high risk HPV 2018-2012 NIL annual pap 4/13/15 NIL pap, neg HPV 18 ASCUS, neg HPV 11/10/20 NIL pap, neg HPV     Hypertension     was on nifedipine and HCTZ before pregnancy, had since age early 20's     Past Surgical History:   Procedure Laterality Date     C/SECTION, LOW TRANSVERSE  2021    with PPTL     FOOT SURGERY Left age 20    bursa removed from left foot     SALPINGECTOMY Bilateral 2021    with  section     WISDOM TOOTH  "EXTRACTION  age 19     Family History   Problem Relation Age of Onset     Hypertension Mother      Hypertension Father      Diabetes Father      Social History     Socioeconomic History     Marital status:      Spouse name: Not on file     Number of children: 0     Years of education: Not on file     Highest education level: Not on file   Occupational History     Not on file   Tobacco Use     Smoking status: Never Smoker     Smokeless tobacco: Never Used   Substance and Sexual Activity     Alcohol use: Not Currently     Comment: Alcoholic Drinks/day: once every 2-3 months prior to pregnancy     Drug use: Never     Sexual activity: Yes     Partners: Male     Birth control/protection: Surgical     Comment: tubal ligation   Other Topics Concern     Not on file   Social History Narrative     Not on file     Social Determinants of Health     Financial Resource Strain: Not on file   Food Insecurity: Not on file   Transportation Needs: Not on file   Physical Activity: Not on file   Stress: Not on file   Social Connections: Not on file   Intimate Partner Violence: Not on file   Housing Stability: Not on file       Allergies    Allergies   Allergen Reactions     Metronidazole Other (See Comments)     Had \"tremulous chills\" associated with iv infusion.  Resolved after stopping infusion     Sulfamethoxazole-Trimethoprim [Sulfamethoxazole W/Trimethoprim] Rash          Physical Exam    /75 (BP Location: Left arm)   Pulse 119   Temp 98.7  F (37.1  C) (Oral)   Resp (!) 32   Ht 1.676 m (5' 6\")   Wt 67.5 kg (148 lb 14.4 oz)   LMP 03/01/2022   SpO2 93%   BMI 24.03 kg/m        General: alert, awake, not in acute distress  HEENT: Head: Normal, normocephalic, atraumatic.  Eye: Normal external eye, conjunctiva, lids cornea, ABDULLAHI.  Nose: Normal external nose, mucus membranes and septum.  Pharynx: Normal buccal mucosa. Normal pharynx.  Neck / Thyroid: Supple, no masses, nodes, nodules or enlargement.  Lymphatics: No " abnormally enlarged lymph nodes.  Chest: Normal chest wall and respirations. Clear to auscultation.  Left chest tube drain present.  There is a purulent bloody drainage.  Heart: S1 S2 RRR, no murmur.   Abdomen: abdomen is soft without significant tenderness, masses, organomegaly or guarding  Extremities: normal strength, tone, and muscle mass  Skin: normal. no rash or abnormalities  CNS: non focal.    Lab Results  Recent Results (from the past 24 hour(s))   CBC with platelets    Collection Time: 04/07/22  4:35 AM   Result Value Ref Range    WBC Count 9.1 4.0 - 11.0 10e3/uL    RBC Count 4.00 3.80 - 5.20 10e6/uL    Hemoglobin 9.1 (L) 11.7 - 15.7 g/dL    Hematocrit 29.4 (L) 35.0 - 47.0 %    MCV 74 (L) 78 - 100 fL    MCH 22.8 (L) 26.5 - 33.0 pg    MCHC 31.0 (L) 31.5 - 36.5 g/dL    RDW 15.7 (H) 10.0 - 15.0 %    Platelet Count 407 150 - 450 10e3/uL   Extra Green Top (Lithium Heparin) Tube    Collection Time: 04/07/22  4:35 AM   Result Value Ref Range    Hold Specimen JIC    Phosphorus    Collection Time: 04/07/22  4:35 AM   Result Value Ref Range    Phosphorus 3.9 2.5 - 4.5 mg/dL   Prealbumin    Collection Time: 04/07/22  2:27 PM   Result Value Ref Range    Prealbumin 7 (L) 19 - 38 mg/dL   Lactic Acid STAT    Collection Time: 04/07/22  4:38 PM   Result Value Ref Range    Lactic Acid 1.4 0.7 - 2.0 mmol/L        Imaging Results    XR Chest 1 View    Result Date: 4/3/2022  EXAM: XR CHEST 1 VIEW LOCATION: [facility] DATE/TIME: 4/3/2022 6:59 PM INDICATION: dyspnea; status post left thoracentesis COMPARISON: AP chest radiograph from earlier today     IMPRESSION: Considerable decrease in the amount of left pleural fluid. The left upper lobe is better inflated. The residual opacity in the left basal chest has a sharp interface which parallels the left ventricular heart border within which there are no air bronchograms consistent with lobar atelectasis of the left lower lobe. There is minimal atelectasis  or pleural fluid in the left lateral costophrenic sulcus. Unchanged atelectasis/pleural fluid along the right hemidiaphragm.     Echocardiogram Complete    Result Date: 4/3/2022  [id number]^FABIANO^ARY^[place] [address]  Name: SHANI PETIT MRN: [id number] : 1980 Study Date: 2022 02:38 PM Age: 41 yrs Gender: Female Patient Location: [facility] Reason For Study: SOB Ordering Physician: ARY MARIO Performed By: [facility]  BSA: 1.7 m2 Height: 66 in Weight: 145 lb HR: 120 BP: 138/93 mmHg ______________________________________________________________________________ Procedure Complete Portable Echo Adult. ______________________________________________________________________________ Interpretation Summary  Left ventricular size, wall motion and function are normal. The ejection fraction is > 65%. Normal right ventricle size and systolic function. No hemodynamically significant valvular abnormalities on 2D or color flow imaging. ______________________________________________________________________________ Left Ventricle Left ventricular size, wall motion and function are normal. The ejection fraction is > 65%. There is normal left ventricular wall thickness. Left ventricular diastolic function is normal. No regional wall motion abnormalities noted.  Right Ventricle Normal right ventricle size and systolic function.  Atria Normal left atrial size. Right atrial size is normal. There is no color Doppler evidence of an atrial shunt.  Mitral Valve Mitral valve leaflets appear normal. There is no evidence of mitral stenosis or clinically significant mitral regurgitation.  Tricuspid Valve Tricuspid valve leaflets appear normal. There is no evidence of tricuspid stenosis or clinically significant tricuspid regurgitation. Right ventricle systolic pressure estimate normal.  Aortic Valve Aortic valve leaflets appear normal. There is no evidence of  aortic stenosis or clinically significant aortic regurgitation.  Pulmonic Valve The pulmonic valve is not well seen, but is grossly normal. This degree of valvular regurgitation is within normal limits.  Vessels The aorta root is normal. Normal size ascending aorta. IVC diameter <2.1 cm collapsing >50% with sniff suggests a normal RA pressure of 3 mmHg.  Pericardium There is no pericardial effusion.  ______________________________________________________________________________ MMode/2D Measurements & Calculations IVSd: 0.68 cm LVIDd: 3.8 cm LVIDs: 2.3 cm LVPWd: 1.0 cm FS: 39.1 %  LV mass(C)d: 91.8 grams LV mass(C)dI: 52.6 grams/m2 LA dimension: 2.4 cm LVOT diam: 2.0 cm LVOT area: 3.1 cm2 LA Volume Indexed (AL/bp): 26.0 ml/m2 RWT: 0.55  Time Measurements MM HR: 120.0 BPM  Doppler Measurements & Calculations MV E max qamar: 59.1 cm/sec MV A max qamar: 91.3 cm/sec MV E/A: 0.65 MV dec slope: 559.2 cm/sec2 MV dec time: 0.11 sec Ao V2 max: 155.5 cm/sec Ao max PG: 10.0 mmHg Ao V2 mean: 103.6 cm/sec Ao mean P.9 mmHg Ao V2 VTI: 23.0 cm JERONIMO(I,D): 2.8 cm2 JERONIMO(V,D): 2.8 cm2 LV V1 max P.1 mmHg LV V1 max: 142.1 cm/sec LV V1 VTI: 20.9 cm SV(LVOT): 64.8 ml SI(LVOT): 37.2 ml/m2 PA acc time: 0.09 sec AV Qamar Ratio (DI): 0.91 JERONIMO Index (cm2/m2): 1.6 E/E' av.1 Lateral E/e': 3.8 Medial E/e': 6.3  ______________________________________________________________________________ Report approved by: Fransisco Belcher 2022 03:46 PM       Abdomen US, limited (RUQ only)    Result Date: 4/3/2022  EXAM: US ABDOMEN LIMITED LOCATION: [facility] DATE/TIME: 4/3/2022 1:51 PM INDICATION: abnormal LFTs COMPARISON: CT from earlier today TECHNIQUE: Limited abdominal ultrasound. FINDINGS: GALLBLADDER: Cholelithiasis. BILE DUCTS: No biliary dilatation. The common duct measures 6 mm. LIVER: Normal parenchyma with smooth contour. No focal mass. RIGHT KIDNEY: No hydronephrosis. PANCREAS: The visualized portions are  normal. No ascites.     IMPRESSION: 1.  Cholelithiasis. Otherwise normal right upper quadrant ultrasound.     US Thoracentesis    Result Date: 4/3/2022  EXAM: 1. LEFT THORACENTESIS 2. ULTRASOUND GUIDANCE LOCATION: [facility] DATE/TIME: 4/3/2022 6:17 PM INDICATION: Pleural effusion. PROCEDURE: Informed consent obtained. Time out performed. The chest was prepped and draped in sterile fashion. 10 mL of 1 % lidocaine was infused into the local soft tissues. Under direct ultrasound guidance, a 5 Persian catheter system was placed into the pleural effusion. 800 mL of thick cloudy yellow fluid were removed and sent to lab, if requested. Patient tolerated procedure well. Ultrasound imaging was obtained and placed in the patient's permanent medical record.     IMPRESSION: Status post left ultrasound-guided thoracentesis. Reference CPT Code: 54283    XR Chest Port 1 View    Result Date: 4/7/2022  EXAM: XR CHEST PORT 1 VIEW LOCATION: [facility] DATE/TIME: 4/7/2022 8:08 AM INDICATION: Follow up effusion empyema, on lytic therapy. COMPARISON: 04/05/2022     IMPRESSION: Slight retraction of the left basilar chest tube. Continued decrease in the layering left effusion and associated atelectasis. There is a similar small amount of air within the pleural space medially on the retrocardiac region compatible with  recent tube placement. Trace right effusion and associated atelectasis. Heart size is normal.    XR Chest Port 1 View    Result Date: 4/5/2022  EXAM: XR CHEST PORT 1 VIEW LOCATION: [facility] DATE/TIME: 4/5/2022 5:43 AM INDICATION: follow up empyema, pneumonia. COMPARISON: 04/03/2022. CT chest tube placement of 04/04/2022.     IMPRESSION: There is a new chest tube at the left medial lung base. There has been decrease in left pleural effusion and there is some residual of effusion and atelectasis at the left lung base. No pneumothorax. Mild  atelectasis at the right lung base. Normal heart size and pulmonary vascularity.    XR Chest Port 1 View    Result Date: 4/3/2022  EXAM: XR CHEST PORT 1 VIEW LOCATION: [facility] DATE/TIME: 4/3/2022 11:29 PM INDICATION: Shortness of breath COMPARISON: 04/03/2022     IMPRESSION: The chest is stable with again seen minimal right pleural effusion and mild left pleural effusion with some underlying infiltrate/atelectasis in both lung bases and extending into the left midlung. The chest is otherwise negative.    XR Chest Port 1 View    Result Date: 4/3/2022  EXAM: XR CHEST PORT 1 VIEW LOCATION: [facility] DATE/TIME: 4/3/2022 4:38 PM INDICATION: worsening sob COMPARISON: CT pulmonary angiogram 04/03/2022 at 1256 hours     IMPRESSION: Moderate to large left and small right pleural effusions are not visibly changed. Related atelectasis including near complete atelectasis of the left lower lobe. Lower left ventricular heart border is silhouetted. Cardiac silhouette is not enlarged. Azygos vein is not distended and the vascular pedicle width is normal. No pneumothorax.    CT Chest Tube with Cath Placement    Result Date: 4/4/2022  EXAM: 1. PERCUTANEOUS CHEST TUBE PLACEMENT IN THE LEFT PLEURAL SPACE 2. CT GUIDANCE 3. CONSCIOUS SEDATION LOCATION: [facility] DATE/TIME: 4/4/2022 2:56 PM INDICATION: Left pleural effusion with concern for empyema TECHNIQUE: Dose reduction techniques were used. PROCEDURE: Informed consent obtained. Site marked. Prior images reviewed. Required items made available. Patient identity confirmed verbally and with arm band. Patient reevaluated immediately before administering sedation. Universal protocol was followed. Time out performed. The site was prepped and draped in sterile fashion. 10 mL of 1% lidocaine was infused into the local soft tissues. Using standard technique and under direct CT guidance, a 12 Azeri chest tube  catheter was inserted into the pleural space. The catheter was fixed in place with sutures and adhesive device, and the tubing was banded. Chest tube placed to Pleur-evac suction. BLOOD LOSS: Minimal. The patient tolerated the procedure well. No immediate complications. SEDATION: Versed 1 mg. Fentanyl 50 mcg. The procedure was performed with administration intravenous conscious sedation with appropriate preoperative, intraoperative, and postoperative evaluation. 20 minutes of supervised face to face conscious sedation time was provided by a radiology nurse under my direct supervision.     IMPRESSION: 1.  Successful CT-guided chest tube placement into the left pleural space. Reference CPT Codes: 49911, 02745    CT Chest (PE) Abdomen Pelvis w Contrast    Result Date: 4/3/2022  EXAM: CT CHEST PE ABDOMEN PELVIS W CONTRAST LOCATION: [facility] DATE/TIME: 4/3/2022 12:49 PM INDICATION: Chest and abdominal pain. Elevated liver function tests. PE suspected, high prob COMPARISON: 03/20/2022 CT chest. TECHNIQUE: CT chest pulmonary angiogram and routine CT abdomen pelvis with IV contrast. Arterial phase through the chest and venous phase through the abdomen and pelvis. Multiplanar reformats and MIP reconstructions were performed. Dose reduction techniques were used. CONTRAST: 100ml Isovue 370 FINDINGS: ANGIOGRAM CHEST: Pulmonary arteries are normal caliber and negative for pulmonary emboli. Thoracic aorta is negative for dissection. No CT evidence of right heart strain. LUNGS AND PLEURA: The previous large dense consolidative infiltrate in the left upper lobe and left lower lobe seen on 03/10/2022 has near completely resolved. New small right and moderate left pleural effusions with compressive appearing atelectasis in the lung bases. MEDIASTINUM/AXILLAE: Normal. CORONARY ARTERY CALCIFICATION: None. HEPATOBILIARY: Gallstones. Normal liver. PANCREAS: Normal. SPLEEN: Mild splenomegaly measuring 12 cm.  ADRENAL GLANDS: Normal. KIDNEYS/BLADDER: Normal. BOWEL: Moderate amount of stool throughout the entire colon suggestive of constipation. LYMPH NODES: Normal. VASCULATURE: Unremarkable. PELVIC ORGANS: Normal. MUSCULOSKELETAL: Normal.     IMPRESSION: 1.  Negative for pulmonary emboli. 2.  The previous dense consolidative infiltrates in the left lung seen on CT 03/10/2022 appear to near completely resolved. 3.  New small right and moderate left pleural effusions with moderate atelectasis in the lower lobes greater on the left side. 4.  Gallstones. 5.  Mild splenomegaly.       Signed by: Lizzy Conn MD

## 2022-04-07 NOTE — PROGRESS NOTES
[facility]:  PULMONARY PROGRESS NOTE     Date / Time of Admission:  4/3/2022 11:04 AM    ID: Arely Saldaña is a 41 year old female, never smoker, with essential hypertension and pneumonia diagnosis since February 2022 who was admitted to [facility] on 4/3/2022 with bilateral pleural effusions and pneumonia, now being treated for gram-negative coccobacilli bacteremia and left pleural empyema with Haemophilus influenzae non-typable.  Course complicated by significant immunodeficiency and anemia with iron-deficiency.    Reason for consult: Pleural effusion.          Assessment: 1.   Left pleural empyema with Haemophilus influenzae nontypable s/p thoracentesis 4/3, s/p chest tube 4/4.  Patient with persistent pneumonia since 02/2022, failed 2 rounds of outpatient antibiotic therapy, now admitted with recurrent pneumonia and bilateral pleural effusions (L > R).  Underwent thoracentesis 4/3, 800 mL thick yellow fluid extracted, Gram stain showing gram-negative coccobacilli.  Chest tube placed 4/4, ~ 750 mL removed soonafter insertion.  Initiated on intrapleural fibrinolytics (alteplase) 4/5 (BID dosing x 6 doses).  2. Gram negative bacteremia with Haemophilus influenzae.  Blood cultures x 2 on 4/3 with 2/4 bottles + for gram negative coccobacilli.  Vanco IV discontinued 4/5.  3. History of recurrent left-sided pneumonia.  Treated with 1 week Abx in 02/2022 and again 1 week Abx in 03/2022 without improvement.    4. Suspected CVID vs acquired immunodeficiency.  Immunoglobulins sent 4/5, profound reduction in all lines: IgA < 5, IgG < 109, IgE < 2.  If patient truly has CVID, then villous atrophy may be seen in these patients, which may result in poor oral absorption (including poor oral absorption of iron); patient's iron level 9 this admission.  HIV Ag Ab combo screen negative 4/5/22.  Other possible causes of acquired immunodeficiency may be malnutrition.   5. Pleuritic chest pain.  Developed  evening of 4/3.  Transiently placed on NIPPV due to increased work of breathing with pleuritic chest pain.    6. Essential hypertension.  Patient on lisinopril 20 mg as outpatient, but takes as needed only when BP greater than 130/40.         Plan:     ID consultation appreciated to assist with long-term empyema therapy and antibiotic management.       Continue ceftriaxone and clindamycin IV for good source control.     Follow-up cultures/blood cultures.  Last fever in early evening 4/6.    Continue intrapleural alteplase 2x/day x 6 doses (start 4/5) - last dose tonight.     CXR reviewed, pleural effusion reduced but still present.  I reviewed potential need for VATS with the patient.     CT chest study ordered for tomorrow morning.  Will determine surgical involvement at that time.     Concern for possible CVID and acquired immunodeficiency.  Check prealbumin, phosphorous, flow cytometry.     Dr. Andrade is going to discuss with patient regarding IVIg infusion.      Oral intake for GI prophylaxis, Lovenox for DVT prophylaxis.    Patient and/or family were educated on the above recommendations.   Thank you for allowing our service to participate in the care of this patient.  Please call with any questions or concerns.    Total patient care time: 35 minutes, with over 50% spent in counseling/coordination of care.      Brea Upton MD, MPH  Pulmonary/Critical Care Medicine  04/07/2022  1:13 PM        Subjective/Interval History:   4/3:  Thoracentesis, 800 mL thick yellow fluid removed.   4/4:  Pleural effusion + gram stain w/ GN coccobacilli.  BCx + with GN coccobacilli - later speciated as H. Influenzae nontypable. L chest tube placed.    4/5:  CXR reviewed, persistent effusion.  Initiated tPA therapy into chest tube.  Pleural fluid yellow prior to tPA.  4/6: Abx switched initially from Zosyn to ceftriaxone/Flagyl.  However, reaction to flagyl - this was stopped and switched to clindamycin.  Fevers in evening.   "Cultures sent.      Overnight, no issues.     Doing well overall.  Noticing clots in the chest tube.   Pain with movement otherwise stable.   Denies wheezing, hemoptysis.  Cough slightly improved - greenish in morning and more clear in afternoon.     I/O: 1000 mL from chest tube in last 24 hours.    Review of Systems:  Pertinent items are noted in HPI.  The Review of Systems is negative other than noted in the HPI        Allergies/Medications:   Allergies:     Allergies   Allergen Reactions     Metronidazole Other (See Comments)     Had \"tremulous chills\" associated with iv infusion.  Resolved after stopping infusion     Sulfamethoxazole-Trimethoprim [Sulfamethoxazole W/Trimethoprim] Rash       Continuous Infusions:    Scheduled Medications:    alteplase (ACTIVASE) 10 mg and dornase 5 mg in NS syringe for chest tube instillation  50 mL Chest Tube BID     cefTRIAXone  2 g Intravenous Q24H     clindamycin  600 mg Intravenous Q8H     enoxaparin ANTICOAGULANT  40 mg Subcutaneous Q24H     ferrous sulfate  325 mg Oral Every Other Day     lisinopril  5 mg Oral Daily            Objective:   Vitals:  /79 (BP Location: Right arm)   Pulse 109   Temp 98.4  F (36.9  C) (Oral)   Resp 24   Ht 1.676 m (5' 6\")   Wt 67.5 kg (148 lb 14.4 oz)   LMP 03/01/2022   SpO2 92%   BMI 24.03 kg/m    GEN: Pleasant female, sitting in the bed in no acute distress  HEENT: Normocephalic, atraumatic.  Extraoccular eye movements intact, anicteric sclera.  Moist mucous membranes.   NECK: Supple.    CHEST:  L posterior chest tube, to suction, no air leaks.  Mild tenderness around site.  PULM: Non-labored breathing.  No use of accessory muscles.  Mildly diminished breath sounds at left lung base.  No wheezing or rales.  CVS: Regular rate and rhythm.  Normal S1, S2.  No rubs, murmurs, or gallops.    ABDOMEN: Normoactive bowel sounds.  Non-tender to palpation.  Non-distended.    EXTREMITES:  No clubbing, cyanosis, or edema.    NEURO:  Awake.  " Oriented to person, place, time and situation.  Cranial nerves 2-12 grossly intact.  Moving all extremities.      Intake/Output:  I/O last 3 completed shifts:  In: 262 [I.V.:262]  Out: 1700 [Urine:700; Chest Tube:1000]        Pertinent Studies:   All laboratory data reviewed  Serum Glucose range:   Recent Labs   Lab 04/05/22  0506 04/03/22 2312 04/03/22  1944 04/03/22  1848 04/03/22  1141   GLC 88 116 175* 148* 121     ABG: No lab results found in last 7 days.  CBC:   Recent Labs   Lab 04/07/22  0435 04/06/22 0449 04/05/22  0506 04/04/22  0501 04/03/22  1141   WBC 9.1 9.5 9.1   < > 8.8   HGB 9.1* 8.7* 7.5*   < > 9.4*   HCT 29.4* 28.9* 25.0*   < > 30.7*   MCV 74* 75* 75*   < > 74*    402 366   < > 379   NEUTROPHIL  --   --   --   --  90   LYMPH  --   --   --   --  4   MONOCYTE  --   --   --   --  6   EOSINOPHIL  --   --   --   --  0    < > = values in this interval not displayed.     Chemistry:   Recent Labs   Lab 04/06/22 0449 04/05/22  0506 04/03/22 2312 04/03/22  1747 04/03/22  1141   NA  --  139 138  --  139   POTASSIUM  --  3.8 3.9  --  3.8   CHLORIDE  --  108* 108*  --  104   CO2  --  21* 19*  --  20*   BUN  --  13 13  --  12   CR  --  0.61 0.65  --  0.69   GFRESTIMATED  --  >90 >90  --  >90   PABLO  --  8.9 8.1*  --  9.3   MAG  --   --   --   --  1.7*   PROTTOTAL 4.1* 4.7* 4.4*   < > 5.8*   ALBUMIN 1.8* 1.9* 2.0*  --  2.9*   AST 10 8 39  --  92*   ALT 16 23 41  --  51*   ALKPHOS 142* 155* 200*  --  268*   BILITOTAL 0.4 0.4 1.4*  --  1.4*    < > = values in this interval not displayed.     Coags:  Recent Labs   Lab 04/03/22  1651   INR 1.20*     Cardiac Markers:  Recent Labs   Lab 04/03/22  2312   TROPONINI <0.01     Left pleural fluid, 4/3:    [phone]    Microbiology:   Blood culture x 2 set, 4/6: Collected/pending.  Blood culture x 1, 4/5: NGTD  Blood culture x 2 sets, 4/3: 2 of 4 bottles with gram negative coccobacilli.  1 of 2 bottles - growing Haemophilus influenzae nontypable.  Blood culture  x 1 set, 4/3: 1 of 2 bottles - growing Haemophilus influenzae nontypable.  Left pleural fluid, 4/3:  Gram stain: 4+ WBC, 4+ gram negative coccobacilli.  Cx:  4+ Haemophilus influenzae nontypable.  COVID-19 PCR, 4/3: Negative  Influenza A/B swab, 4/3: Negative        Cardiology/Radiology:   Cardiac: All cardiac studies reviewed by me.    EKG:  Reviewed    TTE, 4/3/22:  Summary: Left ventricular size, wall motion and function are normal. The ejection fraction is > 65%. Normal right ventricle size and systolic function. No hemodynamically significant valvular abnormalities on 2D or color flow imaging.     Radiology: All imaging studies reviewed by me.    Chest X-Ray, 4/7:  Slight retraction of the left basilar chest tube. Continued decrease in the layering left effusion and associated atelectasis. There is a similar small amount of air within the pleural space medially on the retrocardiac region compatible with recent tube placement. Trace right effusion and associated atelectasis. Heart size is normal    CT chst PE study, Abd/pelvis, 4/3:   ANGIOGRAM CHEST: Pulmonary arteries are normal caliber and negative for pulmonary emboli. Thoracic aorta is negative for dissection. No CT evidence of right heart strain.   LUNGS AND PLEURA: The previous large dense consolidative infiltrate in the left upper lobe and left lower lobe seen on 03/10/2022 has near completely resolved. New small right and moderate left pleural effusions with compressive appearing atelectasis in the lung bases.  MEDIASTINUM/AXILLAE: Normal.  CORONARY ARTERY CALCIFICATION: None.  HEPATOBILIARY: Gallstones. Normal liver.  PANCREAS: Normal.  SPLEEN: Mild splenomegaly measuring 12 cm.  ADRENAL GLANDS: Normal.  KIDNEYS/BLADDER: Normal.   BOWEL: Moderate amount of stool throughout the entire colon suggestive of constipation.  LYMPH NODES: Normal.  VASCULATURE: Unremarkable.  PELVIC ORGANS: Normal.  MUSCULOSKELETAL: Normal.    IMPRESSION: 1.  Negative for  pulmonary emboli.  2.  The previous dense consolidative infiltrates in the left lung seen on CT 03/10/2022 appear to near completely resolved.  3.  New small right and moderate left pleural effusions with moderate atelectasis in the lower lobes greater on the left side. 4.  Gallstones. 5.  Mild splenomegaly.

## 2022-04-07 NOTE — PROGRESS NOTES
[facility]    Infectious Disease Progress Note    04/07/2022      Assessment & Plan   Arely Saldaña is a 41 year old female who was admitted on 4/3/2022.     1. Empyema, left-sided-chest tube in place, tPA instilled by pulmonary.  2. 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 Recurrent left-sided pneumonia  4. sinusitis since August/September 2021  5. Hypertension, pleuritic chest pain, tachypnea  6. Status post thoracentesis and chest tube placement on 4/4/2022  7. Elevated liver enzymes, improving.  Splenomegaly  8. Hypogammaglobulinemia: Decreased immunoglobulin levels, IgA < 5, IgA <109, IgA < 2. ?  CVID  9. Transthoracic echocardiogram did not show vegetations  10. Patient is a vegetarian    Recommendations     1. Hematology oncology consultation to further evaluate CVID, acquired immune deficiency, anemia  2. Ordered flow cytometry  3. Follow repeat blood culture results  4. Continue ceftriaxone and clindamycin.  5. Repeat CT chest on 4/8/2022 by pulmonary  6. May need VATS for source control, resolution of empyema  7. Chest tube management per pulmonary team  8. Likely need prolonged antibiotic course, depending on clinical response and repeat culture results  9. Patient has an ENT appointment and procedure scheduled for May 2022 for nasal polyps  10. Appreciate GI input  11. Discussed with patient and nurse, pulmonary.    Josefina Andrade MD  [facility]  [phone]     Susceptibility     Haemophilus influenzae nontypable     HANNA     Amoxicillin/Clavulanate 0.75 ug/mL Susceptible     Ampicillin 0.38 ug/mL Susceptible     Ceftriaxone <=0.016 ug/mL Susceptible     Levofloxacin 0.032 ug/mL Susceptible     Meropenem 0.125 ug/mL Susceptible                CT chest 4/3/2022: Prior to chest tube  placement                 Interval History   Chart reviewed  Detailed discussion with patient regarding low immunoglobulin levels  Still having cough, fever overnight    Patient transferred out of ICU.  Breathing slightly better.  Still tachycardic.  Cough present.  Chest tube in place    Physical Exam   Temp: 98.4  F (36.9  C) Temp src: Oral BP: 115/79 Pulse: 109   Resp: 24 SpO2: 92 % O2 Device: None (Room air) Oxygen Delivery: 2 LPM  Vitals:    04/04/22 0600 04/05/22 0730 04/06/22 0500   Weight: 65.1 kg (143 lb 8 oz) 64.9 kg (143 lb) 67.5 kg (148 lb 14.4 oz)     Vital Signs with Ranges  Temp:  [98.4  F (36.9  C)-102.8  F (39.3  C)] 98.4  F (36.9  C)  Pulse:  [104-135] 109  Resp:  [24-44] 24  BP: (109-132)/(65-88) 115/79  SpO2:  [89 %-96 %] 92 %    Exam on 04/07/2022    GENERAL APPEARANCE:  awake, appears ill  EYES: Eyes grossly normal to inspection, PERRL and conjunctivae and sclerae normal  HENT: Dry mucosa  NECK: Supple  RESP: Tachypnea, decreased breath sounds on left, chest tube  CV: S1-S2 tachycardia  LYMPHATICS: No severe lymphedema  ABDOMEN: Firm, pleuritic chest pain  MS: extremities normal- no gross deformities noted  SKIN: no suspicious lesions or rashes  Neuro: Awake, deconditioned , able to answer questions    Medications       alteplase (ACTIVASE) 10 mg and dornase 5 mg in NS syringe for chest tube instillation  50 mL Chest Tube BID     cefTRIAXone  2 g Intravenous Q24H     clindamycin  600 mg Intravenous Q8H     enoxaparin ANTICOAGULANT  40 mg Subcutaneous Q24H     ferrous sulfate  325 mg Oral Every Other Day     lisinopril  5 mg Oral Daily       Data   All microbiology laboratory data reviewed.  Recent Labs   Lab Test 04/07/22  0435 04/06/22  0449 04/05/22  0506   WBC 9.1 9.5 9.1   HGB 9.1* 8.7* 7.5*   HCT 29.4* 28.9* 25.0*   MCV 74* 75* 75*    402 366     Recent Labs   Lab Test 04/05/22  0506 04/03/22  2312 04/03/22  1141   CR 0.61 0.65 0.69     Recent Labs   Lab Test 01/21/22  1435   SED 14      MICRO  04/06/2022 0455 04/06/2022 0516 Blood Culture Hand, Right [[id number]]   Blood from Hand, Right    In process Component Value   No component results              04/06/2022 0449 04/06/2022 0516 Blood Culture Peripheral Blood [[id number]]   Peripheral Blood    In process Component Value   No component results              04/05/2022 0506 04/06/2022 0017 Blood Culture Peripheral Blood [[id number]]   Peripheral Blood    Preliminary result Component Value   Culture No growth after 12 hours P              04/03/2022 1915 04/05/2022 1438 Blood Culture Arm, Left [[id number]]   (Abnormal)   Blood from Arm, Left    Preliminary result Component Value   Culture Positive on the 1st day of incubation Abnormal  P    Haemophilus influenzae nontypable Panic  P    1 of 2 bottles   Corrected on April 4, 2022/7:25 PM by Michael Philip   Previously reported as: gram positive cocci in pairs and chains. Corrected to gram negative cocobacilli.     Susceptibilities done on previous cultures              04/03/2022 1849 04/03/2022 2108 Cell count with differential fluid [[id number]]    (Abnormal)   Pleural fluid from Pleural Cavity, Left    Final result Component Value   No component results           04/03/2022 1849 04/06/2022 0845 Pleural fluid Aerobic Bacterial Culture Routine with Gram Stain [[id number]]     (Abnormal)   Pleural fluid from Pleural Cavity, Left    Final result Component Value   Culture 4+ Haemophilus influenzae nontypable Abnormal    Gram Stain Result 4+ Gram negative coccobacilli Abnormal     4+ WBC seen Abnormal     Predominantly PMNs         Susceptibility     Haemophilus influenzae nontypable     HANNA     Amoxicillin/Clavulanate 0.75 ug/mL Susceptible     Ampicillin 0.38 ug/mL Susceptible     Ceftriaxone <=0.016 ug/mL Susceptible     Levofloxacin 0.032 ug/mL Susceptible                   04/03/2022 1849 04/03/2022 1946 Glucose fluid [[id number]]    Pleural fluid from Pleural Cavity, Left     Final result Component Value Units   Glucose Fluid Source Pleural Cavity, Left    left pleural fluid   left pleural fluid   left pleural fluid   left pleural fluid   Glucose fluid <5 mg/dL           04/03/2022 1849 04/04/2022 0003 Lactate dehydrogenase fluid [[id number]]    Pleural fluid from Pleural Cavity, Left    Final result Component Value Units   LD Fluid Source Pleural Cavity, Left    left pleural fluid   left pleural fluid   Lactate dehydrogenase fluid 2,332 U/L   Collect pleural fluid when Thoracentesis procedure           04/03/2022 1849 04/03/2022 2246 Protein fluid [[id number]]    Pleural fluid from Pleural Cavity, Left    Final result Component Value Units   Protein Fluid Source Pleural Cavity, Left    left pleural fluid   left pleural fluid   Protein Total Fluid 3.7 g/dL           04/03/2022 1849 04/03/2022 2014 pH fluid [[id number]]    Pleural fluid from Pleural Cavity, Left    Final result Component Value Units   pH Fluid Source Pleural Cavity, Left    left pleural fluid   left pleural fluid   pH Fluid 7.5 pH           04/03/2022 1849 04/03/2022 2108 Cell Count Body Fluid [[id number]]    (Abnormal)   Pleural fluid from Pleural Cavity, Left    Final result Component Value   Color Yellow   Clarity Turbid Abnormal    Cell Count Fluid Source Pleural Cavity, Left   left pleural fluid   left pleural fluid   left pleural fluid   left pleural fluid   left pleural fluid   left pleural fluid   left pleural fluid   left pleural fluid   left pleural fluid   left pleural fluid   left pleural fluid   Clot Presence Large Clot (>Half Volume)   Specimen clotted. Slide reviewed, no   abnormal cells seen.    Cytology Ordered No           04/03/2022 1154 04/05/2022 1438 Blood Culture Peripheral Blood [[id number]]   (Abnormal)   Peripheral Blood    Preliminary result Component Value   Culture Positive on the 1st day of incubation Abnormal  P    Haemophilus influenzae nontypable Panic  P    1 of 2 bottles    Susceptibilities done on previous cultures              2022 1141 2022 0845 Blood Culture Line, venous [[id number]]     (Abnormal)   Blood from Line, venous    Final result Component Value   Culture Positive on the 1st day of incubation Abnormal     Haemophilus influenzae nontypable Panic     1 of 2 bottles         Susceptibility     Haemophilus influenzae nontypable     HANNA     Amoxicillin/Clavulanate 0.75 ug/mL Susceptible     Ampicillin 0.38 ug/mL Susceptible     Ceftriaxone <=0.016 ug/mL Susceptible     Levofloxacin 0.032 ug/mL Susceptible     Meropenem 0.125 ug/mL Susceptible                         RADIOLOGY:  XR Chest 1 View    Result Date: 4/3/2022  EXAM: XR CHEST 1 VIEW LOCATION: [facility] DATE/TIME: 4/3/2022 6:59 PM INDICATION: dyspnea; status post left thoracentesis COMPARISON: AP chest radiograph from earlier today     IMPRESSION: Considerable decrease in the amount of left pleural fluid. The left upper lobe is better inflated. The residual opacity in the left basal chest has a sharp interface which parallels the left ventricular heart border within which there are no air bronchograms consistent with lobar atelectasis of the left lower lobe. There is minimal atelectasis or pleural fluid in the left lateral costophrenic sulcus. Unchanged atelectasis/pleural fluid along the right hemidiaphragm.     Echocardiogram Complete    Result Date: 4/3/2022  [id number]^FABIANO^ARY^[place] [address]  Name: SHANI PETIT MRN: [id number] : 1980 Study Date: 2022 02:38 PM Age: 41 yrs Gender: Female Patient Location: [facility] Reason For Study: SOB Ordering Physician: ARY MARIO Performed By: ANABELL  BSA: 1.7 m2 Height: 66 in Weight: 145 lb HR: 120 BP: 138/93 mmHg ______________________________________________________________________________ Procedure Complete Portable Echo Adult.  ______________________________________________________________________________ Interpretation Summary  Left ventricular size, wall motion and function are normal. The ejection fraction is > 65%. Normal right ventricle size and systolic function. No hemodynamically significant valvular abnormalities on 2D or color flow imaging. ______________________________________________________________________________ Left Ventricle Left ventricular size, wall motion and function are normal. The ejection fraction is > 65%. There is normal left ventricular wall thickness. Left ventricular diastolic function is normal. No regional wall motion abnormalities noted.  Right Ventricle Normal right ventricle size and systolic function.  Atria Normal left atrial size. Right atrial size is normal. There is no color Doppler evidence of an atrial shunt.  Mitral Valve Mitral valve leaflets appear normal. There is no evidence of mitral stenosis or clinically significant mitral regurgitation.  Tricuspid Valve Tricuspid valve leaflets appear normal. There is no evidence of tricuspid stenosis or clinically significant tricuspid regurgitation. Right ventricle systolic pressure estimate normal.  Aortic Valve Aortic valve leaflets appear normal. There is no evidence of aortic stenosis or clinically significant aortic regurgitation.  Pulmonic Valve The pulmonic valve is not well seen, but is grossly normal. This degree of valvular regurgitation is within normal limits.  Vessels The aorta root is normal. Normal size ascending aorta. IVC diameter <2.1 cm collapsing >50% with sniff suggests a normal RA pressure of 3 mmHg.  Pericardium There is no pericardial effusion.  ______________________________________________________________________________ MMode/2D Measurements & Calculations IVSd: 0.68 cm LVIDd: 3.8 cm LVIDs: 2.3 cm LVPWd: 1.0 cm FS: 39.1 %  LV mass(C)d: 91.8 grams LV mass(C)dI: 52.6 grams/m2 LA dimension: 2.4 cm LVOT diam: 2.0 cm LVOT area: 3.1  cm2 LA Volume Indexed (AL/bp): 26.0 ml/m2 RWT: 0.55  Time Measurements MM HR: 120.0 BPM  Doppler Measurements & Calculations MV E max qamar: 59.1 cm/sec MV A max qamar: 91.3 cm/sec MV E/A: 0.65 MV dec slope: 559.2 cm/sec2 MV dec time: 0.11 sec Ao V2 max: 155.5 cm/sec Ao max PG: 10.0 mmHg Ao V2 mean: 103.6 cm/sec Ao mean P.9 mmHg Ao V2 VTI: 23.0 cm JERONIMO(I,D): 2.8 cm2 JERONIMO(V,D): 2.8 cm2 LV V1 max P.1 mmHg LV V1 max: 142.1 cm/sec LV V1 VTI: 20.9 cm SV(LVOT): 64.8 ml SI(LVOT): 37.2 ml/m2 PA acc time: 0.09 sec AV Qamar Ratio (DI): 0.91 JERONIMO Index (cm2/m2): 1.6 E/E' av.1 Lateral E/e': 3.8 Medial E/e': 6.3  ______________________________________________________________________________ Report approved by: Fransisco Belcher 2022 03:46 PM       Abdomen US, limited (RUQ only)    Result Date: 4/3/2022  EXAM: US ABDOMEN LIMITED LOCATION: [facility] DATE/TIME: 4/3/2022 1:51 PM INDICATION: abnormal LFTs COMPARISON: CT from earlier today TECHNIQUE: Limited abdominal ultrasound. FINDINGS: GALLBLADDER: Cholelithiasis. BILE DUCTS: No biliary dilatation. The common duct measures 6 mm. LIVER: Normal parenchyma with smooth contour. No focal mass. RIGHT KIDNEY: No hydronephrosis. PANCREAS: The visualized portions are normal. No ascites.     IMPRESSION: 1.  Cholelithiasis. Otherwise normal right upper quadrant ultrasound.     Abdomen US, limited (RUQ only)    Result Date: 3/10/2022  EXAM: US ABDOMEN LIMITED LOCATION: [facility] DATE/TIME: 3/10/2022 10:16 AM INDICATION: Abnormal liver function tests and right upper quadrant pain. COMPARISON: 1. TECHNIQUE: Limited abdominal ultrasound. FINDINGS: GALLBLADDER: Cholelithiasis and gallbladder sludge are noted. No wall thickening nor pericholecystic fluid. BILE DUCTS: No biliary dilatation. The common duct measures 6 mm. LIVER: Normal parenchyma with smooth contour. No focal mass. RIGHT KIDNEY: No hydronephrosis. PANCREAS: The  visualized portions are normal. No ascites.     IMPRESSION: 1.  Cholelithiasis and gallbladder sludge are present. No evidence of cholecystitis nor bile duct dilatation.     US Thoracentesis    Result Date: 4/3/2022  EXAM: 1. LEFT THORACENTESIS 2. ULTRASOUND GUIDANCE LOCATION: [facility] DATE/TIME: 4/3/2022 6:17 PM INDICATION: Pleural effusion. PROCEDURE: Informed consent obtained. Time out performed. The chest was prepped and draped in sterile fashion. 10 mL of 1 % lidocaine was infused into the local soft tissues. Under direct ultrasound guidance, a 5 Kyrgyz catheter system was placed into the pleural effusion. 800 mL of thick cloudy yellow fluid were removed and sent to lab, if requested. Patient tolerated procedure well. Ultrasound imaging was obtained and placed in the patient's permanent medical record.     IMPRESSION: Status post left ultrasound-guided thoracentesis. Reference CPT Code: 35552    XR Chest Port 1 View    Result Date: 4/5/2022  EXAM: XR CHEST PORT 1 VIEW LOCATION: [facility] DATE/TIME: 4/5/2022 5:43 AM INDICATION: follow up empyema, pneumonia. COMPARISON: 04/03/2022. CT chest tube placement of 04/04/2022.     IMPRESSION: There is a new chest tube at the left medial lung base. There has been decrease in left pleural effusion and there is some residual of effusion and atelectasis at the left lung base. No pneumothorax. Mild atelectasis at the right lung base. Normal heart size and pulmonary vascularity.    XR Chest Port 1 View    Result Date: 4/3/2022  EXAM: XR CHEST PORT 1 VIEW LOCATION: [facility] DATE/TIME: 4/3/2022 11:29 PM INDICATION: Shortness of breath COMPARISON: 04/03/2022     IMPRESSION: The chest is stable with again seen minimal right pleural effusion and mild left pleural effusion with some underlying infiltrate/atelectasis in both lung bases and extending into the left midlung. The chest is otherwise  negative.    XR Chest Port 1 View    Result Date: 4/3/2022  EXAM: XR CHEST PORT 1 VIEW LOCATION: [facility] DATE/TIME: 4/3/2022 4:38 PM INDICATION: worsening sob COMPARISON: CT pulmonary angiogram 04/03/2022 at 1256 hours     IMPRESSION: Moderate to large left and small right pleural effusions are not visibly changed. Related atelectasis including near complete atelectasis of the left lower lobe. Lower left ventricular heart border is silhouetted. Cardiac silhouette is not enlarged. Azygos vein is not distended and the vascular pedicle width is normal. No pneumothorax.    CT Chest Pulmonary Embolism w Contrast    Result Date: 3/10/2022  EXAM: CT CHEST PULMONARY EMBOLISM W CONTRAST LOCATION: [facility] DATE/TIME: 3/10/2022 10:08 AM INDICATION: Shortness of breath. Cough. COMPARISON: 02/21/2022 radiograph. TECHNIQUE: CT chest pulmonary angiogram during arterial phase injection of IV contrast. Multiplanar reformats and MIP reconstructions were performed. Dose reduction techniques were used. CONTRAST: 75ml Isovue 370. FINDINGS: ANGIOGRAM CHEST: Regions of motion artifact. No pulmonary embolism seen. Nonaneurysmal aorta without dissection. LUNGS AND PLEURA: Moderate to large amount of heterogeneously enhancing left upper and lower lobe perihilar predominant consolidation with example area measuring 5.6 x 5.8 cm (series 6, image 108). Minimal opacities elsewhere bilaterally. Mild basilar atelectasis. Small right pleural effusion. No pneumothorax. MEDIASTINUM/AXILLAE: Mild adenopathy, presumed reactive. Normal heart size. No pericardial effusion. CORONARY ARTERY CALCIFICATION: Compromised by motion. UPPER ABDOMEN: Hepatic steatosis. Incompletely seen spleen appears mildly enlarged, measuring roughly 13.3 cm in AP dimension. MUSCULOSKELETAL: Nothing acute     IMPRESSION: 1.  No pulmonary embolism. 2.  Moderate to large amount of left lung consolidation suggestive of  pneumonia. Recommend 6-8 week follow-up PA and lateral radiographs for clearing. A few other mild bilateral opacities. 3.  Small right pleural effusion. 4.  Hepatic steatosis. 5.  Incompletely seen splenomegaly.     CT Chest Tube with Cath Placement    Result Date: 4/4/2022  EXAM: 1. PERCUTANEOUS CHEST TUBE PLACEMENT IN THE LEFT PLEURAL SPACE 2. CT GUIDANCE 3. CONSCIOUS SEDATION LOCATION: [facility] DATE/TIME: 4/4/2022 2:56 PM INDICATION: Left pleural effusion with concern for empyema TECHNIQUE: Dose reduction techniques were used. PROCEDURE: Informed consent obtained. Site marked. Prior images reviewed. Required items made available. Patient identity confirmed verbally and with arm band. Patient reevaluated immediately before administering sedation. Universal protocol was followed. Time out performed. The site was prepped and draped in sterile fashion. 10 mL of 1% lidocaine was infused into the local soft tissues. Using standard technique and under direct CT guidance, a 12 Urdu chest tube catheter was inserted into the pleural space. The catheter was fixed in place with sutures and adhesive device, and the tubing was banded. Chest tube placed to Pleur-evac suction. BLOOD LOSS: Minimal. The patient tolerated the procedure well. No immediate complications. SEDATION: Versed 1 mg. Fentanyl 50 mcg. The procedure was performed with administration intravenous conscious sedation with appropriate preoperative, intraoperative, and postoperative evaluation. 20 minutes of supervised face to face conscious sedation time was provided by a radiology nurse under my direct supervision.     IMPRESSION: 1.  Successful CT-guided chest tube placement into the left pleural space. Reference CPT Codes: 07672, 79180    CT Chest (PE) Abdomen Pelvis w Contrast    Result Date: 4/3/2022  EXAM: CT CHEST PE ABDOMEN PELVIS W CONTRAST LOCATION: [facility] DATE/TIME: 4/3/2022 12:49 PM  INDICATION: Chest and abdominal pain. Elevated liver function tests. PE suspected, high prob COMPARISON: 03/20/2022 CT chest. TECHNIQUE: CT chest pulmonary angiogram and routine CT abdomen pelvis with IV contrast. Arterial phase through the chest and venous phase through the abdomen and pelvis. Multiplanar reformats and MIP reconstructions were performed. Dose reduction techniques were used. CONTRAST: 100ml Isovue 370 FINDINGS: ANGIOGRAM CHEST: Pulmonary arteries are normal caliber and negative for pulmonary emboli. Thoracic aorta is negative for dissection. No CT evidence of right heart strain. LUNGS AND PLEURA: The previous large dense consolidative infiltrate in the left upper lobe and left lower lobe seen on 03/10/2022 has near completely resolved. New small right and moderate left pleural effusions with compressive appearing atelectasis in the lung bases. MEDIASTINUM/AXILLAE: Normal. CORONARY ARTERY CALCIFICATION: None. HEPATOBILIARY: Gallstones. Normal liver. PANCREAS: Normal. SPLEEN: Mild splenomegaly measuring 12 cm. ADRENAL GLANDS: Normal. KIDNEYS/BLADDER: Normal. BOWEL: Moderate amount of stool throughout the entire colon suggestive of constipation. LYMPH NODES: Normal. VASCULATURE: Unremarkable. PELVIC ORGANS: Normal. MUSCULOSKELETAL: Normal.     IMPRESSION: 1.  Negative for pulmonary emboli. 2.  The previous dense consolidative infiltrates in the left lung seen on CT 03/10/2022 appear to near completely resolved. 3.  New small right and moderate left pleural effusions with moderate atelectasis in the lower lobes greater on the left side. 4.  Gallstones. 5.  Mild splenomegaly.    CT Sinus w/o Contrast    Result Date: 3/16/2022  EXAM DATE:         03/16/2022 EXAM: CT SINUSES WITHOUT CONTRAST LOCATION: [facility] DATE/TIME: 3/16/2022 12:45 PM INDICATION: Sinus infection COMPARISON: None. TECHNIQUE: Routine without contrast. Multiplanar reformats. Dose reduction techniques  were used. FINDINGS: FRONTAL SINUSES:  Subtotal opacification of the frontal sinuses bilaterally with some partially aerated secretions. ETHMOID SINUSES:  Subtotal opacification of ethmoid air cells bilaterally, left greater than right. Some partially aerated secretions noted within posterior ethmoid locules bilaterally. SPHENOID SINUSES: Moderate mucosal thickening involving both sphenoid sinuses with bilateral air-fluid levels. MAXILLARY SINUSES: Moderate circumferential mucosal thickening and subtotal opacification of the bilateral maxillary sinuses, left greater than right. Bilateral air-fluid levels in some partially aerated secretions. The floors of the maxillary sinuses are intact. NASAL CAVITY/SKULL BASE: Rightward bowing of the nasal septum without focal defect. Partially paradoxical curvature of the middle turbinates. The cribriform plate is intact and symmetric. The anterior clinoid processes are not pneumatized. PARANASAL SINUS DRAINAGE PATHWAYS:  Opacification of the bilateral frontoethmoidal recesses, ostiomeatal units, and sphenoethmoidal recesses. NON-SINUS STRUCTURES: No abnormality of the visualized orbits or intracranial compartment. IMPRESSION: 1.  Findings of pansinusitis as above. 2.  Rightward nasal septal deviation.       Total Time Spent 45 minutes with >50% of the time spent in counseling, education and care coordination, antibiotic management.  Discussed with pulmonary and GI.  Discussed with patient and patient's nurse

## 2022-04-07 NOTE — PLAN OF CARE
Patient alert & oriented. VSS. No complaints of pain overnight. Pt slept comfortably between cares. Chest tube patent & drained 910 mL this shift. See other notes regarding chest tube.

## 2022-04-08 ENCOUNTER — APPOINTMENT (OUTPATIENT)
Dept: CT IMAGING | Facility: CLINIC | Age: 42
DRG: 177 | End: 2022-04-08
Attending: INTERNAL MEDICINE
Payer: COMMERCIAL

## 2022-04-08 LAB
CD19 CELLS # BLD: 7 CELLS/UL (ref 107–698)
CD19 CELLS NFR BLD: 1 % (ref 6–27)
CD3 CELLS # BLD: 829 CELLS/UL (ref 603–2990)
CD3 CELLS NFR BLD: 89 % (ref 49–84)
CD3+CD4+ CELLS # BLD: 519 CELLS/UL (ref 441–2156)
CD3+CD4+ CELLS NFR BLD: 56 % (ref 28–63)
CD3+CD4+ CELLS/CD3+CD8+ CLL BLD: 1.66 % (ref 1.4–2.6)
CD3+CD8+ CELLS # BLD: 313 CELLS/UL (ref 125–1312)
CD3+CD8+ CELLS NFR BLD: 34 % (ref 10–40)
CD3-CD16+CD56+ CELLS # BLD: 88 CELLS/UL (ref 95–640)
CD3-CD16+CD56+ CELLS NFR BLD: 10 % (ref 4–25)
ERYTHROCYTE [DISTWIDTH] IN BLOOD BY AUTOMATED COUNT: 15.7 % (ref 10–15)
FERRITIN SERPL-MCNC: 4 NG/ML (ref 10–130)
HCT VFR BLD AUTO: 27.1 % (ref 35–47)
HGB BLD-MCNC: 8.5 G/DL (ref 11.7–15.7)
IGG SERPL-MCNC: <16 MG/DL (ref 610–1616)
IGG1 SER-MCNC: <15 MG/DL (ref 382–929)
IGG2 SER-MCNC: <2 MG/DL (ref 242–700)
IGG3 SER-MCNC: <1 MG/DL (ref 22–176)
IGG4 SER-MCNC: <1 MG/DL (ref 4–86)
MCH RBC QN AUTO: 22.4 PG (ref 26.5–33)
MCHC RBC AUTO-ENTMCNC: 31.4 G/DL (ref 31.5–36.5)
MCV RBC AUTO: 72 FL (ref 78–100)
PATH REPORT.COMMENTS IMP SPEC: NORMAL
PATH REPORT.FINAL DX SPEC: NORMAL
PATH REPORT.MICROSCOPIC SPEC OTHER STN: NORMAL
PATH REPORT.RELEVANT HX SPEC: NORMAL
PLATELET # BLD AUTO: 376 10E3/UL (ref 150–450)
RBC # BLD AUTO: 3.79 10E6/UL (ref 3.8–5.2)
T CELL EXTENDED COMMENT: ABNORMAL
WBC # BLD AUTO: 8.5 10E3/UL (ref 4–11)

## 2022-04-08 PROCEDURE — 99233 SBSQ HOSP IP/OBS HIGH 50: CPT | Performed by: INTERNAL MEDICINE

## 2022-04-08 PROCEDURE — 71260 CT THORAX DX C+: CPT

## 2022-04-08 PROCEDURE — 250N000013 HC RX MED GY IP 250 OP 250 PS 637: Performed by: INTERNAL MEDICINE

## 2022-04-08 PROCEDURE — 85027 COMPLETE CBC AUTOMATED: CPT | Performed by: INTERNAL MEDICINE

## 2022-04-08 PROCEDURE — 30233S1 TRANSFUSION OF NONAUTOLOGOUS GLOBULIN INTO PERIPHERAL VEIN, PERCUTANEOUS APPROACH: ICD-10-PCS | Performed by: INTERNAL MEDICINE

## 2022-04-08 PROCEDURE — 250N000011 HC RX IP 250 OP 636: Performed by: INTERNAL MEDICINE

## 2022-04-08 PROCEDURE — 250N000011 HC RX IP 250 OP 636: Performed by: HOSPITALIST

## 2022-04-08 PROCEDURE — 36415 COLL VENOUS BLD VENIPUNCTURE: CPT | Performed by: INTERNAL MEDICINE

## 2022-04-08 PROCEDURE — 99232 SBSQ HOSP IP/OBS MODERATE 35: CPT | Performed by: HOSPITALIST

## 2022-04-08 PROCEDURE — 258N000003 HC RX IP 258 OP 636: Performed by: HOSPITALIST

## 2022-04-08 PROCEDURE — 120N000001 HC R&B MED SURG/OB

## 2022-04-08 RX ORDER — ONDANSETRON 4 MG/1
4 TABLET, ORALLY DISINTEGRATING ORAL EVERY 6 HOURS PRN
Status: CANCELLED | OUTPATIENT
Start: 2022-04-08

## 2022-04-08 RX ORDER — CLINDAMYCIN PHOSPHATE 600 MG/50ML
600 INJECTION, SOLUTION INTRAVENOUS EVERY 8 HOURS
Status: CANCELLED | OUTPATIENT
Start: 2022-04-08

## 2022-04-08 RX ORDER — CEFTRIAXONE 2 G/1
2 INJECTION, POWDER, FOR SOLUTION INTRAMUSCULAR; INTRAVENOUS EVERY 24 HOURS
Status: CANCELLED | OUTPATIENT
Start: 2022-04-09

## 2022-04-08 RX ORDER — ACETAMINOPHEN 325 MG/1
650 TABLET ORAL
Status: DISCONTINUED | OUTPATIENT
Start: 2022-04-08 | End: 2022-04-13 | Stop reason: HOSPADM

## 2022-04-08 RX ORDER — HYDROMORPHONE HCL IN WATER/PF 6 MG/30 ML
.2-.3 PATIENT CONTROLLED ANALGESIA SYRINGE INTRAVENOUS
Status: CANCELLED | OUTPATIENT
Start: 2022-04-08

## 2022-04-08 RX ORDER — METHYLPREDNISOLONE SODIUM SUCCINATE 125 MG/2ML
125 INJECTION, POWDER, LYOPHILIZED, FOR SOLUTION INTRAMUSCULAR; INTRAVENOUS
Status: CANCELLED | OUTPATIENT
Start: 2022-04-08

## 2022-04-08 RX ORDER — CODEINE PHOSPHATE AND GUAIFENESIN 10; 100 MG/5ML; MG/5ML
5 SOLUTION ORAL EVERY 4 HOURS PRN
Status: CANCELLED | OUTPATIENT
Start: 2022-04-08

## 2022-04-08 RX ORDER — DIPHENHYDRAMINE HYDROCHLORIDE 50 MG/ML
50 INJECTION INTRAMUSCULAR; INTRAVENOUS
Status: CANCELLED | OUTPATIENT
Start: 2022-04-08

## 2022-04-08 RX ORDER — CLINDAMYCIN PHOSPHATE 600 MG/50ML
600 INJECTION, SOLUTION INTRAVENOUS EVERY 8 HOURS
Status: ON HOLD | COMMUNITY
Start: 2022-04-08 | End: 2022-04-17

## 2022-04-08 RX ORDER — DIPHENHYDRAMINE HCL 50 MG
50 CAPSULE ORAL
Status: DISCONTINUED | OUTPATIENT
Start: 2022-04-08 | End: 2022-04-13 | Stop reason: HOSPADM

## 2022-04-08 RX ORDER — NALOXONE HYDROCHLORIDE 0.4 MG/ML
0.4 INJECTION, SOLUTION INTRAMUSCULAR; INTRAVENOUS; SUBCUTANEOUS
Status: CANCELLED | OUTPATIENT
Start: 2022-04-08

## 2022-04-08 RX ORDER — DIPHENHYDRAMINE HYDROCHLORIDE 50 MG/ML
50 INJECTION INTRAMUSCULAR; INTRAVENOUS
Status: DISCONTINUED | OUTPATIENT
Start: 2022-04-08 | End: 2022-04-13 | Stop reason: HOSPADM

## 2022-04-08 RX ORDER — DIPHENHYDRAMINE HYDROCHLORIDE 50 MG/ML
25 INJECTION INTRAMUSCULAR; INTRAVENOUS EVERY 6 HOURS PRN
Status: CANCELLED | OUTPATIENT
Start: 2022-04-08

## 2022-04-08 RX ORDER — FERROUS SULFATE 325(65) MG
325 TABLET ORAL EVERY OTHER DAY
Status: CANCELLED | OUTPATIENT
Start: 2022-04-10

## 2022-04-08 RX ORDER — NALOXONE HYDROCHLORIDE 0.4 MG/ML
0.2 INJECTION, SOLUTION INTRAMUSCULAR; INTRAVENOUS; SUBCUTANEOUS
Status: CANCELLED | OUTPATIENT
Start: 2022-04-08

## 2022-04-08 RX ORDER — LISINOPRIL 5 MG/1
5 TABLET ORAL DAILY
Status: CANCELLED | OUTPATIENT
Start: 2022-04-09

## 2022-04-08 RX ORDER — ACETAMINOPHEN 325 MG/1
650 TABLET ORAL ONCE
Status: COMPLETED | OUTPATIENT
Start: 2022-04-08 | End: 2022-04-08

## 2022-04-08 RX ORDER — DIPHENHYDRAMINE HYDROCHLORIDE 50 MG/ML
50 INJECTION INTRAMUSCULAR; INTRAVENOUS ONCE
Status: COMPLETED | OUTPATIENT
Start: 2022-04-08 | End: 2022-04-08

## 2022-04-08 RX ORDER — METHYLPREDNISOLONE SODIUM SUCCINATE 125 MG/2ML
125 INJECTION, POWDER, LYOPHILIZED, FOR SOLUTION INTRAMUSCULAR; INTRAVENOUS
Status: DISCONTINUED | OUTPATIENT
Start: 2022-04-08 | End: 2022-04-13 | Stop reason: HOSPADM

## 2022-04-08 RX ORDER — ONDANSETRON 2 MG/ML
4 INJECTION INTRAMUSCULAR; INTRAVENOUS EVERY 6 HOURS PRN
Status: CANCELLED | OUTPATIENT
Start: 2022-04-08

## 2022-04-08 RX ORDER — ACETAMINOPHEN 325 MG/1
650 TABLET ORAL
Status: CANCELLED | OUTPATIENT
Start: 2022-04-08

## 2022-04-08 RX ORDER — ACETAMINOPHEN 325 MG/1
650 TABLET ORAL EVERY 4 HOURS PRN
Status: CANCELLED | OUTPATIENT
Start: 2022-04-08

## 2022-04-08 RX ORDER — LISINOPRIL 5 MG/1
5 TABLET ORAL DAILY
Status: ON HOLD | COMMUNITY
Start: 2022-04-09 | End: 2022-04-17

## 2022-04-08 RX ORDER — HYDROCODONE BITARTRATE AND ACETAMINOPHEN 5; 325 MG/1; MG/1
1 TABLET ORAL EVERY 6 HOURS PRN
Status: CANCELLED | OUTPATIENT
Start: 2022-04-08

## 2022-04-08 RX ORDER — DIPHENHYDRAMINE HCL 50 MG
50 CAPSULE ORAL ONCE
Status: COMPLETED | OUTPATIENT
Start: 2022-04-08 | End: 2022-04-08

## 2022-04-08 RX ORDER — CEFTRIAXONE 2 G/1
2 INJECTION, POWDER, FOR SOLUTION INTRAMUSCULAR; INTRAVENOUS EVERY 24 HOURS
Status: ON HOLD | COMMUNITY
Start: 2022-04-09 | End: 2022-04-17

## 2022-04-08 RX ORDER — IOPAMIDOL 755 MG/ML
100 INJECTION, SOLUTION INTRAVASCULAR ONCE
Status: COMPLETED | OUTPATIENT
Start: 2022-04-08 | End: 2022-04-08

## 2022-04-08 RX ORDER — DIPHENHYDRAMINE HCL 50 MG
50 CAPSULE ORAL
Status: CANCELLED | OUTPATIENT
Start: 2022-04-08

## 2022-04-08 RX ADMIN — FERROUS SULFATE TAB 325 MG (65 MG ELEMENTAL FE) 325 MG: 325 (65 FE) TAB at 08:43

## 2022-04-08 RX ADMIN — SODIUM CHLORIDE 500 ML: 9 INJECTION, SOLUTION INTRAVENOUS at 16:29

## 2022-04-08 RX ADMIN — ACETAMINOPHEN 650 MG: 325 TABLET ORAL at 16:24

## 2022-04-08 RX ADMIN — SODIUM CHLORIDE 500 ML: 9 INJECTION, SOLUTION INTRAVENOUS at 17:21

## 2022-04-08 RX ADMIN — IOPAMIDOL 100 ML: 755 INJECTION, SOLUTION INTRAVENOUS at 09:06

## 2022-04-08 RX ADMIN — CLINDAMYCIN PHOSPHATE 600 MG: 600 INJECTION, SOLUTION INTRAVENOUS at 06:42

## 2022-04-08 RX ADMIN — LISINOPRIL 5 MG: 5 TABLET ORAL at 08:43

## 2022-04-08 RX ADMIN — CEFTRIAXONE SODIUM 2 G: 2 INJECTION, POWDER, FOR SOLUTION INTRAMUSCULAR; INTRAVENOUS at 09:30

## 2022-04-08 RX ADMIN — CLINDAMYCIN PHOSPHATE 600 MG: 600 INJECTION, SOLUTION INTRAVENOUS at 17:57

## 2022-04-08 RX ADMIN — DIPHENHYDRAMINE HCL 50 MG: 50 CAPSULE ORAL at 13:27

## 2022-04-08 RX ADMIN — HUMAN IMMUNOGLOBULIN G 29.7 G: 20 LIQUID INTRAVENOUS at 14:19

## 2022-04-08 RX ADMIN — ACETAMINOPHEN 650 MG: 325 TABLET ORAL at 13:27

## 2022-04-08 RX ADMIN — ENOXAPARIN SODIUM 40 MG: 100 INJECTION SUBCUTANEOUS at 21:31

## 2022-04-08 ASSESSMENT — ACTIVITIES OF DAILY LIVING (ADL)
ADLS_ACUITY_SCORE: 7

## 2022-04-08 NOTE — PROGRESS NOTES
"CLINICAL NUTRITION SERVICES - ASSESSMENT NOTE     Nutrition Prescription    RECOMMENDATIONS FOR MDs/PROVIDERS TO ORDER:  None at this time    Malnutrition Status:    % Weight Loss:  Weight loss does not meet criteria for malnutrition   % Intake:  < 75% for >/= 1 month (moderate malnutrition)  Subcutaneous Fat Loss: Unable to assess due to droplet precautions  Muscle Loss: Unable to assess due to droplet precautions  Fluid Retention:  None noted    Malnutrition Diagnosis: Patient does not meet two of the above criteria necessary for diagnosing malnutrition    Recommendations already ordered by Registered Dietitian (RD):  None at this time    Future/Additional Recommendations:  None at this time     REASON FOR ASSESSMENT  Arely Saldaña is a/an 41 year old female assessed by the dietitian for Admission Nutrition Risk Screen for positive for wt loss and low appetite.    Patient admitted for tachypnea and pneumonia with history of anemia and HTN.    NUTRITION HISTORY  Pt reports poor appetite for past few months due to illness. Eating 3 meals/day but smaller amounts. Eats seafood but not much meat. Appetite starting to improve. Reports a few meals have arrived cold to her room.    Diet Recall  Meal 1: Granola bar  Meal 2: Grilled cheese sandwich   Meal 3: Hunters, vegetables    NKFA    CURRENT NUTRITION ORDERS  Diet: Regular  Intake/Tolerance: % of adequately sized, balanced meals    LABS  Labs reviewed: noted prealbumin 7, albumin 1.8    MEDICATIONS  Medications reviewed: ferrous sulfate    ANTHROPOMETRICS  Height: 167.6 cm (5' 6\")  Most Recent Weight: 67.5 kg (148 lb 14.4 oz)    IBW: 59 kg  BMI: Normal BMI  Weight History:   Wt Readings from Last 15 Encounters:   04/06/22 67.5 kg (148 lb 14.4 oz)   03/10/22 68 kg (150 lb)   01/21/22 70.9 kg (156 lb 6.4 oz)   06/30/21 74.8 kg (165 lb)   02/24/21 79.6 kg (175 lb 8 oz)   02/22/21 79.4 kg (175 lb)   02/19/21 78.9 kg (174 lb)   01/08/21 74.4 kg (164 lb)   12/08/20 " 72.6 kg (160 lb)   11/10/20 71.2 kg (157 lb)   05/31/19 67.6 kg (149 lb)   05/10/19 68.5 kg (151 lb)   04/29/19 68.5 kg (151 lb)   Weight change: had baby April 2021 so wt loss influenced by pregnancy. Recently lost 8 lb (5%) in 2 months due to pneumonia.    Dosing Weight: 68 kg    ASSESSED NUTRITION NEEDS  Estimated Energy Needs: 9546-6328 kcals/day (25 - 30 kcals/kg)  Justification: Maintenance  Estimated Protein Needs: 68-82 grams protein/day (1 - 1.2 grams of pro/kg)  Justification: Maintenance  Estimated Fluid Needs: 4603-9895 mL/day (1 mL/kcal)   Justification: Maintenance    PHYSICAL FINDINGS  See malnutrition section below.  Per flowsheet  GI: last BM 4/7  Edema: none noted  Skin: no breakdown noted    MALNUTRITION:  % Weight Loss:  Weight loss does not meet criteria for malnutrition   % Intake:  < 75% for >/= 1 month (moderate malnutrition)  Subcutaneous Fat Loss: Unable to assess due to droplet precautions  Muscle Loss: Unable to assess due to droplet precautions  Fluid Retention:  None noted    Malnutrition Diagnosis: Patient does not meet two of the above criteria necessary for diagnosing malnutrition    NUTRITION DIAGNOSIS  Inadequate protein-energy intake related to reduced appetite as evidenced by pt reports of reduced intake x2-3 months and wt loss of 5% in 2 months.      INTERVENTIONS  Implementation  Encouraged pt to strive for eating at least 75% of meals.  Explained that kitchen staff are unable to deliver tray to room due to precautions. Pt can ask nursing staff to heat food if it is cold. Will leave sticky note in chart as well.    Goals  Patient to consume % of nutritionally adequate meals three times per day, or the equivalent with supplements/snacks.  No significant wt loss <148 lb     Monitoring/Evaluation  Progress toward goals will be monitored and evaluated per protocol.

## 2022-04-08 NOTE — PLAN OF CARE
Patient alert & oriented. Tachypneic and tachycardic but all other VSS. Pt on 2L O2 overnight. No complaints of pain, SOB or chest pain on shift. Possible VATS procedure pending CT results. Total chest tube output 320 mL this shift.

## 2022-04-08 NOTE — OP NOTE
OPERATIVE REPORT    Arely Saldaña   [facility]  Medical Record #:  [id number]  YOB: 1980  Age:  41 year old    PROCEDURE DATE:  * No surgery found *    PREOPERATIVE DIAGNOSIS: 1.  Right inguinal hernia 2.  Left inguinal hernia 3.  Incarcerated umbilical hernia    POSTOPERATIVE DIAGNOSIS: 1.  Right direct inguinal hernia 2.  Right cord lipoma 3.  Left indirect slight inguinal hernia 4.  Left cord lipoma 5.  Incarcerated umbilical hernia     PROCEDURE: With the patient in the supine position under    OPERATING SURGEON:  Suleman Kumar MD    ASSISTANT: Technician    ANESTHESIA: General    DESCRIPTION OF PROCEDURE: With the patient in the supine position and general anesthesia having reviewed the risk benefits complications of surgical intervention with him the abdomen is prepped draped in usual sterile fashion.  Symmetrical right and left lower quadrant transverse incisions are made on each side the external bleak aponeurosis opened direction of the fibers.  Cord structures and nerve identified at pubic tubercle on each side and preserved throughout the remainder procedure.  Dissection revealed the above-noted findings.  On the right side the direct hernia identified isolated cord lipoma first isolated from the cord structures and ligated and resected for permanent pathology.  Preperitoneal space is entered large portion Marlex mesh placed and secured circumferentially with running interrupted 0 Prolene suture.  Wound is irrigated with antibiotic solution and closed in the external bleak layer with running 3-0 Vicryl after returning cord structures and nerve to normal anatomic position.  Through the mirror-image incision in the left side dissection is undertaken as described above with isolation of cord structures and nerve and findings indicated a left indirect sliding inguinal hernia.  Cord lipoma is resected and sent for permanent pathology and the hernia reduced.  Large portion of Marlex  mesh placed and secured circumferentially in the preperitoneal space.  Closure was as described above.  The umbilical hernia is then isolated an infraumbilical incision is made umbilical stalk freed and incarcerated umbilical contents are reduced into the preperitoneal space.  Hernia defect is closed transversely with interrupted 0 Vicryl.  The umbilical stalk is resecured with interrupted 2-0 Vicryl and wound closed with running 2-0 Vicryl and running 3-0 Vicryl subcuticular suture.  The estimated blood loss is minimal there were no complications and the patient tolerated the procedure well.  Sponge and counts are correct x2.    EBL:  [unfilled]    SPECIMENS:  * Cannot find log *    Suleman capellan md  [facility]

## 2022-04-08 NOTE — SIGNIFICANT EVENT
This RN was notified that pt tremulous during IVIG infusion. RN went to pt room and witnessed pt visibly shaking at 1605. Pt states SOB and has pale appearance. Vitals taken= /98, , RR 40, temp 99.1, and O2 92% on 2 L O2 via NC. IVIG infusion stopped. Charge RN came to room to assist. MD notified. Pt temp 102.6 at 1623, ice packs applied and in place and prn tylenol given. 500 mL bolus started at 1629 per MD orders. Pt temp reached 104.7 at 1636. This RN stayed with pt through duration of bolus, pt states symptoms improving. Vitals post infusion are /69, , RR 20, temp 103.1, O2 92% on 2 L. MD notified. Another one time 500 mL bolus ordered per MD.

## 2022-04-08 NOTE — CONSULTS
"GENERAL SURGERY CONSULTATION    Arely Saldaña  [facility]  Medical Record #:  [id number]  YOB: 1980  Age:  41 year old     Date of Consultation: 2022    Reason for Consultation: Pneumonia and empyema    Arely Saldaña is a 41 year old female who presents with a 2-month history of respiratory illness preceded by several month history of \"sinusitis which was treated with antibiotic therapy and evaluated eventually by ear nose and throat with potential plan next month for additional sinus evaluation operatively.  She has presented to [facility] and found to have left empyema and left side pneumonia H influenza positive.  Case reviewed with Dr. Upton.  At the current time patient seen general care tellez setting.  She has left tube thoracostomy in place.  She has put out significant fluid over the last 24 to 36 hours with lytic therapy.  The issue regarding acquired immunodeficiency has been reviewed as well.  Patient does have mild discomfort with breathing left greater than right in chest tube site more than the remainder of her chest.  She does not have other previous history of immune deficiency diseases and as she has not had significant an infection in the lungs.  She has had a several pound weight loss in the last couple of months.  Intermittent chronic dry cough noted.  She has not had significant travel.    PHH:    Past Medical History:   Diagnosis Date     ASCUS of cervix with negative high risk HPV 2018-2012 NIL annual pap 4/13/15 NIL pap, neg HPV 18 ASCUS, neg HPV 11/10/20 NIL pap, neg HPV     Hypertension     was on nifedipine and HCTZ before pregnancy, had since age early 20's        Past Surgical History:   Procedure Laterality Date     C/SECTION, LOW TRANSVERSE  2021    with PPTL     FOOT SURGERY Left age 20    bursa removed from left foot     SALPINGECTOMY Bilateral 2021    with  section     WISDOM TOOTH EXTRACTION  age 19       ALLERGIES:  " Metronidazole and Sulfamethoxazole-trimethoprim [sulfamethoxazole w/trimethoprim]    MEDS:    Current Facility-Administered Medications:      acetaminophen (TYLENOL) tablet 650 mg, 650 mg, Oral, Once, Lizzy Conn MD     acetaminophen (TYLENOL) tablet 650 mg, 650 mg, Oral, Q24H PRN, Lizzy Conn MD     acetaminophen (TYLENOL) tablet 650 mg, 650 mg, Oral, Q4H PRN, Ede Chávez DO, 650 mg at 04/06/22 1947     cefTRIAXone (ROCEPHIN) 2 g vial to attach to  ml bag for ADULTS or NS 50 ml bag for PEDS, 2 g, Intravenous, Q24H, Josefina Andrade MD, 2 g at 04/08/22 0930     clindamycin (CLEOCIN) infusion 600 mg, 600 mg, Intravenous, Q8H, Josefina Andrade MD, Last Rate: 100 mL/hr at 04/08/22 0642, 600 mg at 04/08/22 0642     diphenhydrAMINE (BENADRYL) capsule 50 mg, 50 mg, Oral, Once **OR** diphenhydrAMINE (BENADRYL) injection 50 mg, 50 mg, Intravenous, Once, Lizzy Conn MD     diphenhydrAMINE (BENADRYL) capsule 50 mg, 50 mg, Oral, Q24H PRN **OR** diphenhydrAMINE (BENADRYL) injection 50 mg, 50 mg, Intravenous, Q24H PRN, Lizzy Conn MD     diphenhydrAMINE (BENADRYL) injection 25 mg, 25 mg, Intravenous, Q6H PRN, Brea Upton MD     diphenhydrAMINE (BENADRYL) injection 50 mg, 50 mg, Intravenous, Once PRN, Lizzy Conn MD     enoxaparin ANTICOAGULANT (LOVENOX) injection 40 mg, 40 mg, Subcutaneous, Q24H, Brea Upton MD, 40 mg at 04/07/22 2200     EPINEPHrine (ADRENALIN) kit 0.3 mg, 0.3 mg, Intramuscular, Q3 Min PRN, Lizzy Conn MD     famotidine (PEPCID) injection 20 mg, 20 mg, Intravenous, Once PRN, Lizzy Conn MD     ferrous sulfate (FEROSUL) tablet 325 mg, 325 mg, Oral, Every Other Day, Ede Chávez DO, 325 mg at 04/08/22 0843     guaiFENesin (ROBITUSSIN) 20 mg/mL solution 10 mL, 10 mL, Oral, Q4H PRN, Brea Upton MD, 10 mL at 04/07/22 0949     guaiFENesin-codeine (ROBITUSSIN AC) 100-10 MG/5ML solution 5 mL, 5 mL, Oral, Q4H PRN, Brea Upton MD, 5 mL at 04/07/22 1325      HYDROcodone-acetaminophen (NORCO) 5-325 MG per tablet 1 tablet, 1 tablet, Oral, Q6H PRN, Brea Upton MD, 1 tablet at 04/06/22 0939     HYDROmorphone (DILAUDID) injection 0.2-0.3 mg, 0.2-0.3 mg, Intravenous, Q3H PRN, Brea Upton MD, 0.2 mg at 04/07/22 2157     immune globulin - sucrose free 10 % injection 29.7 g, 0.5 g/kg (Ideal), Intravenous, Once, Lizzy Conn MD     lisinopril (ZESTRIL) tablet 5 mg, 5 mg, Oral, Daily, Brea Upton MD, 5 mg at 04/08/22 0843     melatonin tablet 1 mg, 1 mg, Oral, At Bedtime PRN, Brea Upton MD     methylPREDNISolone sodium succinate (solu-MEDROL) injection 125 mg, 125 mg, Intravenous, Once PRN, Lizzy Conn MD     midazolam (VERSED) injection 0.5-2 mg, 0.5-2 mg, Intravenous, Q4 Min PRN, Alton Perez MD, 0.5 mg at 04/04/22 1448     naloxone (NARCAN) injection 0.2 mg, 0.2 mg, Intravenous, Q2 Min PRN **OR** naloxone (NARCAN) injection 0.4 mg, 0.4 mg, Intravenous, Q2 Min PRN **OR** naloxone (NARCAN) injection 0.2 mg, 0.2 mg, Intramuscular, Q2 Min PRN **OR** naloxone (NARCAN) injection 0.4 mg, 0.4 mg, Intramuscular, Q2 Min PRN, Brea Upton MD     ondansetron (ZOFRAN-ODT) ODT tab 4 mg, 4 mg, Oral, Q6H PRN **OR** ondansetron (ZOFRAN) injection 4 mg, 4 mg, Intravenous, Q6H PRN, Brea Upton MD    SOCIAL HABITS:    History   Smoking Status     Never Smoker   Smokeless Tobacco     Never Used     Social History    Substance and Sexual Activity      Alcohol use: Not Currently        Comment: Alcoholic Drinks/day: once every 2-3 months prior to pregnancy      History   Drug Use Unknown       FAMILY HISTORY:    Family History   Problem Relation Age of Onset     Hypertension Mother      Hypertension Father      Diabetes Father        REVIEW OF SYSTEMS: Patient without history of cardiac issues peripheral vascular arterial or venous insufficiency disease or CNS or endocrine disease to the best of her knowledge.    PE:    /65 (BP Location: Right arm, Patient  "Position: Sitting)   Pulse 103   Temp 98.6  F (37  C) (Oral)   Resp 22   Ht 1.676 m (5' 6\")   Wt 67.5 kg (148 lb 14.4 oz)   LMP 03/01/2022   SpO2 (!) 89%   BMI 24.03 kg/m      HEENT: Patient appears chronically ill.  She appears to have facial weight loss but she indicates that she has only lost several pounds in the last couple of months.  Chest: Left chest tube in place.  Fluid noted.  Serous  Lungs: Significant decreased breath sounds left side.  Right fairly clear.  Heart: Heart rate approximately 90/min regular  Abd: Benign  Ext: Normal without edema without varicose veins or chronic venous insufficiency.  Arterial exam normal.  Vascular: Normal carotids femorals and peripheral pulses    LABS:    Recent Labs   Lab 04/05/22  0506      CO2 21*   BUN 13      Recent Labs   Lab 04/08/22  0449   WBC 8.5   HGB 8.5*   HCT 27.1*         Recent Labs   Lab 04/06/22  0449   ALKPHOS 142*   ALT 16   AST 10     No results for input(s): AMYLASE in the last 168 hours.    Recent Labs   Lab 04/03/22  1651   INR 1.20*       XRAYS: Chest x-ray CT scan reviewed    ASSESSMENT: H influenza with pneumonia right and left side left much more significant with empyema status post tube thoracostomy left side.    PLAN: The potential of the left thorax thoracoscopy and potential thoracotomy reviewed with the patient.  She has been seen by Dr. Conn regarding intervention for her immunosuppression at the current time.  Reviewed with Dr. Upton as well chest tube will remain in for 24 to 48 hours it is it has significant output the last 24 to 36 hours.  If we proceed with surgery will be next week.  Transfer to [facility] women necessary.    Suleman capellan md  [facility]  "

## 2022-04-08 NOTE — PROGRESS NOTES
[facility]    Infectious Disease Progress Note    04/08/2022      Assessment & Plan   Arely Saldaña is a 41 year old female who was admitted on 4/3/2022.     1. Empyema, left-sided-chest tube in place, tPA instilled by pulmonary.  2. Repeat CT shows persistent with significantly decreased left pleural effusion and associated atelectasis and residual fluid superior and medial to the tip of left pleural drain.  3. 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 Recurrent left-sided pneumonia  5. sinusitis since August/September 2021  6. Hypertension, pleuritic chest pain, tachypnea  7. Status post thoracentesis and chest tube placement on 4/4/2022  8. Elevated liver enzymes, improving.  Splenomegaly  9. Hypogammaglobulinemia: Decreased immunoglobulin levels, IgA < 5, IgA <109, IgA < 2. ?  CVID  10. Transthoracic echocardiogram did not show vegetations  11. Patient is a vegetarian    Recommendations     1. Appreciate input hematology oncology consultation to further evaluate CVID--hematology oncology ordered IVIG  2. Ordered flow cytometry  3. Follow repeat blood culture results  4. Continue ceftriaxone and clindamycin.  5. Thoracic surgery following:May need VATS for source control, resolution of empyema  6. Chest tube management per pulmonary team  7. Likely need prolonged antibiotic course, depending on clinical response and repeat culture results  8. Patient has an ENT appointment and procedure scheduled for May 2022 for nasal polyps  9. Appreciate GI input  10. Discussed with patient and nurse  11. Discussed with primary team.  Consider transfer to [facility] as patient may VATS/decortication.  Surgery following    Josefina Andrade MD  [facility]  [phone]     Susceptibility     Haemophilus influenzae  nontypable     HANNA     Amoxicillin/Clavulanate 0.75 ug/mL Susceptible     Ampicillin 0.38 ug/mL Susceptible     Ceftriaxone <=0.016 ug/mL Susceptible     Levofloxacin 0.032 ug/mL Susceptible     Meropenem 0.125 ug/mL Susceptible                    Interval History   Chart reviewed  Intermittent low-grade fever, still on oxygen  Chest tube in place  Tolerating antibiotic    Patient transferred out of ICU.  Breathing slightly better.  Still tachycardic.  Cough present.  Chest tube in place    Physical Exam   Temp: 98.6  F (37  C) Temp src: Oral BP: 127/65 Pulse: 103   Resp: 22 SpO2: (!) 89 % O2 Device: Nasal cannula    Vitals:    04/04/22 0600 04/05/22 0730 04/06/22 0500   Weight: 65.1 kg (143 lb 8 oz) 64.9 kg (143 lb) 67.5 kg (148 lb 14.4 oz)     Vital Signs with Ranges  Temp:  [98.2  F (36.8  C)-100.3  F (37.9  C)] 98.6  F (37  C)  Pulse:  [101-126] 103  Resp:  [22-34] 22  BP: (117-127)/(65-75) 127/65  SpO2:  [89 %-93 %] 89 %    Exam on 04/08/2022    GENERAL APPEARANCE:  awake, appears ill  EYES: Eyes grossly normal to inspection, PERRL and conjunctivae and sclerae normal  HENT: Dry mucosa  NECK: Supple  RESP: Tachypnea, decreased breath sounds on left, chest tube  CV: S1-S2 tachycardia  LYMPHATICS: No severe lymphedema  ABDOMEN: Firm, pleuritic chest pain  MS: extremities normal- no gross deformities noted  SKIN: no suspicious lesions or rashes  Neuro: Awake, deconditioned , able to answer questions    Medications       acetaminophen  650 mg Oral Once     cefTRIAXone  2 g Intravenous Q24H     clindamycin  600 mg Intravenous Q8H     diphenhydrAMINE  50 mg Oral Once    Or     diphenhydrAMINE  50 mg Intravenous Once     enoxaparin ANTICOAGULANT  40 mg Subcutaneous Q24H     ferrous sulfate  325 mg Oral Every Other Day     immune globulin - sucrose free  0.5 g/kg (Ideal) Intravenous Once     lisinopril  5 mg Oral Daily       Data   All microbiology laboratory data reviewed.  Recent Labs   Lab Test 04/08/22  5007  04/07/22  0435 04/06/22  0449   WBC 8.5 9.1 9.5   HGB 8.5* 9.1* 8.7*   HCT 27.1* 29.4* 28.9*   MCV 72* 74* 75*    407 402     Recent Labs   Lab Test 04/05/22  0506 04/03/22  2312 04/03/22  1141   CR 0.61 0.65 0.69     Recent Labs   Lab Test 01/21/22  1435   SED 14     MICRO        04/06/2022 0455 04/06/2022 0516 Blood Culture Hand, Right [[id number]]   Blood from Hand, Right    In process Component Value   No component results              04/06/2022 0449 04/06/2022 0516 Blood Culture Peripheral Blood [[id number]]   Peripheral Blood    In process Component Value   No component results              04/05/2022 0506 04/06/2022 0017 Blood Culture Peripheral Blood [[id number]]   Peripheral Blood    Preliminary result Component Value   Culture No growth after 12 hours P              04/03/2022 1915 04/05/2022 1438 Blood Culture Arm, Left [[id number]]   (Abnormal)   Blood from Arm, Left    Preliminary result Component Value   Culture Positive on the 1st day of incubation Abnormal  P    Haemophilus influenzae nontypable Panic  P    1 of 2 bottles   Corrected on April 4, 2022/7:25 PM by Michael Philip   Previously reported as: gram positive cocci in pairs and chains. Corrected to gram negative cocobacilli.     Susceptibilities done on previous cultures              04/03/2022 1849 04/03/2022 2108 Cell count with differential fluid [[id number]]    (Abnormal)   Pleural fluid from Pleural Cavity, Left    Final result Component Value   No component results           04/03/2022 1849 04/06/2022 0845 Pleural fluid Aerobic Bacterial Culture Routine with Gram Stain [[id number]]     (Abnormal)   Pleural fluid from Pleural Cavity, Left    Final result Component Value   Culture 4+ Haemophilus influenzae nontypable Abnormal    Gram Stain Result 4+ Gram negative coccobacilli Abnormal     4+ WBC seen Abnormal     Predominantly PMNs         Susceptibility     Haemophilus influenzae nontypable     HANAN      Amoxicillin/Clavulanate 0.75 ug/mL Susceptible     Ampicillin 0.38 ug/mL Susceptible     Ceftriaxone <=0.016 ug/mL Susceptible     Levofloxacin 0.032 ug/mL Susceptible                   04/03/2022 1849 04/03/2022 1946 Glucose fluid [[id number]]    Pleural fluid from Pleural Cavity, Left    Final result Component Value Units   Glucose Fluid Source Pleural Cavity, Left    left pleural fluid   left pleural fluid   left pleural fluid   left pleural fluid   Glucose fluid <5 mg/dL           04/03/2022 1849 04/04/2022 0003 Lactate dehydrogenase fluid [[id number]]    Pleural fluid from Pleural Cavity, Left    Final result Component Value Units   LD Fluid Source Pleural Cavity, Left    left pleural fluid   left pleural fluid   Lactate dehydrogenase fluid 2,332 U/L   Collect pleural fluid when Thoracentesis procedure           04/03/2022 1849 04/03/2022 2246 Protein fluid [[id number]]    Pleural fluid from Pleural Cavity, Left    Final result Component Value Units   Protein Fluid Source Pleural Cavity, Left    left pleural fluid   left pleural fluid   Protein Total Fluid 3.7 g/dL           04/03/2022 1849 04/03/2022 2014 pH fluid [[id number]]    Pleural fluid from Pleural Cavity, Left    Final result Component Value Units   pH Fluid Source Pleural Cavity, Left    left pleural fluid   left pleural fluid   pH Fluid 7.5 pH           04/03/2022 1849 04/03/2022 2108 Cell Count Body Fluid [[id number]]    (Abnormal)   Pleural fluid from Pleural Cavity, Left    Final result Component Value   Color Yellow   Clarity Turbid Abnormal    Cell Count Fluid Source Pleural Cavity, Left   left pleural fluid   left pleural fluid   left pleural fluid   left pleural fluid   left pleural fluid   left pleural fluid   left pleural fluid   left pleural fluid   left pleural fluid   left pleural fluid   left pleural fluid   Clot Presence Large Clot (>Half Volume)   Specimen clotted. Slide reviewed, no   abnormal cells seen.    Cytology  Ordered No           2022 1154 2022 1438 Blood Culture Peripheral Blood [[id number]]   (Abnormal)   Peripheral Blood    Preliminary result Component Value   Culture Positive on the 1st day of incubation Abnormal  P    Haemophilus influenzae nontypable Panic  P    1 of 2 bottles   Susceptibilities done on previous cultures              2022 1141 2022 0845 Blood Culture Line, venous [[id number]]     (Abnormal)   Blood from Line, venous    Final result Component Value   Culture Positive on the 1st day of incubation Abnormal     Haemophilus influenzae nontypable Panic     1 of 2 bottles         Susceptibility     Haemophilus influenzae nontypable     HANNA     Amoxicillin/Clavulanate 0.75 ug/mL Susceptible     Ampicillin 0.38 ug/mL Susceptible     Ceftriaxone <=0.016 ug/mL Susceptible     Levofloxacin 0.032 ug/mL Susceptible     Meropenem 0.125 ug/mL Susceptible                         RADIOLOGY:  XR Chest 1 View    Result Date: 4/3/2022  EXAM: XR CHEST 1 VIEW LOCATION: [facility] DATE/TIME: 4/3/2022 6:59 PM INDICATION: dyspnea; status post left thoracentesis COMPARISON: AP chest radiograph from earlier today     IMPRESSION: Considerable decrease in the amount of left pleural fluid. The left upper lobe is better inflated. The residual opacity in the left basal chest has a sharp interface which parallels the left ventricular heart border within which there are no air bronchograms consistent with lobar atelectasis of the left lower lobe. There is minimal atelectasis or pleural fluid in the left lateral costophrenic sulcus. Unchanged atelectasis/pleural fluid along the right hemidiaphragm.     Echocardiogram Complete    Result Date: 4/3/2022  [id number]^FABIANO^ARY^[place] [address]  Name: SHANI PETIT MRN: [id number] : 1980 Study Date: 2022 02:38 PM Age: 41 yrs Gender: Female Patient  Location: [facility] Reason For Study: SOB Ordering Physician: ARY MARIO Performed By: CMP  BSA: 1.7 m2 Height: 66 in Weight: 145 lb HR: 120 BP: 138/93 mmHg ______________________________________________________________________________ Procedure Complete Portable Echo Adult. ______________________________________________________________________________ Interpretation Summary  Left ventricular size, wall motion and function are normal. The ejection fraction is > 65%. Normal right ventricle size and systolic function. No hemodynamically significant valvular abnormalities on 2D or color flow imaging. ______________________________________________________________________________ Left Ventricle Left ventricular size, wall motion and function are normal. The ejection fraction is > 65%. There is normal left ventricular wall thickness. Left ventricular diastolic function is normal. No regional wall motion abnormalities noted.  Right Ventricle Normal right ventricle size and systolic function.  Atria Normal left atrial size. Right atrial size is normal. There is no color Doppler evidence of an atrial shunt.  Mitral Valve Mitral valve leaflets appear normal. There is no evidence of mitral stenosis or clinically significant mitral regurgitation.  Tricuspid Valve Tricuspid valve leaflets appear normal. There is no evidence of tricuspid stenosis or clinically significant tricuspid regurgitation. Right ventricle systolic pressure estimate normal.  Aortic Valve Aortic valve leaflets appear normal. There is no evidence of aortic stenosis or clinically significant aortic regurgitation.  Pulmonic Valve The pulmonic valve is not well seen, but is grossly normal. This degree of valvular regurgitation is within normal limits.  Vessels The aorta root is normal. Normal size ascending aorta. IVC diameter <2.1 cm collapsing >50% with sniff suggests a normal RA pressure of 3 mmHg.  Pericardium There is no pericardial effusion.   ______________________________________________________________________________ MMode/2D Measurements & Calculations IVSd: 0.68 cm LVIDd: 3.8 cm LVIDs: 2.3 cm LVPWd: 1.0 cm FS: 39.1 %  LV mass(C)d: 91.8 grams LV mass(C)dI: 52.6 grams/m2 LA dimension: 2.4 cm LVOT diam: 2.0 cm LVOT area: 3.1 cm2 LA Volume Indexed (AL/bp): 26.0 ml/m2 RWT: 0.55  Time Measurements MM HR: 120.0 BPM  Doppler Measurements & Calculations MV E max aqmar: 59.1 cm/sec MV A max qamar: 91.3 cm/sec MV E/A: 0.65 MV dec slope: 559.2 cm/sec2 MV dec time: 0.11 sec Ao V2 max: 155.5 cm/sec Ao max PG: 10.0 mmHg Ao V2 mean: 103.6 cm/sec Ao mean P.9 mmHg Ao V2 VTI: 23.0 cm JERONIMO(I,D): 2.8 cm2 JERONIMO(V,D): 2.8 cm2 LV V1 max P.1 mmHg LV V1 max: 142.1 cm/sec LV V1 VTI: 20.9 cm SV(LVOT): 64.8 ml SI(LVOT): 37.2 ml/m2 PA acc time: 0.09 sec AV Qamar Ratio (DI): 0.91 JERONIMO Index (cm2/m2): 1.6 E/E' av.1 Lateral E/e': 3.8 Medial E/e': 6.3  ______________________________________________________________________________ Report approved by: Fransisco Belcher 2022 03:46 PM       Abdomen US, limited (RUQ only)    Result Date: 4/3/2022  EXAM: US ABDOMEN LIMITED LOCATION: [facility] DATE/TIME: 4/3/2022 1:51 PM INDICATION: abnormal LFTs COMPARISON: CT from earlier today TECHNIQUE: Limited abdominal ultrasound. FINDINGS: GALLBLADDER: Cholelithiasis. BILE DUCTS: No biliary dilatation. The common duct measures 6 mm. LIVER: Normal parenchyma with smooth contour. No focal mass. RIGHT KIDNEY: No hydronephrosis. PANCREAS: The visualized portions are normal. No ascites.     IMPRESSION: 1.  Cholelithiasis. Otherwise normal right upper quadrant ultrasound.     Abdomen US, limited (RUQ only)    Result Date: 3/10/2022  EXAM: US ABDOMEN LIMITED LOCATION: [facility] DATE/TIME: 3/10/2022 10:16 AM INDICATION: Abnormal liver function tests and right upper quadrant pain. COMPARISON: 1. TECHNIQUE: Limited abdominal ultrasound.  FINDINGS: GALLBLADDER: Cholelithiasis and gallbladder sludge are noted. No wall thickening nor pericholecystic fluid. BILE DUCTS: No biliary dilatation. The common duct measures 6 mm. LIVER: Normal parenchyma with smooth contour. No focal mass. RIGHT KIDNEY: No hydronephrosis. PANCREAS: The visualized portions are normal. No ascites.     IMPRESSION: 1.  Cholelithiasis and gallbladder sludge are present. No evidence of cholecystitis nor bile duct dilatation.     US Thoracentesis    Result Date: 4/3/2022  EXAM: 1. LEFT THORACENTESIS 2. ULTRASOUND GUIDANCE LOCATION: [facility] DATE/TIME: 4/3/2022 6:17 PM INDICATION: Pleural effusion. PROCEDURE: Informed consent obtained. Time out performed. The chest was prepped and draped in sterile fashion. 10 mL of 1 % lidocaine was infused into the local soft tissues. Under direct ultrasound guidance, a 5 Tongan catheter system was placed into the pleural effusion. 800 mL of thick cloudy yellow fluid were removed and sent to lab, if requested. Patient tolerated procedure well. Ultrasound imaging was obtained and placed in the patient's permanent medical record.     IMPRESSION: Status post left ultrasound-guided thoracentesis. Reference CPT Code: 28892    XR Chest Port 1 View    Result Date: 4/5/2022  EXAM: XR CHEST PORT 1 VIEW LOCATION: [facility] DATE/TIME: 4/5/2022 5:43 AM INDICATION: follow up empyema, pneumonia. COMPARISON: 04/03/2022. CT chest tube placement of 04/04/2022.     IMPRESSION: There is a new chest tube at the left medial lung base. There has been decrease in left pleural effusion and there is some residual of effusion and atelectasis at the left lung base. No pneumothorax. Mild atelectasis at the right lung base. Normal heart size and pulmonary vascularity.    XR Chest Port 1 View    Result Date: 4/3/2022  EXAM: XR CHEST PORT 1 VIEW LOCATION: [facility] DATE/TIME: 4/3/2022 11:29 PM  INDICATION: Shortness of breath COMPARISON: 04/03/2022     IMPRESSION: The chest is stable with again seen minimal right pleural effusion and mild left pleural effusion with some underlying infiltrate/atelectasis in both lung bases and extending into the left midlung. The chest is otherwise negative.    XR Chest Port 1 View    Result Date: 4/3/2022  EXAM: XR CHEST PORT 1 VIEW LOCATION: [facility] DATE/TIME: 4/3/2022 4:38 PM INDICATION: worsening sob COMPARISON: CT pulmonary angiogram 04/03/2022 at 1256 hours     IMPRESSION: Moderate to large left and small right pleural effusions are not visibly changed. Related atelectasis including near complete atelectasis of the left lower lobe. Lower left ventricular heart border is silhouetted. Cardiac silhouette is not enlarged. Azygos vein is not distended and the vascular pedicle width is normal. No pneumothorax.    CT Chest Pulmonary Embolism w Contrast    Result Date: 3/10/2022  EXAM: CT CHEST PULMONARY EMBOLISM W CONTRAST LOCATION: [facility] DATE/TIME: 3/10/2022 10:08 AM INDICATION: Shortness of breath. Cough. COMPARISON: 02/21/2022 radiograph. TECHNIQUE: CT chest pulmonary angiogram during arterial phase injection of IV contrast. Multiplanar reformats and MIP reconstructions were performed. Dose reduction techniques were used. CONTRAST: 75ml Isovue 370. FINDINGS: ANGIOGRAM CHEST: Regions of motion artifact. No pulmonary embolism seen. Nonaneurysmal aorta without dissection. LUNGS AND PLEURA: Moderate to large amount of heterogeneously enhancing left upper and lower lobe perihilar predominant consolidation with example area measuring 5.6 x 5.8 cm (series 6, image 108). Minimal opacities elsewhere bilaterally. Mild basilar atelectasis. Small right pleural effusion. No pneumothorax. MEDIASTINUM/AXILLAE: Mild adenopathy, presumed reactive. Normal heart size. No pericardial effusion. CORONARY ARTERY CALCIFICATION:  Compromised by motion. UPPER ABDOMEN: Hepatic steatosis. Incompletely seen spleen appears mildly enlarged, measuring roughly 13.3 cm in AP dimension. MUSCULOSKELETAL: Nothing acute     IMPRESSION: 1.  No pulmonary embolism. 2.  Moderate to large amount of left lung consolidation suggestive of pneumonia. Recommend 6-8 week follow-up PA and lateral radiographs for clearing. A few other mild bilateral opacities. 3.  Small right pleural effusion. 4.  Hepatic steatosis. 5.  Incompletely seen splenomegaly.     CT Chest Tube with Cath Placement    Result Date: 4/4/2022  EXAM: 1. PERCUTANEOUS CHEST TUBE PLACEMENT IN THE LEFT PLEURAL SPACE 2. CT GUIDANCE 3. CONSCIOUS SEDATION LOCATION: [facility] DATE/TIME: 4/4/2022 2:56 PM INDICATION: Left pleural effusion with concern for empyema TECHNIQUE: Dose reduction techniques were used. PROCEDURE: Informed consent obtained. Site marked. Prior images reviewed. Required items made available. Patient identity confirmed verbally and with arm band. Patient reevaluated immediately before administering sedation. Universal protocol was followed. Time out performed. The site was prepped and draped in sterile fashion. 10 mL of 1% lidocaine was infused into the local soft tissues. Using standard technique and under direct CT guidance, a 12 Tamazight chest tube catheter was inserted into the pleural space. The catheter was fixed in place with sutures and adhesive device, and the tubing was banded. Chest tube placed to Pleur-evac suction. BLOOD LOSS: Minimal. The patient tolerated the procedure well. No immediate complications. SEDATION: Versed 1 mg. Fentanyl 50 mcg. The procedure was performed with administration intravenous conscious sedation with appropriate preoperative, intraoperative, and postoperative evaluation. 20 minutes of supervised face to face conscious sedation time was provided by a radiology nurse under my direct supervision.     IMPRESSION: 1.  Successful  CT-guided chest tube placement into the left pleural space. Reference CPT Codes: 90584, 75227    CT Chest (PE) Abdomen Pelvis w Contrast    Result Date: 4/3/2022  EXAM: CT CHEST PE ABDOMEN PELVIS W CONTRAST LOCATION: [facility] DATE/TIME: 4/3/2022 12:49 PM INDICATION: Chest and abdominal pain. Elevated liver function tests. PE suspected, high prob COMPARISON: 03/20/2022 CT chest. TECHNIQUE: CT chest pulmonary angiogram and routine CT abdomen pelvis with IV contrast. Arterial phase through the chest and venous phase through the abdomen and pelvis. Multiplanar reformats and MIP reconstructions were performed. Dose reduction techniques were used. CONTRAST: 100ml Isovue 370 FINDINGS: ANGIOGRAM CHEST: Pulmonary arteries are normal caliber and negative for pulmonary emboli. Thoracic aorta is negative for dissection. No CT evidence of right heart strain. LUNGS AND PLEURA: The previous large dense consolidative infiltrate in the left upper lobe and left lower lobe seen on 03/10/2022 has near completely resolved. New small right and moderate left pleural effusions with compressive appearing atelectasis in the lung bases. MEDIASTINUM/AXILLAE: Normal. CORONARY ARTERY CALCIFICATION: None. HEPATOBILIARY: Gallstones. Normal liver. PANCREAS: Normal. SPLEEN: Mild splenomegaly measuring 12 cm. ADRENAL GLANDS: Normal. KIDNEYS/BLADDER: Normal. BOWEL: Moderate amount of stool throughout the entire colon suggestive of constipation. LYMPH NODES: Normal. VASCULATURE: Unremarkable. PELVIC ORGANS: Normal. MUSCULOSKELETAL: Normal.     IMPRESSION: 1.  Negative for pulmonary emboli. 2.  The previous dense consolidative infiltrates in the left lung seen on CT 03/10/2022 appear to near completely resolved. 3.  New small right and moderate left pleural effusions with moderate atelectasis in the lower lobes greater on the left side. 4.  Gallstones. 5.  Mild splenomegaly.    CT Sinus w/o Contrast    Result Date:  3/16/2022  EXAM DATE:         03/16/2022 EXAM: CT SINUSES WITHOUT CONTRAST LOCATION: [facility] DATE/TIME: 3/16/2022 12:45 PM INDICATION: Sinus infection COMPARISON: None. TECHNIQUE: Routine without contrast. Multiplanar reformats. Dose reduction techniques were used. FINDINGS: FRONTAL SINUSES:  Subtotal opacification of the frontal sinuses bilaterally with some partially aerated secretions. ETHMOID SINUSES:  Subtotal opacification of ethmoid air cells bilaterally, left greater than right. Some partially aerated secretions noted within posterior ethmoid locules bilaterally. SPHENOID SINUSES: Moderate mucosal thickening involving both sphenoid sinuses with bilateral air-fluid levels. MAXILLARY SINUSES: Moderate circumferential mucosal thickening and subtotal opacification of the bilateral maxillary sinuses, left greater than right. Bilateral air-fluid levels in some partially aerated secretions. The floors of the maxillary sinuses are intact. NASAL CAVITY/SKULL BASE: Rightward bowing of the nasal septum without focal defect. Partially paradoxical curvature of the middle turbinates. The cribriform plate is intact and symmetric. The anterior clinoid processes are not pneumatized. PARANASAL SINUS DRAINAGE PATHWAYS:  Opacification of the bilateral frontoethmoidal recesses, ostiomeatal units, and sphenoethmoidal recesses. NON-SINUS STRUCTURES: No abnormality of the visualized orbits or intracranial compartment. IMPRESSION: 1.  Findings of pansinusitis as above. 2.  Rightward nasal septal deviation.       Total Time Spent 35 minutes with >50% of the time spent in counseling, education and care coordination, antibiotic management.  Discussed with patient, patient's , hospitalist, nurse

## 2022-04-08 NOTE — PROGRESS NOTES
Hospitalist Progress Note    Assessment/Plan    Active Problems:    Tachypnea    Anemia    Pleural effusion    Tachycardia    Elevated LFTs    Dyspnea    41-year-old patient previously treated for community-acquired pneumonia presented to the hospital for shortness of breath and cough, CT chest showed resolution of the pneumonia on March 10, bilateral pleural effusion moderate to large on the left, underwent thoracentesis on April 3, blood culture and Gram stain from thoracentesis growing cocci bacilli, pulmonary team consulted patient was transferred to ICU for BiPAP support after she developed respiratory distress following thoracentesis, underwent ultrasound-guided chest tube placement on April 4, repeated chest x-ray on April 7 showed decrease the layering of the left effusion and atelectasis, pulmonary ordered repeat CT chest today    Bilateral pleural effusion left more than right, left-sided empyema, Haemophilus bacteremia,  Underwent ultrasound-guided chest tube placement on April 4,  Was previously treated for community-acquired pneumonia  On admission CT chest with complete resolution of the infiltrate of the lung  Thoracentesis on 4/3/2022, pleural fluid Gram stain culture   Blood culture 4/3/22 grew Haemophilus influenza, blood culture on fifth and 7 no growth to date,  On Rocephin clindamycin, vancomycin discontinued(April 4-6)  TPA initiated on//22 twice daily for 6 doses,  CT chest was done today April 8, pulmonary is following reviewed the CT chest that showed improvement of the left pneumonia, stable right atelectasis and pleural effusion, surgery consulted and will talk to the patient, concern for common variable immune deficiency, IVIG infusion    Concern for variable immune deficiency versus acquired immune deficiency  Immunoglobulin levels were checked there was profound reduction in all lines, other possible causes of acquired immune deficiency could be due to malnutrition,  Pulmonary team  recommended IVIG infusion, hematology consulted, awaiting flow cytometry results rule out secondary immune deficiency, on April 7 hematology consulted and offered the patient IVIG she agreed to proceed and she wishes to start    Elevated liver enzyme  Mild elevation, improved,  GI recommended follow-up for an EGD to evaluate for villous atrophy as an outpatient    Iron deficiency anemia  Was on iron supplement on admission, GI concerned about villous atrophy she will have a EGD with small bowel biopsy in about 2 months, once her pulmonary status improved,  Received IV iron infusion one-time dose yesterday      Recurrent sinusitis  ID discussed with the patient she has ENT appointment and procedure scheduled for May 2022 for nasal polyps,    Acute respiratory failure with hypoxia  Required BiPAP currently on oxygen nasal cannula likely secondary to pneumonia and empyema      barriers to Discharge: Pending further work-up, IV antibiotics,    Anticipated discharge date/Disposition: Home in a few days    Subjective  Patient was seen this morning appears to be comfortable    Objective    Vital signs in last 24 hours  Temp:  [98.2  F (36.8  C)-100.3  F (37.9  C)] 98.6  F (37  C)  Pulse:  [101-126] 103  Resp:  [22-34] 22  BP: (117-127)/(65-75) 127/65  SpO2:  [89 %-93 %] 89 % [unfilled] O2 Device: Nasal cannula    Weight:   [unfilled] Weight change:     Intake/Output last 3 shifts  I/O last 3 completed shifts:  In: 50 [Other:50]  Out: 1280 [Urine:350; Chest Tube:930]  Body mass index is 24.03 kg/m .    Physical Exam:  General Appearance: Alert, oriented x3, does not appear in distress.  HEENT: Normocephalic. No scleral icterus, . Mucous membranes moist.  Heart: :Regular rate and rhythm, normal S1 ,S2, No murmurs, no JVD, no pedal edema   Lungs: Clear to auscultation bilaterally. No wheezing or crackles chest tube in place acute respiratory with hypoxia  Abdomen: Soft, non tender, no rebound or rigidity, non distended,  bowel sounds present.  Extremity: No deformity. No joint swelling.      Pertinent Labs   Lab Results: personally reviewed.   Recent Labs   Lab 04/06/22  0449 04/05/22  0506 04/03/22 2312 04/03/22  1141   NA  --  139 138 139   CO2  --  21* 19* 20*   BUN  --  13 13 12   ALBUMIN 1.8* 1.9* 2.0* 2.9*   ALKPHOS 142* 155* 200* 268*   ALT 16 23 41 51*   AST 10 8 39 92*     Recent Labs   Lab 04/08/22  0449 04/07/22  0435 04/06/22  0449   WBC 8.5 9.1 9.5   HGB 8.5* 9.1* 8.7*   HCT 27.1* 29.4* 28.9*    407 402     Recent Labs   Lab 04/03/22 2312 04/03/22  1141   TROPONINI <0.01 <0.01     Invalid input(s): POCGLUFGR      Advanced Care Planning:  Discharge Planning discussed with patient  Total time with this patient is 25 min with 50% of time spent in examining the patient, reviewing records, discussing plan of care and counseling, 50% of time spent in coordination of care.  Care discussed and coordinated with RN, care manager .      Tamiko Peter MD  Internal Medicine Hospitalist  4/8/2022

## 2022-04-08 NOTE — PROGRESS NOTES
[facility]:  PULMONARY PROGRESS NOTE     Date / Time of Admission:  4/3/2022 11:04 AM    ID: Arely Saldaña is a 41 year old female, never smoker, with essential hypertension and pneumonia diagnosis since February 2022 who was admitted to [facility] on 4/3/2022 with bilateral pleural effusions and pneumonia, now being treated for gram-negative coccobacilli bacteremia and left pleural empyema with Haemophilus influenzae non-typable.  Course complicated by significant immunodeficiency and anemia with iron-deficiency.    Reason for consult: Pleural effusion.          Assessment: 1.   Left pleural empyema with Haemophilus influenzae nontypable s/p thoracentesis 4/3, s/p chest tube 4/4.  Patient with persistent pneumonia since 02/2022, failed 2 rounds of outpatient antibiotic therapy, now admitted with recurrent pneumonia and bilateral pleural effusions (L > R).  Underwent thoracentesis 4/3, 800 mL thick yellow fluid extracted, Gram stain showing gram-negative coccobacilli.  Chest tube placed 4/4, ~ 750 mL removed soonafter insertion.  Received intrapleural fibrinolytics (alteplase) 4/5-4/7 (BID dosing x 6 doses).  CT chest 4/8 with improvement of left empyema, associated atelectasis/possible consolidation; stable right atelectasis and pleural effusion.  2. Gram negative bacteremia with Haemophilus influenzae.  Blood cultures x 2 on 4/3 with 2/4 bottles + for gram negative coccobacilli.  Vanco IV discontinued 4/5.  3. History of recurrent left-sided pneumonia.  Treated with 1 week Abx in 02/2022 and again 1 week Abx in 03/2022 without improvement.    4. Suspected CVID vs acquired immunodeficiency.  Immunoglobulins sent 4/5, profound reduction in all lines: IgA < 5, IgG < 109, IgE < 2.  If patient truly has CVID, then villous atrophy may be seen in these patients, which may result in poor oral absorption (including poor oral absorption of iron); patient's iron level 9 this admission.  HIV Ag Ab  combo screen negative 4/5/22.  Other possible causes of acquired immunodeficiency may be malnutrition.   5. Pleuritic chest pain.  Developed evening of 4/3.  Transiently placed on NIPPV due to increased work of breathing with pleuritic chest pain.    6. Essential hypertension.  Patient on lisinopril 20 mg as outpatient, but takes as needed only when BP greater than 130/40.         Plan:     ID consultation appreciated to assist with long-term empyema therapy and antibiotic management.       Continue ceftriaxone and clindamycin IV for good source control.     Follow-up cultures/blood cultures.  Last fever in early evening 4/6.    CT chest study reviewed this morning.  Discussed with Dr. Kumar of general surgery.  He will review films with Radiology and discuss care recommendations with patient.     Keep chest tube to suction.  Will follow-up output in next 24 hours.     Okay for patient to ambulate.  Discussed with RN - okay to put on gravity and ambulate down the hallway.  Just place back on suction when in room.    Concern for possible CVID and acquired immunodeficiency.  IVIg infusion being discussed with patient by ID team - she declined yesterday.  I think she is more open to infusion today.     Oral intake for GI prophylaxis, Lovenox for DVT prophylaxis.    Patient and/or family were educated on the above recommendations.   Thank you for allowing our service to participate in the care of this patient.  Please call with any questions or concerns.    Total patient care time: 35 minutes, with over 50% spent in counseling/coordination of care.      Brea Upton MD, MPH  Pulmonary/Critical Care Medicine  04/08/2022  11:38 AM        Subjective/Interval History:   4/3:  Thoracentesis, 800 mL thick yellow fluid removed.   4/4:  Pleural effusion + gram stain w/ GN coccobacilli.  BCx + with GN coccobacilli - later speciated as H. Influenzae nontypable. L chest tube placed.    4/5:  CXR reviewed, persistent effusion.   "Initiated alteplase therapy into chest tube.  Pleural fluid yellow prior to intrapleural alteplase.  4/6: Abx switched initially from Zosyn to ceftriaxone/Flagyl.  However, reaction to flagyl - this was stopped and switched to clindamycin.  Fevers in evening.  Cultures sent.    4/7: Immunoglobulin studies demonstrated low values.  ID team discussed IVIG treatment.  Last dose of alteplase given in the evening.    Overnight, no issues.  930 mL output from chest tube.    Doing well overall.  Pain with movement otherwise stable.   Denies wheezing, hemoptysis.  Cough decreased.    I/O: 930 mL from chest tube in last 24 hours.    Review of Systems:  Pertinent items are noted in HPI.  The Review of Systems is negative other than noted in the HPI        Allergies/Medications:   Allergies:     Allergies   Allergen Reactions     Metronidazole Other (See Comments)     Had \"tremulous chills\" associated with iv infusion.  Resolved after stopping infusion     Sulfamethoxazole-Trimethoprim [Sulfamethoxazole W/Trimethoprim] Rash       Continuous Infusions:    Scheduled Medications:    cefTRIAXone  2 g Intravenous Q24H     clindamycin  600 mg Intravenous Q8H     enoxaparin ANTICOAGULANT  40 mg Subcutaneous Q24H     ferrous sulfate  325 mg Oral Every Other Day     immune globulin - sucrose free  0.5 g/kg (Ideal) Intravenous Once     lisinopril  5 mg Oral Daily            Objective:   Vitals:  /65 (BP Location: Right arm, Patient Position: Sitting)   Pulse 103   Temp 98.6  F (37  C) (Oral)   Resp 22   Ht 1.676 m (5' 6\")   Wt 67.5 kg (148 lb 14.4 oz)   LMP 03/01/2022   SpO2 (!) 89%   BMI 24.03 kg/m    GEN: Pleasant female, sitting in the bed in no acute distress  HEENT: Normocephalic, atraumatic.  Extraoccular eye movements intact, anicteric sclera.  Moist mucous membranes.   NECK: Supple.    CHEST:  L posterior chest tube, to suction, no air leaks.   PULM: Non-labored breathing.  No use of accessory muscles.  Mildly " diminished breath sounds at left lung base.  No wheezing or rales.  CVS: Regular rate and rhythm.  Normal S1, S2.  No rubs, murmurs, or gallops.    ABDOMEN: Normoactive bowel sounds.  Non-tender to palpation.  Non-distended.    EXTREMITES:  No clubbing, cyanosis, or edema.    NEURO:  Awake.  Oriented to person, place, time and situation.  Cranial nerves 2-12 grossly intact.  Moving all extremities.      Intake/Output:  I/O last 3 completed shifts:  In: 50 [Other:50]  Out: 1280 [Urine:350; Chest Tube:930]        Pertinent Studies:   All laboratory data reviewed  Serum Glucose range:   Recent Labs   Lab 04/05/22  0506 04/03/22 2312 04/03/22  1944 04/03/22  1848 04/03/22  1141   GLC 88 116 175* 148* 121     ABG: No lab results found in last 7 days.  CBC:   Recent Labs   Lab 04/08/22 0449 04/07/22  0435 04/06/22 0449 04/04/22  0501 04/03/22  1141   WBC 8.5 9.1 9.5   < > 8.8   HGB 8.5* 9.1* 8.7*   < > 9.4*   HCT 27.1* 29.4* 28.9*   < > 30.7*   MCV 72* 74* 75*   < > 74*    407 402   < > 379   NEUTROPHIL  --   --   --   --  90   LYMPH  --   --   --   --  4   MONOCYTE  --   --   --   --  6   EOSINOPHIL  --   --   --   --  0    < > = values in this interval not displayed.     Chemistry:   Recent Labs   Lab 04/06/22 0449 04/05/22  0506 04/03/22 2312 04/03/22  1747 04/03/22  1141   NA  --  139 138  --  139   POTASSIUM  --  3.8 3.9  --  3.8   CHLORIDE  --  108* 108*  --  104   CO2  --  21* 19*  --  20*   BUN  --  13 13  --  12   CR  --  0.61 0.65  --  0.69   GFRESTIMATED  --  >90 >90  --  >90   PABLO  --  8.9 8.1*  --  9.3   MAG  --   --   --   --  1.7*   PROTTOTAL 4.1* 4.7* 4.4*   < > 5.8*   ALBUMIN 1.8* 1.9* 2.0*  --  2.9*   AST 10 8 39  --  92*   ALT 16 23 41  --  51*   ALKPHOS 142* 155* 200*  --  268*   BILITOTAL 0.4 0.4 1.4*  --  1.4*    < > = values in this interval not displayed.     Coags:  Recent Labs   Lab 04/03/22  1651   INR 1.20*     Cardiac Markers:  Recent Labs   Lab 04/03/22  2312   TROPONINI <0.01      Left pleural fluid, 4/3: Glucose less than 5, LDH 2332, pH 7.5, protein 3.7    Microbiology:   Blood culture x 1, 4/5: NGTD  Blood culture x 2, 4/6: NGTD  Blood culture x 1, 4/5: NGTD  Blood culture x 2, 4/3: 2 of 4 bottles with Haemophilus influenzae nontypable.  Blood culture x 1 set, 4/3: 1 of 2 bottles with Haemophilus influenzae nontypable.  Left pleural fluid, 4/3:  Gram stain: 4+ WBC, 4+ gram negative coccobacilli.  Cx:  4+ Haemophilus influenzae nontypable.  COVID-19 PCR, 4/3: Negative  Influenza A/B swab, 4/3: Negative        Cardiology/Radiology:   Cardiac: All cardiac studies reviewed by me.    EKG:  Reviewed    TTE, 4/3/22:  Summary: Left ventricular size, wall motion and function are normal. The ejection fraction is > 65%. Normal right ventricle size and systolic function. No hemodynamically significant valvular abnormalities on 2D or color flow imaging.     Radiology: All imaging studies reviewed by me.    Chest X-Ray, 4/7:  Slight retraction of the left basilar chest tube. Continued decrease in the layering left effusion and associated atelectasis. There is a similar small amount of air within the pleural space medially on the retrocardiac region compatible with recent tube placement. Trace right effusion and associated atelectasis. Heart size is normal    CT chest w/ and w/out contrast, 4/8:  FINDINGS: LUNGS AND PLEURA: The left basilar chest tube is in place. There is a small amount of residual left pleural effusion which is decreased from 04/04/2022. The fluid is slightly medial and superior compared to the tip of the pleural drain. There is a small amount of air in the left pleural space compatible with pleural catheter.  There are a few residual linear opacities in the left upper and lower lobe in the area of the prior consolidation likely atelectasis/scarring. There is persistent but improved atelectasis in the left lung base compared to prior exam. Relatively similar small right effusion  and associated atelectasis. MEDIASTINUM/AXILLAE: Heart size is normal. No lymphadenopathy. No thoracic aneurysm. CORONARY ARTERY CALCIFICATION: None. UPPER ABDOMEN: No significant finding. MUSCULOSKELETAL: Probable benign bone island in the T8 vertebral body.    IMPRESSION:  1.  Persistent but significantly decreased left pleural effusion and associated atelectasis when compared to 04/03/2022. There is a small amount of residual fluid slightly superior and medial to the tip of the left pleural drain. Small amount of air in  the left pleural space is consistent with pleural drain placement. 2.  Similar small right effusion and associated atelectasis. 3.  There are a few linear opacities in the left upper lung and lower lung in the area of the prior consolidation likely a combination of atelectasis     CT chst PE study, Abd/pelvis, 4/3:   ANGIOGRAM CHEST: Pulmonary arteries are normal caliber and negative for pulmonary emboli. Thoracic aorta is negative for dissection. No CT evidence of right heart strain.   LUNGS AND PLEURA: The previous large dense consolidative infiltrate in the left upper lobe and left lower lobe seen on 03/10/2022 has near completely resolved. New small right and moderate left pleural effusions with compressive appearing atelectasis in the lung bases.  MEDIASTINUM/AXILLAE: Normal.  CORONARY ARTERY CALCIFICATION: None.  HEPATOBILIARY: Gallstones. Normal liver.  PANCREAS: Normal.  SPLEEN: Mild splenomegaly measuring 12 cm.  ADRENAL GLANDS: Normal.  KIDNEYS/BLADDER: Normal.   BOWEL: Moderate amount of stool throughout the entire colon suggestive of constipation.  LYMPH NODES: Normal.  VASCULATURE: Unremarkable.  PELVIC ORGANS: Normal.  MUSCULOSKELETAL: Normal.    IMPRESSION: 1.  Negative for pulmonary emboli.  2.  The previous dense consolidative infiltrates in the left lung seen on CT 03/10/2022 appear to near completely resolved.  3.  New small right and moderate left pleural effusions with  moderate atelectasis in the lower lobes greater on the left side. 4.  Gallstones. 5.  Mild splenomegaly.

## 2022-04-08 NOTE — PLAN OF CARE
Problem: Plan of Care - These are the overarching goals to be used throughout the patient stay.    Goal: Plan of Care Review/Shift Note  Description: The Plan of Care Review/Shift note should be completed every shift.  The Outcome Evaluation is a brief statement about your assessment that the patient is improving, declining, or no change.  This information will be displayed automatically on your shift note.  4/8/2022 1442 by Franny Ott RN  Outcome: Ongoing, Progressing  4/8/2022 1439 by Franny Ott RN  Outcome: Ongoing, Progressing     Problem: Plan of Care - These are the overarching goals to be used throughout the patient stay.    Goal: Absence of Hospital-Acquired Illness or Injury  Intervention: Identify and Manage Fall Risk  Recent Flowsheet Documentation  Taken 4/8/2022 0802 by Franny Ott RN  Safety Promotion/Fall Prevention: activity supervised     Problem: Respiratory Compromise (Pneumonia)  Goal: Effective Oxygenation and Ventilation  4/8/2022 1442 by Franny Ott RN  Outcome: Ongoing, Progressing  4/8/2022 1439 by Franny Ott RN  Outcome: Ongoing, Progressing  Intervention: Promote Airway Secretion Clearance  Recent Flowsheet Documentation  Taken 4/8/2022 0802 by Franny Ott RN  Cough And Deep Breathing: done with encouragement     Problem: Fluid Imbalance (Pneumonia)  Goal: Fluid Balance  4/8/2022 1442 by Franny Ott RN  Outcome: Ongoing, Progressing   Goal Outcome Evaluation:      Patient alert and oriented. Had immunoglobulin started this afternoon. Tolerating well so far, Vitals are stable., Chest tube output is 20 ml Incentive spirometer taught to patient, afebrile,  on 2L of oxygen ,continue to monitor.

## 2022-04-08 NOTE — PROGRESS NOTES
Prescription Policy     Our goal is to serve you on a more timely basis. Currently, our office receives a large volume of phone requests for medication refills. We are requesting your cooperation with the following:    · Look over your medications, diabetic supplies, etc. before coming to your appointment to see if you need any refills.    · Request your medication (or supply) refills during your office visit.    · Outside of an office visit, requests should be directed to your pharmacy even if you have zero refills. Call your pharmacy after 72 hours to see if your prescription is ready for pick-up.     Pharmacy Refills: All prescriptions take 48-72 hours to refill.          Our Patients are Important    We want to improve and you can help. You may receive a survey asking you about this visit. Please complete this survey; we will use your feedback to make improvements. Thank you.     [facility] Inpatient Progress Note    Patient: Arely Saldaña  MRN: [id number]  Date of Service: April 8, 2022         Reason for Visit    #.  Severe hypogammaglobulinemia    Assessment and Plan    #.  Severe hypogammaglobulinemia concerning for common variable immunodeficiency syndrome  #.  Reaction to IVIG  #.  Microcytic anemia with iron deficiency: Iron studies and ferritin confirmed severe iron deficiency.  #.  H. influenzae bacteremia, or H. influenzae pneumonia and left-sided empyema  #.  Recurrent sinusitis  #.  Mild splenomegaly of unclear clinical significance.      Received IVIG 0.5 g/kg today on 4/8/2022.  No additional IVIG dose planned.  Noted she had infusion reaction to IVIG with fever and tachycardia, resolved with ice pack and Tylenol.  Will get additional premedication for future IVIG therapy as indicated.    Replaced with IV sucrose 300 mg on 4/7/2022.  No reaction.  Continue with oral iron but she has poor tolerance to oral iron therefore she will continue at her best.   She will need immunologist/allergist to establish diagnosis of CVID as outpatient.  Once this is confirmed, she will need long-term IVIG replacement..      We will not follow over the weekend.  Please call us with any concern for on-call provider.  ______________________________________________________________________________    History of Present Illness    Ms. Arely Saldaña received IVIG. Reviewed as she had infusion reaction during IVIG with tachycardia, fever.  It was resolved with ice pack and Tylenol.  She is doing okay at the time of my encounter and no concern.  She was very glad to know that CT scan showed improvement of empyema.  She tolerated IV iron yesterday without issues.    Review of Systems    Apart from describing in HPI, the remainder of comprehensive ROS was negative.    Physical Exam    /69 (BP Location: Right arm, Patient Position: Semi-Camilo's)   Pulse (!) 136   Temp  "(!) 103.1  F (39.5  C) (Temporal)   Resp 20   Ht 1.676 m (5' 6\")   Wt 64.8 kg (142 lb 12.8 oz)   LMP 03/01/2022   SpO2 92%   BMI 23.05 kg/m        General: alert, awake, not in acute distress  HEENT: Head: Normal, normocephalic, atraumatic.  Eye: Normal external eye, conjunctiva, lids cornea, ABDULLAHI.  Nose: Normal external nose, mucus membranes and septum.  Pharynx: Normal buccal mucosa. Normal pharynx.  Neck / Thyroid: Supple, no masses, nodes, nodules or enlargement.  Lymphatics: No abnormally enlarged lymph nodes.  Chest tube in left lung and the reservoir showed serosanguineous fluid.    Abdomen: abdomen is soft without significant tenderness, masses, organomegaly or guarding  Extremities: normal strength, tone, and muscle mass  Skin: normal. no rash or abnormalities  CNS: non focal.     Lab Results    Recent Results (from the past 24 hour(s))   CBC with platelets    Collection Time: 04/08/22  4:49 AM   Result Value Ref Range    WBC Count 8.5 4.0 - 11.0 10e3/uL    RBC Count 3.79 (L) 3.80 - 5.20 10e6/uL    Hemoglobin 8.5 (L) 11.7 - 15.7 g/dL    Hematocrit 27.1 (L) 35.0 - 47.0 %    MCV 72 (L) 78 - 100 fL    MCH 22.4 (L) 26.5 - 33.0 pg    MCHC 31.4 (L) 31.5 - 36.5 g/dL    RDW 15.7 (H) 10.0 - 15.0 %    Platelet Count 376 150 - 450 10e3/uL        Imaging    XR Chest Port 1 View    Result Date: 4/7/2022  EXAM: XR CHEST PORT 1 VIEW LOCATION: [facility] DATE/TIME: 4/7/2022 8:08 AM INDICATION: Follow up effusion empyema, on lytic therapy. COMPARISON: 04/05/2022     IMPRESSION: Slight retraction of the left basilar chest tube. Continued decrease in the layering left effusion and associated atelectasis. There is a similar small amount of air within the pleural space medially on the retrocardiac region compatible with  recent tube placement. Trace right effusion and associated atelectasis. Heart size is normal.    CT Chest w Contrast    Result Date: 4/8/2022  EXAM: CT CHEST W CONTRAST LOCATION: " [facility] DATE/TIME: 4/8/2022 8:55 AM INDICATION: Pneumonia, effusion or abscess suspected, xray done, follow-up chest tube placement COMPARISON: 03/10/2022, chest tube placement from 04/04/2022 CT chest, abdomen, and pelvis from 04/03/2022 TECHNIQUE: CT chest with IV contrast. Multiplanar reformats were obtained. Dose reduction techniques were used. CONTRAST: Isovue 370 100mL FINDINGS: LUNGS AND PLEURA: The left basilar chest tube is in place. There is a small amount of residual left pleural effusion which is decreased from 04/04/2022. The fluid is slightly medial and superior compared to the tip of the pleural drain. There is a small amount of air in the left pleural space compatible with pleural catheter.  There are a few residual linear opacities in the left upper and lower lobe in the area of the prior consolidation likely atelectasis/scarring. There is persistent but improved atelectasis in the left lung base compared to prior exam. Relatively similar small right effusion and associated atelectasis. MEDIASTINUM/AXILLAE: Heart size is normal. No lymphadenopathy. No thoracic aneurysm. CORONARY ARTERY CALCIFICATION: None. UPPER ABDOMEN: No significant finding. MUSCULOSKELETAL: Probable benign bone island in the T8 vertebral body.     IMPRESSION: 1.  Persistent but significantly decreased left pleural effusion and associated atelectasis when compared to 04/03/2022. There is a small amount of residual fluid slightly superior and medial to the tip of the left pleural drain. Small amount of air in the left pleural space is consistent with pleural drain placement. 2.  Similar small right effusion and associated atelectasis. 3.  There are a few linear opacities in the left upper lung and lower lung in the area of the prior consolidation likely a combination of atelectasis and/or scarring.       Signed by: Lizzy Conn MD

## 2022-04-08 NOTE — PROGRESS NOTES
Care Management Follow Up    Length of Stay (days): 4    Expected Discharge Date: 04/10/2022     Concerns to be Addressed: not medically ready for discharge, chest tube, IV antibiotics, ID/pulomary/surgery following      Patient plan of care discussed at interdisciplinary rounds: Yes    Anticipated Discharge Disposition: TBD     Anticipated Discharge Services: TBD    Referrals Placed by CM/SW: None at this time.    Private pay costs discussed: None at this time.     Additional Information:  Chart reviewed.       Armida Lopez RN

## 2022-04-09 LAB
ERYTHROCYTE [DISTWIDTH] IN BLOOD BY AUTOMATED COUNT: 16 % (ref 10–15)
HCT VFR BLD AUTO: 25.8 % (ref 35–47)
HGB BLD-MCNC: 8 G/DL (ref 11.7–15.7)
HOLD SPECIMEN: NORMAL
IGD SER-MCNC: <1.3 MG/DL
MCH RBC QN AUTO: 22.8 PG (ref 26.5–33)
MCHC RBC AUTO-ENTMCNC: 31 G/DL (ref 31.5–36.5)
MCV RBC AUTO: 74 FL (ref 78–100)
PLATELET # BLD AUTO: 290 10E3/UL (ref 150–450)
RBC # BLD AUTO: 3.51 10E6/UL (ref 3.8–5.2)
WBC # BLD AUTO: 5.1 10E3/UL (ref 4–11)

## 2022-04-09 PROCEDURE — 99233 SBSQ HOSP IP/OBS HIGH 50: CPT | Performed by: INTERNAL MEDICINE

## 2022-04-09 PROCEDURE — 85027 COMPLETE CBC AUTOMATED: CPT | Performed by: INTERNAL MEDICINE

## 2022-04-09 PROCEDURE — 250N000013 HC RX MED GY IP 250 OP 250 PS 637: Performed by: INTERNAL MEDICINE

## 2022-04-09 PROCEDURE — 36415 COLL VENOUS BLD VENIPUNCTURE: CPT | Performed by: INTERNAL MEDICINE

## 2022-04-09 PROCEDURE — 250N000011 HC RX IP 250 OP 636: Performed by: INTERNAL MEDICINE

## 2022-04-09 PROCEDURE — 99233 SBSQ HOSP IP/OBS HIGH 50: CPT | Performed by: HOSPITALIST

## 2022-04-09 PROCEDURE — 99233 SBSQ HOSP IP/OBS HIGH 50: CPT | Performed by: STUDENT IN AN ORGANIZED HEALTH CARE EDUCATION/TRAINING PROGRAM

## 2022-04-09 PROCEDURE — 120N000001 HC R&B MED SURG/OB

## 2022-04-09 RX ADMIN — LISINOPRIL 5 MG: 5 TABLET ORAL at 09:51

## 2022-04-09 RX ADMIN — ENOXAPARIN SODIUM 40 MG: 100 INJECTION SUBCUTANEOUS at 22:17

## 2022-04-09 RX ADMIN — CLINDAMYCIN PHOSPHATE 600 MG: 600 INJECTION, SOLUTION INTRAVENOUS at 10:55

## 2022-04-09 RX ADMIN — GUAIFENESIN 10 ML: 100 SOLUTION ORAL at 17:02

## 2022-04-09 RX ADMIN — ACETAMINOPHEN 650 MG: 325 TABLET ORAL at 00:33

## 2022-04-09 RX ADMIN — GUAIFENESIN 10 ML: 100 SOLUTION ORAL at 13:32

## 2022-04-09 RX ADMIN — CEFTRIAXONE SODIUM 2 G: 2 INJECTION, POWDER, FOR SOLUTION INTRAMUSCULAR; INTRAVENOUS at 09:51

## 2022-04-09 RX ADMIN — CLINDAMYCIN PHOSPHATE 600 MG: 600 INJECTION, SOLUTION INTRAVENOUS at 18:08

## 2022-04-09 RX ADMIN — CLINDAMYCIN PHOSPHATE 600 MG: 600 INJECTION, SOLUTION INTRAVENOUS at 01:35

## 2022-04-09 ASSESSMENT — ACTIVITIES OF DAILY LIVING (ADL)
ADLS_ACUITY_SCORE: 7

## 2022-04-09 NOTE — DISCHARGE SUMMARY
[facility] MEDICINE  DISCHARGE SUMMARY     Primary Care Physician: Ashely Larios  Admission Date: 4/3/2022   Discharge Provider: Tamiko Peter MD Discharge Date: 4/9/2022   Diet:   Active Diet and Nourishment Order   Procedures     Regular Diet Adult     Diet       Code Status: Full Code   Activity: DCACTIVITY: Activity as tolerated        Condition at Discharge: Stable     REASON FOR PRESENTATION(See Admission Note for Details)   Dyspnea and cough    PRINCIPAL & ACTIVE DISCHARGE DIAGNOSES     Active Problems:    Tachypnea    Anemia    Pleural effusion    Tachycardia    Elevated LFTs    Dyspnea      PENDING LABS     Unresulted Labs Ordered in the Past 30 Days of this Admission     Date and Time Order Name Status Description    4/6/2022  5:40 PM Blood Culture Hand, Left Preliminary     4/6/2022 12:01 AM Blood Culture Hand, Right Preliminary     4/6/2022 12:01 AM Blood Culture Peripheral Blood Preliminary     4/5/2022 10:18 AM Celiac (Gluten) Antibody Panel In process     4/5/2022 12:01 AM Blood Culture Peripheral Blood Preliminary             PROCEDURES ( this hospitalization only)          RECOMMENDATIONS TO OUTPATIENT PROVIDER FOR F/U VISIT             DISPOSITION     [facility]    SUMMARY OF HOSPITAL COURSE:      41-year-old patient previously treated for community-acquired pneumonia presented to the hospital with cough and shortness of breath, CT chest showed resolution of pneumonia and bilateral pleural effusion moderate to large 1 on the left, she underwent thoracentesis on April 3 blood culture and Gram stain from thoracentesis grew coccobacilli bacilli pulmonary team consulted patient had respiratory distress following thoracentesis and the patient was transferred to ICU for BiPAP support, she also had ultrasound-guided chest tube placement on April 4, oxygen needs-improved, she was found to have Haemophilus influenza bacteremia, on vancomycin from April  4-6 switched to Rocephin and clindamycin, pulmonary, surgery, ID and the hematology consulted    Bilateral pleural effusions left more than right, left-sided empyema,  Haemophilus bacteremia 1 droplet precaution  Status post thoracentesis on April 3 chest tube placement on April 4, received intrapleural fibrinolytics from April 5-7,  Repeat CT chest on April 8 showed improvement of the left empyema  On vancomycin for 3 days and it was stopped currently on clindamycin and Rocephin  She has history of recurrent pneumonia treated with 1 week of antibiotic in February and another week of antibiotic in March  -Surgery consulted and the potential for left thorax thoracoscopy and potential thrombectomy  Discussed with the surgeon who recommended transfer to [facility] decision about timing of her surgery depends on the chest tube      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  Marked hypogammaglobinemia  -Hematology ordered IVIG started infusion on April 8 she had fever and tachycardia which promptly stopped IVIG infusion, resuscitated with IV fluids Tylenol,  -Hematology recommended additional premedication for future IVIG therapy is indicated, she needs to immunologist today to establish diagnosis of CVI D as outpatient once confirmed she will need long-term IVIG replacement,      Iron deficiency anemia  GI consulted, there is concern about villous atrophy and she is not able to absorb oral iron and recommended IV iron she received 1 dose while inpatient  GI recommended EGD and small bowel biopsy in 2 months once her respiratory issues resolved      Elevated liver enzymes   improved she needs to follow-up with GI for an EGD      Acute respiratory failure with hypoxia  Secondary to empyema and pneumonia, required BiPAP currently on oxygen nasal cannula      Recurrent sinusitis  Patient needs to have ENT evaluation and she has appointment scheduled for May 2022          Discharge  Medications with Med changes:     Current Discharge Medication List      START taking these medications    Details   cefTRIAXone (ROCEPHIN) 2 GM vial Inject 2 g into the vein every 24 hours      clindamycin (CLEOCIN) 600 MG/50ML infusion Inject 50 mLs (600 mg) into the vein every 8 hours         CONTINUE these medications which have CHANGED    Details   lisinopril (ZESTRIL) 5 MG tablet Take 1 tablet (5 mg) by mouth daily         CONTINUE these medications which have NOT CHANGED    Details   Calcium Carb-Cholecalciferol (CALTRATE 600+D3 SOFT) 600-800 MG-UNIT CHEW Take 1 tablet by mouth daily       ferrous sulfate (FEROSUL) 325 (65 Fe) MG tablet Take 325 mg by mouth daily (with breakfast)                   Rationale for medication changes:      Haemophilus influenza bacteremia and empyema        Consults       PULMONARY IP CONSULT  PHARMACY TO DOSE VANCO  GASTROENTEROLOGY IP CONSULT  SOCIAL WORK IP CONSULT  INFECTIOUS DISEASES IP CONSULT  HEMATOLOGY & ONCOLOGY IP CONSULT  SURGERY GENERAL IP CONSULT  SURGERY GENERAL IP CONSULT    Immunizations given this encounter     Most Recent Immunizations   Administered Date(s) Administered     COVID-19,PF,Moderna 09/01/2021     Historical DTP/aP 07/01/1987     Influenza (IIV3) PF 02/25/2020     Influenza Vaccine IM > 6 months Valent IIV4 (Alfuria,Fluzone) 11/10/2020     MMR 04/15/2021     Meningococcal (Menomune ) 03/28/2003     OPV, trivalent, live 09/10/1986     Td (Adult), Adsorbed 01/01/2000     Tdap (Adacel,Boostrix) 02/27/2021                 SIGNIFICANT IMAGING FINDINGS     Results for orders placed or performed during the hospital encounter of 04/03/22   CT Chest (PE) Abdomen Pelvis w Contrast    Impression    IMPRESSION:  1.  Negative for pulmonary emboli.    2.  The previous dense consolidative infiltrates in the left lung seen on CT 03/10/2022 appear to near completely resolved.    3.  New small right and moderate left pleural effusions with moderate atelectasis in the  lower lobes greater on the left side.    4.  Gallstones.    5.  Mild splenomegaly.   Abdomen US, limited (RUQ only)    Impression    IMPRESSION:  1.  Cholelithiasis. Otherwise normal right upper quadrant ultrasound.       XR Chest Port 1 View    Impression    IMPRESSION:     Moderate to large left and small right pleural effusions are not visibly changed. Related atelectasis including near complete atelectasis of the left lower lobe.    Lower left ventricular heart border is silhouetted. Cardiac silhouette is not enlarged. Azygos vein is not distended and the vascular pedicle width is normal.    No pneumothorax.   US Thoracentesis    Impression    IMPRESSION:  Status post left ultrasound-guided thoracentesis.    Reference CPT Code: 95146   XR Chest 1 View    Impression    IMPRESSION:     Considerable decrease in the amount of left pleural fluid. The left upper lobe is better inflated.     The residual opacity in the left basal chest has a sharp interface which parallels the left ventricular heart border within which there are no air bronchograms consistent with lobar atelectasis of the left lower lobe. There is minimal atelectasis or   pleural fluid in the left lateral costophrenic sulcus.    Unchanged atelectasis/pleural fluid along the right hemidiaphragm.       XR Chest Port 1 View    Impression    IMPRESSION: The chest is stable with again seen minimal right pleural effusion and mild left pleural effusion with some underlying infiltrate/atelectasis in both lung bases and extending into the left midlung. The chest is otherwise negative.   CT Chest Tube with Cath Placement    Impression    IMPRESSION:  1.  Successful CT-guided chest tube placement into the left pleural space.    Reference CPT Codes: 28987, 37284   XR Chest Port 1 View    Impression    IMPRESSION: There is a new chest tube at the left medial lung base. There has been decrease in left pleural effusion and there is some residual of effusion and  atelectasis at the left lung base. No pneumothorax. Mild atelectasis at the right lung base.   Normal heart size and pulmonary vascularity.   XR Chest Port 1 View    Impression    IMPRESSION: Slight retraction of the left basilar chest tube. Continued decrease in the layering left effusion and associated atelectasis. There is a similar small amount of air within the pleural space medially on the retrocardiac region compatible with   recent tube placement. Trace right effusion and associated atelectasis. Heart size is normal.   CT Chest w Contrast    Impression    IMPRESSION:   1.  Persistent but significantly decreased left pleural effusion and associated atelectasis when compared to 04/03/2022. There is a small amount of residual fluid slightly superior and medial to the tip of the left pleural drain. Small amount of air in   the left pleural space is consistent with pleural drain placement.  2.  Similar small right effusion and associated atelectasis.  3.  There are a few linear opacities in the left upper lung and lower lung in the area of the prior consolidation likely a combination of atelectasis and/or scarring.   Echocardiogram Complete   Result Value Ref Range    LVEF  > 65%        SIGNIFICANT LABORATORY FINDINGS     Most Recent 3 CBC's:Recent Labs   Lab Test 04/09/22  0627 04/08/22 0449 04/07/22  0435   WBC 5.1 8.5 9.1   HGB 8.0* 8.5* 9.1*   MCV 74* 72* 74*    376 407     Most Recent 3 BMP's:Recent Labs   Lab Test 04/05/22  0506 04/03/22  2312 04/03/22  1944 04/03/22  1848 04/03/22  1141    138  --   --  139   POTASSIUM 3.8 3.9  --   --  3.8   CHLORIDE 108* 108*  --   --  104   CO2 21* 19*  --   --  20*   BUN 13 13  --   --  12   CR 0.61 0.65  --   --  0.69   ANIONGAP 10 11  --   --  15   PABLO 8.9 8.1*  --   --  9.3   GLC 88 116 175*   < > 121    < > = values in this interval not displayed.     Most Recent 2 LFT's:Recent Labs   Lab Test 04/06/22  0449 04/05/22  0506   AST 10 8   ALT 16 23    ALKPHOS 142* 155*   BILITOTAL 0.4 0.4           Discharge Orders        Activity    Your activity upon discharge: activity as tolerated     Full Code     Diet    Follow this diet upon discharge: Orders Placed This Encounter      Regular Diet Adult       Examination   Physical Exam   Temp:  [97.9  F (36.6  C)-104.7  F (40.4  C)] 98.3  F (36.8  C)  Pulse:  [] 93  Resp:  [16-40] 16  BP: (120-148)/(60-98) 128/78  SpO2:  [91 %-100 %] 100 %  Wt Readings from Last 1 Encounters:   04/09/22 64.6 kg (142 lb 8 oz)       Physical Exam:  General Appearance: Alert, oriented x3, does not appear in distress.  HEENT: Normocephalic. No scleral icterus, . Mucous membranes moist.  Heart: :Regular rate and rhythm, normal S1 ,S2, No murmurs, no JVD, no pedal edema   Lungs: Clear to auscultation bilaterally. No wheezing or crackles chest tube in place acute respiratory with hypoxia  Abdomen: Soft, non tender, no rebound or rigidity, non distended, bowel sounds present.  Extremity: No deformity. No joint swelling.       Please see EMR for more detailed significant labs, imaging, consultant notes etc.    I, Tamiko Peter MD, personally saw the patient today and spent greater than 30 minutes discharging this patient.    Tamiko Peter MD  [facility]    CC:Ashely Larios

## 2022-04-09 NOTE — PROGRESS NOTES
Patient plan at 1900 was to transfer to [facility] for possible VATS procedure. Hospitalist at [facility] did not accept patient based on Dr Kumar's note indicating that procedure would be scheduled for next week. Unable to reach Dr Kumar for clarification, unable to reach on call general surgeon Dr Wayne for clarification. As [facility] has beds currently and is aware of the patient and in collaboration with Dr Almendarez and Lexie CORTEZ, transfer has been cancelled.

## 2022-04-09 NOTE — PLAN OF CARE
Problem: Plan of Care - These are the overarching goals to be used throughout the patient stay.    Goal: Optimal Comfort and Wellbeing  Outcome: Ongoing, Progressing   Goal Outcome Evaluation:        Pt. Verbalizes comfort tonight no c/o pain or discomfort. Stable respiratory pattern at this time.

## 2022-04-09 NOTE — PROGRESS NOTES
[facility]    Infectious Disease Progress Note    04/09/2022      Assessment & Plan   Arely Saldaña is a 41 year old female who was admitted on 4/3/2022.     1. Empyema, left-sided-chest tube in place, tPA instilled by pulmonary.  2. Repeat CT shows persistent with significantly decreased left pleural effusion and associated atelectasis and residual fluid superior and medial to the tip of left pleural drain.  3. 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  Being transferred to [facility] for decortication.  Chest tube removed today 4/9/2022.  4. Recurrent left-sided pneumonia  5. sinusitis since August/September 2021  6. Hypertension, pleuritic chest pain, tachypnea  7. Status post thoracentesis and chest tube placement on 4/4/2022  8. Elevated liver enzymes, improving.  Splenomegaly  9. Hypogammaglobulinemia: Decreased immunoglobulin levels, IgA < 5, IgA <109, IgA < 2. ?  CVID.  Had high-grade fever with IVIG.  10. Transthoracic echocardiogram did not show vegetations  11. Patient is a vegetarian    Recommendations  Continue IV ceftriaxone and clindamycin for now.  Transferring to [facility] for decortication.  We will need immunology consult outpatient.  IVIG therapy per hematology.    Discussed with the patient, nursing staff, Dr. Upton.    ID will sign off here at [facility].  Care to resume a [facility].      Karlos Rae MD  [facility]  [phone]     Susceptibility     Haemophilus influenzae nontypable     HANNA     Amoxicillin/Clavulanate 0.75 ug/mL Susceptible     Ampicillin 0.38 ug/mL Susceptible     Ceftriaxone <=0.016 ug/mL Susceptible     Levofloxacin 0.032 ug/mL Susceptible     Meropenem 0.125 ug/mL Susceptible                    Interval History   Patient new to me today  4/9/2022.  On IV ceftriaxone and clindamycin and tolerating well.  Had high-grade fever with IVIG yesterday.  Chest tube removed today.  Transferring to [facility] for decortication.    Physical Exam   Temp: 98.6  F (37  C) Temp src: Oral BP: 139/82 Pulse: 105   Resp: 18 SpO2: 98 % O2 Device: Nasal cannula Oxygen Delivery: 2 LPM  Vitals:    04/06/22 0500 04/08/22 1415 04/09/22 0515   Weight: 67.5 kg (148 lb 14.4 oz) 64.8 kg (142 lb 12.8 oz) 64.6 kg (142 lb 8 oz)     Vital Signs with Ranges  Temp:  [97.9  F (36.6  C)-104.7  F (40.4  C)] 98.6  F (37  C)  Pulse:  [] 105  Resp:  [16-40] 18  BP: (120-148)/(60-98) 139/82  SpO2:  [91 %-100 %] 98 %    Exam on 04/09/2022    GENERAL APPEARANCE:  awake, appears ill  EYES: Eyes grossly normal to inspection, PERRL and conjunctivae and sclerae normal  HENT: Dry mucosa  NECK: Supple  RESP: Tachypnea, decreased breath sounds on left.  Chest tube out  CV: S1-S2 tachycardia  LYMPHATICS: No severe lymphedema  ABDOMEN: Firm, pleuritic chest pain  MS: extremities normal- no gross deformities noted  SKIN: no suspicious lesions or rashes  Neuro: Awake, deconditioned , able to answer questions    Medications       cefTRIAXone  2 g Intravenous Q24H     clindamycin  600 mg Intravenous Q8H     enoxaparin ANTICOAGULANT  40 mg Subcutaneous Q24H     ferrous sulfate  325 mg Oral Every Other Day     lisinopril  5 mg Oral Daily       Data   All microbiology laboratory data reviewed.  Recent Labs   Lab Test 04/09/22  0627 04/08/22  0449 04/07/22  0435   WBC 5.1 8.5 9.1   HGB 8.0* 8.5* 9.1*   HCT 25.8* 27.1* 29.4*   MCV 74* 72* 74*    376 407     Recent Labs   Lab Test 04/05/22  0506 04/03/22  2312 04/03/22  1141   CR 0.61 0.65 0.69     Recent Labs   Lab Test 01/21/22  1435   SED 14     MICRO        04/06/2022 0455 04/06/2022 0516 Blood Culture Hand, Right [[id number]]   Blood from Hand, Right    In process Component Value   No component results              04/06/2022 0449 04/06/2022  0516 Blood Culture Peripheral Blood [[id number]]   Peripheral Blood    In process Component Value   No component results              04/05/2022 0506 04/06/2022 0017 Blood Culture Peripheral Blood [[id number]]   Peripheral Blood    Preliminary result Component Value   Culture No growth after 12 hours P              04/03/2022 1915 04/05/2022 1438 Blood Culture Arm, Left [[id number]]   (Abnormal)   Blood from Arm, Left    Preliminary result Component Value   Culture Positive on the 1st day of incubation Abnormal  P    Haemophilus influenzae nontypable Panic  P    1 of 2 bottles   Corrected on April 4, 2022/7:25 PM by Michael Philip   Previously reported as: gram positive cocci in pairs and chains. Corrected to gram negative cocobacilli.     Susceptibilities done on previous cultures              04/03/2022 1849 04/03/2022 2108 Cell count with differential fluid [[id number]]    (Abnormal)   Pleural fluid from Pleural Cavity, Left    Final result Component Value   No component results           04/03/2022 1849 04/06/2022 0845 Pleural fluid Aerobic Bacterial Culture Routine with Gram Stain [[id number]]     (Abnormal)   Pleural fluid from Pleural Cavity, Left    Final result Component Value   Culture 4+ Haemophilus influenzae nontypable Abnormal    Gram Stain Result 4+ Gram negative coccobacilli Abnormal     4+ WBC seen Abnormal     Predominantly PMNs         Susceptibility     Haemophilus influenzae nontypable     HANNA     Amoxicillin/Clavulanate 0.75 ug/mL Susceptible     Ampicillin 0.38 ug/mL Susceptible     Ceftriaxone <=0.016 ug/mL Susceptible     Levofloxacin 0.032 ug/mL Susceptible                   04/03/2022 1849 04/03/2022 1946 Glucose fluid [[id number]]    Pleural fluid from Pleural Cavity, Left    Final result Component Value Units   Glucose Fluid Source Pleural Cavity, Left    left pleural fluid   left pleural fluid   left pleural fluid   left pleural fluid   Glucose fluid <5 mg/dL            04/03/2022 1849 04/04/2022 0003 Lactate dehydrogenase fluid [[id number]]    Pleural fluid from Pleural Cavity, Left    Final result Component Value Units   LD Fluid Source Pleural Cavity, Left    left pleural fluid   left pleural fluid   Lactate dehydrogenase fluid 2,332 U/L   Collect pleural fluid when Thoracentesis procedure           04/03/2022 1849 04/03/2022 2246 Protein fluid [[id number]]    Pleural fluid from Pleural Cavity, Left    Final result Component Value Units   Protein Fluid Source Pleural Cavity, Left    left pleural fluid   left pleural fluid   Protein Total Fluid 3.7 g/dL           04/03/2022 1849 04/03/2022 2014 pH fluid [[id number]]    Pleural fluid from Pleural Cavity, Left    Final result Component Value Units   pH Fluid Source Pleural Cavity, Left    left pleural fluid   left pleural fluid   pH Fluid 7.5 pH           04/03/2022 1849 04/03/2022 2108 Cell Count Body Fluid [[id number]]    (Abnormal)   Pleural fluid from Pleural Cavity, Left    Final result Component Value   Color Yellow   Clarity Turbid Abnormal    Cell Count Fluid Source Pleural Cavity, Left   left pleural fluid   left pleural fluid   left pleural fluid   left pleural fluid   left pleural fluid   left pleural fluid   left pleural fluid   left pleural fluid   left pleural fluid   left pleural fluid   left pleural fluid   Clot Presence Large Clot (>Half Volume)   Specimen clotted. Slide reviewed, no   abnormal cells seen.    Cytology Ordered No           04/03/2022 1154 04/05/2022 1438 Blood Culture Peripheral Blood [[id number]]   (Abnormal)   Peripheral Blood    Preliminary result Component Value   Culture Positive on the 1st day of incubation Abnormal  P    Haemophilus influenzae nontypable Panic  P    1 of 2 bottles   Susceptibilities done on previous cultures              04/03/2022 1141 04/06/2022 0845 Blood Culture Line, venous [[id number]]     (Abnormal)   Blood from Line, venous    Final result Component  Value   Culture Positive on the 1st day of incubation Abnormal     Haemophilus influenzae nontypable Panic     1 of 2 bottles         Susceptibility     Haemophilus influenzae nontypable     HANNA     Amoxicillin/Clavulanate 0.75 ug/mL Susceptible     Ampicillin 0.38 ug/mL Susceptible     Ceftriaxone <=0.016 ug/mL Susceptible     Levofloxacin 0.032 ug/mL Susceptible     Meropenem 0.125 ug/mL Susceptible                         RADIOLOGY:  XR Chest 1 View    Result Date: 4/3/2022  EXAM: XR CHEST 1 VIEW LOCATION: [facility] DATE/TIME: 4/3/2022 6:59 PM INDICATION: dyspnea; status post left thoracentesis COMPARISON: AP chest radiograph from earlier today     IMPRESSION: Considerable decrease in the amount of left pleural fluid. The left upper lobe is better inflated. The residual opacity in the left basal chest has a sharp interface which parallels the left ventricular heart border within which there are no air bronchograms consistent with lobar atelectasis of the left lower lobe. There is minimal atelectasis or pleural fluid in the left lateral costophrenic sulcus. Unchanged atelectasis/pleural fluid along the right hemidiaphragm.     Echocardiogram Complete    Result Date: 4/3/2022  [id number]^FABIANO^ARY^[place] [address]  Name: SHANI PETIT MRN: [id number] : 1980 Study Date: 2022 02:38 PM Age: 41 yrs Gender: Female Patient Location: [facility] Reason For Study: SOB Ordering Physician: ARY MARIO Performed By: ANABELL  BSA: 1.7 m2 Height: 66 in Weight: 145 lb HR: 120 BP: 138/93 mmHg ______________________________________________________________________________ Procedure Complete Portable Echo Adult. ______________________________________________________________________________ Interpretation Summary  Left ventricular size, wall motion and function are normal. The ejection fraction is > 65%. Normal right  ventricle size and systolic function. No hemodynamically significant valvular abnormalities on 2D or color flow imaging. ______________________________________________________________________________ Left Ventricle Left ventricular size, wall motion and function are normal. The ejection fraction is > 65%. There is normal left ventricular wall thickness. Left ventricular diastolic function is normal. No regional wall motion abnormalities noted.  Right Ventricle Normal right ventricle size and systolic function.  Atria Normal left atrial size. Right atrial size is normal. There is no color Doppler evidence of an atrial shunt.  Mitral Valve Mitral valve leaflets appear normal. There is no evidence of mitral stenosis or clinically significant mitral regurgitation.  Tricuspid Valve Tricuspid valve leaflets appear normal. There is no evidence of tricuspid stenosis or clinically significant tricuspid regurgitation. Right ventricle systolic pressure estimate normal.  Aortic Valve Aortic valve leaflets appear normal. There is no evidence of aortic stenosis or clinically significant aortic regurgitation.  Pulmonic Valve The pulmonic valve is not well seen, but is grossly normal. This degree of valvular regurgitation is within normal limits.  Vessels The aorta root is normal. Normal size ascending aorta. IVC diameter <2.1 cm collapsing >50% with sniff suggests a normal RA pressure of 3 mmHg.  Pericardium There is no pericardial effusion.  ______________________________________________________________________________ MMode/2D Measurements & Calculations IVSd: 0.68 cm LVIDd: 3.8 cm LVIDs: 2.3 cm LVPWd: 1.0 cm FS: 39.1 %  LV mass(C)d: 91.8 grams LV mass(C)dI: 52.6 grams/m2 LA dimension: 2.4 cm LVOT diam: 2.0 cm LVOT area: 3.1 cm2 LA Volume Indexed (AL/bp): 26.0 ml/m2 RWT: 0.55  Time Measurements MM HR: 120.0 BPM  Doppler Measurements & Calculations MV E max freda: 59.1 cm/sec MV A max freda: 91.3 cm/sec MV E/A: 0.65 MV dec slope:  559.2 cm/sec2 MV dec time: 0.11 sec Ao V2 max: 155.5 cm/sec Ao max PG: 10.0 mmHg Ao V2 mean: 103.6 cm/sec Ao mean P.9 mmHg Ao V2 VTI: 23.0 cm JERONIMO(I,D): 2.8 cm2 JERONIMO(V,D): 2.8 cm2 LV V1 max P.1 mmHg LV V1 max: 142.1 cm/sec LV V1 VTI: 20.9 cm SV(LVOT): 64.8 ml SI(LVOT): 37.2 ml/m2 PA acc time: 0.09 sec AV Qamar Ratio (DI): 0.91 JERONIMO Index (cm2/m2): 1.6 E/E' av.1 Lateral E/e': 3.8 Medial E/e': 6.3  ______________________________________________________________________________ Report approved by: Fransisco Belcher 2022 03:46 PM       Abdomen US, limited (RUQ only)    Result Date: 4/3/2022  EXAM: US ABDOMEN LIMITED LOCATION: [facility] DATE/TIME: 4/3/2022 1:51 PM INDICATION: abnormal LFTs COMPARISON: CT from earlier today TECHNIQUE: Limited abdominal ultrasound. FINDINGS: GALLBLADDER: Cholelithiasis. BILE DUCTS: No biliary dilatation. The common duct measures 6 mm. LIVER: Normal parenchyma with smooth contour. No focal mass. RIGHT KIDNEY: No hydronephrosis. PANCREAS: The visualized portions are normal. No ascites.     IMPRESSION: 1.  Cholelithiasis. Otherwise normal right upper quadrant ultrasound.     Abdomen US, limited (RUQ only)    Result Date: 3/10/2022  EXAM: US ABDOMEN LIMITED LOCATION: [facility] DATE/TIME: 3/10/2022 10:16 AM INDICATION: Abnormal liver function tests and right upper quadrant pain. COMPARISON: 1. TECHNIQUE: Limited abdominal ultrasound. FINDINGS: GALLBLADDER: Cholelithiasis and gallbladder sludge are noted. No wall thickening nor pericholecystic fluid. BILE DUCTS: No biliary dilatation. The common duct measures 6 mm. LIVER: Normal parenchyma with smooth contour. No focal mass. RIGHT KIDNEY: No hydronephrosis. PANCREAS: The visualized portions are normal. No ascites.     IMPRESSION: 1.  Cholelithiasis and gallbladder sludge are present. No evidence of cholecystitis nor bile duct dilatation.     US Thoracentesis    Result Date:  4/3/2022  EXAM: 1. LEFT THORACENTESIS 2. ULTRASOUND GUIDANCE LOCATION: [facility] DATE/TIME: 4/3/2022 6:17 PM INDICATION: Pleural effusion. PROCEDURE: Informed consent obtained. Time out performed. The chest was prepped and draped in sterile fashion. 10 mL of 1 % lidocaine was infused into the local soft tissues. Under direct ultrasound guidance, a 5 Latvian catheter system was placed into the pleural effusion. 800 mL of thick cloudy yellow fluid were removed and sent to lab, if requested. Patient tolerated procedure well. Ultrasound imaging was obtained and placed in the patient's permanent medical record.     IMPRESSION: Status post left ultrasound-guided thoracentesis. Reference CPT Code: 57593    XR Chest Port 1 View    Result Date: 4/5/2022  EXAM: XR CHEST PORT 1 VIEW LOCATION: [facility] DATE/TIME: 4/5/2022 5:43 AM INDICATION: follow up empyema, pneumonia. COMPARISON: 04/03/2022. CT chest tube placement of 04/04/2022.     IMPRESSION: There is a new chest tube at the left medial lung base. There has been decrease in left pleural effusion and there is some residual of effusion and atelectasis at the left lung base. No pneumothorax. Mild atelectasis at the right lung base. Normal heart size and pulmonary vascularity.    XR Chest Port 1 View    Result Date: 4/3/2022  EXAM: XR CHEST PORT 1 VIEW LOCATION: [facility] DATE/TIME: 4/3/2022 11:29 PM INDICATION: Shortness of breath COMPARISON: 04/03/2022     IMPRESSION: The chest is stable with again seen minimal right pleural effusion and mild left pleural effusion with some underlying infiltrate/atelectasis in both lung bases and extending into the left midlung. The chest is otherwise negative.    XR Chest Port 1 View    Result Date: 4/3/2022  EXAM: XR CHEST PORT 1 VIEW LOCATION: [facility] DATE/TIME: 4/3/2022 4:38 PM INDICATION: worsening sob COMPARISON: CT pulmonary  angiogram 04/03/2022 at 1256 hours     IMPRESSION: Moderate to large left and small right pleural effusions are not visibly changed. Related atelectasis including near complete atelectasis of the left lower lobe. Lower left ventricular heart border is silhouetted. Cardiac silhouette is not enlarged. Azygos vein is not distended and the vascular pedicle width is normal. No pneumothorax.    CT Chest Pulmonary Embolism w Contrast    Result Date: 3/10/2022  EXAM: CT CHEST PULMONARY EMBOLISM W CONTRAST LOCATION: [facility] DATE/TIME: 3/10/2022 10:08 AM INDICATION: Shortness of breath. Cough. COMPARISON: 02/21/2022 radiograph. TECHNIQUE: CT chest pulmonary angiogram during arterial phase injection of IV contrast. Multiplanar reformats and MIP reconstructions were performed. Dose reduction techniques were used. CONTRAST: 75ml Isovue 370. FINDINGS: ANGIOGRAM CHEST: Regions of motion artifact. No pulmonary embolism seen. Nonaneurysmal aorta without dissection. LUNGS AND PLEURA: Moderate to large amount of heterogeneously enhancing left upper and lower lobe perihilar predominant consolidation with example area measuring 5.6 x 5.8 cm (series 6, image 108). Minimal opacities elsewhere bilaterally. Mild basilar atelectasis. Small right pleural effusion. No pneumothorax. MEDIASTINUM/AXILLAE: Mild adenopathy, presumed reactive. Normal heart size. No pericardial effusion. CORONARY ARTERY CALCIFICATION: Compromised by motion. UPPER ABDOMEN: Hepatic steatosis. Incompletely seen spleen appears mildly enlarged, measuring roughly 13.3 cm in AP dimension. MUSCULOSKELETAL: Nothing acute     IMPRESSION: 1.  No pulmonary embolism. 2.  Moderate to large amount of left lung consolidation suggestive of pneumonia. Recommend 6-8 week follow-up PA and lateral radiographs for clearing. A few other mild bilateral opacities. 3.  Small right pleural effusion. 4.  Hepatic steatosis. 5.  Incompletely seen splenomegaly.     CT  Chest Tube with Cath Placement    Result Date: 4/4/2022  EXAM: 1. PERCUTANEOUS CHEST TUBE PLACEMENT IN THE LEFT PLEURAL SPACE 2. CT GUIDANCE 3. CONSCIOUS SEDATION LOCATION: [facility] DATE/TIME: 4/4/2022 2:56 PM INDICATION: Left pleural effusion with concern for empyema TECHNIQUE: Dose reduction techniques were used. PROCEDURE: Informed consent obtained. Site marked. Prior images reviewed. Required items made available. Patient identity confirmed verbally and with arm band. Patient reevaluated immediately before administering sedation. Universal protocol was followed. Time out performed. The site was prepped and draped in sterile fashion. 10 mL of 1% lidocaine was infused into the local soft tissues. Using standard technique and under direct CT guidance, a 12 Niuean chest tube catheter was inserted into the pleural space. The catheter was fixed in place with sutures and adhesive device, and the tubing was banded. Chest tube placed to Pleur-evac suction. BLOOD LOSS: Minimal. The patient tolerated the procedure well. No immediate complications. SEDATION: Versed 1 mg. Fentanyl 50 mcg. The procedure was performed with administration intravenous conscious sedation with appropriate preoperative, intraoperative, and postoperative evaluation. 20 minutes of supervised face to face conscious sedation time was provided by a radiology nurse under my direct supervision.     IMPRESSION: 1.  Successful CT-guided chest tube placement into the left pleural space. Reference CPT Codes: 30930, 63160    CT Chest (PE) Abdomen Pelvis w Contrast    Result Date: 4/3/2022  EXAM: CT CHEST PE ABDOMEN PELVIS W CONTRAST LOCATION: [facility] DATE/TIME: 4/3/2022 12:49 PM INDICATION: Chest and abdominal pain. Elevated liver function tests. PE suspected, high prob COMPARISON: 03/20/2022 CT chest. TECHNIQUE: CT chest pulmonary angiogram and routine CT abdomen pelvis with IV contrast. Arterial phase  through the chest and venous phase through the abdomen and pelvis. Multiplanar reformats and MIP reconstructions were performed. Dose reduction techniques were used. CONTRAST: 100ml Isovue 370 FINDINGS: ANGIOGRAM CHEST: Pulmonary arteries are normal caliber and negative for pulmonary emboli. Thoracic aorta is negative for dissection. No CT evidence of right heart strain. LUNGS AND PLEURA: The previous large dense consolidative infiltrate in the left upper lobe and left lower lobe seen on 03/10/2022 has near completely resolved. New small right and moderate left pleural effusions with compressive appearing atelectasis in the lung bases. MEDIASTINUM/AXILLAE: Normal. CORONARY ARTERY CALCIFICATION: None. HEPATOBILIARY: Gallstones. Normal liver. PANCREAS: Normal. SPLEEN: Mild splenomegaly measuring 12 cm. ADRENAL GLANDS: Normal. KIDNEYS/BLADDER: Normal. BOWEL: Moderate amount of stool throughout the entire colon suggestive of constipation. LYMPH NODES: Normal. VASCULATURE: Unremarkable. PELVIC ORGANS: Normal. MUSCULOSKELETAL: Normal.     IMPRESSION: 1.  Negative for pulmonary emboli. 2.  The previous dense consolidative infiltrates in the left lung seen on CT 03/10/2022 appear to near completely resolved. 3.  New small right and moderate left pleural effusions with moderate atelectasis in the lower lobes greater on the left side. 4.  Gallstones. 5.  Mild splenomegaly.    CT Sinus w/o Contrast    Result Date: 3/16/2022  EXAM DATE:         03/16/2022 EXAM: CT SINUSES WITHOUT CONTRAST LOCATION: [facility] DATE/TIME: 3/16/2022 12:45 PM INDICATION: Sinus infection COMPARISON: None. TECHNIQUE: Routine without contrast. Multiplanar reformats. Dose reduction techniques were used. FINDINGS: FRONTAL SINUSES:  Subtotal opacification of the frontal sinuses bilaterally with some partially aerated secretions. ETHMOID SINUSES:  Subtotal opacification of ethmoid air cells bilaterally, left greater than  right. Some partially aerated secretions noted within posterior ethmoid locules bilaterally. SPHENOID SINUSES: Moderate mucosal thickening involving both sphenoid sinuses with bilateral air-fluid levels. MAXILLARY SINUSES: Moderate circumferential mucosal thickening and subtotal opacification of the bilateral maxillary sinuses, left greater than right. Bilateral air-fluid levels in some partially aerated secretions. The floors of the maxillary sinuses are intact. NASAL CAVITY/SKULL BASE: Rightward bowing of the nasal septum without focal defect. Partially paradoxical curvature of the middle turbinates. The cribriform plate is intact and symmetric. The anterior clinoid processes are not pneumatized. PARANASAL SINUS DRAINAGE PATHWAYS:  Opacification of the bilateral frontoethmoidal recesses, ostiomeatal units, and sphenoethmoidal recesses. NON-SINUS STRUCTURES: No abnormality of the visualized orbits or intracranial compartment. IMPRESSION: 1.  Findings of pansinusitis as above. 2.  Rightward nasal septal deviation.       Total Time Spent 35 minutes with >50% of the time spent in counseling, education and care coordination, antibiotic management.  Discussed with patient, patient's , hospitalist, nurse

## 2022-04-09 NOTE — PROGRESS NOTES
Hospitalist cross cover note:    Pt admitted with recent pneumonia teratment with SOB noted to have large pleural effusion noted to be empyema with haemophillus influenzae on ceftriaxone and clindamycin pt with hypogamaglobulinemia with Common variable immunodeficiency needed IVIG had reaction to it with fever and tachycardia needs VATS procedure for the empyema so getting discharged to [facility] for surgery. Bed is available tonight.Discharge orders are done. Discharge summary to be done by  as I am only crosscovering tonight    Called and discussed with accepting physician for transfer. As per them wanted to make sure Surgeon wanted her to be transferred with surgical plans sooner than later next week and we were unable to get a hold off the surgeon so pt's transfer was cancelled rita James MD   Page [phone]

## 2022-04-09 NOTE — SIGNIFICANT EVENT
Pt. With temp of 102.7 orally. JUSTO JANG notified. Tylenol given. ABX given as ordered. Pt. currently has BC pending. Room cooled, ice provided. Monitor pt. closely and recheck temp within the hour.

## 2022-04-09 NOTE — PLAN OF CARE
Problem: Respiratory Compromise (Pneumonia)  Goal: Effective Oxygenation and Ventilation  Intervention: Promote Airway Secretion Clearance  Recent Flowsheet Documentation  Taken 4/9/2022 0930 by Malika Blas RN  Cough And Deep Breathing: done with encouragement       Patient's breathing feels better today. Occasional productive cough, utilizing robitussin as patient needs. Chest tube removed by MD, tolerated. Up standby assist in room, tolerates activity. Vitally stable on 2L, weaning as able. Plan to transfer to [facility] pending bed availability.

## 2022-04-09 NOTE — PROGRESS NOTES
[facility]:  PULMONARY PROGRESS NOTE     Date / Time of Admission:  4/3/2022 11:04 AM    ID: Arely Saldaña is a 41 year old female, never smoker, with essential hypertension and pneumonia diagnosis since February 2022 who was admitted to [facility] on 4/3/2022 with bilateral pleural effusions and pneumonia, now being treated for gram-negative coccobacilli bacteremia and left pleural empyema with Haemophilus influenzae non-typable.  Course complicated by significant immunodeficiency and anemia with iron-deficiency.    Reason for consult: Pleural effusion.          Assessment: 1.   Left pleural empyema with Haemophilus influenzae nontypable s/p thoracentesis 4/3, s/p chest tube 4/4.  Patient with persistent pneumonia since 02/2022, failed 2 rounds of outpatient antibiotic therapy, now admitted with recurrent pneumonia and bilateral pleural effusions (L > R).  Underwent thoracentesis 4/3, 800 mL thick yellow fluid extracted, Gram stain showing gram-negative coccobacilli.  Chest tube placed 4/4, ~ 750 mL removed soonafter insertion.  Received intrapleural fibrinolytics (alteplase) 4/5-4/7 (BID dosing x 6 doses).  CT chest 4/8 with improvement of left empyema, associated atelectasis/possible consolidation; stable right atelectasis and pleural effusion.  2. Gram negative bacteremia with Haemophilus influenzae.  Blood cultures x 2 on 4/3 with 2/4 bottles + for gram negative coccobacilli.  Vanco IV discontinued 4/5.  3. History of recurrent left-sided pneumonia.  Treated with 1 week Abx in 02/2022 and again 1 week Abx in 03/2022 without improvement.  Suspect this was secondary to H influenza that has not been adequately treated due to her immunodeficiency.  4. Suspected CVID vs acquired immunodeficiency.  Immunoglobulins sent 4/5, profound reduction in all lines: IgA < 5, IgG < 109, IgE < 2.  If patient truly has CVID, then villous atrophy may be seen in these patients, which may result in poor  oral absorption (including poor oral absorption of iron); patient's iron level 9 this admission.  HIV Ag Ab combo screen negative 4/5/22.  Other possible causes of acquired immunodeficiency may be malnutrition.  Her prealbumin was very low at 7 on 4/7/2022.  IVIG infusion initiated on 4/8, but became febrile and symptomatic soon after and it was discontinued.  5. Pleuritic chest pain.  Developed evening of 4/3.  Transiently placed on NIPPV due to increased work of breathing with pleuritic chest pain.    6. Essential hypertension.  Patient on lisinopril 20 mg as outpatient, but takes as needed only when BP greater than 130/40.         Plan:     ID consultation appreciated to assist with long-term empyema therapy and antibiotic management.       Continue ceftriaxone and clindamycin IV for good source control.     Follow-up cultures/blood cultures.  Last fever in early evening 4/8.    Chest tube output only 25 cc in last 24 hours.    Chest tube removed today.  No complications noted.    Appreciate general surgery recommendations.  Patient is being transferred to [facility] in anticipation of future thoracic intervention.    Repeat chest x-ray in the a.m. to assess for evolution of pleural effusion.     Concern for possible CVID or acquired immunodeficiency.      Provided patient and  with some patient information on CVI D.    Prealbumin very low at 7, placed nutritionist consult.    Had reaction to IVIg infusion 4/8 so was stopped early.  Consideration for pre-medication before re-attempt infusion.  Defer to Heme/Onc and ID for now.    Oral intake for GI prophylaxis, Lovenox for DVT prophylaxis.    Patient and/or family were educated on the above recommendations.   Thank you for allowing our service to participate in the care of this patient.  Please call with any questions or concerns.    Total patient care time: 35 minutes, with over 50% spent in counseling/coordination of care.      Brea Upton,  "MD, MPH  Pulmonary/Critical Care Medicine  04/09/2022  2:14 PM        Subjective/Interval History:   4/3:  Thoracentesis, 800 mL thick yellow fluid removed.   4/4:  Pleural effusion + gram stain w/ GN coccobacilli.  BCx + with GN coccobacilli - later speciated as H. Influenzae nontypable. L chest tube placed.    4/5:  CXR reviewed, persistent effusion.  Initiated alteplase therapy into chest tube.  Pleural fluid yellow prior to intrapleural alteplase.  4/6: Abx switched initially from Zosyn to ceftriaxone/Flagyl.  However, reaction to flagyl - this was stopped and switched to clindamycin.  Fevers in evening.  Cultures sent.    4/7: Immunoglobulin studies demonstrated low values.  ID team discussed IVIG treatment.  Last dose of alteplase given in the evening.  4/8: IVIG infusion initiated but became symptomatic soon after and was stopped.  Had fevers.    Minimal output from chest tube.  We removed it today.    Doing well overall.  Pain with movement otherwise stable.   Denies wheezing, hemoptysis.  Cough decreased.    I/O: 25 mL from chest tube in last 24 hours.    Review of Systems:  Pertinent items are noted in HPI.  The Review of Systems is negative other than noted in the HPI        Allergies/Medications:   Allergies:     Allergies   Allergen Reactions     Metronidazole Other (See Comments)     Had \"tremulous chills\" associated with iv infusion.  Resolved after stopping infusion     Sulfamethoxazole-Trimethoprim [Sulfamethoxazole W/Trimethoprim] Rash       Continuous Infusions:    Scheduled Medications:    cefTRIAXone  2 g Intravenous Q24H     clindamycin  600 mg Intravenous Q8H     enoxaparin ANTICOAGULANT  40 mg Subcutaneous Q24H     ferrous sulfate  325 mg Oral Every Other Day     lisinopril  5 mg Oral Daily            Objective:   Vitals:  /82 (BP Location: Right arm, Patient Position: Semi-Camilo's, Cuff Size: Adult Regular)   Pulse 105   Temp 98.6  F (37  C) (Oral)   Resp 18   Ht 1.676 m (5' 6\")  "  Wt 64.6 kg (142 lb 8 oz)   LMP 03/01/2022   SpO2 98%   BMI 23.00 kg/m    GEN: Pleasant female, sitting in the bed in no acute distress  HEENT: Normocephalic, atraumatic.  Extraoccular eye movements intact, anicteric sclera.  Moist mucous membranes.   NECK: Supple.    CHEST:  L posterior chest tube, to suction, no air leaks.   PULM: Non-labored breathing.  No use of accessory muscles.  Mildly diminished breath sounds at left lung base.  No wheezing or rales.  CVS: Regular rate and rhythm.  Normal S1, S2.  No rubs, murmurs, or gallops.    ABDOMEN: Normoactive bowel sounds.  Non-tender to palpation.  Non-distended.    EXTREMITES:  No clubbing, cyanosis, or edema.    NEURO:  Awake.  Oriented to person, place, time and situation.  Cranial nerves 2-12 grossly intact.  Moving all extremities.      Intake/Output:  I/O last 3 completed shifts:  In: 720 [P.O.:720]  Out: 1325 [Urine:1300; Chest Tube:25]        Pertinent Studies:   All laboratory data reviewed  Serum Glucose range:   Recent Labs   Lab 04/05/22  0506 04/03/22 2312 04/03/22  1944 04/03/22  1848 04/03/22  1141   GLC 88 116 175* 148* 121     ABG: No lab results found in last 7 days.  CBC:   Recent Labs   Lab 04/09/22  0627 04/08/22 0449 04/07/22  0435 04/04/22  0501 04/03/22  1141   WBC 5.1 8.5 9.1   < > 8.8   HGB 8.0* 8.5* 9.1*   < > 9.4*   HCT 25.8* 27.1* 29.4*   < > 30.7*   MCV 74* 72* 74*   < > 74*    376 407   < > 379   NEUTROPHIL  --   --   --   --  90   LYMPH  --   --   --   --  4   MONOCYTE  --   --   --   --  6   EOSINOPHIL  --   --   --   --  0    < > = values in this interval not displayed.     Chemistry:   Recent Labs   Lab 04/06/22  0449 04/05/22  0506 04/03/22  2312 04/03/22  1747 04/03/22  1141   NA  --  139 138  --  139   POTASSIUM  --  3.8 3.9  --  3.8   CHLORIDE  --  108* 108*  --  104   CO2  --  21* 19*  --  20*   BUN  --  13 13  --  12   CR  --  0.61 0.65  --  0.69   GFRESTIMATED  --  >90 >90  --  >90   PABLO  --  8.9 8.1*  --  9.3    MAG  --   --   --   --  1.7*   PROTTOTAL 4.1* 4.7* 4.4*   < > 5.8*   ALBUMIN 1.8* 1.9* 2.0*  --  2.9*   AST 10 8 39  --  92*   ALT 16 23 41  --  51*   ALKPHOS 142* 155* 200*  --  268*   BILITOTAL 0.4 0.4 1.4*  --  1.4*    < > = values in this interval not displayed.     Coags:  Recent Labs   Lab 04/03/22  1651   INR 1.20*     Cardiac Markers:  Recent Labs   Lab 04/03/22  2312   TROPONINI <0.01     Left pleural fluid, 4/3: Glucose less than 5, LDH 2332, pH 7.5, protein 3.7    Microbiology:   Blood culture x 1, 4/6: NGTD  Blood culture x 2, 4/6: NGTD  Blood culture x 1, 4/5: NGTD  Blood culture x 2, 4/3: 2 of 4 bottles with Haemophilus influenzae nontypable.  Blood culture x 1 set, 4/3: 1 of 2 bottles with Haemophilus influenzae nontypable.  Left pleural fluid, 4/3:  Gram stain: 4+ WBC, 4+ gram negative coccobacilli.  Cx:  4+ Haemophilus influenzae nontypable.  COVID-19 PCR, 4/3: Negative  Influenza A/B swab, 4/3: Negative        Cardiology/Radiology:   Cardiac: All cardiac studies reviewed by me.    EKG:  Reviewed    TTE, 4/3/22:  Summary: Left ventricular size, wall motion and function are normal. The ejection fraction is > 65%. Normal right ventricle size and systolic function. No hemodynamically significant valvular abnormalities on 2D or color flow imaging.     Radiology: All imaging studies reviewed by me.    Chest X-Ray, 4/7:  Slight retraction of the left basilar chest tube. Continued decrease in the layering left effusion and associated atelectasis. There is a similar small amount of air within the pleural space medially on the retrocardiac region compatible with recent tube placement. Trace right effusion and associated atelectasis. Heart size is normal    CT chest w/ and w/out contrast, 4/8:  FINDINGS: LUNGS AND PLEURA: The left basilar chest tube is in place. There is a small amount of residual left pleural effusion which is decreased from 04/04/2022. The fluid is slightly medial and superior compared to  the tip of the pleural drain. There is a small amount of air in the left pleural space compatible with pleural catheter.  There are a few residual linear opacities in the left upper and lower lobe in the area of the prior consolidation likely atelectasis/scarring. There is persistent but improved atelectasis in the left lung base compared to prior exam. Relatively similar small right effusion and associated atelectasis. MEDIASTINUM/AXILLAE: Heart size is normal. No lymphadenopathy. No thoracic aneurysm. CORONARY ARTERY CALCIFICATION: None. UPPER ABDOMEN: No significant finding. MUSCULOSKELETAL: Probable benign bone island in the T8 vertebral body.    IMPRESSION:  1.  Persistent but significantly decreased left pleural effusion and associated atelectasis when compared to 04/03/2022. There is a small amount of residual fluid slightly superior and medial to the tip of the left pleural drain. Small amount of air in  the left pleural space is consistent with pleural drain placement. 2.  Similar small right effusion and associated atelectasis. 3.  There are a few linear opacities in the left upper lung and lower lung in the area of the prior consolidation likely a combination of atelectasis     CT chst PE study, Abd/pelvis, 4/3:   ANGIOGRAM CHEST: Pulmonary arteries are normal caliber and negative for pulmonary emboli. Thoracic aorta is negative for dissection. No CT evidence of right heart strain.   LUNGS AND PLEURA: The previous large dense consolidative infiltrate in the left upper lobe and left lower lobe seen on 03/10/2022 has near completely resolved. New small right and moderate left pleural effusions with compressive appearing atelectasis in the lung bases.  MEDIASTINUM/AXILLAE: Normal.  CORONARY ARTERY CALCIFICATION: None.  HEPATOBILIARY: Gallstones. Normal liver.  PANCREAS: Normal.  SPLEEN: Mild splenomegaly measuring 12 cm.  ADRENAL GLANDS: Normal.  KIDNEYS/BLADDER: Normal.   BOWEL: Moderate amount of stool  throughout the entire colon suggestive of constipation.  LYMPH NODES: Normal.  VASCULATURE: Unremarkable.  PELVIC ORGANS: Normal.  MUSCULOSKELETAL: Normal.    IMPRESSION: 1.  Negative for pulmonary emboli.  2.  The previous dense consolidative infiltrates in the left lung seen on CT 03/10/2022 appear to near completely resolved.  3.  New small right and moderate left pleural effusions with moderate atelectasis in the lower lobes greater on the left side. 4.  Gallstones. 5.  Mild splenomegaly.

## 2022-04-10 ENCOUNTER — HOSPITAL ENCOUNTER (OUTPATIENT)
Age: 42
End: 2022-04-10
Attending: INTERNAL MEDICINE | Admitting: INTERNAL MEDICINE
Payer: COMMERCIAL

## 2022-04-10 ENCOUNTER — APPOINTMENT (OUTPATIENT)
Dept: RADIOLOGY | Facility: CLINIC | Age: 42
DRG: 177 | End: 2022-04-10
Attending: INTERNAL MEDICINE
Payer: COMMERCIAL

## 2022-04-10 LAB
BACTERIA BLD CULT: NO GROWTH
ERYTHROCYTE [DISTWIDTH] IN BLOOD BY AUTOMATED COUNT: 15.9 % (ref 10–15)
HCT VFR BLD AUTO: 22.6 % (ref 35–47)
HGB BLD-MCNC: 7 G/DL (ref 11.7–15.7)
HOLD SPECIMEN: NORMAL
MCH RBC QN AUTO: 22.2 PG (ref 26.5–33)
MCHC RBC AUTO-ENTMCNC: 31 G/DL (ref 31.5–36.5)
MCV RBC AUTO: 72 FL (ref 78–100)
PLATELET # BLD AUTO: 278 10E3/UL (ref 150–450)
RBC # BLD AUTO: 3.15 10E6/UL (ref 3.8–5.2)
WBC # BLD AUTO: 3.6 10E3/UL (ref 4–11)

## 2022-04-10 PROCEDURE — 250N000011 HC RX IP 250 OP 636: Performed by: INTERNAL MEDICINE

## 2022-04-10 PROCEDURE — 36415 COLL VENOUS BLD VENIPUNCTURE: CPT | Performed by: INTERNAL MEDICINE

## 2022-04-10 PROCEDURE — 250N000013 HC RX MED GY IP 250 OP 250 PS 637: Performed by: INTERNAL MEDICINE

## 2022-04-10 PROCEDURE — 99232 SBSQ HOSP IP/OBS MODERATE 35: CPT | Performed by: INTERNAL MEDICINE

## 2022-04-10 PROCEDURE — 99233 SBSQ HOSP IP/OBS HIGH 50: CPT | Performed by: HOSPITALIST

## 2022-04-10 PROCEDURE — 120N000001 HC R&B MED SURG/OB

## 2022-04-10 PROCEDURE — 71045 X-RAY EXAM CHEST 1 VIEW: CPT

## 2022-04-10 PROCEDURE — 85027 COMPLETE CBC AUTOMATED: CPT | Performed by: INTERNAL MEDICINE

## 2022-04-10 RX ORDER — CLINDAMYCIN PHOSPHATE 600 MG/50ML
600 INJECTION, SOLUTION INTRAVENOUS EVERY 8 HOURS
Status: CANCELLED | OUTPATIENT
Start: 2022-04-10

## 2022-04-10 RX ADMIN — ENOXAPARIN SODIUM 40 MG: 100 INJECTION SUBCUTANEOUS at 21:10

## 2022-04-10 RX ADMIN — GUAIFENESIN 10 ML: 100 SOLUTION ORAL at 00:31

## 2022-04-10 RX ADMIN — CLINDAMYCIN PHOSPHATE 600 MG: 600 INJECTION, SOLUTION INTRAVENOUS at 17:27

## 2022-04-10 RX ADMIN — CLINDAMYCIN PHOSPHATE 600 MG: 600 INJECTION, SOLUTION INTRAVENOUS at 01:36

## 2022-04-10 RX ADMIN — CEFTRIAXONE SODIUM 2 G: 2 INJECTION, POWDER, FOR SOLUTION INTRAMUSCULAR; INTRAVENOUS at 09:26

## 2022-04-10 RX ADMIN — CLINDAMYCIN PHOSPHATE 600 MG: 600 INJECTION, SOLUTION INTRAVENOUS at 10:16

## 2022-04-10 RX ADMIN — LISINOPRIL 5 MG: 5 TABLET ORAL at 09:25

## 2022-04-10 RX ADMIN — FERROUS SULFATE TAB 325 MG (65 MG ELEMENTAL FE) 325 MG: 325 (65 FE) TAB at 09:25

## 2022-04-10 RX ADMIN — GUAIFENESIN 10 ML: 100 SOLUTION ORAL at 09:44

## 2022-04-10 ASSESSMENT — ACTIVITIES OF DAILY LIVING (ADL)
ADLS_ACUITY_SCORE: 7

## 2022-04-10 NOTE — PLAN OF CARE
Problem: Infection (Pneumonia)  Goal: Resolution of Infection Signs and Symptoms  Outcome: Ongoing, Progressing     Problem: Fluid Imbalance (Pneumonia)  Goal: Fluid Balance  Outcome: Ongoing, Progressing   Goal Outcome Evaluation:    No complaints of pain.  Pt continues to have a productive cough and she received some Robitussin for this.  She is on RA and states she is breathing better. Pt continues to get IV antibiotics. Pt states she has a BM every time she goes to the bathroom.  She was able to take a shower today.  She is just waiting for a bed to open up at [place] and she will transfer over there.  She is independent.  Will continue to monitor.

## 2022-04-10 NOTE — PROGRESS NOTES
[facility]:  PULMONARY PROGRESS NOTE     Date / Time of Admission:  4/3/2022 11:04 AM    ID: Arely Saldaña is a 41 year old female, never smoker, with essential hypertension and pneumonia diagnosis since February 2022 who was admitted to [facility] on 4/3/2022 with bilateral pleural effusions and pneumonia, now being treated for gram-negative coccobacilli bacteremia and left pleural empyema with Haemophilus influenzae non-typable.  Course complicated by significant immunodeficiency and anemia with iron-deficiency.    Reason for consult: Pleural effusion.          Assessment: 1.   Left pleural empyema with Haemophilus influenzae nontypable s/p thoracentesis 4/3, s/p chest tube 4/4-4/9.  Patient with persistent pneumonia since 02/2022, failed 2 rounds of outpatient antibiotic therapy, now admitted with recurrent pneumonia and bilateral pleural effusions (L > R).  Underwent thoracentesis 4/3, 800 mL thick yellow fluid extracted, Gram stain showing gram-negative coccobacilli.  Chest tube placed 4/4, ~ 750 mL removed soonafter insertion.  Received intrapleural fibrinolytics (alteplase) 4/5-4/7 (BID dosing x 6 doses).  CT chest 4/8 with improvement of left empyema, associated atelectasis/possible consolidation; stable right atelectasis and pleural effusion. Limited CT ouput (25 mL in 24 hrs) so removed tube on 4/9.  2. Gram negative bacteremia with Haemophilus influenzae.  Blood cultures x 2 on 4/3 with 2/4 bottles + for gram negative coccobacilli.  Vanco IV discontinued 4/5.  3. History of recurrent left-sided pneumonia.  Treated with 1 week Abx in 02/2022 and again 1 week Abx in 03/2022 without improvement.  Suspect this was secondary to H influenza that has not been adequately treated due to her immunodeficiency.  4. Suspected CVID vs acquired immunodeficiency.  Immunoglobulins sent 4/5, profound reduction in all lines: IgA < 5, IgG < 109, IgE < 2.  If patient truly has CVID, then connor  atrophy may be seen in these patients, which may result in poor oral absorption (including poor oral absorption of iron); patient's iron level 9 this admission.  HIV Ag Ab combo screen negative 4/5/22.  Other possible causes of acquired immunodeficiency may be malnutrition.  Her prealbumin was very low at 7 on 4/7/2022.  IVIG infusion initiated on 4/8, but became febrile and symptomatic soon after and it was discontinued.  5. Pleuritic chest pain.  Developed evening of 4/3.  Transiently placed on NIPPV due to increased work of breathing with pleuritic chest pain.    6. Essential hypertension.  Patient on lisinopril 20 mg as outpatient, but takes as needed only when BP greater than 130/40.         Plan:     ID consultation appreciated to assist with long-term empyema therapy and antibiotic management.       Continue ceftriaxone and clindamycin IV for good source control.     Follow-up cultures/blood cultures.  Last fever in early evening 4/8.    CXR images reviewed this morning with patient, RN, Dr. Peter.      Slight increase in L pleural effusion, atelectasis in R lung    Appreciate general surgery recommendations.      Patient is being transferred to [facility] in anticipation of future thoracic intervention.    Concern for possible CVID or acquired immunodeficiency.      Provided patient and  with patient information handouts on CVID yesterday.    Prealbumin very low at 7, nutritionist consult placed yesterday.    Had reaction to IVIg infusion 4/8 so was stopped early.  Consideration for pre-medication before re-attempt infusion.  Defer to Heme/Onc and ID for now.    Encourage pulmonary hygiene:  IS Q2H while awake, OOB to chair, ambulate    Oral intake for GI prophylaxis, Lovenox for DVT prophylaxis.    Patient and/or family were educated on the above recommendations.    Thank you for allowing our service to participate in the care of this patient.  Please call with any questions or concerns.   "Patient is being transferred to [facility] when bed comes available.  I discussed care with Dr. Baltazar, Pulmonologist at [place].    Total patient care time: 25 minutes, with over 50% spent in counseling/coordination of care.      Brea Upton MD, MPH  Pulmonary/Critical Care Medicine  04/10/2022  10:14 AM        Subjective/Interval History:   4/3:  Thoracentesis, 800 mL thick yellow fluid removed.   4/4:  Pleural effusion + gram stain w/ GN coccobacilli.  BCx + with GN coccobacilli - later speciated as H. Influenzae nontypable. L chest tube placed.    4/5:  CXR reviewed, persistent effusion.  Initiated alteplase therapy into chest tube.  Pleural fluid yellow prior to intrapleural alteplase.  4/6: Abx switched initially from Zosyn to ceftriaxone/Flagyl.  However, reaction to flagyl - this was stopped and switched to clindamycin.  Fevers in evening.  Cultures sent.    4/7: Immunoglobulin studies demonstrated low values.  ID team discussed IVIG treatment.  Last dose of alteplase given in the evening.  4/8: IVIG infusion initiated but became symptomatic soon after and was stopped.  Had fevers.  4/9:  Removed chest tube.     Doing well overall.  No pain.    The incentive spirometry is causing her to work.   Limited cough.   I/O: 25 mL from chest tube in last 24 hours.    Review of Systems:  Pertinent items are noted in HPI.  The Review of Systems is negative other than noted in the HPI        Allergies/Medications:   Allergies:     Allergies   Allergen Reactions     Metronidazole Other (See Comments)     Had \"tremulous chills\" associated with iv infusion.  Resolved after stopping infusion     Sulfamethoxazole-Trimethoprim [Sulfamethoxazole W/Trimethoprim] Rash       Continuous Infusions:    Scheduled Medications:    cefTRIAXone  2 g Intravenous Q24H     clindamycin  600 mg Intravenous Q8H     enoxaparin ANTICOAGULANT  40 mg Subcutaneous Q24H     ferrous sulfate  325 mg Oral Every Other Day     lisinopril  5 " "mg Oral Daily            Objective:   Vitals:  /88 (BP Location: Right arm)   Pulse 92   Temp 98.4  F (36.9  C) (Oral)   Resp 16   Ht 1.676 m (5' 6\")   Wt 63.4 kg (139 lb 12.8 oz)   LMP 03/01/2022   SpO2 94%   BMI 22.56 kg/m    GEN: Pleasant female, sitting in the bed in no acute distress  HEENT: Normocephalic, atraumatic.  Extraoccular eye movements intact, anicteric sclera.  Moist mucous membranes.   NECK: Supple.    PULM: Non-labored breathing.  No use of accessory muscles.  Mildly diminished breath sounds at left lung base.  No wheezing or rales.  CVS: Regular rate and rhythm.  Normal S1, S2.  No rubs, murmurs, or gallops.    ABDOMEN: Normoactive bowel sounds.  Non-tender to palpation.  Non-distended.    EXTREMITES:  No clubbing, cyanosis, or edema.    NEURO:  Awake.  Oriented to person, place, time and situation.  Cranial nerves 2-12 grossly intact.  Moving all extremities.      Intake/Output:  I/O last 3 completed shifts:  In: 1850 [P.O.:1850]  Out: -         Pertinent Studies:   All laboratory data reviewed  Serum Glucose range:   Recent Labs   Lab 04/05/22  0506 04/03/22  2312 04/03/22  1944 04/03/22  1848 04/03/22  1141   GLC 88 116 175* 148* 121     ABG: No lab results found in last 7 days.  CBC:   Recent Labs   Lab 04/10/22  0628 04/09/22  0627 04/08/22  0449 04/04/22  0501 04/03/22  1141   WBC 3.6* 5.1 8.5   < > 8.8   HGB 7.0* 8.0* 8.5*   < > 9.4*   HCT 22.6* 25.8* 27.1*   < > 30.7*   MCV 72* 74* 72*   < > 74*    290 376   < > 379   NEUTROPHIL  --   --   --   --  90   LYMPH  --   --   --   --  4   MONOCYTE  --   --   --   --  6   EOSINOPHIL  --   --   --   --  0    < > = values in this interval not displayed.     Chemistry:   Recent Labs   Lab 04/06/22  0449 04/05/22  0506 04/03/22  2312 04/03/22  1747 04/03/22  1141   NA  --  139 138  --  139   POTASSIUM  --  3.8 3.9  --  3.8   CHLORIDE  --  108* 108*  --  104   CO2  --  21* 19*  --  20*   BUN  --  13 13  --  12   CR  --  0.61 0.65  " --  0.69   GFRESTIMATED  --  >90 >90  --  >90   PABLO  --  8.9 8.1*  --  9.3   MAG  --   --   --   --  1.7*   PROTTOTAL 4.1* 4.7* 4.4*   < > 5.8*   ALBUMIN 1.8* 1.9* 2.0*  --  2.9*   AST 10 8 39  --  92*   ALT 16 23 41  --  51*   ALKPHOS 142* 155* 200*  --  268*   BILITOTAL 0.4 0.4 1.4*  --  1.4*    < > = values in this interval not displayed.     Coags:  Recent Labs   Lab 04/03/22  1651   INR 1.20*     Cardiac Markers:  Recent Labs   Lab 04/03/22  2312   TROPONINI <0.01     Left pleural fluid, 4/3: Glucose less than 5, LDH 2332, pH 7.5, protein 3.7    Microbiology:   Blood culture x 1, 4/6: NGTD  Blood culture x 2, 4/6: NGTD  Blood culture x 1, 4/5: NGTD  Blood culture x 2, 4/3: 2 of 4 bottles with Haemophilus influenzae nontypable.  Blood culture x 1 set, 4/3: 1 of 2 bottles with Haemophilus influenzae nontypable.  Left pleural fluid, 4/3:  Gram stain: 4+ WBC, 4+ gram negative coccobacilli.  Cx:  4+ Haemophilus influenzae nontypable.  COVID-19 PCR, 4/3: Negative  Influenza A/B swab, 4/3: Negative        Cardiology/Radiology:   Cardiac: All cardiac studies reviewed by me.    EKG:  Reviewed    TTE, 4/3/22:  Summary: Left ventricular size, wall motion and function are normal. The ejection fraction is > 65%. Normal right ventricle size and systolic function. No hemodynamically significant valvular abnormalities on 2D or color flow imaging.     Radiology: All imaging studies reviewed by me.    Chest X-Ray, 4/10:  Tiny right and small pleural effusions are present (the left pleural effusion has mildly increased versus increased atelectasis). Associated mild right and moderate left basilar atelectasis.   No obvious pneumothorax identified.   Partial obscuration of the cardiac silhouette from overlying fluid and opacities.    CT chest w/ and w/out contrast, 4/8:  FINDINGS: LUNGS AND PLEURA: The left basilar chest tube is in place. There is a small amount of residual left pleural effusion which is decreased from 04/04/2022.  The fluid is slightly medial and superior compared to the tip of the pleural drain. There is a small amount of air in the left pleural space compatible with pleural catheter.  There are a few residual linear opacities in the left upper and lower lobe in the area of the prior consolidation likely atelectasis/scarring. There is persistent but improved atelectasis in the left lung base compared to prior exam. Relatively similar small right effusion and associated atelectasis. MEDIASTINUM/AXILLAE: Heart size is normal. No lymphadenopathy. No thoracic aneurysm. CORONARY ARTERY CALCIFICATION: None. UPPER ABDOMEN: No significant finding. MUSCULOSKELETAL: Probable benign bone island in the T8 vertebral body.    IMPRESSION:  1.  Persistent but significantly decreased left pleural effusion and associated atelectasis when compared to 04/03/2022. There is a small amount of residual fluid slightly superior and medial to the tip of the left pleural drain. Small amount of air in  the left pleural space is consistent with pleural drain placement. 2.  Similar small right effusion and associated atelectasis. 3.  There are a few linear opacities in the left upper lung and lower lung in the area of the prior consolidation likely a combination of atelectasis     CT chst PE study, Abd/pelvis, 4/3:   ANGIOGRAM CHEST: Pulmonary arteries are normal caliber and negative for pulmonary emboli. Thoracic aorta is negative for dissection. No CT evidence of right heart strain.   LUNGS AND PLEURA: The previous large dense consolidative infiltrate in the left upper lobe and left lower lobe seen on 03/10/2022 has near completely resolved. New small right and moderate left pleural effusions with compressive appearing atelectasis in the lung bases.  MEDIASTINUM/AXILLAE: Normal.  CORONARY ARTERY CALCIFICATION: None.  HEPATOBILIARY: Gallstones. Normal liver.  PANCREAS: Normal.  SPLEEN: Mild splenomegaly measuring 12 cm.  ADRENAL GLANDS: Normal.   KIDNEYS/BLADDER: Normal.   BOWEL: Moderate amount of stool throughout the entire colon suggestive of constipation.  LYMPH NODES: Normal.  VASCULATURE: Unremarkable.  PELVIC ORGANS: Normal.  MUSCULOSKELETAL: Normal.    IMPRESSION: 1.  Negative for pulmonary emboli.  2.  The previous dense consolidative infiltrates in the left lung seen on CT 03/10/2022 appear to near completely resolved.  3.  New small right and moderate left pleural effusions with moderate atelectasis in the lower lobes greater on the left side. 4.  Gallstones. 5.  Mild splenomegaly.

## 2022-04-10 NOTE — PROGRESS NOTES
Hospitalist Progress Note    Assessment/Plan    Active Problems:    Tachypnea    Anemia    Pleural effusion    Tachycardia    Elevated LFTs    Dyspnea    41-year-old patient who was previously treated for pneumonia presented to the hospital with shortness of breath and cough imaging was concerning for pleural effusion moderate to large on the left underwent thoracentesis on April 3 blood culture and Gram stain from thoracentesis grew coccobacilli pulmonary team were consulted patient developed respiratory distress following thoracentesis required BiPAP support in the ICU, she underwent ultrasound-guided chest tube placement on April 4, pulmonary ID surgery were following, chest tube was removed on April ninth, there is concern about, variable immune deficiency versus acquired immune deficiency and hematology consulted for IVIG infusion which she had on the eighth but she developed fever and chills and the infusion was stopped, surgeon requested transfer to [facility] for possible left thoracoscopy and potential thrombectomy    Bilateral pleural effusion left more than right  Left-sided empyema  Haemophilus influenza bacteremia  On droplet precaution,  Failed treatment for pneumonia twice,  CT chest on admission showed resolution of the lung infiltrate,  She had thoracentesis on 4/3/20/2022 pleural Gram stain and culture obtained,  Blood culture 4/3/2022 grew Haemophilus influenza repeat cultures on April 5 and 7 has no growth,  Was on vancomycin from April 4-6  On clindamycin and Rocephin for now,  Status post intrapleural fibrinolytics alteplase, from a 4/5-4/7 twice daily for 6 doses,  Repeat CT chest on April 8 showed improvement of left pneumonia and stable atelectasis pleural effusion  -Chest x-ray done today showed a slight increase in the left pleural effusion  Surgery will follow the patient [facility] in anticipation for further thoracic intervention, discussed with Dr. Spann over the  phone yesterday    Concerning for common variable immune deficiency versus acquired immune deficiency  Immunoglobulin levels were checked and there is profound reduction in all lines, possible causes would be also acquired immune deficiency due to malnutrition  Hematology consulted, flow cytometry ordered,  Hematology ordered IVIG and patient started the infusion on April 8 for the first time, she developed fever and chills and the infusion was stopped, resuscitated with Tylenol and IV fluids,  Hematology recommended no further IVIG at this time, will need to have immunologist/allergist to establish diagnosis of CVI D as outpatient, once is confirmed she will have the need for long-term IVIG replacement will need to add premedication for future IVIG therapy if needed      ' Microcytic anemia with iron deficiency  Severe iron deficiency, received IV sucrose on April 7,  Hematology recommended to continue with the oral iron but she has poor tolerance to it,  GI concerned about villous atrophy and she is not able to absorb oral iron, she needs to have EGD and small bowel biopsy in 2 months once her respiratory issues resolve    Acute respiratory failure with hypoxia  Secondary to empyema, required BiPAP currently on room air    Recurrent sinusitis  She has appointment scheduled with ENT on May 2022    Hypertension.   -Controlled  -Continue current medications    Barriers to Discharge: IV antibiotics, further treatment planning    Anticipated discharge date/Disposition: Transferring to [facility] when bed available likely later today    Subjective  Patient was seen this morning, she feels better her chest tube was removed yesterday, she is asking if she can take a shower, the pulmonary doctor was in her room and discussed with her x-ray results, the plan to transfer to [facility] when bed available    Objective    Vital signs in last 24 hours  Temp:  [98.4  F (36.9  C)-99.6  F (37.6  C)] 98.4  F (36.9   C)  Pulse:  [] 92  Resp:  [16-20] 16  BP: (136-143)/(82-88) 136/88  SpO2:  [91 %-98 %] 94 % [unfilled] O2 Device: None (Room air)    Weight:   [unfilled] Weight change:     Intake/Output last 3 shifts  I/O last 3 completed shifts:  In: 1850 [P.O.:1850]  Out: -   Body mass index is 22.56 kg/m .    Physical Exam:  General Appearance: Alert, oriented x3, does not appear in distress.  HEENT: Normocephalic. No scleral icterus, . Mucous membranes moist.  Heart: :Regular rate and rhythm, normal S1 ,S2, No murmurs, no JVD, no pedal edema   Lungs: Clear to auscultation bilaterally. No wheezing or crackles  Abdomen: Soft, non tender, no rebound or rigidity, non distended, bowel sounds present.  Extremity: No deformity. No joint swelling.    Pertinent Labs   Lab Results: personally reviewed.   Recent Labs   Lab 04/06/22  0449 04/05/22  0506 04/03/22 2312 04/03/22  1141   NA  --  139 138 139   CO2  --  21* 19* 20*   BUN  --  13 13 12   ALBUMIN 1.8* 1.9* 2.0* 2.9*   ALKPHOS 142* 155* 200* 268*   ALT 16 23 41 51*   AST 10 8 39 92*     Recent Labs   Lab 04/10/22  0628 04/09/22  0627 04/08/22  0449   WBC 3.6* 5.1 8.5   HGB 7.0* 8.0* 8.5*   HCT 22.6* 25.8* 27.1*    290 376     Recent Labs   Lab 04/03/22 2312 04/03/22  1141   TROPONINI <0.01 <0.01     Invalid input(s): POCGLUFGR    Medications  Drug and lactation database from the [place] [employer] of [employer]:  http://toxnet.nlm.nih.gov/cgi-bin/sis/htmlgen?LACT      Discharge Planning discussed with patient,  Total time with this patient is 45 min with 50% of time spent in examining the patient, reviewing records, discussing plan of care and counseling, 50% of time spent in coordination of care.  Care discussed and coordinated with RN, care manager, pulmonary team, hospitalist at [facility].      Tamiko Peter MD  Internal Medicine Hospitalist  4/10/2022

## 2022-04-10 NOTE — PROGRESS NOTES
Care Management Discharge Note    Discharge Date: 04/10/2022       Discharge Disposition: transfer to [facility]    Discharge Services:  TBD    Discharge DME: None    Discharge Transportation: HE stretcher      Education Provided on the Discharge Plan:  AVS per bedside RN.     Persons Notified of Discharge Plans: patient    Patient/Family in Agreement with the Plan: yes      Additional Information:  Chart reviewed. Patient will transfer to [facility] for further treatment. Transport per  stretcher.         Laurence Vick RN

## 2022-04-10 NOTE — PLAN OF CARE
Patient denies pain. Respiratory status improving as she is now stable on room air. Continues to be mildly tachycardic (100-110). Afebrile overnight. Plan to transfer to [facility] for VATS though will need a surgery date prior to transferring- according to patient services. Encouraged IS usage. Can tolerate 500ml incentive spirometer currently. Cough treated with robitussin.     Problem: Fluid Imbalance (Pneumonia)  Goal: Fluid Balance  Outcome: Ongoing, Progressing     Problem: Infection (Pneumonia)  Goal: Resolution of Infection Signs and Symptoms  Outcome: Ongoing, Progressing     Problem: Respiratory Compromise (Pneumonia)  Goal: Effective Oxygenation and Ventilation  Outcome: Ongoing, Progressing  Intervention: Promote Airway Secretion Clearance  Recent Flowsheet Documentation  Taken 4/10/2022 0143 by Rony Goldman, RN  Cough And Deep Breathing: done independently per patient

## 2022-04-11 ENCOUNTER — APPOINTMENT (OUTPATIENT)
Dept: RADIOLOGY | Facility: CLINIC | Age: 42
DRG: 177 | End: 2022-04-11
Attending: SURGERY
Payer: COMMERCIAL

## 2022-04-11 LAB
BACTERIA BLD CULT: NO GROWTH
ERYTHROCYTE [DISTWIDTH] IN BLOOD BY AUTOMATED COUNT: 16 % (ref 10–15)
HCT VFR BLD AUTO: 23 % (ref 35–47)
HGB BLD-MCNC: 7.1 G/DL (ref 11.7–15.7)
MCH RBC QN AUTO: 22.5 PG (ref 26.5–33)
MCHC RBC AUTO-ENTMCNC: 30.9 G/DL (ref 31.5–36.5)
MCV RBC AUTO: 73 FL (ref 78–100)
PLATELET # BLD AUTO: 317 10E3/UL (ref 150–450)
RBC # BLD AUTO: 3.16 10E6/UL (ref 3.8–5.2)
SARS-COV-2 RNA RESP QL NAA+PROBE: NEGATIVE
WBC # BLD AUTO: 3.3 10E3/UL (ref 4–11)

## 2022-04-11 PROCEDURE — 250N000011 HC RX IP 250 OP 636: Performed by: INTERNAL MEDICINE

## 2022-04-11 PROCEDURE — 120N000001 HC R&B MED SURG/OB

## 2022-04-11 PROCEDURE — 85014 HEMATOCRIT: CPT | Performed by: INTERNAL MEDICINE

## 2022-04-11 PROCEDURE — 99232 SBSQ HOSP IP/OBS MODERATE 35: CPT | Performed by: INTERNAL MEDICINE

## 2022-04-11 PROCEDURE — 250N000013 HC RX MED GY IP 250 OP 250 PS 637: Performed by: INTERNAL MEDICINE

## 2022-04-11 PROCEDURE — 99233 SBSQ HOSP IP/OBS HIGH 50: CPT | Performed by: INTERNAL MEDICINE

## 2022-04-11 PROCEDURE — 71046 X-RAY EXAM CHEST 2 VIEWS: CPT

## 2022-04-11 PROCEDURE — 36415 COLL VENOUS BLD VENIPUNCTURE: CPT | Performed by: INTERNAL MEDICINE

## 2022-04-11 PROCEDURE — 87635 SARS-COV-2 COVID-19 AMP PRB: CPT | Performed by: HOSPITALIST

## 2022-04-11 PROCEDURE — 99239 HOSP IP/OBS DSCHRG MGMT >30: CPT | Performed by: HOSPITALIST

## 2022-04-11 RX ADMIN — LISINOPRIL 5 MG: 5 TABLET ORAL at 08:40

## 2022-04-11 RX ADMIN — CLINDAMYCIN PHOSPHATE 600 MG: 600 INJECTION, SOLUTION INTRAVENOUS at 01:58

## 2022-04-11 RX ADMIN — CLINDAMYCIN PHOSPHATE 600 MG: 600 INJECTION, SOLUTION INTRAVENOUS at 09:52

## 2022-04-11 RX ADMIN — CEFTRIAXONE SODIUM 2 G: 2 INJECTION, POWDER, FOR SOLUTION INTRAMUSCULAR; INTRAVENOUS at 08:43

## 2022-04-11 RX ADMIN — CLINDAMYCIN PHOSPHATE 600 MG: 600 INJECTION, SOLUTION INTRAVENOUS at 19:37

## 2022-04-11 ASSESSMENT — ACTIVITIES OF DAILY LIVING (ADL)
ADLS_ACUITY_SCORE: 7

## 2022-04-11 NOTE — PLAN OF CARE
Goal Outcome Evaluation:      Continues to maintain O2 sats on room air. Reports mild dyspnea with exertion and loose, productive cough. Vitals stable this morning. 2 view chest xray done this afternoon. Dr. Kumar's office notified by Radiology (per their note) as Arely is waiting to determine if surgical procedure is recommended. IV saline locked, continues on IV antibiotics.

## 2022-04-11 NOTE — PROGRESS NOTES
Progress Note    Assessment/Plan  Pt much better clinically-temp with infusion noted-ct out-some cough-clear secretion. Will get cxr/possible ct to help decision for surgery.    Active Problems:    Tachypnea    Anemia    Pleural effusion    Tachycardia    Elevated LFTs    Dyspnea      Subjective  Without pain  Objective    Vital signs in last 24 hours  Temp:  [98.2  F (36.8  C)-98.6  F (37  C)] 98.2  F (36.8  C)  Pulse:  [82-94] 85  Resp:  [16-18] 18  BP: (151-157)/(92-94) 151/94  SpO2:  [93 %-96 %] 96 %  Weight:   [unfilled]    Intake/Output last 3 shifts  I/O last 3 completed shifts:  In: 1614 [P.O.:1440; I.V.:174]  Out: -   Intake/Output this shift:  I/O this shift:  In: 540 [P.O.:540]  Out: -       Physical Exam  bilat rhonchi-bs left better than 72 hrs ago    Pertinent Labs   Lab Results   Component Value Date    WBC 3.3 (L) 04/11/2022    HGB 7.1 (L) 04/11/2022    HCT 23.0 (L) 04/11/2022    MCV 73 (L) 04/11/2022     04/11/2022             Pertinent Radiology     [unfilled]        Suleman Kumar MD

## 2022-04-11 NOTE — CONSULTS
"Nutrition Therapy Brief Note    Noted new consult for \"Concern for malnutrition, prealbumin 7.  Has immunodeficiency - may be acquired?\" RD already following; see nutrition note 4/8 for full nutrition assessment.    EVALUATION OF THE PROGRESS TOWARD GOALS   Diet: Regular  Intake: % though meals usually low in protein       NEW FINDINGS   Attempted to visit pt but was busy with other providers. Noted plans to transfer to [facility] today. Per chart review, intake is improving but weight continues to trend down.     Weight history:  04/10/22 63.4 kg (139 lb 12.8 oz)  04/06/22 67.5 kg (148 lb 14.4 oz)   03/10/22 68 kg (150 lb)   ? Accuracy. If 4/10 weight accurate, -9 lb in 4 days    LABS  Labs reviewed: noted prealbumin 7, albumin 1.8 (likely low due to inflammation)     MEDICATIONS  Medications reviewed: ferrous sulfate      NUTRITION DIAGNOSIS  Inadequate protein-energy intake related to reduced appetite as evidenced by pt reports of reduced intake x2-3 months and wt loss of 5% in 2 months.   --Evaluation: continue, intake improving but remains low    Goals:  Patient to consume % of nutritionally adequate meals three times per day, or the equivalent with supplements/snacks. --NOT MET but improving, ongoing  No significant wt loss <148 lb --NOT MET but question accuracy, ongoing    INTERVENTIONS  Implementation  No changes at this time. Patient would benefit from protein supplements at [facility] if she agrees. She is particular about her protein sources so I did not order without being able to speak to her first.      Monitoring/Evaluation  Progress toward goals will be monitored and evaluated per protocol.    "

## 2022-04-11 NOTE — DISCHARGE SUMMARY
[facility] MEDICINE  DISCHARGE SUMMARY     Primary Care Physician: Ashely Larios  Admission Date: 4/3/2022   Discharge Provider: Tmaiko Peter MD Discharge Date: 4/11/2022   Diet:   Active Diet and Nourishment Order   Procedures     Regular Diet Adult     Diet       Code Status: Full Code   Activity: DCACTIVITY: Activity as tolerated        Condition at Discharge: Stable     REASON FOR PRESENTATION(See Admission Note for Details)   Dyspnea and cough    PRINCIPAL & ACTIVE DISCHARGE DIAGNOSES     Active Problems:    Tachypnea    Anemia    Pleural effusion    Tachycardia    Elevated LFTs    Dyspnea      PENDING LABS     Unresulted Labs Ordered in the Past 30 Days of this Admission     Date and Time Order Name Status Description    4/6/2022  5:40 PM Blood Culture Hand, Left Preliminary     4/5/2022 10:18 AM Celiac (Gluten) Antibody Panel In process             PROCEDURES ( this hospitalization only)          RECOMMENDATIONS TO OUTPATIENT PROVIDER FOR F/U VISIT     Droplet precautions    Admitted inpatient to [facility]    DISPOSITION     [facility]    SUMMARY OF HOSPITAL COURSE:      41-year-old patient who previously treated for community-acquired pneumonia, presented to the hospital with cough and shortness of breath, chest CT showed large left pleural effusion she underwent thoracentesis on April 3 and blood culture Gram stain thoracentesis grew coccobacilli, pulmonary team consulted as patient had respiratory distress required BiPAP and transferred to ICU, she had chest tube placement on April 4, her blood culture grew Haemophilus influenza bacteremia, received vancomycin from every 4-6, continue to be on Rocephin clindamycin pulmonary is following a chest tube was removed on April 9, ID surgery and hematology consulted      Bilateral pleural effusion left more than right  Left-sided empyema  On droplet precaution,  Haemophilus influenza  bacteremia  Status post thoracentesis on April 3 chest tube placement April 4 removed on April 9,  Received intrapleural fibrinolytics from April 5-7  CT was repeated on April 8 showed improvement of the left empyema  On clindamycin and Rocephin, ID pulmonary is following,  Previously treated for pneumonia as an outpatient in February and again in March  Repeat chest x-ray on April 10 showed slight increase in the left pleural effusion,  Surgery consulted they are considering left thorax thoracoscopy recommended transfer to [facility]      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  Marked hypogammaglobinemia  Hematology ordered IVIG infusion on April 8 she had fever tachycardia and chills the infusion was stopped, she received Tylenol and IV fluid  Etiology recommended referral for immunology and 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 she will need to have long-term IVIG replacement with premedication to prevent another reaction      Iron deficiency anemia  Iron GI consulted concerned about villous atrophy and she cannot absorb oral iron recommended IV iron hematology ordered only one-time dose of IV iron  He is to follow-up with GI as an outpatient for small bowel biopsy in 2 months once her respiratory issues resolve    Elevated liver enzymes  GI consulted and evaluated her on April 4, trended LFT which improved,    Acute respiratory failure with hypoxia  Secondary to empyema improved currently on room air    Recurrent sinusitis  She has ENT appointment scheduled for nasal polyp on May 2022      Discharge Medications with Med changes:     Current Discharge Medication List      START taking these medications    Details   cefTRIAXone (ROCEPHIN) 2 GM vial Inject 2 g into the vein every 24 hours      clindamycin (CLEOCIN) 600 MG/50ML infusion Inject 50 mLs (600 mg) into the vein every 8 hours         CONTINUE these medications which have CHANGED     Details   lisinopril (ZESTRIL) 5 MG tablet Take 1 tablet (5 mg) by mouth daily         CONTINUE these medications which have NOT CHANGED    Details   Calcium Carb-Cholecalciferol (CALTRATE 600+D3 SOFT) 600-800 MG-UNIT CHEW Take 1 tablet by mouth daily       ferrous sulfate (FEROSUL) 325 (65 Fe) MG tablet Take 325 mg by mouth daily (with breakfast)                   Rationale for medication changes:              Consults       PULMONARY IP CONSULT  PHARMACY TO DOSE VANCO  GASTROENTEROLOGY IP CONSULT  SOCIAL WORK IP CONSULT  INFECTIOUS DISEASES IP CONSULT  HEMATOLOGY & ONCOLOGY IP CONSULT  SURGERY GENERAL IP CONSULT  NUTRITION SERVICES ADULT IP CONSULT  SURGERY GENERAL IP CONSULT  NUTRITION SERVICES ADULT IP CONSULT  INFECTIOUS DISEASES IP CONSULT  PULMONARY IP CONSULT    Immunizations given this encounter     Most Recent Immunizations   Administered Date(s) Administered     COVID-19,PF,Moderna 09/01/2021     Historical DTP/aP 07/01/1987     Influenza (IIV3) PF 02/25/2020     Influenza Vaccine IM > 6 months Valent IIV4 (Alfuria,Fluzone) 11/10/2020     MMR 04/15/2021     Meningococcal (Menomune ) 03/28/2003     OPV, trivalent, live 09/10/1986     Td (Adult), Adsorbed 01/01/2000     Tdap (Adacel,Boostrix) 02/27/2021        *      SIGNIFICANT IMAGING FINDINGS     Results for orders placed or performed during the hospital encounter of 04/03/22   CT Chest (PE) Abdomen Pelvis w Contrast    Impression    IMPRESSION:  1.  Negative for pulmonary emboli.    2.  The previous dense consolidative infiltrates in the left lung seen on CT 03/10/2022 appear to near completely resolved.    3.  New small right and moderate left pleural effusions with moderate atelectasis in the lower lobes greater on the left side.    4.  Gallstones.    5.  Mild splenomegaly.   Abdomen US, limited (RUQ only)    Impression    IMPRESSION:  1.  Cholelithiasis. Otherwise normal right upper quadrant ultrasound.       XR Chest Port 1 View    Impression     IMPRESSION:     Moderate to large left and small right pleural effusions are not visibly changed. Related atelectasis including near complete atelectasis of the left lower lobe.    Lower left ventricular heart border is silhouetted. Cardiac silhouette is not enlarged. Azygos vein is not distended and the vascular pedicle width is normal.    No pneumothorax.   US Thoracentesis    Impression    IMPRESSION:  Status post left ultrasound-guided thoracentesis.    Reference CPT Code: 07732   XR Chest 1 View    Impression    IMPRESSION:     Considerable decrease in the amount of left pleural fluid. The left upper lobe is better inflated.     The residual opacity in the left basal chest has a sharp interface which parallels the left ventricular heart border within which there are no air bronchograms consistent with lobar atelectasis of the left lower lobe. There is minimal atelectasis or   pleural fluid in the left lateral costophrenic sulcus.    Unchanged atelectasis/pleural fluid along the right hemidiaphragm.       XR Chest Port 1 View    Impression    IMPRESSION: The chest is stable with again seen minimal right pleural effusion and mild left pleural effusion with some underlying infiltrate/atelectasis in both lung bases and extending into the left midlung. The chest is otherwise negative.   CT Chest Tube with Cath Placement    Impression    IMPRESSION:  1.  Successful CT-guided chest tube placement into the left pleural space.    Reference CPT Codes: 69872, 58227   XR Chest Port 1 View    Impression    IMPRESSION: There is a new chest tube at the left medial lung base. There has been decrease in left pleural effusion and there is some residual of effusion and atelectasis at the left lung base. No pneumothorax. Mild atelectasis at the right lung base.   Normal heart size and pulmonary vascularity.   XR Chest Port 1 View    Impression    IMPRESSION: Slight retraction of the left basilar chest tube. Continued decrease in  the layering left effusion and associated atelectasis. There is a similar small amount of air within the pleural space medially on the retrocardiac region compatible with   recent tube placement. Trace right effusion and associated atelectasis. Heart size is normal.   CT Chest w Contrast    Impression    IMPRESSION:   1.  Persistent but significantly decreased left pleural effusion and associated atelectasis when compared to 04/03/2022. There is a small amount of residual fluid slightly superior and medial to the tip of the left pleural drain. Small amount of air in   the left pleural space is consistent with pleural drain placement.  2.  Similar small right effusion and associated atelectasis.  3.  There are a few linear opacities in the left upper lung and lower lung in the area of the prior consolidation likely a combination of atelectasis and/or scarring.   XR Chest Port 1 View    Impression    IMPRESSION:     Tiny right and small pleural effusions are present (the left pleural effusion has mildly increased versus increased atelectasis). Associated mild right and moderate left basilar atelectasis.     No obvious pneumothorax identified.     Partial obscuration of the cardiac silhouette from overlying fluid and opacities.       Echocardiogram Complete   Result Value Ref Range    LVEF  > 65%        SIGNIFICANT LABORATORY FINDINGS     Most Recent 3 CBC's:Recent Labs   Lab Test 04/11/22  0436 04/10/22  0628 04/09/22  0627   WBC 3.3* 3.6* 5.1   HGB 7.1* 7.0* 8.0*   MCV 73* 72* 74*    278 290     Most Recent 2 LFT's:Recent Labs   Lab Test 04/06/22  0449 04/05/22  0506   AST 10 8   ALT 16 23   ALKPHOS 142* 155*   BILITOTAL 0.4 0.4           Discharge Orders        Activity    Your activity upon discharge: activity as tolerated     Full Code     Diet    Follow this diet upon discharge: Orders Placed This Encounter      Regular Diet Adult       Examination   Physical Exam   Temp:  [98.2  F (36.8  C)-98.6  F (37   C)] 98.2  F (36.8  C)  Pulse:  [82-94] 85  Resp:  [16-18] 18  BP: (151-157)/(92-94) 151/94  SpO2:  [93 %-96 %] 96 %  Wt Readings from Last 1 Encounters:   04/10/22 63.4 kg (139 lb 12.8 oz)         General Appearance: Alert, oriented x3, does not appear in distress.  HEENT: Normocephalic. No scleral icterus, . Mucous membranes moist.  Heart: :Regular rate and rhythm, normal S1 ,S2, No murmurs, no JVD, no pedal edema   Lungs: Clear to auscultation bilaterally. No wheezing or crackles  Abdomen: Soft, non tender, no rebound or rigidity, non distended, bowel sounds present.  Extremity: No deformity. No joint swelling.  Please see EMR for more detailed significant labs, imaging, consultant notes etc.    ITamiko MD, personally saw the patient today and spent greater than 30 minutes discharging this patient.    Tamiko Peter MD  [facility]    CC:Ashely Larios

## 2022-04-11 NOTE — PROGRESS NOTES
[facility] MEDICINE  DISCHARGE SUMMARY     Primary Care Physician: Ashely Larios  Admission Date: 4/3/2022   Discharge Provider: Tamiko Peter MD Discharge Date: 4/11/2022   Diet:   Active Diet and Nourishment Order   Procedures     Regular Diet Adult     Diet       Code Status: Full Code   Activity: DCACTIVITY: Activity as tolerated        Condition at Discharge: Stable     REASON FOR PRESENTATION(See Admission Note for Details)   Dyspnea and cough    PRINCIPAL & ACTIVE DISCHARGE DIAGNOSES     Active Problems:    Tachypnea    Anemia    Pleural effusion    Tachycardia    Elevated LFTs    Dyspnea      PENDING LABS     Unresulted Labs Ordered in the Past 30 Days of this Admission     Date and Time Order Name Status Description    4/6/2022  5:40 PM Blood Culture Hand, Left Preliminary     4/5/2022 10:18 AM Celiac (Gluten) Antibody Panel In process             PROCEDURES ( this hospitalization only)          RECOMMENDATIONS TO OUTPATIENT PROVIDER FOR F/U VISIT     Droplet precautions    Admitted inpatient to [facility]    DISPOSITION     [facility]    SUMMARY OF HOSPITAL COURSE:      41-year-old patient who previously treated for community-acquired pneumonia, presented to the hospital with cough and shortness of breath, chest CT showed large left pleural effusion she underwent thoracentesis on April 3 and blood culture Gram stain thoracentesis grew coccobacilli, pulmonary team consulted as patient had respiratory distress required BiPAP and transferred to ICU, she had chest tube placement on April 4, her blood culture grew Haemophilus influenza bacteremia, received vancomycin from every 4-6, continue to be on Rocephin clindamycin pulmonary is following a chest tube was removed on April 9, ID surgery and hematology consulted      Bilateral pleural effusion left more than right  Left-sided empyema  On droplet precaution,  Haemophilus influenza  bacteremia  Status post thoracentesis on April 3 chest tube placement April 4 removed on April 9,  Received intrapleural fibrinolytics from April 5-7  CT was repeated on April 8 showed improvement of the left empyema  On clindamycin and Rocephin, ID pulmonary is following,  Previously treated for pneumonia as an outpatient in February and again in March  Repeat chest x-ray on April 10 showed slight increase in the left pleural effusion,  Surgery consulted they are considering left thorax thoracoscopy recommended transfer to [facility]      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  Marked hypogammaglobinemia  Hematology ordered IVIG infusion on April 8 she had fever tachycardia and chills the infusion was stopped, she received Tylenol and IV fluid  Etiology recommended referral for immunology and 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 she will need to have long-term IVIG replacement with premedication to prevent another reaction      Iron deficiency anemia  Iron GI consulted concerned about villous atrophy and she cannot absorb oral iron recommended IV iron hematology ordered only one-time dose of IV iron  He is to follow-up with GI as an outpatient for small bowel biopsy in 2 months once her respiratory issues resolve    Elevated liver enzymes  GI consulted and evaluated her on April 4, trended LFT which improved,    Acute respiratory failure with hypoxia  Secondary to empyema improved currently on room air    Recurrent sinusitis  She has ENT appointment scheduled for nasal polyp on May 2022      Discharge Medications with Med changes:     Current Discharge Medication List      START taking these medications    Details   cefTRIAXone (ROCEPHIN) 2 GM vial Inject 2 g into the vein every 24 hours      clindamycin (CLEOCIN) 600 MG/50ML infusion Inject 50 mLs (600 mg) into the vein every 8 hours         CONTINUE these medications which have CHANGED     Details   lisinopril (ZESTRIL) 5 MG tablet Take 1 tablet (5 mg) by mouth daily         CONTINUE these medications which have NOT CHANGED    Details   Calcium Carb-Cholecalciferol (CALTRATE 600+D3 SOFT) 600-800 MG-UNIT CHEW Take 1 tablet by mouth daily       ferrous sulfate (FEROSUL) 325 (65 Fe) MG tablet Take 325 mg by mouth daily (with breakfast)                   Rationale for medication changes:              Consults       PULMONARY IP CONSULT  PHARMACY TO DOSE VANCO  GASTROENTEROLOGY IP CONSULT  SOCIAL WORK IP CONSULT  INFECTIOUS DISEASES IP CONSULT  HEMATOLOGY & ONCOLOGY IP CONSULT  SURGERY GENERAL IP CONSULT  NUTRITION SERVICES ADULT IP CONSULT  SURGERY GENERAL IP CONSULT  NUTRITION SERVICES ADULT IP CONSULT  INFECTIOUS DISEASES IP CONSULT  PULMONARY IP CONSULT    Immunizations given this encounter     Most Recent Immunizations   Administered Date(s) Administered     COVID-19,PF,Moderna 09/01/2021     Historical DTP/aP 07/01/1987     Influenza (IIV3) PF 02/25/2020     Influenza Vaccine IM > 6 months Valent IIV4 (Alfuria,Fluzone) 11/10/2020     MMR 04/15/2021     Meningococcal (Menomune ) 03/28/2003     OPV, trivalent, live 09/10/1986     Td (Adult), Adsorbed 01/01/2000     Tdap (Adacel,Boostrix) 02/27/2021        *      SIGNIFICANT IMAGING FINDINGS     Results for orders placed or performed during the hospital encounter of 04/03/22   CT Chest (PE) Abdomen Pelvis w Contrast    Impression    IMPRESSION:  1.  Negative for pulmonary emboli.    2.  The previous dense consolidative infiltrates in the left lung seen on CT 03/10/2022 appear to near completely resolved.    3.  New small right and moderate left pleural effusions with moderate atelectasis in the lower lobes greater on the left side.    4.  Gallstones.    5.  Mild splenomegaly.   Abdomen US, limited (RUQ only)    Impression    IMPRESSION:  1.  Cholelithiasis. Otherwise normal right upper quadrant ultrasound.       XR Chest Port 1 View    Impression     IMPRESSION:     Moderate to large left and small right pleural effusions are not visibly changed. Related atelectasis including near complete atelectasis of the left lower lobe.    Lower left ventricular heart border is silhouetted. Cardiac silhouette is not enlarged. Azygos vein is not distended and the vascular pedicle width is normal.    No pneumothorax.   US Thoracentesis    Impression    IMPRESSION:  Status post left ultrasound-guided thoracentesis.    Reference CPT Code: 50340   XR Chest 1 View    Impression    IMPRESSION:     Considerable decrease in the amount of left pleural fluid. The left upper lobe is better inflated.     The residual opacity in the left basal chest has a sharp interface which parallels the left ventricular heart border within which there are no air bronchograms consistent with lobar atelectasis of the left lower lobe. There is minimal atelectasis or   pleural fluid in the left lateral costophrenic sulcus.    Unchanged atelectasis/pleural fluid along the right hemidiaphragm.       XR Chest Port 1 View    Impression    IMPRESSION: The chest is stable with again seen minimal right pleural effusion and mild left pleural effusion with some underlying infiltrate/atelectasis in both lung bases and extending into the left midlung. The chest is otherwise negative.   CT Chest Tube with Cath Placement    Impression    IMPRESSION:  1.  Successful CT-guided chest tube placement into the left pleural space.    Reference CPT Codes: 43206, 96425   XR Chest Port 1 View    Impression    IMPRESSION: There is a new chest tube at the left medial lung base. There has been decrease in left pleural effusion and there is some residual of effusion and atelectasis at the left lung base. No pneumothorax. Mild atelectasis at the right lung base.   Normal heart size and pulmonary vascularity.   XR Chest Port 1 View    Impression    IMPRESSION: Slight retraction of the left basilar chest tube. Continued decrease in  the layering left effusion and associated atelectasis. There is a similar small amount of air within the pleural space medially on the retrocardiac region compatible with   recent tube placement. Trace right effusion and associated atelectasis. Heart size is normal.   CT Chest w Contrast    Impression    IMPRESSION:   1.  Persistent but significantly decreased left pleural effusion and associated atelectasis when compared to 04/03/2022. There is a small amount of residual fluid slightly superior and medial to the tip of the left pleural drain. Small amount of air in   the left pleural space is consistent with pleural drain placement.  2.  Similar small right effusion and associated atelectasis.  3.  There are a few linear opacities in the left upper lung and lower lung in the area of the prior consolidation likely a combination of atelectasis and/or scarring.   XR Chest Port 1 View    Impression    IMPRESSION:     Tiny right and small pleural effusions are present (the left pleural effusion has mildly increased versus increased atelectasis). Associated mild right and moderate left basilar atelectasis.     No obvious pneumothorax identified.     Partial obscuration of the cardiac silhouette from overlying fluid and opacities.       Echocardiogram Complete   Result Value Ref Range    LVEF  > 65%        SIGNIFICANT LABORATORY FINDINGS     Most Recent 3 CBC's:Recent Labs   Lab Test 04/11/22  0436 04/10/22  0628 04/09/22  0627   WBC 3.3* 3.6* 5.1   HGB 7.1* 7.0* 8.0*   MCV 73* 72* 74*    278 290     Most Recent 2 LFT's:Recent Labs   Lab Test 04/06/22  0449 04/05/22  0506   AST 10 8   ALT 16 23   ALKPHOS 142* 155*   BILITOTAL 0.4 0.4           Discharge Orders        Activity    Your activity upon discharge: activity as tolerated     Full Code     Diet    Follow this diet upon discharge: Orders Placed This Encounter      Regular Diet Adult       Examination   Physical Exam   Temp:  [98.2  F (36.8  C)-98.6  F (37   C)] 98.2  F (36.8  C)  Pulse:  [82-94] 85  Resp:  [16-18] 18  BP: (151-157)/(92-94) 151/94  SpO2:  [93 %-96 %] 96 %  Wt Readings from Last 1 Encounters:   04/10/22 63.4 kg (139 lb 12.8 oz)         General Appearance: Alert, oriented x3, does not appear in distress.  HEENT: Normocephalic. No scleral icterus, . Mucous membranes moist.  Heart: :Regular rate and rhythm, normal S1 ,S2, No murmurs, no JVD, no pedal edema   Lungs: Clear to auscultation bilaterally. No wheezing or crackles  Abdomen: Soft, non tender, no rebound or rigidity, non distended, bowel sounds present.  Extremity: No deformity. No joint swelling.  Please see EMR for more detailed significant labs, imaging, consultant notes etc.    ITamiko MD, personally saw the patient today and spent greater than 30 minutes discharging this patient.    Tamiko Peter MD  [facility]    CC:Ashely Larios

## 2022-04-11 NOTE — PROGRESS NOTES
Hospitalist Progress Note    Assessment/Plan    Active Problems:    Tachypnea    Anemia    Pleural effusion    Tachycardia    Elevated LFTs    Dyspnea    41-year-old patient previously treated for community-acquired pneumonia presented to the hospital with cough and shortness of breath, CT chest showed resolution of pneumonia and bilateral pleural effusion moderate to large 1 on the left, she underwent thoracentesis on April 3 blood culture and Gram stain from thoracentesis grew coccobacilli bacilli pulmonary team consulted patient had respiratory distress following thoracentesis and the patient was transferred to ICU for BiPAP support, she also had ultrasound-guided chest tube placement on April 4, oxygen needs-improved, she was found to have Haemophilus influenza bacteremia, on vancomycin from April 4-6 switched to Rocephin and clindamycin, pulmonary, surgery, ID and the hematology consulted    Bilateral pleural effusions left more than right, left-sided empyema,  Haemophilus bacteremia 1 droplet precaution  Status post thoracentesis on April 3 chest tube placement on April 4, received intrapleural fibrinolytics from April 5-7,  Repeat CT chest on April 8 showed improvement of the left empyema  On vancomycin for 3 days and it was stopped currently on clindamycin and Rocephin  She has history of recurrent pneumonia treated with 1 week of antibiotic in February and another week of antibiotic in March  -Surgery consulted and the potential for left thorax thoracoscopy and potential thrombectomy  Discussed with the surgeon who recommended transfer to [facility] decision about timing of her surgery depends on the chest tube      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  Marked hypogammaglobinemia  -Hematology ordered IVIG started infusion on April 8 she had fever and tachycardia which promptly stopped IVIG infusion, resuscitated with IV fluids Tylenol,  -Hematology recommended  additional premedication for future IVIG therapy is indicated, she needs to immunologist today to establish diagnosis of CVI D as outpatient once confirmed she will need long-term IVIG replacement,      Iron deficiency anemia  GI consulted, there is concern about villous atrophy and she is not able to absorb oral iron and recommended IV iron she received 1 dose while inpatient  GI recommended EGD and small bowel biopsy in 2 months once her respiratory issues resolved      Elevated liver enzymes   improved she needs to follow-up with GI for an EGD      Acute respiratory failure with hypoxia  Secondary to empyema and pneumonia, required BiPAP currently on oxygen nasal cannula      Recurrent sinusitis  Patient needs to have ENT evaluation and she has appointment scheduled for May 2022        Barriers to Discharge: Planning to transfer to [facility]    Anticipated discharge date/Disposition: Transferring to [facility] within 1 to 2 days    Subjective  Patient was seen today, still awaiting to transfer to [facility], had fever and chills after infusion of IVIG yesterday improved today she received fluid and Tylenol,    Objective    Vital signs in last 24 hours  Temp:  [98.2  F (36.8  C)-98.6  F (37  C)] 98.2  F (36.8  C)  Pulse:  [82-94] 85  Resp:  [16-18] 18  BP: (151-157)/(92-94) 151/94  SpO2:  [93 %-96 %] 96 % [unfilled] O2 Device: None (Room air)    Weight:   [unfilled] Weight change:     Intake/Output last 3 shifts  I/O last 3 completed shifts:  In: 1614 [P.O.:1440; I.V.:174]  Out: -   Body mass index is 22.56 kg/m .    Physical Exam:  General Appearance: Alert, oriented x3, does not appear in distress.  HEENT: Normocephalic. No scleral icterus, . Mucous membranes moist.  Heart: :Regular rate and rhythm, normal S1 ,S2, No murmurs, no JVD, no pedal edema   Lungs: Clear to auscultation bilaterally. No wheezing or crackles  Abdomen: Soft, non tender, no rebound or rigidity, non distended,  bowel sounds present.  Extremity: No deformity. No joint swelling.      Pertinent Labs   Lab Results: personally reviewed.   Recent Labs   Lab 04/06/22  0449 04/05/22  0506   NA  --  139   CO2  --  21*   BUN  --  13   ALBUMIN 1.8* 1.9*   ALKPHOS 142* 155*   ALT 16 23   AST 10 8     Recent Labs   Lab 04/11/22  0436 04/10/22  0628 04/09/22  0627   WBC 3.3* 3.6* 5.1   HGB 7.1* 7.0* 8.0*   HCT 23.0* 22.6* 25.8*    278 290     No results for input(s): CKTOTAL, TROPONINI in the last 168 hours.    Invalid input(s): TROPONINT, CKMBINDEX  Invalid input(s): POCGLUFGR    Medications      Advanced Care Planning:  Discharge Planning discussed with patient  Total time with this patient is 35 min with 50% of time spent in examining the patient, reviewing records, discussing plan of care and counseling, 50% of time spent in coordination of care.  Care discussed and coordinated with TIMOTEO.      Tamiko Peter MD  Internal Medicine Hospitalist  4/11/2022

## 2022-04-11 NOTE — PROGRESS NOTES
PULMONARY MEDICINE PROGRESS NOTE  4/11/2022      Admit Date: 4/3/2022  CODE: Full Code    Reason for Consult: left empyema due to Haemophilus influenzae, suspectedcommon variable immunodeficiency    Assessment/Plan:   41 year old female never smoker with primary hypertension, pneumonia diagnosis since February 2022 who was admitted to [facility] on 4/3/2022 with bilateral pleural effusions and pneumonia, now being treated for Haemophilus influenzae bacteremia and left empyema with Haemophilus influenzae, left pleural catheter in place 4/4-4/9 s/p intrapleural alteplase-dornase, course complicated by 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    Severe community acquired pneumonia, Haemophilus influenzae bacteremia, left empyema: Patient with persistent pneumonia since February 2022, failed 2 rounds of outpatient antibiotic therapy, now admitted with recurrent pneumonia and bilateral pleural effusions (L > R). Underwent thoracentesis 4/3, 800 mL thick yellow fluid extracted, overt empyema with pleural fluid and blood positive for H flu. Chest tube placed 4/4, ~ 750 mL removed soon after insertion then dropped off; received intrapleural alteplase-dornase 4/5-4/7 (BID dosing x 6 doses). CT chest 4/8 with improvement of left empyema, associated atelectasis/possible consolidation; stable right atelectasis and pleural effusion. Limited CT ouput (25 mL in 24 hrs) so removed tube on 4/9. Now with improved but persistent cough, persistent left basilar opacity on CXR but may be somewhat improved.    Suspected CVID vs acquired immunodeficiency: Immunoglobulins sent 4/5, profound reduction in all lines: IgA < 5, IgG < 109, IgE < 2. If patient truly has CVID, then villous atrophy may be seen in these patients, which may result in poor oral absorption (including poor oral absorption of iron); patient's iron level 9 this admission. HIV Ag Ab combo screen negative 4/5/22. Other  "possible causes of acquired immunodeficiency may be malnutrition. Her prealbumin was very low at 7 on 4/7/2022. IVIG infusion initiated on 4/8, but became febrile and symptomatic soon after and it was discontinued.    Plan:  - appreciate surgery and ID consultation  - continues on ceftriaxone and clindamycin  - needs eventual immunology evaluation; may tolerate future courses of IVIG with pretreatment  - dietician consult for malnutrition, prealbumin very low at 7  - Dr. Kumar to round AM of 4/12 and likely obtain chest CT at that time; he will order in AM in my discussion with him  - Encourage pulmonary hygiene:  IS Q2H while awake, OOB to chair, ambulate  - transfer to [facility] dependent on chest CT and clinical evaluation tomorrow AM  - case discussed with Dr. Peter and Dr. Kumar  - will follow with you    Pritesh Vazquez MD  Pulmonary and Critical Care Medicine  [facility]  Office [phone]  Pager [phone]  (he/him/his)                                                                                                                                                      SUBJECTIVE/INTERVAL HISTORY   Cough a bit improved. Afebrile.                                                                                                                                                    Exam/Data:     Vitals  BP (!) 162/103 (BP Location: Right arm)   Pulse 77   Temp 97.9  F (36.6  C) (Oral)   Resp 16   Ht 1.676 m (5' 6\")   Wt 63.4 kg (139 lb 12.8 oz)   LMP 03/01/2022   SpO2 97%   BMI 22.56 kg/m       I/O last 3 completed shifts:  In: 1260 [P.O.:1260]  Out: -   Weight change:   [unfilled]  EXAM:  BP (!) 162/103 (BP Location: Right arm)   Pulse 77   Temp 97.9  F (36.6  C) (Oral)   Resp 16   Ht 1.676 m (5' 6\")   Wt 63.4 kg (139 lb 12.8 oz)   LMP 03/01/2022   SpO2 97%   BMI 22.56 kg/m      Intake/Output last 3 shifts:  I/O last 3 completed shifts:  In: 1260 [P.O.:1260]  Out: - "   Intake/Output this shift:  No intake/output data recorded.    Physical Exam  Gen: alert, oriented, appears fatigued  HEENT: NT, no HANNAH  CV: RRR, no m/g/r  Resp: diminished at bases with bibasilar faint crackles  Abd: soft, nontender, BS+  Skin: no rashes or lesions  Ext: no edema  Neuro: PERRL, nonfocal exam    ROS: A complete 10-system review of systems was obtained and is negative with the exception of what is noted in the history of present illness.    Medications:       cefTRIAXone  2 g Intravenous Q24H     clindamycin  600 mg Intravenous Q8H     enoxaparin ANTICOAGULANT  40 mg Subcutaneous Q24H     ferrous sulfate  325 mg Oral Every Other Day     lisinopril  5 mg Oral Daily         DATA  All laboratory and radiology has been personally reviewed by myself today.  Recent Labs   Lab 04/11/22  0436   WBC 3.3*   HGB 7.1*   HCT 23.0*        Recent Labs   Lab 04/06/22  0449 04/05/22  0506   NA  --  139   CO2  --  21*   BUN  --  13   ALKPHOS 142* 155*   ALT 16 23   AST 10 8       IMAGING:   Chest CT (April 2022):  - images directly reviewed, formal interpretation follows:  FINDINGS:   LUNGS AND PLEURA: The left basilar chest tube is in place. There is a small amount of residual left pleural effusion which is decreased from 04/04/2022. The fluid is slightly medial and superior compared to the tip of the pleural drain. There is a small   amount of air in the left pleural space compatible with pleural catheter.  There are a few residual linear opacities in the left upper and lower lobe in the area of the prior consolidation likely atelectasis/scarring. There is persistent but improved   atelectasis in the left lung base compared to prior exam. Relatively similar small right effusion and associated atelectasis.     MEDIASTINUM/AXILLAE: Heart size is normal. No lymphadenopathy. No thoracic aneurysm.     CORONARY ARTERY CALCIFICATION: None.     UPPER ABDOMEN: No significant finding.     MUSCULOSKELETAL: Probable  benign bone island in the T8 vertebral body.                                                                      IMPRESSION:   1.  Persistent but significantly decreased left pleural effusion and associated atelectasis when compared to 04/03/2022. There is a small amount of residual fluid slightly superior and medial to the tip of the left pleural drain. Small amount of air in   the left pleural space is consistent with pleural drain placement.  2.  Similar small right effusion and associated atelectasis.  3.  There are a few linear opacities in the left upper lung and lower lung in the area of the prior consolidation likely a combination of atelectasis and/or scarring.    CXR (4/11/22):  - images directly reviewed, formal interpretation follows:  IMPRESSION: No change in the small left-sided pleural effusion. There is a small air/fluid level in the left paravertebral space consistent with a loculated hydropneumothorax with associated lower lobe basilar opacities with a few air bronchograms. This   is unchanged since the pigtail catheter was in place.      There is a small right effusion with right lower lobe atelectasis as well, unchanged.     Heart and pulmonary vascularity are normal.

## 2022-04-11 NOTE — PROGRESS NOTES
[facility]    Infectious Disease Progress Note    04/11/2022      Assessment & Plan   Arely Saldaña is a 41 year old female who was admitted on 4/3/2022.     1. Empyema, left-sided-chest tube in place, tPA instilled by pulmonary.  2. Repeat CT shows persistent with significantly decreased left pleural effusion and associated atelectasis and residual fluid superior and medial to the tip of left pleural drain.  3. 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  Being transferred to [facility] for decortication.  Chest tube removed today 4/9/2022.  4. Recurrent left-sided pneumonia  5. sinusitis since August/September 2021  6. Hypertension, pleuritic chest pain, tachypnea  7. Status post thoracentesis and chest tube placement on 4/4/2022  8. Elevated liver enzymes, improving.  Splenomegaly  9. Hypogammaglobulinemia: Decreased immunoglobulin levels, IgA < 5, IgA <109, IgA < 2. ?  CVID.  Had high-grade fever with IVIG.  10. Transthoracic echocardiogram did not show vegetations  11. Patient is a vegetarian    Recommendations  Continue IV ceftriaxone and clindamycin for now.  Transferring to [facility] for decortication.?  CXR pending  We will need immunology consult outpatient.  IVIG therapy per hematology.aborted secondary to infusion reaction      Yazmin Oneil M.D.       Susceptibility     Haemophilus influenzae nontypable     HANNA     Amoxicillin/Clavulanate 0.75 ug/mL Susceptible     Ampicillin 0.38 ug/mL Susceptible     Ceftriaxone <=0.016 ug/mL Susceptible     Levofloxacin 0.032 ug/mL Susceptible     Meropenem 0.125 ug/mL Susceptible                    Interval History   Patient new to me today.  On IV ceftriaxone and clindamycin and tolerating well.    She feels well  No chest pain  No sob at rest    Physical Exam   Temp: 98.2  F  (36.8  C) Temp src: Oral BP: (!) 151/94 Pulse: 85   Resp: 18 SpO2: 96 % O2 Device: None (Room air)    Vitals:    04/08/22 1415 04/09/22 0515 04/10/22 0814   Weight: 64.8 kg (142 lb 12.8 oz) 64.6 kg (142 lb 8 oz) 63.4 kg (139 lb 12.8 oz)     Vital Signs with Ranges  Temp:  [98.2  F (36.8  C)-98.6  F (37  C)] 98.2  F (36.8  C)  Pulse:  [82-94] 85  Resp:  [16-18] 18  BP: (151-157)/(92-94) 151/94  SpO2:  [93 %-96 %] 96 %    Exam on 04/11/2022    GENERAL APPEARANCE:  awake, appears ill  EYES: Eyes grossly normal to inspection, PERRL and conjunctivae and sclerae normal    NECK: Supple  RESP: no sob.  Chest tube out      ABDOMEN: Firm, pleuritic chest pain  MS: extremities normal- no gross deformities noted  SKIN: no suspicious lesions or rashes  Neuro: Awake, deconditioned , able to answer questions    Medications       cefTRIAXone  2 g Intravenous Q24H     clindamycin  600 mg Intravenous Q8H     enoxaparin ANTICOAGULANT  40 mg Subcutaneous Q24H     ferrous sulfate  325 mg Oral Every Other Day     lisinopril  5 mg Oral Daily       Data   All microbiology laboratory data reviewed.  Recent Labs   Lab Test 04/11/22  0436 04/10/22  0628 04/09/22  0627   WBC 3.3* 3.6* 5.1   HGB 7.1* 7.0* 8.0*   HCT 23.0* 22.6* 25.8*   MCV 73* 72* 74*    278 290     Recent Labs   Lab Test 04/05/22  0506 04/03/22  2312 04/03/22  1141   CR 0.61 0.65 0.69     Recent Labs   Lab Test 01/21/22  1435   SED 14     MICRO        04/06/2022 0455 04/06/2022 0516 Blood Culture Hand, Right [[id number]]   Blood from Hand, Right    In process Component Value   No component results              04/06/2022 0449 04/06/2022 0516 Blood Culture Peripheral Blood [[id number]]   Peripheral Blood    In process Component Value   No component results              04/05/2022 0506 04/06/2022 0017 Blood Culture Peripheral Blood [[id number]]   Peripheral Blood    Preliminary result Component Value   Culture No growth after 12 hours P              04/03/2022 1915  04/05/2022 1438 Blood Culture Arm, Left [[id number]]   (Abnormal)   Blood from Arm, Left    Preliminary result Component Value   Culture Positive on the 1st day of incubation Abnormal  P    Haemophilus influenzae nontypable Panic  P    1 of 2 bottles   Corrected on April 4, 2022/7:25 PM by Michael Philip   Previously reported as: gram positive cocci in pairs and chains. Corrected to gram negative cocobacilli.     Susceptibilities done on previous cultures              04/03/2022 1849 04/03/2022 2108 Cell count with differential fluid [[id number]]    (Abnormal)   Pleural fluid from Pleural Cavity, Left    Final result Component Value   No component results           04/03/2022 1849 04/06/2022 0845 Pleural fluid Aerobic Bacterial Culture Routine with Gram Stain [[id number]]     (Abnormal)   Pleural fluid from Pleural Cavity, Left    Final result Component Value   Culture 4+ Haemophilus influenzae nontypable Abnormal    Gram Stain Result 4+ Gram negative coccobacilli Abnormal     4+ WBC seen Abnormal     Predominantly PMNs         Susceptibility     Haemophilus influenzae nontypable     HANNA     Amoxicillin/Clavulanate 0.75 ug/mL Susceptible     Ampicillin 0.38 ug/mL Susceptible     Ceftriaxone <=0.016 ug/mL Susceptible     Levofloxacin 0.032 ug/mL Susceptible                   04/03/2022 1849 04/03/2022 1946 Glucose fluid [[id number]]    Pleural fluid from Pleural Cavity, Left    Final result Component Value Units   Glucose Fluid Source Pleural Cavity, Left    left pleural fluid   left pleural fluid   left pleural fluid   left pleural fluid   Glucose fluid <5 mg/dL           04/03/2022 1849 04/04/2022 0003 Lactate dehydrogenase fluid [[id number]]    Pleural fluid from Pleural Cavity, Left    Final result Component Value Units   LD Fluid Source Pleural Cavity, Left    left pleural fluid   left pleural fluid   Lactate dehydrogenase fluid 2,332 U/L   Collect pleural fluid when Thoracentesis procedure            04/03/2022 1849 04/03/2022 2246 Protein fluid [[id number]]    Pleural fluid from Pleural Cavity, Left    Final result Component Value Units   Protein Fluid Source Pleural Cavity, Left    left pleural fluid   left pleural fluid   Protein Total Fluid 3.7 g/dL           04/03/2022 1849 04/03/2022 2014 pH fluid [[id number]]    Pleural fluid from Pleural Cavity, Left    Final result Component Value Units   pH Fluid Source Pleural Cavity, Left    left pleural fluid   left pleural fluid   pH Fluid 7.5 pH           04/03/2022 1849 04/03/2022 2108 Cell Count Body Fluid [[id number]]    (Abnormal)   Pleural fluid from Pleural Cavity, Left    Final result Component Value   Color Yellow   Clarity Turbid Abnormal    Cell Count Fluid Source Pleural Cavity, Left   left pleural fluid   left pleural fluid   left pleural fluid   left pleural fluid   left pleural fluid   left pleural fluid   left pleural fluid   left pleural fluid   left pleural fluid   left pleural fluid   left pleural fluid   Clot Presence Large Clot (>Half Volume)   Specimen clotted. Slide reviewed, no   abnormal cells seen.    Cytology Ordered No           04/03/2022 1154 04/05/2022 1438 Blood Culture Peripheral Blood [[id number]]   (Abnormal)   Peripheral Blood    Preliminary result Component Value   Culture Positive on the 1st day of incubation Abnormal  P    Haemophilus influenzae nontypable Panic  P    1 of 2 bottles   Susceptibilities done on previous cultures              04/03/2022 1141 04/06/2022 0845 Blood Culture Line, venous [[id number]]     (Abnormal)   Blood from Line, venous    Final result Component Value   Culture Positive on the 1st day of incubation Abnormal     Haemophilus influenzae nontypable Panic     1 of 2 bottles         Susceptibility     Haemophilus influenzae nontypable     HANNA     Amoxicillin/Clavulanate 0.75 ug/mL Susceptible     Ampicillin 0.38 ug/mL Susceptible     Ceftriaxone <=0.016 ug/mL Susceptible     Levofloxacin  0.032 ug/mL Susceptible     Meropenem 0.125 ug/mL Susceptible                         RADIOLOGY:  XR Chest 1 View    Result Date: 4/3/2022  EXAM: XR CHEST 1 VIEW LOCATION: [facility] DATE/TIME: 4/3/2022 6:59 PM INDICATION: dyspnea; status post left thoracentesis COMPARISON: AP chest radiograph from earlier today     IMPRESSION: Considerable decrease in the amount of left pleural fluid. The left upper lobe is better inflated. The residual opacity in the left basal chest has a sharp interface which parallels the left ventricular heart border within which there are no air bronchograms consistent with lobar atelectasis of the left lower lobe. There is minimal atelectasis or pleural fluid in the left lateral costophrenic sulcus. Unchanged atelectasis/pleural fluid along the right hemidiaphragm.     Echocardiogram Complete    Result Date: 4/3/2022  [id number]^FABIANO^ARY^[place] [address]  Name: SHANI PETIT MRN: [id number] : 1980 Study Date: 2022 02:38 PM Age: 41 yrs Gender: Female Patient Location: [facility] Reason For Study: SOB Ordering Physician: ARY MARIO Performed By: ANABELL  BSA: 1.7 m2 Height: 66 in Weight: 145 lb HR: 120 BP: 138/93 mmHg ______________________________________________________________________________ Procedure Complete Portable Echo Adult. ______________________________________________________________________________ Interpretation Summary  Left ventricular size, wall motion and function are normal. The ejection fraction is > 65%. Normal right ventricle size and systolic function. No hemodynamically significant valvular abnormalities on 2D or color flow imaging. ______________________________________________________________________________ Left Ventricle Left ventricular size, wall motion and function are normal. The ejection fraction is > 65%. There is normal left ventricular wall  thickness. Left ventricular diastolic function is normal. No regional wall motion abnormalities noted.  Right Ventricle Normal right ventricle size and systolic function.  Atria Normal left atrial size. Right atrial size is normal. There is no color Doppler evidence of an atrial shunt.  Mitral Valve Mitral valve leaflets appear normal. There is no evidence of mitral stenosis or clinically significant mitral regurgitation.  Tricuspid Valve Tricuspid valve leaflets appear normal. There is no evidence of tricuspid stenosis or clinically significant tricuspid regurgitation. Right ventricle systolic pressure estimate normal.  Aortic Valve Aortic valve leaflets appear normal. There is no evidence of aortic stenosis or clinically significant aortic regurgitation.  Pulmonic Valve The pulmonic valve is not well seen, but is grossly normal. This degree of valvular regurgitation is within normal limits.  Vessels The aorta root is normal. Normal size ascending aorta. IVC diameter <2.1 cm collapsing >50% with sniff suggests a normal RA pressure of 3 mmHg.  Pericardium There is no pericardial effusion.  ______________________________________________________________________________ MMode/2D Measurements & Calculations IVSd: 0.68 cm LVIDd: 3.8 cm LVIDs: 2.3 cm LVPWd: 1.0 cm FS: 39.1 %  LV mass(C)d: 91.8 grams LV mass(C)dI: 52.6 grams/m2 LA dimension: 2.4 cm LVOT diam: 2.0 cm LVOT area: 3.1 cm2 LA Volume Indexed (AL/bp): 26.0 ml/m2 RWT: 0.55  Time Measurements MM HR: 120.0 BPM  Doppler Measurements & Calculations MV E max qamar: 59.1 cm/sec MV A max qamar: 91.3 cm/sec MV E/A: 0.65 MV dec slope: 559.2 cm/sec2 MV dec time: 0.11 sec Ao V2 max: 155.5 cm/sec Ao max PG: 10.0 mmHg Ao V2 mean: 103.6 cm/sec Ao mean P.9 mmHg Ao V2 VTI: 23.0 cm JERONIMO(I,D): 2.8 cm2 JERONIMO(V,D): 2.8 cm2 LV V1 max P.1 mmHg LV V1 max: 142.1 cm/sec LV V1 VTI: 20.9 cm SV(LVOT): 64.8 ml SI(LVOT): 37.2 ml/m2 PA acc time: 0.09 sec AV Qamar Ratio (DI): 0.91 JERONIMO Index  (cm2/m2): 1.6 E/E' av.1 Lateral E/e': 3.8 Medial E/e': 6.3  ______________________________________________________________________________ Report approved by: Fransisco Belcher 2022 03:46 PM       Abdomen US, limited (RUQ only)    Result Date: 4/3/2022  EXAM: US ABDOMEN LIMITED LOCATION: [facility] DATE/TIME: 4/3/2022 1:51 PM INDICATION: abnormal LFTs COMPARISON: CT from earlier today TECHNIQUE: Limited abdominal ultrasound. FINDINGS: GALLBLADDER: Cholelithiasis. BILE DUCTS: No biliary dilatation. The common duct measures 6 mm. LIVER: Normal parenchyma with smooth contour. No focal mass. RIGHT KIDNEY: No hydronephrosis. PANCREAS: The visualized portions are normal. No ascites.     IMPRESSION: 1.  Cholelithiasis. Otherwise normal right upper quadrant ultrasound.     Abdomen US, limited (RUQ only)    Result Date: 3/10/2022  EXAM: US ABDOMEN LIMITED LOCATION: [facility] DATE/TIME: 3/10/2022 10:16 AM INDICATION: Abnormal liver function tests and right upper quadrant pain. COMPARISON: 1. TECHNIQUE: Limited abdominal ultrasound. FINDINGS: GALLBLADDER: Cholelithiasis and gallbladder sludge are noted. No wall thickening nor pericholecystic fluid. BILE DUCTS: No biliary dilatation. The common duct measures 6 mm. LIVER: Normal parenchyma with smooth contour. No focal mass. RIGHT KIDNEY: No hydronephrosis. PANCREAS: The visualized portions are normal. No ascites.     IMPRESSION: 1.  Cholelithiasis and gallbladder sludge are present. No evidence of cholecystitis nor bile duct dilatation.     US Thoracentesis    Result Date: 4/3/2022  EXAM: 1. LEFT THORACENTESIS 2. ULTRASOUND GUIDANCE LOCATION: [facility] DATE/TIME: 4/3/2022 6:17 PM INDICATION: Pleural effusion. PROCEDURE: Informed consent obtained. Time out performed. The chest was prepped and draped in sterile fashion. 10 mL of 1 % lidocaine was infused into the local soft tissues. Under  direct ultrasound guidance, a 5 Tongan catheter system was placed into the pleural effusion. 800 mL of thick cloudy yellow fluid were removed and sent to lab, if requested. Patient tolerated procedure well. Ultrasound imaging was obtained and placed in the patient's permanent medical record.     IMPRESSION: Status post left ultrasound-guided thoracentesis. Reference CPT Code: 28937    XR Chest Port 1 View    Result Date: 4/5/2022  EXAM: XR CHEST PORT 1 VIEW LOCATION: [facility] DATE/TIME: 4/5/2022 5:43 AM INDICATION: follow up empyema, pneumonia. COMPARISON: 04/03/2022. CT chest tube placement of 04/04/2022.     IMPRESSION: There is a new chest tube at the left medial lung base. There has been decrease in left pleural effusion and there is some residual of effusion and atelectasis at the left lung base. No pneumothorax. Mild atelectasis at the right lung base. Normal heart size and pulmonary vascularity.    XR Chest Port 1 View    Result Date: 4/3/2022  EXAM: XR CHEST PORT 1 VIEW LOCATION: [facility] DATE/TIME: 4/3/2022 11:29 PM INDICATION: Shortness of breath COMPARISON: 04/03/2022     IMPRESSION: The chest is stable with again seen minimal right pleural effusion and mild left pleural effusion with some underlying infiltrate/atelectasis in both lung bases and extending into the left midlung. The chest is otherwise negative.    XR Chest Port 1 View    Result Date: 4/3/2022  EXAM: XR CHEST PORT 1 VIEW LOCATION: [facility] DATE/TIME: 4/3/2022 4:38 PM INDICATION: worsening sob COMPARISON: CT pulmonary angiogram 04/03/2022 at 1256 hours     IMPRESSION: Moderate to large left and small right pleural effusions are not visibly changed. Related atelectasis including near complete atelectasis of the left lower lobe. Lower left ventricular heart border is silhouetted. Cardiac silhouette is not enlarged. Azygos vein is not distended and the vascular  pedicle width is normal. No pneumothorax.    CT Chest Pulmonary Embolism w Contrast    Result Date: 3/10/2022  EXAM: CT CHEST PULMONARY EMBOLISM W CONTRAST LOCATION: [facility] DATE/TIME: 3/10/2022 10:08 AM INDICATION: Shortness of breath. Cough. COMPARISON: 02/21/2022 radiograph. TECHNIQUE: CT chest pulmonary angiogram during arterial phase injection of IV contrast. Multiplanar reformats and MIP reconstructions were performed. Dose reduction techniques were used. CONTRAST: 75ml Isovue 370. FINDINGS: ANGIOGRAM CHEST: Regions of motion artifact. No pulmonary embolism seen. Nonaneurysmal aorta without dissection. LUNGS AND PLEURA: Moderate to large amount of heterogeneously enhancing left upper and lower lobe perihilar predominant consolidation with example area measuring 5.6 x 5.8 cm (series 6, image 108). Minimal opacities elsewhere bilaterally. Mild basilar atelectasis. Small right pleural effusion. No pneumothorax. MEDIASTINUM/AXILLAE: Mild adenopathy, presumed reactive. Normal heart size. No pericardial effusion. CORONARY ARTERY CALCIFICATION: Compromised by motion. UPPER ABDOMEN: Hepatic steatosis. Incompletely seen spleen appears mildly enlarged, measuring roughly 13.3 cm in AP dimension. MUSCULOSKELETAL: Nothing acute     IMPRESSION: 1.  No pulmonary embolism. 2.  Moderate to large amount of left lung consolidation suggestive of pneumonia. Recommend 6-8 week follow-up PA and lateral radiographs for clearing. A few other mild bilateral opacities. 3.  Small right pleural effusion. 4.  Hepatic steatosis. 5.  Incompletely seen splenomegaly.     CT Chest Tube with Cath Placement    Result Date: 4/4/2022  EXAM: 1. PERCUTANEOUS CHEST TUBE PLACEMENT IN THE LEFT PLEURAL SPACE 2. CT GUIDANCE 3. CONSCIOUS SEDATION LOCATION: [facility] DATE/TIME: 4/4/2022 2:56 PM INDICATION: Left pleural effusion with concern for empyema TECHNIQUE: Dose reduction techniques were used.  PROCEDURE: Informed consent obtained. Site marked. Prior images reviewed. Required items made available. Patient identity confirmed verbally and with arm band. Patient reevaluated immediately before administering sedation. Universal protocol was followed. Time out performed. The site was prepped and draped in sterile fashion. 10 mL of 1% lidocaine was infused into the local soft tissues. Using standard technique and under direct CT guidance, a 12 Kazakh chest tube catheter was inserted into the pleural space. The catheter was fixed in place with sutures and adhesive device, and the tubing was banded. Chest tube placed to Pleur-evac suction. BLOOD LOSS: Minimal. The patient tolerated the procedure well. No immediate complications. SEDATION: Versed 1 mg. Fentanyl 50 mcg. The procedure was performed with administration intravenous conscious sedation with appropriate preoperative, intraoperative, and postoperative evaluation. 20 minutes of supervised face to face conscious sedation time was provided by a radiology nurse under my direct supervision.     IMPRESSION: 1.  Successful CT-guided chest tube placement into the left pleural space. Reference CPT Codes: 00215, 95612    CT Chest (PE) Abdomen Pelvis w Contrast    Result Date: 4/3/2022  EXAM: CT CHEST PE ABDOMEN PELVIS W CONTRAST LOCATION: [facility] DATE/TIME: 4/3/2022 12:49 PM INDICATION: Chest and abdominal pain. Elevated liver function tests. PE suspected, high prob COMPARISON: 03/20/2022 CT chest. TECHNIQUE: CT chest pulmonary angiogram and routine CT abdomen pelvis with IV contrast. Arterial phase through the chest and venous phase through the abdomen and pelvis. Multiplanar reformats and MIP reconstructions were performed. Dose reduction techniques were used. CONTRAST: 100ml Isovue 370 FINDINGS: ANGIOGRAM CHEST: Pulmonary arteries are normal caliber and negative for pulmonary emboli. Thoracic aorta is negative for dissection. No CT  evidence of right heart strain. LUNGS AND PLEURA: The previous large dense consolidative infiltrate in the left upper lobe and left lower lobe seen on 03/10/2022 has near completely resolved. New small right and moderate left pleural effusions with compressive appearing atelectasis in the lung bases. MEDIASTINUM/AXILLAE: Normal. CORONARY ARTERY CALCIFICATION: None. HEPATOBILIARY: Gallstones. Normal liver. PANCREAS: Normal. SPLEEN: Mild splenomegaly measuring 12 cm. ADRENAL GLANDS: Normal. KIDNEYS/BLADDER: Normal. BOWEL: Moderate amount of stool throughout the entire colon suggestive of constipation. LYMPH NODES: Normal. VASCULATURE: Unremarkable. PELVIC ORGANS: Normal. MUSCULOSKELETAL: Normal.     IMPRESSION: 1.  Negative for pulmonary emboli. 2.  The previous dense consolidative infiltrates in the left lung seen on CT 03/10/2022 appear to near completely resolved. 3.  New small right and moderate left pleural effusions with moderate atelectasis in the lower lobes greater on the left side. 4.  Gallstones. 5.  Mild splenomegaly.    CT Sinus w/o Contrast    Result Date: 3/16/2022  EXAM DATE:         03/16/2022 EXAM: CT SINUSES WITHOUT CONTRAST LOCATION: [facility] DATE/TIME: 3/16/2022 12:45 PM INDICATION: Sinus infection COMPARISON: None. TECHNIQUE: Routine without contrast. Multiplanar reformats. Dose reduction techniques were used. FINDINGS: FRONTAL SINUSES:  Subtotal opacification of the frontal sinuses bilaterally with some partially aerated secretions. ETHMOID SINUSES:  Subtotal opacification of ethmoid air cells bilaterally, left greater than right. Some partially aerated secretions noted within posterior ethmoid locules bilaterally. SPHENOID SINUSES: Moderate mucosal thickening involving both sphenoid sinuses with bilateral air-fluid levels. MAXILLARY SINUSES: Moderate circumferential mucosal thickening and subtotal opacification of the bilateral maxillary sinuses, left greater  than right. Bilateral air-fluid levels in some partially aerated secretions. The floors of the maxillary sinuses are intact. NASAL CAVITY/SKULL BASE: Rightward bowing of the nasal septum without focal defect. Partially paradoxical curvature of the middle turbinates. The cribriform plate is intact and symmetric. The anterior clinoid processes are not pneumatized. PARANASAL SINUS DRAINAGE PATHWAYS:  Opacification of the bilateral frontoethmoidal recesses, ostiomeatal units, and sphenoethmoidal recesses. NON-SINUS STRUCTURES: No abnormality of the visualized orbits or intracranial compartment. IMPRESSION: 1.  Findings of pansinusitis as above. 2.  Rightward nasal septal deviation.       Total Time Spent 35 minutes with >50% of the time spent in counseling, education and care coordination, antibiotic management.  Discussed with patient, patient's , hospitalist, nurse

## 2022-04-11 NOTE — PLAN OF CARE
Problem: Infection (Pneumonia)  Goal: Resolution of Infection Signs and Symptoms  Outcome: Ongoing, Progressing   Goal Outcome Evaluation:      Pt afebrile.   Continue with IV abx.       Problem: Respiratory Compromise (Pneumonia)  Goal: Effective Oxygenation and Ventilation  Outcome: Ongoing, Progressing  Intervention: Promote Airway Secretion Clearance  Recent Flowsheet Documentation  Taken 4/11/2022 0000 by Jaime Remy RN  Cough And Deep Breathing: done independently per patient     Pt on RA sating 96% at rest. Lung sounds are diminished and pt gets a little sob with exertion. Pt using IS and can get it up to 700 - encouraged use.       PLAN: awaiting to transfer to [place] for Thoracoscopy surgery. Hopefully transferring today.

## 2022-04-11 NOTE — PLAN OF CARE
Problem: Respiratory Compromise (Pneumonia)  Goal: Effective Oxygenation and Ventilation  Outcome: Ongoing, Progressing  Intervention: Promote Airway Secretion Clearance  Cough And Deep Breathing: done independently per patient   Goal Outcome Evaluation:  Pt LS clear and diminished, remained on room air throughout shift, encouraging IS used, cough and deep breathing, pt denied pain, nausea and vomiting, independent mobility, calls appropriately, awaiting open bed at [place] for further treatment, continue to monitor.

## 2022-04-12 ENCOUNTER — ANESTHESIA EVENT (OUTPATIENT)
Dept: SURGERY | Facility: HOSPITAL | Age: 42
DRG: 163 | End: 2022-04-12
Payer: COMMERCIAL

## 2022-04-12 ENCOUNTER — APPOINTMENT (OUTPATIENT)
Dept: CT IMAGING | Facility: CLINIC | Age: 42
DRG: 177 | End: 2022-04-12
Attending: SURGERY
Payer: COMMERCIAL

## 2022-04-12 LAB
ERYTHROCYTE [DISTWIDTH] IN BLOOD BY AUTOMATED COUNT: 16.2 % (ref 10–15)
HCT VFR BLD AUTO: 27.2 % (ref 35–47)
HGB BLD-MCNC: 8.2 G/DL (ref 11.7–15.7)
HOLD SPECIMEN: NORMAL
MCH RBC QN AUTO: 22.6 PG (ref 26.5–33)
MCHC RBC AUTO-ENTMCNC: 30.1 G/DL (ref 31.5–36.5)
MCV RBC AUTO: 75 FL (ref 78–100)
PLATELET # BLD AUTO: 417 10E3/UL (ref 150–450)
RBC # BLD AUTO: 3.63 10E6/UL (ref 3.8–5.2)
WBC # BLD AUTO: 4.8 10E3/UL (ref 4–11)

## 2022-04-12 PROCEDURE — 250N000011 HC RX IP 250 OP 636: Performed by: HOSPITALIST

## 2022-04-12 PROCEDURE — 250N000011 HC RX IP 250 OP 636: Performed by: INTERNAL MEDICINE

## 2022-04-12 PROCEDURE — 99233 SBSQ HOSP IP/OBS HIGH 50: CPT | Performed by: INTERNAL MEDICINE

## 2022-04-12 PROCEDURE — 87205 SMEAR GRAM STAIN: CPT | Performed by: RADIOLOGY

## 2022-04-12 PROCEDURE — 99232 SBSQ HOSP IP/OBS MODERATE 35: CPT | Performed by: HOSPITALIST

## 2022-04-12 PROCEDURE — 87075 CULTR BACTERIA EXCEPT BLOOD: CPT | Performed by: RADIOLOGY

## 2022-04-12 PROCEDURE — 85027 COMPLETE CBC AUTOMATED: CPT | Performed by: INTERNAL MEDICINE

## 2022-04-12 PROCEDURE — 120N000001 HC R&B MED SURG/OB

## 2022-04-12 PROCEDURE — 87070 CULTURE OTHR SPECIMN AEROBIC: CPT | Performed by: RADIOLOGY

## 2022-04-12 PROCEDURE — 36415 COLL VENOUS BLD VENIPUNCTURE: CPT | Performed by: INTERNAL MEDICINE

## 2022-04-12 PROCEDURE — 71260 CT THORAX DX C+: CPT

## 2022-04-12 PROCEDURE — 32557 INSERT CATH PLEURA W/ IMAGE: CPT

## 2022-04-12 PROCEDURE — 250N000013 HC RX MED GY IP 250 OP 250 PS 637: Performed by: INTERNAL MEDICINE

## 2022-04-12 PROCEDURE — 0W9930Z DRAINAGE OF RIGHT PLEURAL CAVITY WITH DRAINAGE DEVICE, PERCUTANEOUS APPROACH: ICD-10-PCS | Performed by: RADIOLOGY

## 2022-04-12 RX ORDER — FLUMAZENIL 0.1 MG/ML
0.2 INJECTION, SOLUTION INTRAVENOUS
Status: CANCELLED | OUTPATIENT
Start: 2022-04-12

## 2022-04-12 RX ORDER — LEVOFLOXACIN 5 MG/ML
750 INJECTION, SOLUTION INTRAVENOUS EVERY 24 HOURS
Status: DISCONTINUED | OUTPATIENT
Start: 2022-04-12 | End: 2022-04-13 | Stop reason: HOSPADM

## 2022-04-12 RX ORDER — FLUMAZENIL 0.1 MG/ML
0.2 INJECTION, SOLUTION INTRAVENOUS
Status: DISCONTINUED | OUTPATIENT
Start: 2022-04-12 | End: 2022-04-13 | Stop reason: HOSPADM

## 2022-04-12 RX ORDER — FENTANYL CITRATE 50 UG/ML
25-50 INJECTION, SOLUTION INTRAMUSCULAR; INTRAVENOUS EVERY 5 MIN PRN
Status: CANCELLED | OUTPATIENT
Start: 2022-04-12

## 2022-04-12 RX ORDER — FENTANYL CITRATE 50 UG/ML
25-50 INJECTION, SOLUTION INTRAMUSCULAR; INTRAVENOUS EVERY 5 MIN PRN
Status: DISCONTINUED | OUTPATIENT
Start: 2022-04-12 | End: 2022-04-13 | Stop reason: HOSPADM

## 2022-04-12 RX ORDER — NALOXONE HYDROCHLORIDE 0.4 MG/ML
0.4 INJECTION, SOLUTION INTRAMUSCULAR; INTRAVENOUS; SUBCUTANEOUS
Status: DISCONTINUED | OUTPATIENT
Start: 2022-04-12 | End: 2022-04-13 | Stop reason: HOSPADM

## 2022-04-12 RX ORDER — NALOXONE HYDROCHLORIDE 0.4 MG/ML
0.2 INJECTION, SOLUTION INTRAMUSCULAR; INTRAVENOUS; SUBCUTANEOUS
Status: DISCONTINUED | OUTPATIENT
Start: 2022-04-12 | End: 2022-04-13 | Stop reason: HOSPADM

## 2022-04-12 RX ORDER — NALOXONE HYDROCHLORIDE 0.4 MG/ML
0.2 INJECTION, SOLUTION INTRAMUSCULAR; INTRAVENOUS; SUBCUTANEOUS
Status: CANCELLED | OUTPATIENT
Start: 2022-04-12

## 2022-04-12 RX ORDER — NALOXONE HYDROCHLORIDE 0.4 MG/ML
0.4 INJECTION, SOLUTION INTRAMUSCULAR; INTRAVENOUS; SUBCUTANEOUS
Status: CANCELLED | OUTPATIENT
Start: 2022-04-12

## 2022-04-12 RX ORDER — IOPAMIDOL 755 MG/ML
100 INJECTION, SOLUTION INTRAVASCULAR ONCE
Status: COMPLETED | OUTPATIENT
Start: 2022-04-12 | End: 2022-04-12

## 2022-04-12 RX ADMIN — IOPAMIDOL 100 ML: 755 INJECTION, SOLUTION INTRAVENOUS at 07:45

## 2022-04-12 RX ADMIN — LEVOFLOXACIN 750 MG: 750 INJECTION, SOLUTION INTRAVENOUS at 17:17

## 2022-04-12 RX ADMIN — HYDROMORPHONE HYDROCHLORIDE 0.2 MG: 0.2 INJECTION, SOLUTION INTRAMUSCULAR; INTRAVENOUS; SUBCUTANEOUS at 14:41

## 2022-04-12 RX ADMIN — FERROUS SULFATE TAB 325 MG (65 MG ELEMENTAL FE) 325 MG: 325 (65 FE) TAB at 09:27

## 2022-04-12 RX ADMIN — DIPHENHYDRAMINE HYDROCHLORIDE 50 MG: 50 CAPSULE ORAL at 15:38

## 2022-04-12 RX ADMIN — ENOXAPARIN SODIUM 40 MG: 100 INJECTION SUBCUTANEOUS at 02:18

## 2022-04-12 RX ADMIN — FENTANYL CITRATE 50 MCG: 50 INJECTION, SOLUTION INTRAMUSCULAR; INTRAVENOUS at 11:50

## 2022-04-12 RX ADMIN — CEFTRIAXONE SODIUM 2 G: 2 INJECTION, POWDER, FOR SOLUTION INTRAMUSCULAR; INTRAVENOUS at 09:27

## 2022-04-12 RX ADMIN — FENTANYL CITRATE 50 MCG: 50 INJECTION, SOLUTION INTRAMUSCULAR; INTRAVENOUS at 12:05

## 2022-04-12 RX ADMIN — CLINDAMYCIN PHOSPHATE 600 MG: 600 INJECTION, SOLUTION INTRAVENOUS at 10:09

## 2022-04-12 RX ADMIN — LISINOPRIL 5 MG: 5 TABLET ORAL at 09:27

## 2022-04-12 RX ADMIN — CLINDAMYCIN PHOSPHATE 600 MG: 600 INJECTION, SOLUTION INTRAVENOUS at 02:14

## 2022-04-12 ASSESSMENT — ACTIVITIES OF DAILY LIVING (ADL)
ADLS_ACUITY_SCORE: 7

## 2022-04-12 NOTE — PROCEDURES
[facility]    Procedure: Computed tomography guided right pleural drainage catheter placement    Date/Time: 4/12/2022 12:24 PM  Performed by: Fahrner, Lester, MD  Authorized by: Fahrner, Lester, MD       UNIVERSAL PROTOCOL   Site Marked: Yes  Prior Images Obtained and Reviewed:  Yes  Required items: Required blood products, implants, devices and special equipment available    Patient identity confirmed:  Verbally with patient  Patient was reevaluated immediately before administering moderate or deep sedation or anesthesia  Confirmation Checklist:  Patient's identity using two indicators, relevant allergies, procedure was appropriate and matched the consent or emergent situation and correct equipment/implants were available  Time out: Immediately prior to the procedure a time out was called    Universal Protocol: the Joint Commission Universal Protocol was followed    Preparation: Patient was prepped and draped in usual sterile fashion    ESBL (mL):  0     ANESTHESIA    Anesthesia: Local infiltration  Local Anesthetic:  Lidocaine 1% without epinephrine  Anesthetic Total (mL):  6      SEDATION  Patient Sedated: Yes    Sedation Type:  Moderate (conscious) sedation  Sedation:  Fentanyl, midazolam and see MAR for details  Vital signs: Vital signs monitored during sedation    See dictated procedure note for full details.    PROCEDURE    Patient Tolerance:  Patient tolerated the procedure well with no immediate complications  Length of time physician/provider present for 1:1 monitoring during sedation: 25

## 2022-04-12 NOTE — PRE-PROCEDURE
GENERAL PRE-PROCEDURE:   Procedure:  CT guided right pleural space drain placement  Date/Time:  4/12/2022 11:31 AM    Verbal consent obtained?: Yes    Written consent obtained?: Yes    Risks and benefits: Risks, benefits and alternatives were discussed    Consent given by:  Patient  Patient states understanding of procedure being performed: Yes    Patient's understanding of procedure matches consent: Yes    Procedure consent matches procedure scheduled: Yes    Expected level of sedation:  Moderate  Appropriately NPO:  Yes  ASA Class:  2  Mallampati  :  Grade 1- soft palate, uvula, tonsillar pillars, and posterior pharyngeal wall visible  Lungs:  Lungs clear with good breath sounds bilaterally  Heart:  Normal heart sounds and rate  History & Physical reviewed:  History and physical reviewed and no updates needed  Statement of review:  I have reviewed the lab findings, diagnostic data, medications, and the plan for sedation

## 2022-04-12 NOTE — PROGRESS NOTES
Care Management Discharge Note    Discharge Date: 04/12/2022       Discharge Disposition: [facility]    Discharge Services:  (TBA)    Discharge Transportation: [facility] stretcher         Additional Information:  Chart reviewed. Patient is transferring to [facility] for further care.         Laurence Vick RN

## 2022-04-12 NOTE — PROGRESS NOTES
Patient:   ROSY VILLASENOR            MRN: [id number]            FIN: [id number]              Age:   53 years     Sex:  MALE     :  70   Associated Diagnoses:   None   Author:   DISHA LANDIS     Postoperative Information     Postoperative Information   Postoperative Information:          Preoperative Diagnosis: Prostate cancer.         Postoperative Diagnosis: Same As Pre-Op Dx.         Performed by: GARCIA, ALEX GUPTA         Assistant: DISHA LANDIS STROIE-DO, FLORIAN.         Surgical Tasks Performed by Assistant: Opening and closing, Harvesting grafts, Dissecting tissue, Altering tissue.         Procedures Performed: Robot-assisted laparoscopic prostatectomy, bilateral pelvic lymph node dissection.         Findings: See operative report.         Specimens Removed: Prostate with seminal vesicles en bloc  Left and right pelvic lymph nodes.         Estimated Blood Loss: See operative report.         Blood Administered: No.         Complications: None.         Grafts/Implants: 20 Fr 2-way Ramirez catheter.    Anesthetic utilized:  General.   PULMONARY MEDICINE PROGRESS NOTE  4/11/2022      Admit Date: 4/3/2022  CODE: Full Code    Reason for Consult: left empyema due to Haemophilus influenzae, suspectedcommon variable immunodeficiency    Assessment/Plan:   41 year old female never smoker with primary hypertension, pneumonia diagnosis since February 2022 who was admitted to [facility] on 4/3/2022 with bilateral pleural effusions and pneumonia, now being treated for Haemophilus influenzae bacteremia and left empyema with Haemophilus influenzae, left pleural catheter in place 4/4-4/9 s/p intrapleural alteplase-dornase, course complicated by 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    Severe community acquired pneumonia, Haemophilus influenzae bacteremia, left empyema: Patient with persistent pneumonia since February 2022, failed 2 rounds of outpatient antibiotic therapy, now admitted with recurrent pneumonia and bilateral pleural effusions (L > R). Underwent thoracentesis 4/3, 800 mL thick yellow fluid extracted, overt empyema with pleural fluid and blood positive for H flu. Chest tube placed 4/4, ~ 750 mL removed soon after insertion then dropped off; received intrapleural alteplase-dornase 4/5-4/7 (BID dosing x 6 doses). CT chest 4/8 with improvement of left empyema, associated atelectasis/possible consolidation; stable right atelectasis and pleural effusion. Limited CT ouput (25 mL in 24 hrs) so removed tube on 4/9. Now with improved but persistent cough, persistent left basilar opacity on CXR but may be somewhat improved.    Suspected CVID vs acquired immunodeficiency: Immunoglobulins sent 4/5, profound reduction in all lines: IgA < 5, IgG < 109, IgE < 2. If patient truly has CVID, then villous atrophy may be seen in these patients, which may result in poor oral absorption (including poor oral absorption of iron); patient's iron level 9 this admission. HIV Ag Ab combo screen negative 4/5/22. Other  "possible causes of acquired immunodeficiency may be malnutrition. Her prealbumin was very low at 7 on 4/7/2022. IVIG infusion initiated on 4/8, but became febrile and symptomatic soon after and it was discontinued. Had left chest tube with intrapleural alteplase-dornase x 6 doses, removed. Had chest CT 4/12 with small right pleural effusion but with pleural enhancement/thickening, underwent right pleural pigtail catheter placement on 4/12. Plan for left thoracoscopy with washout in discussion with Dr. Kumar.    Plan:   - appreciate surgery and ID consultation  - continues on ceftriaxone and clindamycin  - right chest tube to -20 cm H2O suction  - plan for left thoracoscopy with washout in discussion with Dr. Kumar  - needs eventual immunology evaluation; may tolerate future courses of IVIG with pretreatment; will try to contact immunology fellow at [facility]  - dietician consulted for malnutrition, prealbumin very low at 7  - Encourage pulmonary hygiene:  IS Q2H while awake, OOB to chair, ambulate  - will follow with you     Pritesh Vazquez MD  Pulmonary and Critical Care Medicine  [facility]  Office [phone]  Pager [phone]  (he/him/his)                                                                                                                                                      SUBJECTIVE/INTERVAL HISTORY   Right chest tube placed, a bit uncomfortable but okay. On room air.                                                                                                                                                    Exam/Data:     Vitals  /79 (BP Location: Right arm)   Pulse 113   Temp 100.1  F (37.8  C) (Temporal)   Resp 18   Ht 1.676 m (5' 6\")   Wt 61.9 kg (136 lb 6.4 oz)   LMP 03/01/2022   SpO2 96%   BMI 22.02 kg/m       I/O last 3 completed shifts:  In: 240 [P.O.:240]  Out: -   Weight change: -1.542 kg (-3 lb 6.4 oz)  [unfilled]  EXAM:  /79 (BP Location: " "Right arm)   Pulse 113   Temp 100.1  F (37.8  C) (Temporal)   Resp 18   Ht 1.676 m (5' 6\")   Wt 61.9 kg (136 lb 6.4 oz)   LMP 03/01/2022   SpO2 96%   BMI 22.02 kg/m      Intake/Output last 3 shifts:  I/O last 3 completed shifts:  In: 240 [P.O.:240]  Out: -   Intake/Output this shift:  No intake/output data recorded.    Physical Exam  Gen: alert, oriented, appears fatigued  HEENT: NT, no HANNAH  CV: tachy, regular, no m/g/r  Resp: diminished left base, right pleural pigtail in place, was off to chest wall so I opened it to drainage  Abd: soft, nontender, BS+  Skin: no rashes or lesions  Ext: no edema  Neuro: PERRL, nonfocal exam    ROS: A complete 10-system review of systems was obtained and is negative with the exception of what is noted in the history of present illness.    Medications:       sodium chloride 0.9%         [Held by provider] enoxaparin ANTICOAGULANT  40 mg Subcutaneous Q24H     ferrous sulfate  325 mg Oral Every Other Day     levofloxacin  750 mg Intravenous Q24H     lisinopril  5 mg Oral Daily         DATA  All laboratory and radiology has been personally reviewed by myself today.  Recent Labs   Lab 04/12/22  0843   WBC 4.8   HGB 8.2*   HCT 27.2*        Recent Labs   Lab 04/06/22  0449   ALKPHOS 142*   ALT 16   AST 10       IMAGING:   Chest CT (April 2022):  - images directly reviewed, formal interpretation follows:  FINDINGS:   LUNGS AND PLEURA: The left basilar chest tube is in place. There is a small amount of residual left pleural effusion which is decreased from 04/04/2022. The fluid is slightly medial and superior compared to the tip of the pleural drain. There is a small   amount of air in the left pleural space compatible with pleural catheter.  There are a few residual linear opacities in the left upper and lower lobe in the area of the prior consolidation likely atelectasis/scarring. There is persistent but improved   atelectasis in the left lung base compared to prior exam. " Relatively similar small right effusion and associated atelectasis.     MEDIASTINUM/AXILLAE: Heart size is normal. No lymphadenopathy. No thoracic aneurysm.     CORONARY ARTERY CALCIFICATION: None.     UPPER ABDOMEN: No significant finding.     MUSCULOSKELETAL: Probable benign bone island in the T8 vertebral body.                                                                      IMPRESSION:   1.  Persistent but significantly decreased left pleural effusion and associated atelectasis when compared to 04/03/2022. There is a small amount of residual fluid slightly superior and medial to the tip of the left pleural drain. Small amount of air in   the left pleural space is consistent with pleural drain placement.  2.  Similar small right effusion and associated atelectasis.  3.  There are a few linear opacities in the left upper lung and lower lung in the area of the prior consolidation likely a combination of atelectasis and/or scarring.    CXR (4/11/22):  - images directly reviewed, formal interpretation follows:  IMPRESSION: No change in the small left-sided pleural effusion. There is a small air/fluid level in the left paravertebral space consistent with a loculated hydropneumothorax with associated lower lobe basilar opacities with a few air bronchograms. This   is unchanged since the pigtail catheter was in place.      There is a small right effusion with right lower lobe atelectasis as well, unchanged.     Heart and pulmonary vascularity are normal.    Chest CT with contrast (4/12/22):  - images directly reviewed, formal interpretation follows:  FINDINGS:   LUNGS AND PLEURA: The left chest tube seen on the prior CT has been removed. There is a persistent loculated pleural fluid and air collection at the left medial lung base consistent with history of empyema. The loculated left medial collection measures   4.4 x 3.6 x 6 cm. On the prior CT it measured 4.4 x 2.4 x 6.4 cm. There is consolidation or atelectasis  in the left lower lobe with air bronchograms and a trace posterior left pleural effusion. There is an additional small loculated pleural and air fluid   collection along the left heart border on image 61 of series 6 measuring 1.5 x 0.7 x 1.5 cm. On the prior CT this measured 2.6 .6 x 0.7 x 2 cm. There is a small right pleural effusion with right lower lobe atelectasis. The right pleural effusion has a   mildly enhancing rim and would be consistent with a small right empyema. This measures 7.3 cm in right-left dimension x 2.2 cm in anterior-posterior dimension and 4.4 cm in craniocaudal extent. There is mild linear atelectasis in the left upper lobe.     MEDIASTINUM/AXILLAE: No lymphadenopathy. No pericardial effusion. No thoracic aortic aneurysm. Normal heart size.     CORONARY ARTERY CALCIFICATION: None.     UPPER ABDOMEN: Cholelithiasis.     MUSCULOSKELETAL: Probable bone island T8 vertebra. No suspicious bony lesions.                                                                      IMPRESSION:   1.  Left chest tube has been removed. There is a persistent loculated hydropneumothorax/empyema at the left medial lung base 4.4 x 3.6 x 6 cm. This compares to 4.4 x 2.4 x 6.4 cm on 04/08/2022 when the chest tube was in place. There is also a small fluid   and air-containing collection along the left heart border measuring 1.5 x 0.7 x 1.5 cm. This has decreased in size from the prior study when it measured 2.6 x 0.7 x 2 cm.  2.  Small right posterior pleural fluid collection with an enhancing rim. This could represent a right-sided empyema as well.  3.  Atelectasis or consolidation in both lung bases left greater than right. No free pneumothorax.  4.  No lymphadenopathy.

## 2022-04-12 NOTE — PLAN OF CARE
Goal Outcome Evaluation:    Problem: Plan of Care - These are the overarching goals to be used throughout the patient stay.    Goal: Optimal Comfort and Wellbeing  Outcome: Ongoing, Progressing  Intervention: Monitor Pain and Promote Comfort  Recent Flowsheet Documentation  Taken 4/12/2022 1435 by Hillary Araujo  Pain Management Interventions: medication (see MAR)  Intervention: Provide Person-Centered Care  Recent Flowsheet Documentation  Taken 4/12/2022 1530 by Hillary Araujo  Trust Relationship/Rapport: care explained     Problem: Respiratory Compromise (Pneumonia)  Goal: Effective Oxygenation and Ventilation  Outcome: Ongoing, Progressing  Intervention: Optimize Oxygenation and Ventilation  Recent Flowsheet Documentation  Taken 4/12/2022 1530 by Hillary Araujo  Head of Bed (HOB) Positioning: HOB at 30-45 degrees  Taken 4/12/2022 0900 by Hillary Araujo  Head of Bed (HOB) Positioning: HOB at 30-45 degrees     Patient reports 6/10 pain level, PRN Dilaudid 0.2mg administered to patient. Hives and redness noted at 1530; located on patient's body. PRN Benadryl 50mg given. Staff nurse and MD in room and observed it. Changed IV ABX orders.Weaned patient off O2 therapy after chest tube placement, tolerating room air well w/ 95% SpO2. Patient to notify staff is she experiences any SOB/difficulty breathing.

## 2022-04-12 NOTE — PLAN OF CARE
Goal Outcome Evaluation:    Pt calls appropriately for needs. Denies pain at this time. Pt tolerating her diet. IV antibiotics given. Vitally stable.

## 2022-04-12 NOTE — PLAN OF CARE
Problem: Plan of Care - These are the overarching goals to be used throughout the patient stay.    Goal: Absence of Hospital-Acquired Illness or Injury  Intervention: Identify and Manage Fall Risk  Recent Flowsheet Documentation  Taken 4/12/2022 0924 by Francheska Castañeda RN  Safety Promotion/Fall Prevention:   clutter free environment maintained   treat underlying cause   safety round/check completed   nonskid shoes/slippers when out of bed  Goal: Optimal Comfort and Wellbeing  Outcome: Ongoing, Progressing     Problem: Infection (Pneumonia)  Goal: Resolution of Infection Signs and Symptoms  Outcome: Ongoing, Progressing     Problem: Respiratory Compromise (Pneumonia)  Goal: Effective Oxygenation and Ventilation  Outcome: Ongoing, Progressing  Intervention: Promote Airway Secretion Clearance  Recent Flowsheet Documentation  Taken 4/12/2022 0924 by Francheska Castañeda RN  Cough And Deep Breathing: done independently per patient   Goal Outcome Evaluation:  Pt has been resting comfortably this shift.  Sent for a right side chest tube, returned and had some difficulty regulating temperature, but has since started to level out.  MD aware of temp 101.3 when returning from chest tube placement.  Pt did eat 75% of her breakfast meal after her CT at 0745, but will resume NPO status at 2359 tonight.  Pt is aware and verbalizes understanding, please continue to remind later.  Plan to transfer to [facility] tomorrow for a procedure with Dr. Aguilar.  Transport set for 0830 on 04.13.2022.

## 2022-04-12 NOTE — PROGRESS NOTES
[facility]    Infectious Disease Progress Note    04/12/2022      Assessment & Plan   Arely Saldaña is a 41 year old female who was admitted on 4/3/2022.   Bilateral empyema and hemophilus bacteremia    1. Empyema, left-sided-chest tube in place, tPA instilled by pulmonary.  2. Repeat CT shows persistent with significantly decreased left pleural effusion and associated atelectasis and residual fluid superior and medial to the tip of left pleural drain.  3. 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  Being transferred to [facility] for decortication.  Chest tube removed today 4/9/2022.  4. Recurrent left-sided pneumonia  5. sinusitis since August/September 2021  6. Hypertension, pleuritic chest pain, tachypnea  7. Status post thoracentesis and chest tube placement on 4/4/2022  8. Elevated liver enzymes, improving.  Splenomegaly  9. Hypogammaglobulinemia: Decreased immunoglobulin levels, IgA < 5, IgA <109, IgA < 2.   Had infusion reaction wit IVIG. Needs premed.  10.  CVID? No lymphadenopathy on CT  11. Transthoracic echocardiogram did not show vegetations  12. Rash extremities. Iv contrast? Ceftriaxone? Clindamycin?    Recommendations  discontinue IV CTX and clinda  benadryl PRN  Start levaquin  To [facility] for [facility]  Was myeloma ruled out? Oncology consulted      Yazmin Oneil M.D.       Susceptibility     Haemophilus influenzae nontypable     HANNA     Amoxicillin/Clavulanate 0.75 ug/mL Susceptible     Ampicillin 0.38 ug/mL Susceptible     Ceftriaxone <=0.016 ug/mL Susceptible     Levofloxacin 0.032 ug/mL Susceptible     Meropenem 0.125 ug/mL Susceptible                    Interval History   New right chest tube  On IV ceftriaxone and clindamycin and got a rash on extremities  She feels well  No chest pain  No sob at rest    Physical Exam    Temp: 98.4  F (36.9  C) Temp src: Oral BP: 123/72 Pulse: (!) 121   Resp: 18 SpO2: 96 % O2 Device: Nasal cannula Oxygen Delivery: 1 LPM  Vitals:    04/09/22 0515 04/10/22 0814 04/12/22 0641   Weight: 64.6 kg (142 lb 8 oz) 63.4 kg (139 lb 12.8 oz) 61.9 kg (136 lb 6.4 oz)     Vital Signs with Ranges  Temp:  [98  F (36.7  C)-101.3  F (38.5  C)] 98.4  F (36.9  C)  Pulse:  [] 121  Resp:  [16-18] 18  BP: (123-164)/() 123/72  SpO2:  [92 %-99 %] 96 %    Exam on 04/12/2022    GENERAL APPEARANCE:  awake, appears ill  EYES: Eyes grossly normal to inspection, PERRL and conjunctivae and sclerae normal    NECK: Supple  RESP: no sob.  Chest tube out      ABDOMEN: Firm, pleuritic chest pain  MS: extremities normal- no gross deformities noted  SKIN: morbilliform rash on legs and arms  Neuro: Awake, deconditioned , able to answer questions    Medications     sodium chloride 0.9%         cefTRIAXone  2 g Intravenous Q24H     clindamycin  600 mg Intravenous Q8H     [Held by provider] enoxaparin ANTICOAGULANT  40 mg Subcutaneous Q24H     ferrous sulfate  325 mg Oral Every Other Day     lisinopril  5 mg Oral Daily       Data   All microbiology laboratory data reviewed.  Recent Labs   Lab Test 04/12/22  0843 04/11/22  0436 04/10/22  0628   WBC 4.8 3.3* 3.6*   HGB 8.2* 7.1* 7.0*   HCT 27.2* 23.0* 22.6*   MCV 75* 73* 72*    317 278     Recent Labs   Lab Test 04/05/22  0506 04/03/22  2312 04/03/22  1141   CR 0.61 0.65 0.69     Recent Labs   Lab Test 01/21/22  1435   SED 14     MICRO        04/06/2022 0455 04/06/2022 0516 Blood Culture Hand, Right [[id number]]   Blood from Hand, Right    In process Component Value   No component results              04/06/2022 0449 04/06/2022 0516 Blood Culture Peripheral Blood [[id number]]   Peripheral Blood    In process Component Value   No component results              04/05/2022 0506 04/06/2022 0017 Blood Culture Peripheral Blood [[id number]]   Peripheral Blood    Preliminary  result Component Value   Culture No growth after 12 hours P              04/03/2022 1915 04/05/2022 1438 Blood Culture Arm, Left [[id number]]   (Abnormal)   Blood from Arm, Left    Preliminary result Component Value   Culture Positive on the 1st day of incubation Abnormal  P    Haemophilus influenzae nontypable Panic  P    1 of 2 bottles   Corrected on April 4, 2022/7:25 PM by Michael Philip   Previously reported as: gram positive cocci in pairs and chains. Corrected to gram negative cocobacilli.     Susceptibilities done on previous cultures              04/03/2022 1849 04/03/2022 2108 Cell count with differential fluid [[id number]]    (Abnormal)   Pleural fluid from Pleural Cavity, Left    Final result Component Value   No component results           04/03/2022 1849 04/06/2022 0845 Pleural fluid Aerobic Bacterial Culture Routine with Gram Stain [[id number]]     (Abnormal)   Pleural fluid from Pleural Cavity, Left    Final result Component Value   Culture 4+ Haemophilus influenzae nontypable Abnormal    Gram Stain Result 4+ Gram negative coccobacilli Abnormal     4+ WBC seen Abnormal     Predominantly PMNs         Susceptibility     Haemophilus influenzae nontypable     HANNA     Amoxicillin/Clavulanate 0.75 ug/mL Susceptible     Ampicillin 0.38 ug/mL Susceptible     Ceftriaxone <=0.016 ug/mL Susceptible     Levofloxacin 0.032 ug/mL Susceptible                   04/03/2022 1849 04/03/2022 1946 Glucose fluid [[id number]]    Pleural fluid from Pleural Cavity, Left    Final result Component Value Units   Glucose Fluid Source Pleural Cavity, Left    left pleural fluid   left pleural fluid   left pleural fluid   left pleural fluid   Glucose fluid <5 mg/dL           04/03/2022 1849 04/04/2022 0003 Lactate dehydrogenase fluid [[id number]]    Pleural fluid from Pleural Cavity, Left    Final result Component Value Units   LD Fluid Source Pleural Cavity, Left    left pleural fluid   left pleural fluid   Lactate  dehydrogenase fluid 2,332 U/L   Collect pleural fluid when Thoracentesis procedure           04/03/2022 1849 04/03/2022 2246 Protein fluid [[id number]]    Pleural fluid from Pleural Cavity, Left    Final result Component Value Units   Protein Fluid Source Pleural Cavity, Left    left pleural fluid   left pleural fluid   Protein Total Fluid 3.7 g/dL           04/03/2022 1849 04/03/2022 2014 pH fluid [[id number]]    Pleural fluid from Pleural Cavity, Left    Final result Component Value Units   pH Fluid Source Pleural Cavity, Left    left pleural fluid   left pleural fluid   pH Fluid 7.5 pH           04/03/2022 1849 04/03/2022 2108 Cell Count Body Fluid [[id number]]    (Abnormal)   Pleural fluid from Pleural Cavity, Left    Final result Component Value   Color Yellow   Clarity Turbid Abnormal    Cell Count Fluid Source Pleural Cavity, Left   left pleural fluid   left pleural fluid   left pleural fluid   left pleural fluid   left pleural fluid   left pleural fluid   left pleural fluid   left pleural fluid   left pleural fluid   left pleural fluid   left pleural fluid   Clot Presence Large Clot (>Half Volume)   Specimen clotted. Slide reviewed, no   abnormal cells seen.    Cytology Ordered No           04/03/2022 1154 04/05/2022 1438 Blood Culture Peripheral Blood [[id number]]   (Abnormal)   Peripheral Blood    Preliminary result Component Value   Culture Positive on the 1st day of incubation Abnormal  P    Haemophilus influenzae nontypable Panic  P    1 of 2 bottles   Susceptibilities done on previous cultures              04/03/2022 1141 04/06/2022 0845 Blood Culture Line, venous [[id number]]     (Abnormal)   Blood from Line, venous    Final result Component Value   Culture Positive on the 1st day of incubation Abnormal     Haemophilus influenzae nontypable Panic     1 of 2 bottles         Susceptibility     Haemophilus influenzae nontypable     HANNA     Amoxicillin/Clavulanate 0.75 ug/mL Susceptible      Ampicillin 0.38 ug/mL Susceptible     Ceftriaxone <=0.016 ug/mL Susceptible     Levofloxacin 0.032 ug/mL Susceptible     Meropenem 0.125 ug/mL Susceptible                         RADIOLOGY:  XR Chest 1 View    Result Date: 4/3/2022  EXAM: XR CHEST 1 VIEW LOCATION: [facility] DATE/TIME: 4/3/2022 6:59 PM INDICATION: dyspnea; status post left thoracentesis COMPARISON: AP chest radiograph from earlier today     IMPRESSION: Considerable decrease in the amount of left pleural fluid. The left upper lobe is better inflated. The residual opacity in the left basal chest has a sharp interface which parallels the left ventricular heart border within which there are no air bronchograms consistent with lobar atelectasis of the left lower lobe. There is minimal atelectasis or pleural fluid in the left lateral costophrenic sulcus. Unchanged atelectasis/pleural fluid along the right hemidiaphragm.     Echocardiogram Complete    Result Date: 4/3/2022  [id number]^FABIANO^ARY^[place] [address]  Name: SHANI PETIT MRN: [id number] : 1980 Study Date: 2022 02:38 PM Age: 41 yrs Gender: Female Patient Location: [facility] Reason For Study: SOB Ordering Physician: ARY MARIO Performed By: ANABELL  BSA: 1.7 m2 Height: 66 in Weight: 145 lb HR: 120 BP: 138/93 mmHg ______________________________________________________________________________ Procedure Complete Portable Echo Adult. ______________________________________________________________________________ Interpretation Summary  Left ventricular size, wall motion and function are normal. The ejection fraction is > 65%. Normal right ventricle size and systolic function. No hemodynamically significant valvular abnormalities on 2D or color flow imaging. ______________________________________________________________________________ Left Ventricle Left ventricular size, wall motion and  function are normal. The ejection fraction is > 65%. There is normal left ventricular wall thickness. Left ventricular diastolic function is normal. No regional wall motion abnormalities noted.  Right Ventricle Normal right ventricle size and systolic function.  Atria Normal left atrial size. Right atrial size is normal. There is no color Doppler evidence of an atrial shunt.  Mitral Valve Mitral valve leaflets appear normal. There is no evidence of mitral stenosis or clinically significant mitral regurgitation.  Tricuspid Valve Tricuspid valve leaflets appear normal. There is no evidence of tricuspid stenosis or clinically significant tricuspid regurgitation. Right ventricle systolic pressure estimate normal.  Aortic Valve Aortic valve leaflets appear normal. There is no evidence of aortic stenosis or clinically significant aortic regurgitation.  Pulmonic Valve The pulmonic valve is not well seen, but is grossly normal. This degree of valvular regurgitation is within normal limits.  Vessels The aorta root is normal. Normal size ascending aorta. IVC diameter <2.1 cm collapsing >50% with sniff suggests a normal RA pressure of 3 mmHg.  Pericardium There is no pericardial effusion.  ______________________________________________________________________________ MMode/2D Measurements & Calculations IVSd: 0.68 cm LVIDd: 3.8 cm LVIDs: 2.3 cm LVPWd: 1.0 cm FS: 39.1 %  LV mass(C)d: 91.8 grams LV mass(C)dI: 52.6 grams/m2 LA dimension: 2.4 cm LVOT diam: 2.0 cm LVOT area: 3.1 cm2 LA Volume Indexed (AL/bp): 26.0 ml/m2 RWT: 0.55  Time Measurements MM HR: 120.0 BPM  Doppler Measurements & Calculations MV E max freda: 59.1 cm/sec MV A max freda: 91.3 cm/sec MV E/A: 0.65 MV dec slope: 559.2 cm/sec2 MV dec time: 0.11 sec Ao V2 max: 155.5 cm/sec Ao max PG: 10.0 mmHg Ao V2 mean: 103.6 cm/sec Ao mean P.9 mmHg Ao V2 VTI: 23.0 cm JERONIMO(I,D): 2.8 cm2 JERONIMO(V,D): 2.8 cm2 LV V1 max P.1 mmHg LV V1 max: 142.1 cm/sec LV V1 VTI: 20.9 cm SV(LVOT):  64.8 ml SI(LVOT): 37.2 ml/m2 PA acc time: 0.09 sec AV Qamar Ratio (DI): 0.91 JERONIMO Index (cm2/m2): 1.6 E/E' av.1 Lateral E/e': 3.8 Medial E/e': 6.3  ______________________________________________________________________________ Report approved by: Fransisco Belcher 2022 03:46 PM       Abdomen US, limited (RUQ only)    Result Date: 4/3/2022  EXAM: US ABDOMEN LIMITED LOCATION: [facility] DATE/TIME: 4/3/2022 1:51 PM INDICATION: abnormal LFTs COMPARISON: CT from earlier today TECHNIQUE: Limited abdominal ultrasound. FINDINGS: GALLBLADDER: Cholelithiasis. BILE DUCTS: No biliary dilatation. The common duct measures 6 mm. LIVER: Normal parenchyma with smooth contour. No focal mass. RIGHT KIDNEY: No hydronephrosis. PANCREAS: The visualized portions are normal. No ascites.     IMPRESSION: 1.  Cholelithiasis. Otherwise normal right upper quadrant ultrasound.     Abdomen US, limited (RUQ only)    Result Date: 3/10/2022  EXAM: US ABDOMEN LIMITED LOCATION: [facility] DATE/TIME: 3/10/2022 10:16 AM INDICATION: Abnormal liver function tests and right upper quadrant pain. COMPARISON: 1. TECHNIQUE: Limited abdominal ultrasound. FINDINGS: GALLBLADDER: Cholelithiasis and gallbladder sludge are noted. No wall thickening nor pericholecystic fluid. BILE DUCTS: No biliary dilatation. The common duct measures 6 mm. LIVER: Normal parenchyma with smooth contour. No focal mass. RIGHT KIDNEY: No hydronephrosis. PANCREAS: The visualized portions are normal. No ascites.     IMPRESSION: 1.  Cholelithiasis and gallbladder sludge are present. No evidence of cholecystitis nor bile duct dilatation.     US Thoracentesis    Result Date: 4/3/2022  EXAM: 1. LEFT THORACENTESIS 2. ULTRASOUND GUIDANCE LOCATION: [facility] DATE/TIME: 4/3/2022 6:17 PM INDICATION: Pleural effusion. PROCEDURE: Informed consent obtained. Time out performed. The chest was prepped and draped in  sterile fashion. 10 mL of 1 % lidocaine was infused into the local soft tissues. Under direct ultrasound guidance, a 5 Bruneian catheter system was placed into the pleural effusion. 800 mL of thick cloudy yellow fluid were removed and sent to lab, if requested. Patient tolerated procedure well. Ultrasound imaging was obtained and placed in the patient's permanent medical record.     IMPRESSION: Status post left ultrasound-guided thoracentesis. Reference CPT Code: 61570    XR Chest Port 1 View    Result Date: 4/5/2022  EXAM: XR CHEST PORT 1 VIEW LOCATION: [facility] DATE/TIME: 4/5/2022 5:43 AM INDICATION: follow up empyema, pneumonia. COMPARISON: 04/03/2022. CT chest tube placement of 04/04/2022.     IMPRESSION: There is a new chest tube at the left medial lung base. There has been decrease in left pleural effusion and there is some residual of effusion and atelectasis at the left lung base. No pneumothorax. Mild atelectasis at the right lung base. Normal heart size and pulmonary vascularity.    XR Chest Port 1 View    Result Date: 4/3/2022  EXAM: XR CHEST PORT 1 VIEW LOCATION: [facility] DATE/TIME: 4/3/2022 11:29 PM INDICATION: Shortness of breath COMPARISON: 04/03/2022     IMPRESSION: The chest is stable with again seen minimal right pleural effusion and mild left pleural effusion with some underlying infiltrate/atelectasis in both lung bases and extending into the left midlung. The chest is otherwise negative.    XR Chest Port 1 View    Result Date: 4/3/2022  EXAM: XR CHEST PORT 1 VIEW LOCATION: [facility] DATE/TIME: 4/3/2022 4:38 PM INDICATION: worsening sob COMPARISON: CT pulmonary angiogram 04/03/2022 at 1256 hours     IMPRESSION: Moderate to large left and small right pleural effusions are not visibly changed. Related atelectasis including near complete atelectasis of the left lower lobe. Lower left ventricular heart border is  silhouetted. Cardiac silhouette is not enlarged. Azygos vein is not distended and the vascular pedicle width is normal. No pneumothorax.    CT Chest Pulmonary Embolism w Contrast    Result Date: 3/10/2022  EXAM: CT CHEST PULMONARY EMBOLISM W CONTRAST LOCATION: [facility] DATE/TIME: 3/10/2022 10:08 AM INDICATION: Shortness of breath. Cough. COMPARISON: 02/21/2022 radiograph. TECHNIQUE: CT chest pulmonary angiogram during arterial phase injection of IV contrast. Multiplanar reformats and MIP reconstructions were performed. Dose reduction techniques were used. CONTRAST: 75ml Isovue 370. FINDINGS: ANGIOGRAM CHEST: Regions of motion artifact. No pulmonary embolism seen. Nonaneurysmal aorta without dissection. LUNGS AND PLEURA: Moderate to large amount of heterogeneously enhancing left upper and lower lobe perihilar predominant consolidation with example area measuring 5.6 x 5.8 cm (series 6, image 108). Minimal opacities elsewhere bilaterally. Mild basilar atelectasis. Small right pleural effusion. No pneumothorax. MEDIASTINUM/AXILLAE: Mild adenopathy, presumed reactive. Normal heart size. No pericardial effusion. CORONARY ARTERY CALCIFICATION: Compromised by motion. UPPER ABDOMEN: Hepatic steatosis. Incompletely seen spleen appears mildly enlarged, measuring roughly 13.3 cm in AP dimension. MUSCULOSKELETAL: Nothing acute     IMPRESSION: 1.  No pulmonary embolism. 2.  Moderate to large amount of left lung consolidation suggestive of pneumonia. Recommend 6-8 week follow-up PA and lateral radiographs for clearing. A few other mild bilateral opacities. 3.  Small right pleural effusion. 4.  Hepatic steatosis. 5.  Incompletely seen splenomegaly.     CT Chest Tube with Cath Placement    Result Date: 4/4/2022  EXAM: 1. PERCUTANEOUS CHEST TUBE PLACEMENT IN THE LEFT PLEURAL SPACE 2. CT GUIDANCE 3. CONSCIOUS SEDATION LOCATION: [facility] DATE/TIME: 4/4/2022 2:56 PM INDICATION:  Left pleural effusion with concern for empyema TECHNIQUE: Dose reduction techniques were used. PROCEDURE: Informed consent obtained. Site marked. Prior images reviewed. Required items made available. Patient identity confirmed verbally and with arm band. Patient reevaluated immediately before administering sedation. Universal protocol was followed. Time out performed. The site was prepped and draped in sterile fashion. 10 mL of 1% lidocaine was infused into the local soft tissues. Using standard technique and under direct CT guidance, a 12 Bolivian chest tube catheter was inserted into the pleural space. The catheter was fixed in place with sutures and adhesive device, and the tubing was banded. Chest tube placed to Pleur-evac suction. BLOOD LOSS: Minimal. The patient tolerated the procedure well. No immediate complications. SEDATION: Versed 1 mg. Fentanyl 50 mcg. The procedure was performed with administration intravenous conscious sedation with appropriate preoperative, intraoperative, and postoperative evaluation. 20 minutes of supervised face to face conscious sedation time was provided by a radiology nurse under my direct supervision.     IMPRESSION: 1.  Successful CT-guided chest tube placement into the left pleural space. Reference CPT Codes: 27524, 82456    CT Chest (PE) Abdomen Pelvis w Contrast    Result Date: 4/3/2022  EXAM: CT CHEST PE ABDOMEN PELVIS W CONTRAST LOCATION: [facility] DATE/TIME: 4/3/2022 12:49 PM INDICATION: Chest and abdominal pain. Elevated liver function tests. PE suspected, high prob COMPARISON: 03/20/2022 CT chest. TECHNIQUE: CT chest pulmonary angiogram and routine CT abdomen pelvis with IV contrast. Arterial phase through the chest and venous phase through the abdomen and pelvis. Multiplanar reformats and MIP reconstructions were performed. Dose reduction techniques were used. CONTRAST: 100ml Isovue 370 FINDINGS: ANGIOGRAM CHEST: Pulmonary arteries are normal  caliber and negative for pulmonary emboli. Thoracic aorta is negative for dissection. No CT evidence of right heart strain. LUNGS AND PLEURA: The previous large dense consolidative infiltrate in the left upper lobe and left lower lobe seen on 03/10/2022 has near completely resolved. New small right and moderate left pleural effusions with compressive appearing atelectasis in the lung bases. MEDIASTINUM/AXILLAE: Normal. CORONARY ARTERY CALCIFICATION: None. HEPATOBILIARY: Gallstones. Normal liver. PANCREAS: Normal. SPLEEN: Mild splenomegaly measuring 12 cm. ADRENAL GLANDS: Normal. KIDNEYS/BLADDER: Normal. BOWEL: Moderate amount of stool throughout the entire colon suggestive of constipation. LYMPH NODES: Normal. VASCULATURE: Unremarkable. PELVIC ORGANS: Normal. MUSCULOSKELETAL: Normal.     IMPRESSION: 1.  Negative for pulmonary emboli. 2.  The previous dense consolidative infiltrates in the left lung seen on CT 03/10/2022 appear to near completely resolved. 3.  New small right and moderate left pleural effusions with moderate atelectasis in the lower lobes greater on the left side. 4.  Gallstones. 5.  Mild splenomegaly.    CT Sinus w/o Contrast    Result Date: 3/16/2022  EXAM DATE:         03/16/2022 EXAM: CT SINUSES WITHOUT CONTRAST LOCATION: [facility] DATE/TIME: 3/16/2022 12:45 PM INDICATION: Sinus infection COMPARISON: None. TECHNIQUE: Routine without contrast. Multiplanar reformats. Dose reduction techniques were used. FINDINGS: FRONTAL SINUSES:  Subtotal opacification of the frontal sinuses bilaterally with some partially aerated secretions. ETHMOID SINUSES:  Subtotal opacification of ethmoid air cells bilaterally, left greater than right. Some partially aerated secretions noted within posterior ethmoid locules bilaterally. SPHENOID SINUSES: Moderate mucosal thickening involving both sphenoid sinuses with bilateral air-fluid levels. MAXILLARY SINUSES: Moderate circumferential  mucosal thickening and subtotal opacification of the bilateral maxillary sinuses, left greater than right. Bilateral air-fluid levels in some partially aerated secretions. The floors of the maxillary sinuses are intact. NASAL CAVITY/SKULL BASE: Rightward bowing of the nasal septum without focal defect. Partially paradoxical curvature of the middle turbinates. The cribriform plate is intact and symmetric. The anterior clinoid processes are not pneumatized. PARANASAL SINUS DRAINAGE PATHWAYS:  Opacification of the bilateral frontoethmoidal recesses, ostiomeatal units, and sphenoethmoidal recesses. NON-SINUS STRUCTURES: No abnormality of the visualized orbits or intracranial compartment. IMPRESSION: 1.  Findings of pansinusitis as above. 2.  Rightward nasal septal deviation.       Total Time Spent 35 minutes with >50% of the time spent in counseling, education and care coordination, antibiotic management.  Discussed with patient, patient's , hospitalist, nurse

## 2022-04-12 NOTE — PROGRESS NOTES
Care Management Follow Up    Length of Stay (days): 8    Expected Discharge Date: 04/12/2022     Concerns to be Addressed: CT ordered by surgery, possible transfer to [facility] for decortication        Patient plan of care discussed at interdisciplinary rounds: Yes    Anticipated Discharge Disposition: TBD     Anticipated Discharge Services:  TBD    Anticipated Discharge DME: None      Education Provided on the Discharge Plan: AVS will be bedside RN.     Patient/Family in Agreement with the Plan: yes      Additional Information:  Chart reviewed. CM following for progression of care and discharge planning. Anticipate that patient will transfer to [facility] for further workup. Patient has a CT ordered by surgery for today. CM following.       Laurence Vick RN

## 2022-04-12 NOTE — PLAN OF CARE
Patient alert & oriented. Hypertensive otherwise vitally stable. No complaints of pain overnight. Pt able to sleep comfortable overnight in between cares. Pt to have a CT this AM to determine plan.

## 2022-04-12 NOTE — PROGRESS NOTES
[facility] Medicine PROGRESS NOTE      Identification/Summary:   40 yo female presented with shortness of breath, dx'd with and treated for pneumonia in Feb. Completed abx in March. Eval here with large left and small rt effusions. Had left thoracentesis with 800ml of purulent fluid removed. Pleural fluid pos for H flu. Had chest tube placed. Followed by pulmonary, ID, gen surgery, hematology. Found to have very low IgG, IgA, IgE. Felt to have common variable immunodeficiency, given IVIG but had a significant reaction involving temp over 104, shaking chills. Eventually had left chest tube removed, subsequently required chest tube placement on the right. Plan now to transfer to [facility] for VATS on the morning of 4/13.     Assessment and Plan:  Right sided empyema  Pneumonia, bacteremia due to H flu  Left sided hydropneumothorax/empyema, persists after chest tube removal  Chest tube placed on right today on 4/12  Appreciate ID, pulmonary and surgery recs  Continue abx, rocephin and clinda  Plan to transfer tomorrow morning for VATS  Will start diet today, NPO midnight    Severe hypogammaglobulinemia  Appreciate hematology recs  Had severe reaction to IVIG  Will need outpt follow up with immunology    Microcytic anemia, splenomegaly  No evidence of blood loss  Appreciate hematology recs  Hx of iron deficiency, intolerance to oral iron due to abdominal pain  Given IV iron sucrose    Diet: Diet  Regular Diet Adult  NPO for Medical/Clinical Reasons Except for: Meds, Ice Chips  Fluids: none  Pain meds:tylenol, norco prn  Therapy:none  DVT Prophylaxis:  Lovenox, hold now  Code Status: Full Code  Disposition: plan to discharge tomorrow morning    Interval History/Subjective:  Denies significant pain. She is short of breath. No abdominal pain, nausea, vomiting, diarrhea, dysuria, edema.     Physical Exam/Objective:  Gen: no acute distress, appears ill, slightly short of breath  ENT: no scleral  icterus  Pulm: lungs are diminished at bases bilaterally, clear at apices  CV: regular rate and rhythm, tachycardic, no pitting edema  GI: abdomen is soft, non-tender, non-distended with active bowel sounds.  Derm: appears slightly pale/gray, no jaundice   Psych: appropriate affect    Medications:     cefTRIAXone  2 g Intravenous Q24H     clindamycin  600 mg Intravenous Q8H     enoxaparin ANTICOAGULANT  40 mg Subcutaneous Q24H     ferrous sulfate  325 mg Oral Every Other Day     lisinopril  5 mg Oral Daily       Dilan Gonzalez DO   Hospitalist  [facility]

## 2022-04-12 NOTE — PROGRESS NOTES
Progress Note    Assessment/Plan  Patient clinically much improved today.  As reviewed with her yesterday repeat CAT scan was ordered and completed today.  Reviewed results with patient.  She has persistent fluid in the right pleural space and the left pleural space has more than 1 area of residual fluid.  Subpulmonic fluid collection anteromedial fluid collection noted.  Reviewed with radiology.  One fluid collection is somewhat larger in the subpulmonic is significant and not easily reached with additional percutaneous drainage.  In light of these findings having had thoracostomy tube and lytic therapy last week decision is made to place tube thoracostomy right side for cultures and evaluation and to proceed with thoracoscopy for the left thoracic empyema.  I have reviewed this with patient previously as well.  She is agreeable with proceeding and will obtain time on the operating room schedule at [facility].  Patient will be transferred in a.m. to [facility] for left thoracoscopy.    Active Problems:    Tachypnea    Anemia    Pleural effusion    Tachycardia    Elevated LFTs    Dyspnea      Subjective  Patient continues to feel much better than she did several days ago.  Still very fatigued however.  Cough without change  Objective    Vital signs in last 24 hours  Temp:  [98  F (36.7  C)-101.3  F (38.5  C)] 99  F (37.2  C)  Pulse:  [] 117  Resp:  [16-18] 18  BP: (104-164)/() 104/64  SpO2:  [92 %-99 %] 93 %  Weight:   [unfilled]    Intake/Output last 3 shifts  I/O last 3 completed shifts:  In: 540 [P.O.:240; I.V.:300]  Out: -   Intake/Output this shift:  No intake/output data recorded.      Physical Exam  No change by all reports.    Pertinent Labs   Lab Results   Component Value Date    WBC 4.8 04/12/2022    HGB 8.2 (L) 04/12/2022    HCT 27.2 (L) 04/12/2022    MCV 75 (L) 04/12/2022     04/12/2022             Pertinent Radiology     [unfilled]        Suleman Kumar MD

## 2022-04-13 ENCOUNTER — HOSPITAL ENCOUNTER (INPATIENT)
Facility: HOSPITAL | Age: 42
LOS: 4 days | Discharge: HOME | DRG: 163 | End: 2022-04-17
Attending: SURGERY | Admitting: FAMILY MEDICINE
Payer: COMMERCIAL

## 2022-04-13 ENCOUNTER — ANESTHESIA (OUTPATIENT)
Dept: SURGERY | Facility: HOSPITAL | Age: 42
DRG: 163 | End: 2022-04-13
Payer: COMMERCIAL

## 2022-04-13 ENCOUNTER — APPOINTMENT (OUTPATIENT)
Dept: RADIOLOGY | Facility: HOSPITAL | Age: 42
DRG: 163 | End: 2022-04-13
Attending: SURGERY
Payer: COMMERCIAL

## 2022-04-13 VITALS
WEIGHT: 136.4 LBS | SYSTOLIC BLOOD PRESSURE: 142 MMHG | HEIGHT: 66 IN | DIASTOLIC BLOOD PRESSURE: 95 MMHG | OXYGEN SATURATION: 97 % | TEMPERATURE: 98.1 F | RESPIRATION RATE: 16 BRPM | BODY MASS INDEX: 21.92 KG/M2 | HEART RATE: 101 BPM

## 2022-04-13 DIAGNOSIS — K59.00 CONSTIPATION, UNSPECIFIED CONSTIPATION TYPE: ICD-10-CM

## 2022-04-13 DIAGNOSIS — J86.9 EMPYEMA LUNG (H): Primary | ICD-10-CM

## 2022-04-13 DIAGNOSIS — I10 ESSENTIAL HYPERTENSION: ICD-10-CM

## 2022-04-13 LAB
ABO/RH(D): NORMAL
ANTIBODY SCREEN: NEGATIVE
BACTERIA BLD CULT: ABNORMAL
BACTERIA BLD CULT: ABNORMAL
BACTERIA PLR CULT: ABNORMAL
BLD PROD TYP BPU: NORMAL
BLOOD COMPONENT TYPE: NORMAL
CODING SYSTEM: NORMAL
CROSSMATCH: NORMAL
ERYTHROCYTE [DISTWIDTH] IN BLOOD BY AUTOMATED COUNT: 16.4 % (ref 10–15)
GLIADIN IGA SER-ACNC: <1 U/ML
GLIADIN IGG SER-ACNC: <1 U/ML
GRAM STAIN RESULT: ABNORMAL
GRAM STAIN RESULT: ABNORMAL
GRAM STAIN RESULT: NORMAL
GRAM STAIN RESULT: NORMAL
HCT VFR BLD AUTO: 25.3 % (ref 35–47)
HGB BLD-MCNC: 7.8 G/DL (ref 11.7–15.7)
HOLD SPECIMEN: NORMAL
IGA SERPL-MCNC: <2 MG/DL (ref 84–499)
ISSUE DATE AND TIME: NORMAL
MCH RBC QN AUTO: 22.3 PG (ref 26.5–33)
MCHC RBC AUTO-ENTMCNC: 30.8 G/DL (ref 31.5–36.5)
MCV RBC AUTO: 73 FL (ref 78–100)
PLATELET # BLD AUTO: 410 10E3/UL (ref 150–450)
RBC # BLD AUTO: 3.49 10E6/UL (ref 3.8–5.2)
SPECIMEN EXPIRATION DATE: NORMAL
TTG IGA SER-ACNC: <1 U/ML
TTG IGG SER-ACNC: <1 U/ML
UNIT ABO/RH: NORMAL
UNIT NUMBER: NORMAL
UNIT STATUS: NORMAL
UNIT TYPE ISBT: 7300
WBC # BLD AUTO: 6 10E3/UL (ref 4–11)

## 2022-04-13 PROCEDURE — 87205 SMEAR GRAM STAIN: CPT | Performed by: SURGERY

## 2022-04-13 PROCEDURE — 88305 TISSUE EXAM BY PATHOLOGIST: CPT | Mod: TC | Performed by: SURGERY

## 2022-04-13 PROCEDURE — 250N000011 HC RX IP 250 OP 636: Performed by: NURSE ANESTHETIST, CERTIFIED REGISTERED

## 2022-04-13 PROCEDURE — 99223 1ST HOSP IP/OBS HIGH 75: CPT | Mod: AI | Performed by: FAMILY MEDICINE

## 2022-04-13 PROCEDURE — 87102 FUNGUS ISOLATION CULTURE: CPT | Performed by: SURGERY

## 2022-04-13 PROCEDURE — 0W9B3ZZ DRAINAGE OF LEFT PLEURAL CAVITY, PERCUTANEOUS APPROACH: ICD-10-PCS | Performed by: SURGERY

## 2022-04-13 PROCEDURE — 370N000017 HC ANESTHESIA TECHNICAL FEE, PER MIN: Performed by: SURGERY

## 2022-04-13 PROCEDURE — 85014 HEMATOCRIT: CPT | Performed by: INTERNAL MEDICINE

## 2022-04-13 PROCEDURE — 250N000009 HC RX 250: Performed by: SURGERY

## 2022-04-13 PROCEDURE — 0BDP0ZZ EXTRACTION OF LEFT PLEURA, OPEN APPROACH: ICD-10-PCS | Performed by: SURGERY

## 2022-04-13 PROCEDURE — 250N000011 HC RX IP 250 OP 636: Performed by: ANESTHESIOLOGY

## 2022-04-13 PROCEDURE — 87070 CULTURE OTHR SPECIMN AEROBIC: CPT | Performed by: SURGERY

## 2022-04-13 PROCEDURE — 250N000009 HC RX 250: Performed by: NURSE ANESTHETIST, CERTIFIED REGISTERED

## 2022-04-13 PROCEDURE — 87252 VIRUS INOCULATION TISSUE: CPT | Performed by: SURGERY

## 2022-04-13 PROCEDURE — 710N000010 HC RECOVERY PHASE 1, LEVEL 2, PER MIN: Performed by: SURGERY

## 2022-04-13 PROCEDURE — 250N000011 HC RX IP 250 OP 636: Performed by: SURGERY

## 2022-04-13 PROCEDURE — 258N000003 HC RX IP 258 OP 636: Performed by: NURSE ANESTHETIST, CERTIFIED REGISTERED

## 2022-04-13 PROCEDURE — 120N000001 HC R&B MED SURG/OB

## 2022-04-13 PROCEDURE — 71045 X-RAY EXAM CHEST 1 VIEW: CPT

## 2022-04-13 PROCEDURE — 36415 COLL VENOUS BLD VENIPUNCTURE: CPT | Performed by: INTERNAL MEDICINE

## 2022-04-13 PROCEDURE — 86923 COMPATIBILITY TEST ELECTRIC: CPT | Performed by: ANESTHESIOLOGY

## 2022-04-13 PROCEDURE — 36415 COLL VENOUS BLD VENIPUNCTURE: CPT | Performed by: ANESTHESIOLOGY

## 2022-04-13 PROCEDURE — 258N000003 HC RX IP 258 OP 636: Performed by: ANESTHESIOLOGY

## 2022-04-13 PROCEDURE — 86901 BLOOD TYPING SEROLOGIC RH(D): CPT | Performed by: ANESTHESIOLOGY

## 2022-04-13 PROCEDURE — P9016 RBC LEUKOCYTES REDUCED: HCPCS | Performed by: ANESTHESIOLOGY

## 2022-04-13 PROCEDURE — 250N000013 HC RX MED GY IP 250 OP 250 PS 637: Performed by: SURGERY

## 2022-04-13 PROCEDURE — 99239 HOSP IP/OBS DSCHRG MGMT >30: CPT | Performed by: HOSPITALIST

## 2022-04-13 PROCEDURE — 360N000077 HC SURGERY LEVEL 4, PER MIN: Performed by: SURGERY

## 2022-04-13 PROCEDURE — 272N000001 HC OR GENERAL SUPPLY STERILE: Performed by: SURGERY

## 2022-04-13 PROCEDURE — 0BJ08ZZ INSPECTION OF TRACHEOBRONCHIAL TREE, VIA NATURAL OR ARTIFICIAL OPENING ENDOSCOPIC: ICD-10-PCS | Performed by: SURGERY

## 2022-04-13 PROCEDURE — 250N000025 HC SEVOFLURANE, PER MIN: Performed by: SURGERY

## 2022-04-13 PROCEDURE — 999N000141 HC STATISTIC PRE-PROCEDURE NURSING ASSESSMENT: Performed by: SURGERY

## 2022-04-13 PROCEDURE — 87075 CULTR BACTERIA EXCEPT BLOOD: CPT | Performed by: SURGERY

## 2022-04-13 PROCEDURE — 258N000003 HC RX IP 258 OP 636: Performed by: SURGERY

## 2022-04-13 RX ORDER — HYDROMORPHONE HCL IN WATER/PF 6 MG/30 ML
.2-.3 PATIENT CONTROLLED ANALGESIA SYRINGE INTRAVENOUS
Status: DISCONTINUED | OUTPATIENT
Start: 2022-04-13 | End: 2022-04-17 | Stop reason: HOSPADM

## 2022-04-13 RX ORDER — LEVOFLOXACIN 5 MG/ML
750 INJECTION, SOLUTION INTRAVENOUS EVERY 24 HOURS
Status: CANCELLED | OUTPATIENT
Start: 2022-04-13

## 2022-04-13 RX ORDER — SODIUM CHLORIDE 9 MG/ML
INJECTION, SOLUTION INTRAVENOUS CONTINUOUS PRN
Status: DISCONTINUED | OUTPATIENT
Start: 2022-04-13 | End: 2022-04-13

## 2022-04-13 RX ORDER — HYDROCODONE BITARTRATE AND ACETAMINOPHEN 5; 325 MG/1; MG/1
1 TABLET ORAL EVERY 6 HOURS PRN
Status: DISCONTINUED | OUTPATIENT
Start: 2022-04-13 | End: 2022-04-17

## 2022-04-13 RX ORDER — LEVOFLOXACIN 5 MG/ML
750 INJECTION, SOLUTION INTRAVENOUS EVERY 24 HOURS
Status: DISCONTINUED | OUTPATIENT
Start: 2022-04-13 | End: 2022-04-17

## 2022-04-13 RX ORDER — PROPOFOL 10 MG/ML
INJECTION, EMULSION INTRAVENOUS PRN
Status: DISCONTINUED | OUTPATIENT
Start: 2022-04-13 | End: 2022-04-13

## 2022-04-13 RX ORDER — LIDOCAINE 40 MG/G
CREAM TOPICAL
Status: DISCONTINUED | OUTPATIENT
Start: 2022-04-13 | End: 2022-04-17 | Stop reason: HOSPADM

## 2022-04-13 RX ORDER — ACETAMINOPHEN 325 MG/1
650 TABLET ORAL EVERY 4 HOURS PRN
Status: DISCONTINUED | OUTPATIENT
Start: 2022-04-13 | End: 2022-04-13

## 2022-04-13 RX ORDER — HALOPERIDOL 5 MG/ML
1 INJECTION INTRAMUSCULAR
Status: DISCONTINUED | OUTPATIENT
Start: 2022-04-13 | End: 2022-04-13 | Stop reason: HOSPADM

## 2022-04-13 RX ORDER — MAGNESIUM HYDROXIDE 1200 MG/15ML
LIQUID ORAL PRN
Status: DISCONTINUED | OUTPATIENT
Start: 2022-04-13 | End: 2022-04-13 | Stop reason: HOSPADM

## 2022-04-13 RX ORDER — DIPHENHYDRAMINE HYDROCHLORIDE 50 MG/ML
25 INJECTION INTRAMUSCULAR; INTRAVENOUS EVERY 6 HOURS PRN
Status: DISCONTINUED | OUTPATIENT
Start: 2022-04-13 | End: 2022-04-17 | Stop reason: HOSPADM

## 2022-04-13 RX ORDER — NALOXONE HYDROCHLORIDE 1 MG/ML
0.2 INJECTION INTRAMUSCULAR; INTRAVENOUS; SUBCUTANEOUS
Status: DISCONTINUED | OUTPATIENT
Start: 2022-04-13 | End: 2022-04-13

## 2022-04-13 RX ORDER — ACETAMINOPHEN 325 MG/1
975 TABLET ORAL EVERY 8 HOURS
Status: DISPENSED | OUTPATIENT
Start: 2022-04-13 | End: 2022-04-16

## 2022-04-13 RX ORDER — DEXTROSE MONOHYDRATE, SODIUM CHLORIDE, AND POTASSIUM CHLORIDE 50; 1.49; 4.5 G/1000ML; G/1000ML; G/1000ML
INJECTION, SOLUTION INTRAVENOUS CONTINUOUS
Status: DISCONTINUED | OUTPATIENT
Start: 2022-04-13 | End: 2022-04-14

## 2022-04-13 RX ORDER — EPHEDRINE SULFATE 50 MG/ML
INJECTION, SOLUTION INTRAMUSCULAR; INTRAVENOUS; SUBCUTANEOUS PRN
Status: DISCONTINUED | OUTPATIENT
Start: 2022-04-13 | End: 2022-04-13

## 2022-04-13 RX ORDER — ONDANSETRON 4 MG/1
4 TABLET, ORALLY DISINTEGRATING ORAL EVERY 6 HOURS PRN
Status: DISCONTINUED | OUTPATIENT
Start: 2022-04-13 | End: 2022-04-17 | Stop reason: HOSPADM

## 2022-04-13 RX ORDER — HYDROMORPHONE HCL IN WATER/PF 6 MG/30 ML
0.2 PATIENT CONTROLLED ANALGESIA SYRINGE INTRAVENOUS
Status: DISCONTINUED | OUTPATIENT
Start: 2022-04-13 | End: 2022-04-13

## 2022-04-13 RX ORDER — NALOXONE HYDROCHLORIDE 1 MG/ML
0.4 INJECTION INTRAMUSCULAR; INTRAVENOUS; SUBCUTANEOUS
Status: DISCONTINUED | OUTPATIENT
Start: 2022-04-13 | End: 2022-04-13

## 2022-04-13 RX ORDER — NALOXONE HYDROCHLORIDE 0.4 MG/ML
0.4 INJECTION, SOLUTION INTRAMUSCULAR; INTRAVENOUS; SUBCUTANEOUS
Status: DISCONTINUED | OUTPATIENT
Start: 2022-04-13 | End: 2022-04-17 | Stop reason: HOSPADM

## 2022-04-13 RX ORDER — FENTANYL CITRATE 50 UG/ML
50 INJECTION, SOLUTION INTRAMUSCULAR; INTRAVENOUS EVERY 5 MIN PRN
Status: DISCONTINUED | OUTPATIENT
Start: 2022-04-13 | End: 2022-04-13 | Stop reason: HOSPADM

## 2022-04-13 RX ORDER — ONDANSETRON 2 MG/ML
4 INJECTION INTRAMUSCULAR; INTRAVENOUS EVERY 6 HOURS PRN
Status: DISCONTINUED | OUTPATIENT
Start: 2022-04-13 | End: 2022-04-13

## 2022-04-13 RX ORDER — AMOXICILLIN 250 MG
1 CAPSULE ORAL 2 TIMES DAILY
Status: DISCONTINUED | OUTPATIENT
Start: 2022-04-13 | End: 2022-04-17 | Stop reason: HOSPADM

## 2022-04-13 RX ORDER — ONDANSETRON 4 MG/1
4 TABLET, ORALLY DISINTEGRATING ORAL EVERY 6 HOURS PRN
Status: DISCONTINUED | OUTPATIENT
Start: 2022-04-13 | End: 2022-04-13

## 2022-04-13 RX ORDER — ACETAMINOPHEN 325 MG/1
650 TABLET ORAL EVERY 4 HOURS PRN
Status: DISCONTINUED | OUTPATIENT
Start: 2022-04-16 | End: 2022-04-17 | Stop reason: HOSPADM

## 2022-04-13 RX ORDER — NALOXONE HYDROCHLORIDE 0.4 MG/ML
0.2 INJECTION, SOLUTION INTRAMUSCULAR; INTRAVENOUS; SUBCUTANEOUS
Status: DISCONTINUED | OUTPATIENT
Start: 2022-04-13 | End: 2022-04-17 | Stop reason: HOSPADM

## 2022-04-13 RX ORDER — HYDROMORPHONE HYDROCHLORIDE 2 MG/1
2 TABLET ORAL EVERY 4 HOURS PRN
Status: DISCONTINUED | OUTPATIENT
Start: 2022-04-13 | End: 2022-04-17 | Stop reason: HOSPADM

## 2022-04-13 RX ORDER — ONDANSETRON 2 MG/ML
INJECTION INTRAMUSCULAR; INTRAVENOUS PRN
Status: DISCONTINUED | OUTPATIENT
Start: 2022-04-13 | End: 2022-04-13

## 2022-04-13 RX ORDER — ACETAMINOPHEN 325 MG/1
650 TABLET ORAL EVERY 4 HOURS PRN
Status: DISCONTINUED | OUTPATIENT
Start: 2022-04-13 | End: 2022-04-17 | Stop reason: HOSPADM

## 2022-04-13 RX ORDER — SODIUM CHLORIDE, SODIUM LACTATE, POTASSIUM CHLORIDE, CALCIUM CHLORIDE 600; 310; 30; 20 MG/100ML; MG/100ML; MG/100ML; MG/100ML
INJECTION, SOLUTION INTRAVENOUS CONTINUOUS
Status: DISCONTINUED | OUTPATIENT
Start: 2022-04-13 | End: 2022-04-14

## 2022-04-13 RX ORDER — LIDOCAINE HYDROCHLORIDE 20 MG/ML
INJECTION, SOLUTION INFILTRATION; PERINEURAL PRN
Status: DISCONTINUED | OUTPATIENT
Start: 2022-04-13 | End: 2022-04-13

## 2022-04-13 RX ORDER — ONDANSETRON 2 MG/ML
4 INJECTION INTRAMUSCULAR; INTRAVENOUS EVERY 6 HOURS PRN
Status: DISCONTINUED | OUTPATIENT
Start: 2022-04-13 | End: 2022-04-17 | Stop reason: HOSPADM

## 2022-04-13 RX ORDER — MAGNESIUM SULFATE 4 G/50ML
4 INJECTION INTRAVENOUS ONCE
Status: COMPLETED | OUTPATIENT
Start: 2022-04-13 | End: 2022-04-13

## 2022-04-13 RX ORDER — FERROUS SULFATE 325(65) MG
325 TABLET ORAL EVERY OTHER DAY
Status: DISCONTINUED | OUTPATIENT
Start: 2022-04-14 | End: 2022-04-17 | Stop reason: HOSPADM

## 2022-04-13 RX ORDER — POLYETHYLENE GLYCOL 3350 17 G/17G
17 POWDER, FOR SOLUTION ORAL DAILY
Status: DISCONTINUED | OUTPATIENT
Start: 2022-04-14 | End: 2022-04-17 | Stop reason: HOSPADM

## 2022-04-13 RX ORDER — ONDANSETRON 2 MG/ML
4 INJECTION INTRAMUSCULAR; INTRAVENOUS EVERY 30 MIN PRN
Status: DISCONTINUED | OUTPATIENT
Start: 2022-04-13 | End: 2022-04-13 | Stop reason: HOSPADM

## 2022-04-13 RX ORDER — BISACODYL 10 MG
10 SUPPOSITORY, RECTAL RECTAL DAILY PRN
Status: DISCONTINUED | OUTPATIENT
Start: 2022-04-13 | End: 2022-04-17 | Stop reason: HOSPADM

## 2022-04-13 RX ORDER — MEPERIDINE HYDROCHLORIDE 25 MG/ML
12.5 INJECTION INTRAMUSCULAR; INTRAVENOUS; SUBCUTANEOUS EVERY 5 MIN PRN
Status: DISCONTINUED | OUTPATIENT
Start: 2022-04-13 | End: 2022-04-13 | Stop reason: HOSPADM

## 2022-04-13 RX ORDER — PROCHLORPERAZINE MALEATE 10 MG
10 TABLET ORAL EVERY 6 HOURS PRN
Status: DISCONTINUED | OUTPATIENT
Start: 2022-04-13 | End: 2022-04-17 | Stop reason: HOSPADM

## 2022-04-13 RX ORDER — SODIUM CHLORIDE, SODIUM LACTATE, POTASSIUM CHLORIDE, CALCIUM CHLORIDE 600; 310; 30; 20 MG/100ML; MG/100ML; MG/100ML; MG/100ML
INJECTION, SOLUTION INTRAVENOUS CONTINUOUS
Status: DISCONTINUED | OUTPATIENT
Start: 2022-04-13 | End: 2022-04-13 | Stop reason: HOSPADM

## 2022-04-13 RX ORDER — CODEINE PHOSPHATE AND GUAIFENESIN 10; 100 MG/5ML; MG/5ML
5 SOLUTION ORAL EVERY 4 HOURS PRN
Status: DISCONTINUED | OUTPATIENT
Start: 2022-04-13 | End: 2022-04-17 | Stop reason: HOSPADM

## 2022-04-13 RX ORDER — HYDROMORPHONE HYDROCHLORIDE 4 MG/1
4 TABLET ORAL EVERY 4 HOURS PRN
Status: DISCONTINUED | OUTPATIENT
Start: 2022-04-13 | End: 2022-04-17 | Stop reason: HOSPADM

## 2022-04-13 RX ORDER — HYDROMORPHONE HCL IN WATER/PF 6 MG/30 ML
0.4 PATIENT CONTROLLED ANALGESIA SYRINGE INTRAVENOUS
Status: DISCONTINUED | OUTPATIENT
Start: 2022-04-13 | End: 2022-04-13

## 2022-04-13 RX ORDER — LIDOCAINE 40 MG/G
CREAM TOPICAL
Status: DISCONTINUED | OUTPATIENT
Start: 2022-04-13 | End: 2022-04-13

## 2022-04-13 RX ORDER — OXYCODONE HYDROCHLORIDE 5 MG/1
5 TABLET ORAL EVERY 4 HOURS PRN
Status: DISCONTINUED | OUTPATIENT
Start: 2022-04-13 | End: 2022-04-13 | Stop reason: HOSPADM

## 2022-04-13 RX ORDER — ONDANSETRON 4 MG/1
4 TABLET, ORALLY DISINTEGRATING ORAL EVERY 30 MIN PRN
Status: DISCONTINUED | OUTPATIENT
Start: 2022-04-13 | End: 2022-04-13 | Stop reason: HOSPADM

## 2022-04-13 RX ORDER — KETAMINE HYDROCHLORIDE 10 MG/ML
INJECTION INTRAMUSCULAR; INTRAVENOUS PRN
Status: DISCONTINUED | OUTPATIENT
Start: 2022-04-13 | End: 2022-04-13

## 2022-04-13 RX ORDER — DEXAMETHASONE SODIUM PHOSPHATE 10 MG/ML
INJECTION, SOLUTION INTRAMUSCULAR; INTRAVENOUS PRN
Status: DISCONTINUED | OUTPATIENT
Start: 2022-04-13 | End: 2022-04-13

## 2022-04-13 RX ORDER — BACITRACIN ZINC 500 [USP'U]/G
OINTMENT TOPICAL PRN
Status: DISCONTINUED | OUTPATIENT
Start: 2022-04-13 | End: 2022-04-13 | Stop reason: HOSPADM

## 2022-04-13 RX ORDER — LISINOPRIL 5 MG/1
5 TABLET ORAL DAILY
Status: DISCONTINUED | OUTPATIENT
Start: 2022-04-14 | End: 2022-04-17 | Stop reason: HOSPADM

## 2022-04-13 RX ORDER — FENTANYL CITRATE 50 UG/ML
INJECTION, SOLUTION INTRAMUSCULAR; INTRAVENOUS PRN
Status: DISCONTINUED | OUTPATIENT
Start: 2022-04-13 | End: 2022-04-13

## 2022-04-13 RX ADMIN — HYDROMORPHONE HYDROCHLORIDE 0.4 MG: 0.2 INJECTION, SOLUTION INTRAMUSCULAR; INTRAVENOUS; SUBCUTANEOUS at 22:35

## 2022-04-13 RX ADMIN — POTASSIUM CHLORIDE, DEXTROSE MONOHYDRATE AND SODIUM CHLORIDE: 150; 5; 450 INJECTION, SOLUTION INTRAVENOUS at 21:27

## 2022-04-13 RX ADMIN — MAGNESIUM SULFATE HEPTAHYDRATE 4 G: 80 INJECTION, SOLUTION INTRAVENOUS at 10:05

## 2022-04-13 RX ADMIN — HYDROMORPHONE HYDROCHLORIDE 0.5 MG: 1 INJECTION, SOLUTION INTRAMUSCULAR; INTRAVENOUS; SUBCUTANEOUS at 18:02

## 2022-04-13 RX ADMIN — Medication 10 MG: at 17:10

## 2022-04-13 RX ADMIN — FENTANYL CITRATE 50 MCG: 50 INJECTION, SOLUTION INTRAMUSCULAR; INTRAVENOUS at 18:39

## 2022-04-13 RX ADMIN — Medication 10 MG: at 17:04

## 2022-04-13 RX ADMIN — HYDROMORPHONE HYDROCHLORIDE 0.4 MG: 1 INJECTION, SOLUTION INTRAMUSCULAR; INTRAVENOUS; SUBCUTANEOUS at 19:13

## 2022-04-13 RX ADMIN — ROCURONIUM BROMIDE 50 MG: 50 INJECTION, SOLUTION INTRAVENOUS at 16:07

## 2022-04-13 RX ADMIN — SODIUM CHLORIDE, POTASSIUM CHLORIDE, SODIUM LACTATE AND CALCIUM CHLORIDE: 600; 310; 30; 20 INJECTION, SOLUTION INTRAVENOUS at 10:04

## 2022-04-13 RX ADMIN — ACETAMINOPHEN 975 MG: 325 TABLET ORAL at 21:26

## 2022-04-13 RX ADMIN — LIDOCAINE HYDROCHLORIDE 60 MG: 20 INJECTION, SOLUTION INFILTRATION; PERINEURAL at 16:07

## 2022-04-13 RX ADMIN — SODIUM CHLORIDE: 9 INJECTION, SOLUTION INTRAVENOUS at 17:07

## 2022-04-13 RX ADMIN — PHENYLEPHRINE HYDROCHLORIDE 200 MCG: 10 INJECTION INTRAVENOUS at 17:10

## 2022-04-13 RX ADMIN — SENNOSIDES AND DOCUSATE SODIUM 1 TABLET: 8.6; 5 TABLET ORAL at 21:23

## 2022-04-13 RX ADMIN — FENTANYL CITRATE 100 MCG: 50 INJECTION, SOLUTION INTRAMUSCULAR; INTRAVENOUS at 16:07

## 2022-04-13 RX ADMIN — SUGAMMADEX 200 MG: 100 INJECTION, SOLUTION INTRAVENOUS at 18:26

## 2022-04-13 RX ADMIN — HYDROMORPHONE HYDROCHLORIDE 0.4 MG: 0.2 INJECTION, SOLUTION INTRAMUSCULAR; INTRAVENOUS; SUBCUTANEOUS at 20:49

## 2022-04-13 RX ADMIN — DEXAMETHASONE SODIUM PHOSPHATE 10 MG: 10 INJECTION, SOLUTION INTRAMUSCULAR; INTRAVENOUS at 16:28

## 2022-04-13 RX ADMIN — HYDROMORPHONE HYDROCHLORIDE 0.5 MG: 1 INJECTION, SOLUTION INTRAMUSCULAR; INTRAVENOUS; SUBCUTANEOUS at 17:49

## 2022-04-13 RX ADMIN — MIDAZOLAM 2 MG: 1 INJECTION INTRAMUSCULAR; INTRAVENOUS at 15:58

## 2022-04-13 RX ADMIN — PHENYLEPHRINE HYDROCHLORIDE 200 MCG: 10 INJECTION INTRAVENOUS at 17:13

## 2022-04-13 RX ADMIN — HYDROMORPHONE HYDROCHLORIDE 4 MG: 4 TABLET ORAL at 23:56

## 2022-04-13 RX ADMIN — PROPOFOL 160 MG: 10 INJECTION, EMULSION INTRAVENOUS at 16:07

## 2022-04-13 RX ADMIN — ONDANSETRON 4 MG: 2 INJECTION INTRAMUSCULAR; INTRAVENOUS at 18:10

## 2022-04-13 RX ADMIN — LEVOFLOXACIN 750 MG: 5 INJECTION, SOLUTION INTRAVENOUS at 21:27

## 2022-04-13 RX ADMIN — PHENYLEPHRINE HYDROCHLORIDE 100 MCG: 10 INJECTION INTRAVENOUS at 17:09

## 2022-04-13 RX ADMIN — FENTANYL CITRATE 50 MCG: 50 INJECTION, SOLUTION INTRAMUSCULAR; INTRAVENOUS at 18:26

## 2022-04-13 RX ADMIN — HYDROMORPHONE HYDROCHLORIDE 0.4 MG: 1 INJECTION, SOLUTION INTRAMUSCULAR; INTRAVENOUS; SUBCUTANEOUS at 19:29

## 2022-04-13 RX ADMIN — KETAMINE HYDROCHLORIDE 20 MG: 10 INJECTION, SOLUTION INTRAMUSCULAR; INTRAVENOUS at 16:33

## 2022-04-13 ASSESSMENT — ACTIVITIES OF DAILY LIVING (ADL)
ADLS_ACUITY_SCORE: 8
ADLS_ACUITY_SCORE: 7
ADLS_ACUITY_SCORE: 7
ADLS_ACUITY_SCORE: 8
ADLS_ACUITY_SCORE: 7
ADLS_ACUITY_SCORE: 7
ADLS_ACUITY_SCORE: 8
ADLS_ACUITY_SCORE: 7
ADLS_ACUITY_SCORE: 7
ADLS_ACUITY_SCORE: 8
ADLS_ACUITY_SCORE: 7
ADLS_ACUITY_SCORE: 8
ADLS_ACUITY_SCORE: 7

## 2022-04-13 NOTE — PLAN OF CARE
Patient tachycardic throughout shift in the 110s. Denies SOB and chest pain. Lung sounds are diminished but clear. New chest tube placed on right side today. No output. No pain reported. Rash noted on right upper leg, upper back, and right arm. ID at bedside, visualized rash, and plan in place to change antibiotics. New IV levofloxacin began. 30-40 minutes after beginning, patient reported diaphoresis and lightheadedness. Infusion paused, pharmacy consulted, and infusion restarted at half rate. No further symptoms reported and infusion completed in full. Patient utilizes bedside commode with standby assist. Patient plan to discharge to [facility] tomorrow at 0830 for VATS procedure. Education given to patient regarding procedure. Patient resting comfortably.     Problem: Plan of Care - These are the overarching goals to be used throughout the patient stay.    Goal: Plan of Care Review/Shift Note  Description: The Plan of Care Review/Shift note should be completed every shift.  The Outcome Evaluation is a brief statement about your assessment that the patient is improving, declining, or no change.  This information will be displayed automatically on your shift note.  Outcome: Ongoing, Progressing  Flowsheets (Taken 4/12/2022 2133)  Plan of Care Reviewed With: patient    Problem: Pain Acute  Goal: Acceptable Pain Control and Functional Ability  Outcome: Met

## 2022-04-13 NOTE — PROGRESS NOTES
CLINICAL NUTRITION SERVICES - REASSESSMENT NOTE      EVALUATION OF THE PROGRESS TOWARD GOALS   Diet: Regular, now npo for surgery at [facility]  Intake: %  Ordering adequate sized meals      NEW FINDINGS   Pt had left for [facility] by the time I tried to see     ANTHROPOMETRICS  Admission wt: 65kg  Most Recent Weight: 61kg  Pt down 6# in 2 days (4%)         PHYSICAL FINDINGS  Per flowsheet  Edema-none noted  GI-WDL, last BM on 4/11  Skin-no skin breakdown noted     LABS  Reviewed    MEDICATIONS  Reviewed      NUTRITION DIAGNOSIS  Inadequate protein-energy intake related to reduced appetite as evidenced by pt reports of reduced intake x2-3 months and wt loss of 5% in 2 months.    --evaluation: continued        Goals:  Patient to consume % of nutritionally adequate meals three times per day, or the equivalent with supplements/snacks.-progressing  No significant wt loss <148 lb-not met wt down 136#    INTERVENTIONS  Implementation  Unable to meet with her before transfer   Pt may benefit from supplements when diet advances again and at risk for malnutrition due to wt loss     Monitoring/Evaluation  Progress toward goals will be monitored and evaluated per protocol.

## 2022-04-13 NOTE — PROGRESS NOTES
Pt A&Ox4.  Assisted to bedside commode, pt had bowel movement and urinated prior to transportation.  Transport arrived at 0745.  Chest tube CDI and hooked to suction currently.  Pt denies pain, states tube is noticeable, but not painful.  Transportation provided with paperwork and necessary items faxed to [facility].

## 2022-04-13 NOTE — PLAN OF CARE
Patient alert & oriented. VSS. No complaints of pain on shift. Chest tube intact with no drainage on shift. Pt NPO @ 0000 for transfer to [place] for surgery. Transport scheduled for 0830.

## 2022-04-13 NOTE — ANESTHESIA PROCEDURE NOTES
Airway       Patient location during procedure: OR       Procedure Start/Stop Times: 4/13/2022 4:10 PM  Staff -        Anesthesiologist:  Tiffanie Friedman MD       CRNA: Krishan Gan APRN CRNA       Performed By: CRNAIndications and Patient Condition       Indications for airway management: prema-procedural       Induction type:intravenous       Mask difficulty assessment: 1 - vent by mask    Final Airway Details       Final airway type: endotracheal airway       Successful airway: ETT - double lumen left  Endotracheal Airway Details        Cuffed: yes       Successful intubation technique: direct laryngoscopy       DL Blade Type: Goldman 2       Grade View of Cords: 1       Placement verification comments: (Confirmed with flexible bronchoscope)       Adjucts: stylet and tooth guard       Position: Center       Measured from: lips       Secured at (cm): 27       Bite block used: None       ETT Double lumen (fr): 37    Post intubation assessment        Placement verified by: capnometry, equal breath sounds and chest rise        Number of attempts at approach: 1       Number of other approaches attempted: 0       Secured with: silk tape       Ease of procedure: easy       Dentition: Dental injury    Medication(s) Administered   Medication Administration Time: 4/13/2022 4:10 PM

## 2022-04-13 NOTE — ANESTHESIA PREPROCEDURE EVALUATION
"Anesthesia Pre-Procedure Evaluation    Patient: Arely Saldaña   MRN: [id number] : 1980        Procedure : Procedure(s):  LEFT THORACOSCOPY, DECORTICATION          Past Medical History:   Diagnosis Date     ASCUS of cervix with negative high risk HPV 2018-2012 NIL annual pap 4/13/15 NIL pap, neg HPV 18 ASCUS, neg HPV 11/10/20 NIL pap, neg HPV     Hypertension     was on nifedipine and HCTZ before pregnancy, had since age early 20's      Past Surgical History:   Procedure Laterality Date     C/SECTION, LOW TRANSVERSE  2021    with PPTL     FOOT SURGERY Left age 20    bursa removed from left foot     SALPINGECTOMY Bilateral 2021    with  section     WISDOM TOOTH EXTRACTION  age 19      Allergies   Allergen Reactions     Immune Globulin (Human) Other (See Comments)     Fevers, chills , tremor     Metronidazole Other (See Comments)     Had \"tremulous chills\" associated with iv infusion.  Resolved after stopping infusion     Sulfamethoxazole-Trimethoprim [Sulfamethoxazole W/Trimethoprim] Rash      Social History     Tobacco Use     Smoking status: Never Smoker     Smokeless tobacco: Never Used   Substance Use Topics     Alcohol use: Not Currently     Comment: Alcoholic Drinks/day: once every 2-3 months prior to pregnancy      Wt Readings from Last 1 Encounters:   22 61.9 kg (136 lb 6.4 oz)        Anesthesia Evaluation   Pt has had prior anesthetic. Type: General.    No history of anesthetic complications       ROS/MED HX  ENT/Pulmonary: Comment: H flu pneumonia with bilateral empyema (L>R), failed CT management, now s/f L thoracoscopy and decortication     (+) recent URI,     Neurologic:  - neg neurologic ROS   (+) Spinal cord injury,     Cardiovascular: Comment: Left ventricular size, wall motion and function are normal. The ejection  fraction is > 65%.  Normal right ventricle size and systolic function.  No hemodynamically significant valvular abnormalities on 2D or " color flow  imaging.    (+) hypertension-----    METS/Exercise Tolerance: >4 METS    Hematologic:     (+) anemia (Hgb 7.8),     Musculoskeletal:  - neg musculoskeletal ROS     GI/Hepatic:  - neg GI/hepatic ROS     Renal/Genitourinary:  - neg Renal ROS     Endo:  - neg endo ROS     Psychiatric/Substance Use:  - neg psychiatric ROS     Infectious Disease: Comment: CVID      Malignancy:       Other:            Physical Exam    Airway        Mallampati: II   TM distance: > 3 FB   Neck ROM: full   Mouth opening: > 3 cm    Respiratory Devices and Support         Dental  no notable dental history         Cardiovascular   cardiovascular exam normal          Pulmonary           (+) decreased breath sounds           OUTSIDE LABS:  CBC:   Lab Results   Component Value Date    WBC 6.0 04/13/2022    WBC 4.8 04/12/2022    HGB 7.8 (L) 04/13/2022    HGB 8.2 (L) 04/12/2022    HCT 25.3 (L) 04/13/2022    HCT 27.2 (L) 04/12/2022     04/13/2022     04/12/2022     BMP:   Lab Results   Component Value Date     04/05/2022     04/03/2022    POTASSIUM 3.8 04/05/2022    POTASSIUM 3.9 04/03/2022    CHLORIDE 108 (H) 04/05/2022    CHLORIDE 108 (H) 04/03/2022    CO2 21 (L) 04/05/2022    CO2 19 (L) 04/03/2022    BUN 13 04/05/2022    BUN 13 04/03/2022    CR 0.61 04/05/2022    CR 0.65 04/03/2022    GLC 88 04/05/2022     04/03/2022     COAGS:   Lab Results   Component Value Date    PTT 24 02/19/2021    INR 1.20 (H) 04/03/2022     POC:   Lab Results   Component Value Date    HCG Negative 05/31/2019    HCGS Negative 04/03/2022     HEPATIC:   Lab Results   Component Value Date    ALBUMIN 1.8 (L) 04/06/2022    PROTTOTAL 4.1 (L) 04/06/2022    ALT 16 04/06/2022    AST 10 04/06/2022    ALKPHOS 142 (H) 04/06/2022    BILITOTAL 0.4 04/06/2022     OTHER:   Lab Results   Component Value Date    LACT 1.4 04/07/2022    A1C 4.8 11/10/2020    PABLO 8.9 04/05/2022    PHOS 3.9 04/07/2022    MAG 1.7 (L) 04/03/2022    LIPASE <9 03/10/2022     TSH 1.86 04/03/2022    CRP 14.7 (H) 04/03/2022    SED 14 01/21/2022       Anesthesia Plan    ASA Status:  3   NPO Status:  NPO Appropriate    Anesthesia Type: General.     - Airway: ETT   Induction: Intravenous, Propofol.   Maintenance: Inhalation.   Techniques and Equipment:     - Airway: Video-Laryngoscope, Double lumen ETT, Fiberoptic Bronchoscope (35 F L HEATHER)     - Lines/Monitors: 2nd IV     - Blood: T&S     Consents    Anesthesia Plan(s) and associated risks, benefits, and realistic alternatives discussed. Questions answered and patient/representative(s) expressed understanding.    - Discussed:     - Discussed with:  Patient    Use of blood products discussed: Yes.     - Discussed with: Patient.     - Consented: consented to blood products            Reason for refusal: other.     Postoperative Care    Pain management: IV analgesics, Multi-modal analgesia.   PONV prophylaxis: Ondansetron (or other 5HT-3), Dexamethasone or Solumedrol     Comments:    Other Comments: GETA - 35F HEATHER, glidescope  Magnesium gtt, ketamine 30mg post induction + 15mg q1hr, Dilaudid IV  2 PIV, T&S   Hgb 7.8, MABL ~ 650cc  Early albumin IV for volume            Marnie Vazquez MD

## 2022-04-13 NOTE — ANESTHESIA CARE TRANSFER NOTE
Patient: Arely Saldaña    Procedure: Procedure(s):  LEFT THORACOSCOPY, DECORTICATION       Diagnosis: Tachypnea [R06.82]  Diagnosis Additional Information: No value filed.    Anesthesia Type:   General     Note:    Oropharynx: oropharynx clear of all foreign objects and spontaneously breathing  Level of Consciousness: drowsy  Oxygen Supplementation: face mask  Level of Supplemental Oxygen (L/min / FiO2): 6  Independent Airway: airway patency satisfactory and stable  Dentition: dentition unchanged  Vital Signs Stable: post-procedure vital signs reviewed and stable  Report to RN Given: handoff report given  Patient transferred to: PACU    Handoff Report: Identifed the Patient, Identified the Reponsible Provider, Reviewed the pertinent medical history, Discussed the surgical course, Reviewed Intra-OP anesthesia mangement and issues during anesthesia, Set expectations for post-procedure period and Allowed opportunity for questions and acknowledgement of understanding      Vitals:  Vitals Value Taken Time   /62 04/13/22 1854   Temp 36.3  C (97.3  F) 04/13/22 1854   Pulse 92 04/13/22 1854   Resp 24 04/13/22 1854   SpO2 100 % 04/13/22 1854       Electronically Signed By: JAKE Lester CRNA  April 13, 2022  6:55 PM

## 2022-04-13 NOTE — OR NURSING
Dr Kumar is delayed until 1530 today and there are no available beds now for this patient. I spoke with the nursing supervisor Re, she will get back to me on a hospitalist to follow Arely here at [facility].

## 2022-04-13 NOTE — OR NURSING
Dr Morgan the hospitalist here, we discussed Arely and he will check in with her in a short while. He will alert me if any orders need my attention.

## 2022-04-13 NOTE — H&P
[facility]    History and Physical - Hospitalist Service       Date of Admission:  4/13/2022    Active Problems:    Pleural effusion    Assessment & Plan        42 yo female with a history of hypertension admitted with shortness of breath, found to have large left and small right pleural effusion/empyema.  Deviously diagnosed and treated for community-acquired pneumonia in February and March.    Seen by pulmonology, general surgery, infectious disease, hematology  Had left thoracentesis with 800 cc of purulent fluid removed, cultures positive for H. influenzae, blood culture also positive.  Left chest tube placed, now removed.Found to have very low IgG, IgA, IgE. Felt to have common variable immunodeficiency versus acquired immunodeficiency, given IVIG but had a significant reaction involving temp over 104, shaking chills. Eventually had left chest tube removed, subsequently required chest tube placement on the right.  Repeat imaging showed persistent left-sided empyema.  Treated with ceftriaxone and clindamycin initially, transitioned to Levaquin on the afternoon of 4/12.  Plan now to transfer to [facility] for thoracoscopy for the left thoracic empyema.     Right sided empyema  Pneumonia, bacteremia due to H flu  Left sided hydropneumothorax/empyema, recurred after left chest tube removal on 4/9  Had left thoracentesis on 4/4, with 800 cc of purulent fluid, blood and pleural fluid culture positive for H influenza.  Received intrapleural alteplase on 4/5-4/7.  ET of chest on 4/8 showed improvement of left empyema.  Left chest tube removed on 4/9,  Repeat CT on 4/12 showed persistent left-sided empyema, small possible right empyema as well  Initially treated with Zosyn, vancomycin. Transition to Levaquin on 4/12   Right chest tube placed on 4/13   Planning thoracoscopy this afternoon on left lung  Reconsult ID, pulmonary and surgery chest tube placed on right on 4/12, remains in  place     Severe hypogammaglobulinemia  Appreciate hematology recs  Had severe reaction to IVIG  Will need outpt follow up with immunology     Microcytic anemia, splenomegaly with iron deficient anemia  No evidence of blood loss  Appreciate hematology recs,   Hx of iron deficiency prior to admission, intolerance to oral iron due to abdominal pain  Given IV iron sucrose  No signs of GI loss     Essential hypertension  Stable.  Holding home lisinopril.     Diet: NPO for Medical/Clinical Reasons Except for: Meds, Ice Chips    DVT Prophylaxis: Pneumatic Compression Devices  Alexandra Catheter: Not present  Central Lines: None  Code Status:  Code full    Clinically Significant Risk Factors Present on Admission                      Disposition Plan        The patient's care was discussed with the Bedside Nurse, Patient and Patient's Family.  Hospitalist    NAVIN MORLAEZ MD  [facility]  Securely message with the Vocera Web Console (learn more here)  Text page via [employer] Paging/Directory      ______________________________________________________________________    Chief Complaint   Shortness of breath    History is obtained from the patient, electronic health record and hospitalist    History of Present Illness   Arely Saldaña is a 41 year old female with a history of hypertension who is being transferred to [facility] for left thoracoscopy for persistent left sided empyema and small right empyema.  Patient was previously treated for community-acquired pneumonia in February and March.  She was treated with cefuroxime and Levaquin.  She had improvement of her symptoms but continued shortness of breath and cough.  She had a CT of her chest on 3/10/2022 that showed moderate to large left lung consolidation and small right pleural effusion and was discharged home from the ER on oral Levaquin  She then started to feel worse last week with shortness of breath fever and worsening productive cough and was  admitted with imaging showing left pleural effusion, and infiltrate..  She was initially started on Zosyn and vancomycin.  She underwent left thoracentesis.  After having episode of fever and shortness of breath was transferred to the ICU on 4/3 requiring BiPAP for work of breathing.  Patient since then has been weaned from oxygen.  She underwent lytic therapy and her left chest tube has been removed.  However repeat imaging showed persistent left loculated pleural effusion/empyema along with a small right pleural effusion.  She underwent right chest tube placement on 4/12.  She currently denies chest pain or shortness of breath, fevers or chills.  No abdominal pain.  She is n.p.o. for her procedure this afternoon with Dr. Spann.  Patient also was found to have severely decreased immunoglobulin levels, was treated with a single dose of IVIG but had a reaction of fever and tachycardia.    Habits: Patient does not smoke, denies alcohol use    Social history patient is , has a 1-year-old child, works at a [employer]    Family history: No family history of immune deficiency    Review of Systems  The 10 point Review of Systems is negative other than noted in the HPI or here.     Past Medical History    I have reviewed this patient's medical history and updated it with pertinent information if needed.   Past Medical History:   Diagnosis Date     Anemia      ASCUS of cervix with negative high risk HPV 07/26/2018 2008-2012 NIL annual pap 4/13/15 NIL pap, neg HPV 7/26/18 ASCUS, neg HPV 11/10/20 NIL pap, neg HPV     Hypertension     was on nifedipine and HCTZ before pregnancy, had since age early 20's     Sinusitis, chronic        Past Surgical History   I have reviewed this patient's surgical history and updated it with pertinent information if needed.  Past Surgical History:   Procedure Laterality Date     C/SECTION, LOW TRANSVERSE  04/12/2021    with PPTL     FOOT SURGERY Left age 20    bursa removed from left  "foot     SALPINGECTOMY Bilateral 2021    with  section     WISDOM TOOTH EXTRACTION  age 19       Social History   I have reviewed this patient's social history and updated it with pertinent information if needed.  Social History     Tobacco Use     Smoking status: Never Smoker     Smokeless tobacco: Never Used   Vaping Use     Vaping Use: Never used   Substance Use Topics     Alcohol use: Yes     Comment: once every other month     Drug use: Never       Family History   I have reviewed this patient's family history and updated it with pertinent information if needed.  Family History   Problem Relation Age of Onset     Hypertension Mother      Hypertension Father      Diabetes Father        Prior to Admission Medications   Prior to Admission Medications   Prescriptions Last Dose Informant Patient Reported? Taking?   Calcium Carb-Cholecalciferol (CALTRATE 600+D3 SOFT) 600-800 MG-UNIT CHEW   Yes No   Sig: Take 1 tablet by mouth daily    cefTRIAXone (ROCEPHIN) 2 GM vial   Yes No   Sig: Inject 2 g into the vein every 24 hours   clindamycin (CLEOCIN) 600 MG/50ML infusion   Yes No   Sig: Inject 50 mLs (600 mg) into the vein every 8 hours   ferrous sulfate (FEROSUL) 325 (65 Fe) MG tablet   Yes No   Sig: Take 325 mg by mouth daily (with breakfast)   lisinopril (ZESTRIL) 5 MG tablet   Yes No   Sig: Take 1 tablet (5 mg) by mouth daily      Facility-Administered Medications: None     Allergies   Allergies   Allergen Reactions     Immune Globulin (Human) Other (See Comments)     Fevers, chills , tremor     Metronidazole Other (See Comments)     Had \"tremulous chills\" associated with iv infusion.  Resolved after stopping infusion     Penicillins Hives     Sulfamethoxazole-Trimethoprim [Sulfamethoxazole W/Trimethoprim] Rash       Physical Exam   Vital Signs: Temp: 98.3  F (36.8  C) Temp src: Oral BP: 130/84 Pulse: 74   Resp: 18 SpO2: 95 % O2 Device: None (Room air)    Weight: 136 lbs 4.8 oz    Physical Exam:  Temp:  " [98.1  F (36.7  C)-100.1  F (37.8  C)] 98.3  F (36.8  C)  Pulse:  [] 74  Resp:  [16-18] 18  BP: (104-143)/(64-95) 130/84  SpO2:  [93 %-97 %] 95 %  /84   Pulse 74   Temp 98.3  F (36.8  C) (Oral)   Resp 18   Wt 61.8 kg (136 lb 4.8 oz)   LMP 03/01/2022   SpO2 95%   Breastfeeding No   BMI 22.00 kg/m    General appearance: alert, appears stated age and cooperative  Head: Normocephalic, without obvious abnormality, atraumatic  Eyes: Clear conjuctiva  Neck: no JVD and supple, symmetrical, trachea midline  Lungs: Decreased breath sounds right and left lower lobe, no tachypnea, no rales, no wheeze  Heart: regular rate and rhythm, S1, S2 normal, no murmur, click, rub or gallop  Abdomen: soft, non-tender; bowel sounds normal; no masses,  no organomegaly  Extremities: Liz's sign is negative, no sign of DVT  Skin: Skin color, texture, turgor normal. No rashes or lesions  Neurologic: Grossly normal          Data   Data reviewed today: I reviewed all medications, new labs and imaging results over the last 24 hours. I personally reviewed the chest CT image(s) showing Continued left loculated empyema/pleural effusion and a small right lower pleural effusion.    Recent Labs   Lab 04/13/22  0602 04/12/22  0843 04/11/22  0436   WBC 6.0 4.8 3.3*   HGB 7.8* 8.2* 7.1*   MCV 73* 75* 73*    417 317

## 2022-04-13 NOTE — PROGRESS NOTES
Nutrition Note    Received nutrition consult.  RD cannot consult on observation patients or outpatients in beds d/t licensure issues and because of CMS conditions of participation.  RD will monitor for any status changes and will complete consult if patient changes to inpatient status.  Thank you.

## 2022-04-13 NOTE — DISCHARGE SUMMARY
[facility] MEDICINE  DISCHARGE SUMMARY     Primary Care Physician: Ashely Larios  Admission Date: 4/3/2022   Discharge Provider: Dilan Gonzalez DO Discharge Date: 4/13/2022   Diet:   Active Diet and Nourishment Order   Procedures     Diet     NPO for Medical/Clinical Reasons Except for: Meds, Ice Chips       Code Status: Full Code   Activity: Bedrest with bedside commode        Condition at Discharge: Stable     REASON FOR PRESENTATION(See Admission Note for Details)   Shortness of breath, cough  PRINCIPAL & ACTIVE DISCHARGE DIAGNOSES   Severe pneumonia with bilateral empyema  Haemophilus influenza bacteremia  Severe hypogammaglobulinemia of unclear etiology  Hypoalbuminemia  Iron deficiency anemia  PENDING LABS     Unresulted Labs Ordered in the Past 30 Days of this Admission     Date and Time Order Name Status Description    4/12/2022 12:58 PM Anaerobic culture In process     4/12/2022 12:58 PM Pleural fluid Aerobic Bacterial Culture Routine with Gram Stain Preliminary     4/5/2022 10:18 AM Celiac (Gluten) Antibody Panel In process         PROCEDURES ( this hospitalization only)    Left chest tube placement with lytic therapy  Right chest tube placement on 4/12  Left chest tube removal  RECOMMENDATIONS TO OUTPATIENT PROVIDER FOR F/U VISIT     Not applicable  DISPOSITION   [facility]  SUMMARY OF HOSPITAL COURSE:    40 yo female presented with shortness of breath, dx'd with and treated for pneumonia in Feb and March. Eval here with large left and small rt effusions. Had left thoracentesis with 800ml of purulent fluid removed. Pleural fluid pos for H flu as was blood culture. Had chest tube placed. Followed by pulmonary, ID, gen surgery, hematology, please see their notes. Found to have very low IgG, IgA, IgE. Felt to have common variable immunodeficiency versus acquired immunodeficiency, given IVIG but had a significant reaction involving temp over 104, shaking  chills. Eventually had left chest tube removed, subsequently required chest tube placement on the right.  Repeat imaging showed persistent left-sided empyema.  Treated with ceftriaxone and clindamycin initially, transitioned to Levaquin on the afternoon of 4/12.  Plan now to transfer to [facility] for thoracoscopy for the left thoracic empyema.    Right sided empyema  Pneumonia, bacteremia due to H flu  Left sided hydropneumothorax/empyema, recurred after chest tube removal on 4/9  Chest tube placed on right on 4/12, remains in place  Appreciate ID, pulmonary and surgery recs  Antibiotic's changed from clindamycin and ceftriaxone to Levaquin on 4/12  Transferring to [facility] this morning for thoracoscopy     Severe hypogammaglobulinemia  Appreciate hematology recs  Had severe reaction to IVIG  Will need outpt follow up with immunology     Microcytic anemia, splenomegaly  No evidence of blood loss  Appreciate hematology recs, please see their notes  Hx of iron deficiency prior to admission, intolerance to oral iron due to abdominal pain  Given IV iron sucrose    Essential hypertension  Seems odd in someone her age and size  She takes lisinopril 5 mg daily prior to admission  Discharge Medications with Med changes:   Current meds at discharge include oral iron every other day, Levaquin 750 IV every 24 hours, lisinopril 5 mg daily.  Lovenox held.    Rationale for medication changes:    Not applicable  Consults   Hematology, pulmonology, infectious disease, general surgery  Anticoagulation Information    Not applicable  SIGNIFICANT IMAGING FINDINGS     Results for orders placed or performed during the hospital encounter of 04/03/22   CT Chest (PE) Abdomen Pelvis w Contrast    Impression    IMPRESSION:  1.  Negative for pulmonary emboli.    2.  The previous dense consolidative infiltrates in the left lung seen on CT 03/10/2022 appear to near completely resolved.    3.  New small right and moderate left  pleural effusions with moderate atelectasis in the lower lobes greater on the left side.    4.  Gallstones.    5.  Mild splenomegaly.   Abdomen US, limited (RUQ only)    Impression    IMPRESSION:  1.  Cholelithiasis. Otherwise normal right upper quadrant ultrasound.       XR Chest Port 1 View    Impression    IMPRESSION:     Moderate to large left and small right pleural effusions are not visibly changed. Related atelectasis including near complete atelectasis of the left lower lobe.    Lower left ventricular heart border is silhouetted. Cardiac silhouette is not enlarged. Azygos vein is not distended and the vascular pedicle width is normal.    No pneumothorax.   US Thoracentesis    Impression    IMPRESSION:  Status post left ultrasound-guided thoracentesis.    Reference CPT Code: 08715   XR Chest 1 View    Impression    IMPRESSION:     Considerable decrease in the amount of left pleural fluid. The left upper lobe is better inflated.     The residual opacity in the left basal chest has a sharp interface which parallels the left ventricular heart border within which there are no air bronchograms consistent with lobar atelectasis of the left lower lobe. There is minimal atelectasis or   pleural fluid in the left lateral costophrenic sulcus.    Unchanged atelectasis/pleural fluid along the right hemidiaphragm.       XR Chest Port 1 View    Impression    IMPRESSION: The chest is stable with again seen minimal right pleural effusion and mild left pleural effusion with some underlying infiltrate/atelectasis in both lung bases and extending into the left midlung. The chest is otherwise negative.   CT Chest Tube with Cath Placement    Impression    IMPRESSION:  1.  Successful CT-guided chest tube placement into the left pleural space.    Reference CPT Codes: 13315, 99419   XR Chest Port 1 View    Impression    IMPRESSION: There is a new chest tube at the left medial lung base. There has been decrease in left pleural  effusion and there is some residual of effusion and atelectasis at the left lung base. No pneumothorax. Mild atelectasis at the right lung base.   Normal heart size and pulmonary vascularity.   XR Chest Port 1 View    Impression    IMPRESSION: Slight retraction of the left basilar chest tube. Continued decrease in the layering left effusion and associated atelectasis. There is a similar small amount of air within the pleural space medially on the retrocardiac region compatible with   recent tube placement. Trace right effusion and associated atelectasis. Heart size is normal.   CT Chest w Contrast    Impression    IMPRESSION:   1.  Persistent but significantly decreased left pleural effusion and associated atelectasis when compared to 04/03/2022. There is a small amount of residual fluid slightly superior and medial to the tip of the left pleural drain. Small amount of air in   the left pleural space is consistent with pleural drain placement.  2.  Similar small right effusion and associated atelectasis.  3.  There are a few linear opacities in the left upper lung and lower lung in the area of the prior consolidation likely a combination of atelectasis and/or scarring.   XR Chest Port 1 View    Impression    IMPRESSION:     Tiny right and small pleural effusions are present (the left pleural effusion has mildly increased versus increased atelectasis). Associated mild right and moderate left basilar atelectasis.     No obvious pneumothorax identified.     Partial obscuration of the cardiac silhouette from overlying fluid and opacities.       XR Chest 2 Views    Impression    IMPRESSION: No change in the small left-sided pleural effusion. There is a small air/fluid level in the left paravertebral space consistent with a loculated hydropneumothorax with associated lower lobe basilar opacities with a few air bronchograms. This   is unchanged since the pigtail catheter was in place.     There is a small right effusion  with right lower lobe atelectasis as well, unchanged.    Heart and pulmonary vascularity are normal.    Contacted Dr. Kumar's office re findings as requested  at 1410. Was able to leave a message with answering service with call back information as he and his nurse were not available.        CT Chest w Contrast    Impression    IMPRESSION:   1.  Left chest tube has been removed. There is a persistent loculated hydropneumothorax/empyema at the left medial lung base 4.4 x 3.6 x 6 cm. This compares to 4.4 x 2.4 x 6.4 cm on 04/08/2022 when the chest tube was in place. There is also a small fluid   and air-containing collection along the left heart border measuring 1.5 x 0.7 x 1.5 cm. This has decreased in size from the prior study when it measured 2.6 x 0.7 x 2 cm.  2.  Small right posterior pleural fluid collection with an enhancing rim. This could represent a right-sided empyema as well.  3.  Atelectasis or consolidation in both lung bases left greater than right. No free pneumothorax.  4.  No lymphadenopathy.    NOTE: ABNORMAL REPORT    THE DICTATION ABOVE DESCRIBES AN ABNORMALITY FOR WHICH FOLLOW-UP IS NEEDED.                CT Chest Tube with Cath Placement    Impression    IMPRESSION:  1.  Successful CT-guided chest tube placement into the right pleural space.   Echocardiogram Complete   Result Value Ref Range    LVEF  > 65%      SIGNIFICANT LABORATORY FINDINGS   Please see EMR  Discharge Orders        Activity    Your activity upon discharge: activity as tolerated     Full Code     Diet    Follow this diet upon discharge: Orders Placed This Encounter      Regular Diet Adult     Examination   Physical Exam   Temp:  [98  F (36.7  C)-101.3  F (38.5  C)] 98.1  F (36.7  C)  Pulse:  [] 101  Resp:  [16-18] 16  BP: (104-164)/() 142/95  SpO2:  [92 %-99 %] 97 %  Wt Readings from Last 1 Encounters:   04/12/22 61.9 kg (136 lb 6.4 oz)       Subjective: Feeling much better this morning.  Not really having much  pain.  No nausea or vomiting.    Physical Exam:    Gen: no acute distress, comfortable, alert, pleasant  ENT: no scleral icterus  Pulm: lungs are diminished at bases bilaterally, clear at apices, right posterior lateral chest tube in place  CV: regular rate and rhythm, no pitting edema  Derm: Slightly pale, no jaundice  Psych: appropriate affect       Please see EMR for more detailed significant labs, imaging, consultant notes etc.    I, Dilan Gonzalez DO, personally saw the patient today and spent greater than 30 minutes discharging this patient.    Dilan Gonzalez DO  [facility]    CC:Ashely Larios

## 2022-04-13 NOTE — OP NOTE
OPERATIVE REPORT    Arely Saldaña   [facility]  Medical Record #:  [id number]  YOB: 1980  Age:  41 year old    PROCEDURE DATE:  4/13/2022    PREOPERATIVE DIAGNOSIS: Empyema left thorax refractory to tube thoracostomy lytic therapy with trapped lung    POSTOPERATIVE DIAGNOSIS: Same    PROCEDURE: 1.  Flexible bronchoscopy within normal limits 2.  Left thoracoscopy with findings requiring left thoracotomy for complete decortication visceral parietal pleural surfaces and parietal pleurectomy    OPERATING SURGEON:  Suleman Kumar MD    ASSISTANT: Technician    ANESTHESIA: General    DESCRIPTION OF PROCEDURE: With the patient in supine position under general anesthesia having reviewed the risk benefits complications of surgical intervention with her and her  in light of the patient's illness for extended length of time and with evidence of trapped lung on the left side with pneumonia in the left lower lobe and 2 fluid collections not amenable to tube thoracostomy and being status post left tube thoracostomy and lytic therapy for several days last week the left chest is prepped and draped in usual sterile fashion.  First bronchoscopy was completed and a double-lumen endotracheal tube was placed and secured and there were no endobronchial lesions or abnormalities of trachea mainstem or segmental bronchial segments.  Small stab wound incision is made below the scapula on the left side and blunt dissection with finger is used to try to enter the thoracic space where according to recent CAT scan there should be free entry into the pleural space above the area of the inflammatory process left lower pneumonia and fluid collections with thick parietal pleural and visceral pleural peel.  The space was entered safely and thoracoscope introduced.  A portion of the upper lobe was free of the inflammatory process in the apical location but below this level firm fibrotic change in adherence of the lung to  the chest wall is noted.  As result is reviewed with the patient and her  preoperatively standard posterior lateral thoracotomy incision is then made at the extra parietal pleural space is entered and dissection is undertaken for removal of the thick inflammatory peel.  This is accomplished over the majority of the chest wall.  The diaphragm was then freed left lower lobe freed and the fluid collection below the left lower lobe is removed in its entirety.  Additional knee middle mediastinal fluids collection is also isolated and removed.  Tedious dissection is then accomplished to remove the peel over the visceral pleural surface in the lower lobe major fissure and upper lobe.  Good insufflation of the lung was eventually obtained.  Time is taken to assure a adequate hemostasis is obtained and a 3 chest tubes are placed to posterior laterally and one at the diaphragm level and the wound closed in usual fashion with running #1 Prolene reinforced with #1 Vicryl and the muscle layers closed with running #1 Vicryl and skin closed with surgical steel staples.  The estimated blood loss was 50 cc.  There were no complications and the patient tolerated procedure well.  Sponge and counts are correct x2.    EBL:  [unfilled]    SPECIMENS:    ID Type Source Tests Collected by Time Destination   1 : LEFT LUNG EMPYEMA Tissue Lung, Left SURGICAL PATHOLOGY EXAM Suleman Capellan MD 4/13/2022  6:22 PM    A : source: pleural fluid Pleural fluid Lung, Left GRAM STAIN, FUNGAL OR YEAST CULTURE ROUTINE, AEROBIC BACTERIAL CULTURE ROUTINE Suleman Capellan MD 4/13/2022  5:04 PM    B : source: pleural fluid Pleural fluid Lung, Left ANAEROBIC BACTERIAL CULTURE ROUTINE Suleman Capellan MD 4/13/2022  5:08 PM    C : source: pleural fluid for viral  Pleural fluid Lung, Left VIRAL CULTURE NON-RESPIRATORY Suleman Capellan MD 4/13/2022  5:12 PM        Suleman capellan md  [facility]

## 2022-04-13 NOTE — PROGRESS NOTES
CLINICAL NUTRITION SERVICES - REASSESSMENT NOTE    Consult received for clinical dietitian.   We will plan on seeing and assessing pt when she is admitted to the floor

## 2022-04-14 ENCOUNTER — APPOINTMENT (OUTPATIENT)
Dept: RADIOLOGY | Facility: HOSPITAL | Age: 42
DRG: 163 | End: 2022-04-14
Attending: SURGERY
Payer: COMMERCIAL

## 2022-04-14 LAB
ALBUMIN SERPL-MCNC: 2 G/DL (ref 3.5–5)
ALP SERPL-CCNC: 107 U/L (ref 45–120)
ALT SERPL W P-5'-P-CCNC: 21 U/L (ref 0–45)
ANION GAP SERPL CALCULATED.3IONS-SCNC: 7 MMOL/L (ref 5–18)
AST SERPL W P-5'-P-CCNC: 15 U/L (ref 0–40)
BILIRUB SERPL-MCNC: 0.2 MG/DL (ref 0–1)
BUN SERPL-MCNC: 7 MG/DL (ref 8–22)
CALCIUM SERPL-MCNC: 8.7 MG/DL (ref 8.5–10.5)
CHLORIDE BLD-SCNC: 106 MMOL/L (ref 98–107)
CO2 SERPL-SCNC: 24 MMOL/L (ref 22–31)
CREAT SERPL-MCNC: 0.56 MG/DL (ref 0.6–1.1)
ERYTHROCYTE [DISTWIDTH] IN BLOOD BY AUTOMATED COUNT: 18.5 % (ref 10–15)
GFR SERPL CREATININE-BSD FRML MDRD: >90 ML/MIN/1.73M2
GLUCOSE BLD-MCNC: 151 MG/DL (ref 70–125)
HCT VFR BLD AUTO: 28.2 % (ref 35–47)
HGB BLD-MCNC: 9.3 G/DL (ref 11.7–15.7)
MAGNESIUM SERPL-MCNC: 2.4 MG/DL (ref 1.8–2.6)
MCH RBC QN AUTO: 24.3 PG (ref 26.5–33)
MCHC RBC AUTO-ENTMCNC: 33 G/DL (ref 31.5–36.5)
MCV RBC AUTO: 74 FL (ref 78–100)
PLATELET # BLD AUTO: 429 10E3/UL (ref 150–450)
POTASSIUM BLD-SCNC: 4.3 MMOL/L (ref 3.5–5)
POTASSIUM BLD-SCNC: 5.4 MMOL/L (ref 3.5–5)
PROT SERPL-MCNC: 4.6 G/DL (ref 6–8)
RBC # BLD AUTO: 3.83 10E6/UL (ref 3.8–5.2)
SODIUM SERPL-SCNC: 137 MMOL/L (ref 136–145)
WBC # BLD AUTO: 12.7 10E3/UL (ref 4–11)

## 2022-04-14 PROCEDURE — 258N000003 HC RX IP 258 OP 636: Performed by: FAMILY MEDICINE

## 2022-04-14 PROCEDURE — 120N000001 HC R&B MED SURG/OB

## 2022-04-14 PROCEDURE — 250N000011 HC RX IP 250 OP 636: Performed by: SURGERY

## 2022-04-14 PROCEDURE — 83735 ASSAY OF MAGNESIUM: CPT | Performed by: SURGERY

## 2022-04-14 PROCEDURE — 250N000013 HC RX MED GY IP 250 OP 250 PS 637: Performed by: MASSAGE THERAPIST

## 2022-04-14 PROCEDURE — 258N000003 HC RX IP 258 OP 636: Performed by: SURGERY

## 2022-04-14 PROCEDURE — 84132 ASSAY OF SERUM POTASSIUM: CPT | Performed by: FAMILY MEDICINE

## 2022-04-14 PROCEDURE — 250N000013 HC RX MED GY IP 250 OP 250 PS 637: Performed by: SURGERY

## 2022-04-14 PROCEDURE — 80053 COMPREHEN METABOLIC PANEL: CPT | Performed by: SURGERY

## 2022-04-14 PROCEDURE — 85027 COMPLETE CBC AUTOMATED: CPT | Performed by: SURGERY

## 2022-04-14 PROCEDURE — 36415 COLL VENOUS BLD VENIPUNCTURE: CPT | Performed by: FAMILY MEDICINE

## 2022-04-14 PROCEDURE — 99233 SBSQ HOSP IP/OBS HIGH 50: CPT | Performed by: INTERNAL MEDICINE

## 2022-04-14 PROCEDURE — 99221 1ST HOSP IP/OBS SF/LOW 40: CPT | Performed by: STUDENT IN AN ORGANIZED HEALTH CARE EDUCATION/TRAINING PROGRAM

## 2022-04-14 PROCEDURE — 36415 COLL VENOUS BLD VENIPUNCTURE: CPT | Performed by: SURGERY

## 2022-04-14 PROCEDURE — 99233 SBSQ HOSP IP/OBS HIGH 50: CPT | Performed by: FAMILY MEDICINE

## 2022-04-14 PROCEDURE — 71045 X-RAY EXAM CHEST 1 VIEW: CPT

## 2022-04-14 PROCEDURE — 250N000013 HC RX MED GY IP 250 OP 250 PS 637: Performed by: HOSPITALIST

## 2022-04-14 RX ORDER — VIT B COMP NO.3/FOLIC/C/BIOTIN 1 MG-60 MG
1 TABLET ORAL DAILY
Status: DISCONTINUED | OUTPATIENT
Start: 2022-04-14 | End: 2022-04-17 | Stop reason: HOSPADM

## 2022-04-14 RX ORDER — DIAZEPAM 2 MG
2 TABLET ORAL ONCE
Status: COMPLETED | OUTPATIENT
Start: 2022-04-14 | End: 2022-04-14

## 2022-04-14 RX ORDER — DIAZEPAM 2 MG
2 TABLET ORAL ONCE
Status: DISCONTINUED | OUTPATIENT
Start: 2022-04-14 | End: 2022-04-14

## 2022-04-14 RX ORDER — SODIUM CHLORIDE 9 MG/ML
INJECTION, SOLUTION INTRAVENOUS CONTINUOUS
Status: DISCONTINUED | OUTPATIENT
Start: 2022-04-14 | End: 2022-04-14

## 2022-04-14 RX ADMIN — HYDROMORPHONE HYDROCHLORIDE 2 MG: 2 TABLET ORAL at 17:01

## 2022-04-14 RX ADMIN — HYDROMORPHONE HYDROCHLORIDE 2 MG: 2 TABLET ORAL at 18:46

## 2022-04-14 RX ADMIN — ACETAMINOPHEN 975 MG: 325 TABLET ORAL at 12:33

## 2022-04-14 RX ADMIN — Medication 325 MG: at 08:00

## 2022-04-14 RX ADMIN — POTASSIUM CHLORIDE, DEXTROSE MONOHYDRATE AND SODIUM CHLORIDE: 150; 5; 450 INJECTION, SOLUTION INTRAVENOUS at 04:28

## 2022-04-14 RX ADMIN — LEVOFLOXACIN 750 MG: 5 INJECTION, SOLUTION INTRAVENOUS at 21:38

## 2022-04-14 RX ADMIN — ACETAMINOPHEN 975 MG: 325 TABLET ORAL at 19:54

## 2022-04-14 RX ADMIN — SENNOSIDES AND DOCUSATE SODIUM 1 TABLET: 8.6; 5 TABLET ORAL at 19:54

## 2022-04-14 RX ADMIN — HYDROMORPHONE HYDROCHLORIDE 2 MG: 2 TABLET ORAL at 12:33

## 2022-04-14 RX ADMIN — HYDROMORPHONE HYDROCHLORIDE 2 MG: 2 TABLET ORAL at 03:59

## 2022-04-14 RX ADMIN — SODIUM CHLORIDE 250 ML: 9 INJECTION, SOLUTION INTRAVENOUS at 07:45

## 2022-04-14 RX ADMIN — B-COMPLEX W/ C & FOLIC ACID TAB 1 MG 1 TABLET: 1 TAB at 12:45

## 2022-04-14 RX ADMIN — DIAZEPAM 2 MG: 2 TABLET ORAL at 19:54

## 2022-04-14 RX ADMIN — HYDROMORPHONE HYDROCHLORIDE 2 MG: 2 TABLET ORAL at 08:00

## 2022-04-14 RX ADMIN — SODIUM CHLORIDE: 9 INJECTION, SOLUTION INTRAVENOUS at 07:59

## 2022-04-14 ASSESSMENT — ACTIVITIES OF DAILY LIVING (ADL)
ADLS_ACUITY_SCORE: 6
ADLS_ACUITY_SCORE: 7
ADLS_ACUITY_SCORE: 9
ADLS_ACUITY_SCORE: 6
ADLS_ACUITY_SCORE: 9
DEPENDENT_IADLS:: INDEPENDENT
ADLS_ACUITY_SCORE: 6
ADLS_ACUITY_SCORE: 7
ADLS_ACUITY_SCORE: 9
ADLS_ACUITY_SCORE: 9
ADLS_ACUITY_SCORE: 6
ADLS_ACUITY_SCORE: 9
ADLS_ACUITY_SCORE: 6
ADLS_ACUITY_SCORE: 9
ADLS_ACUITY_SCORE: 9
ADLS_ACUITY_SCORE: 6

## 2022-04-14 NOTE — CONSULTS
"CLINICAL NUTRITION SERVICES - ASSESSMENT NOTE     Nutrition Prescription    RECOMMENDATIONS FOR MDs/PROVIDERS TO ORDER:  None    Malnutrition Status:    Severe in the context of acute illness    Recommendations already ordered by Registered Dietitian (RD):  Nephrovite  Nepro daily = 420 kcal, 19 g protein    Future/Additional Recommendations:  Adjust supplements pending intake, acceptance     REASON FOR ASSESSMENT  Arely Saldaña is a/an 41 year old female assessed by the dietitian for Provider Order - Concern for malnutrition, prealbumin 7.  Has immunodeficiency - may be acquired?, and nutrition risk screen positive with -2-13 lb weight loss and eating poorly d/t decreased appetite    Pt presents with Empyema, PNA, microcytic anemia, possible immunodeficency- common variable vs aquired  Hx HTN, PNA in Feb and March, iron deficiency anemia    NUTRITION HISTORY  Pt followed by [facility] at [facility] 4/8-4/14. Eating % 4/8-4/14  Pt had a baby April 2021 with preclampsia    Intake </= 50% past 2-3 months per pt d/t illness.     CURRENT NUTRITION ORDERS  Diet: 2 g Potassium- low K+ restriction added today per MD    Intake/Tolerance: Ate 100% of breakfast today at 551 kcal, 8 g protein    Receiving NS IVF 75 ml/hr    Pt reports her appetite is gradually improving but still < baseline. She is interested in a supplement. Discussed low potassium diet and provided list of potassium foods      LABS  Labs reviewed  K+ 5.4, increased  LFT improved  Prealbumin 7  WBC increased    MEDICATIONS  Medications reviewed  Feso4, iv abx, miralax daily, pericolace    ANTHROPOMETRICS  Height: 167.6 cm (5' 6\")  Most Recent Weight: 61.8 kg (136 lb 4.8 oz) (scale)    IBW: 59 kg  BMI: Normal BMI  Weight History:   Wt Readings from Last 10 Encounters:   04/13/22 61.8 kg (136 lb 4.8 oz)   04/12/22 04/08/22 61.9 kg (136 lb 6.4 oz)  148 lb- [facility]   03/10/22 68 kg (150 lb)   01/21/22 70.9 kg (156 lb 6.4 oz)   06/30/21 74.8 kg (165 lb)   02/24/21 " 79.6 kg (175 lb 8 oz)   02/22/21 79.4 kg (175 lb)   02/19/21 78.9 kg (174 lb)   01/08/21 74.4 kg (164 lb)   12/08/20 72.6 kg (160 lb)   Some expected weight loss in April after pregnancy  12.8% weight loss x 3 months    Dosing Weight: 61.8 kg    ASSESSED NUTRITION NEEDS  Estimated Energy Needs: 7454-0744 kcals/day (25 - 30 kcals/kg)  Justification: Maintenance  Estimated Protein Needs: 74-92 grams protein/day (1.2 - 1.5 grams of pro/kg)  Justification: Hypercatabolism with acute illness, Increased needs and Repletion  Estimated Fluid Needs: 9101-6497 mL/day (25 - 30 mL/kg)   Justification: Maintenance    PHYSICAL FINDINGS  See malnutrition section below.  Chest tube x 4 - 177 ml OP yesterday, ongoing pain  Hypoactive bowel sounds,     MALNUTRITION:  % Weight Loss:  > 7.5% in 3 months (severe malnutrition)  % Intake:  </= 50% for >/= 1 month (severe malnutrition)  Subcutaneous Fat Loss:  Orbital region mild depletion  Muscle Loss:  Acromion bone region mild depletion, UE mild depletion  Fluid Retention:  None noted  Pt endorses noticeable muscle loss in UE    Malnutrition Diagnosis: Severe malnutrition  In Context of:  Acute illness or injury    NUTRITION DIAGNOSIS  Malnutrition related to acute illness as evidenced by intake </= 50% x 2-3 months, 12.8% weight loss x 3 months       INTERVENTIONS  Implementation  Mvi with minerals  Nepro daily = 420 kcal, 19 g protein  Provided list of potassium foods    Goals  Intake > 75% of estimated needs  Maintain weight  No increase in k+d/t intake     Monitoring/Evaluation  Progress toward goals will be monitored and evaluated per protocol.

## 2022-04-14 NOTE — PROGRESS NOTES
SPIRITUAL HEALTH SERVICES Progress Note     visited patient due to patient request upon admission screening. Patient shared about medical condition and history, particularly related to her pneumonia history and ongoing recovery. Patient comes from [employer] and is hopeful of being home for Easter holiday this weekend. No needs stated by patient at this time.      Abel Robertson MDiv, [facility]  Lead Staff , [facility]  [phone]

## 2022-04-14 NOTE — PROGRESS NOTES
Progress Note    Assessment/Plan  Patient reasonably comfortable under the circumstance.  No significant air leak and chest tube output limited.  Reviewed findings again with  and patient.  Continue all chest tubes today.  Increase activity may ambulate on waterseal.  Can go to chemoprophylaxis DVT.    Active Problems:    Essential hypertension    Pleural effusion    Empyema lung (H)      Subjective  As above patient reasonably comfortable aspiratory effort  Objective    Vital signs in last 24 hours  Temp:  [96.9  F (36.1  C)-98.2  F (36.8  C)] 98.2  F (36.8  C)  Pulse:  [] 100  Resp:  [16-29] 16  BP: (110-151)/() 122/76  SpO2:  [95 %-100 %] 96 %  Weight:   [unfilled]    Intake/Output last 3 shifts  I/O last 3 completed shifts:  In: 1350 [I.V.:1000]  Out: 227 [Blood:50; Chest Tube:177]  Intake/Output this shift:  I/O this shift:  In: 730 [P.O.:480; IV Piggyback:250]  Out: 800 [Urine:800]      Physical Exam  As above no air leak Limited chest tube output    Pertinent Labs   Lab Results   Component Value Date    WBC 12.7 (H) 04/14/2022    HGB 9.3 (L) 04/14/2022    HCT 28.2 (L) 04/14/2022    MCV 74 (L) 04/14/2022     04/14/2022             Pertinent Radiology     [unfilled]        Suleman Kumar MD

## 2022-04-14 NOTE — ANESTHESIA POSTPROCEDURE EVALUATION
Patient: Arely Saldaña    Procedure: Procedure(s):  LEFT THORACOSCOPY, DECORTICATION       Anesthesia Type:  General    Note:  Disposition: Inpatient   Postop Pain Control: Uneventful            Sign Out: Well controlled pain   PONV: No   Neuro/Psych: Uneventful            Sign Out: Acceptable/Baseline neuro status   Airway/Respiratory: Uneventful            Sign Out: Acceptable/Baseline resp. status   CV/Hemodynamics: Uneventful            Sign Out: Acceptable CV status; No obvious hypovolemia; No obvious fluid overload   Other NRE: NONE   DID A NON-ROUTINE EVENT OCCUR? No    Event details/Postop Comments:  No issues.  Teeth atraumatic.  Pain controlled.  AVSS.  No awareness.  Eyes without trauma.            Last vitals:  Vitals Value Taken Time   /84 04/13/22 2015   Temp 36.1  C (96.9  F) 04/13/22 2015   Pulse 95 04/13/22 2024   Resp 29 04/13/22 2024   SpO2 97 % 04/13/22 2024   Vitals shown include unvalidated device data.    Electronically Signed By: Tiffanie Friedman MD  April 13, 2022  9:29 PM

## 2022-04-14 NOTE — PHARMACY-ADMISSION MEDICATION HISTORY
Pharmacy Note - Admission Medication History    Admission medication history was completed at [facility] on 4/3/22 prior to patient transfer to [facility]    Thank you for the opportunity to participate in the care of this patient.    Rae Daigle [facility]  4/13/2022 8:38 PM

## 2022-04-14 NOTE — CONSULTS
Consultation - INFECTIOUS DISEASE CONSULTATION  Arely Saldaña,  1980, MRN [id number]      Tachypnea [R06.82]  Pleural effusion [J90]    PCP: Ashely Larios, [phone]   Code status:  Full Code               Chief Complaint:  Nontypeable Haemophilus influenza bacteremia with empyema.     Assessment:  Arely Saldaña is a 41 year old old female with   1.  Recurrent sinus infections since 2021.  2.  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  Not to reduce IgG level <109, IgA < 5.  Has splenomegaly.  Status post IVIG treatment on 2022 with high-grade fever.  3.  Bacteremia with nontypeable haemophilus influenza.  Positive blood culture on 4/3/2022.  Source is left-sided empyema.  4.  Left-sided empyema with nontypeable haemophilus influenza.  Status post aspiration on 4/3/2022.  Status post chest tube placement on 2022.  Chest tube removed on 2022.  Treated with ceftriaxone and clindamycin, changed to Levaquin on 2022 for lower extremity rash.  Status post left-sided thoracotomy for complete decortication on 2022 by Dr. Kumar.   5.  Lower extremity rash.  Resolved.      Recommendations:   Continue on Levaquin.  Follow surgical cultures from 2022.  Chest tube management per surgery.  Will need immunology referral at discharge.    Discussed with the patient, nursing staff.    Thank you for letting us be part of the patient care team. We will follow.    Karlos Rae MD,MD  [facility].   [facility]  Office Telephone [phone].  Fax [phone]  [facility] paging    HPI:    Arely Saldaña is a 41 year old old female. History is provided by the patient, chart review.    I know this patient who has been seen this past Saturday at [facility].  Prolonged hospitalization for bacteremia with nontypeable haemophilus influenza with left-sided empyema.  Organism grew  in both blood and pleural effusion.  Status post thoracentesis on 4/3/2022 and chest tube placement on 2022.  Was also diagnosed with most likely CVID when she was found to have undetectable IgG and IgA.  Was seen by hematology received 1 dose of IVIG on 2022.  Had some fever with days.  Chest tube was removed on 2022.  Patient had decortication on 2022.  3 chest tubes in place.  Previous antibiotic regimen of clindamycin and ceftriaxone was changed to Levaquin after patient developed lower extremity rash in the setting of CT scan with contrast.  Seen today, the patient is feeling relatively well.  Has some pain at chest tube site.        ===========================================    Medical History  Past Medical History:   Diagnosis Date     Anemia      ASCUS of cervix with negative high risk HPV 2018-2012 NIL annual pap 4/13/15 NIL pap, neg HPV 18 ASCUS, neg HPV 11/10/20 NIL pap, neg HPV     Hypertension     was on nifedipine and HCTZ before pregnancy, had since age early 20's     Sinusitis, chronic         Surgical History  Past Surgical History:   Procedure Laterality Date     C/SECTION, LOW TRANSVERSE  2021    with PPTL     FOOT SURGERY Left age 20    bursa removed from left foot     SALPINGECTOMY Bilateral 2021    with  section     WISDOM TOOTH EXTRACTION  age 19            Social History  Reviewed, and she  reports that she has never smoked. She has never used smokeless tobacco. She reports current alcohol use. She reports that she does not use drugs.        Family History  Family History   Problem Relation Age of Onset     Hypertension Mother      Hypertension Father      Diabetes Father       Psychosocial Needs  Social History     Social History Narrative     Not on file     Additional psychosocial needs reviewed per nursing assessment.       Allergies   Allergen Reactions     Immune Globulin (Human) Other (See Comments)     Fevers, chills , tremor  "    Metronidazole Other (See Comments)     Had \"tremulous chills\" associated with iv infusion.  Resolved after stopping infusion     Penicillins Hives     Sulfamethoxazole-Trimethoprim [Sulfamethoxazole W/Trimethoprim] Rash        Medications Prior to Admission   Medication Sig Dispense Refill Last Dose     Calcium Carb-Cholecalciferol (CALTRATE 600+D3 SOFT) 600-800 MG-UNIT CHEW Take 1 tablet by mouth daily         cefTRIAXone (ROCEPHIN) 2 GM vial Inject 2 g into the vein every 24 hours        clindamycin (CLEOCIN) 600 MG/50ML infusion Inject 50 mLs (600 mg) into the vein every 8 hours        ferrous sulfate (FEROSUL) 325 (65 Fe) MG tablet Take 325 mg by mouth daily (with breakfast)        lisinopril (ZESTRIL) 5 MG tablet Take 1 tablet (5 mg) by mouth daily           Review of Systems:  Negative except for findings in the HPI Physical Exam:  Temp:  [96.9  F (36.1  C)-98.3  F (36.8  C)] 97.6  F (36.4  C)  Pulse:  [67-92] 70  Resp:  [18-29] 20  BP: (110-151)/() 125/86  SpO2:  [95 %-100 %] 100 %    Gen: Pleasant in no acute distress.  HEENT: NCAT. EOMI. PERRL.  Neck: No bruit, JVD or thyromegaly.  Chest: Chest tube in place on the left side x3.  Lungs: Decreased breath sounds on the left side.  Card: RRR. NSR. No RMG. Peripheral pulses present and symmetric. No edema.  Abd: Soft NT ND. No mass. Normal bowel sounds.  Skin: No rash.  Extr: No edema.  Neuro: Alert and oriented to place time and person. Cranial nerves II to XII intact.       ============================    Pertinent Labs  personally reviewed.   Recent Labs   Lab 04/14/22  0505 04/13/22  0602 04/12/22  0843   WBC 12.7* 6.0 4.8   HGB 9.3* 7.8* 8.2*   HCT 28.2* 25.3* 27.2*    410 417       Recent Labs   Lab 04/14/22  0505      CO2 24   BUN 7*   ALBUMIN 2.0*   ALKPHOS 107   ALT 21   AST 15       MICROBIOLOGY DATA:  Personally reviewed  Blood Culture Line, venous  Order: [id number]   Collected 4/3/2022 11:41 AM     Status: Edited Result - " FINAL     Visible to patient: Yes (not seen)    Specimen Information: Line, venous; Blood         0 Result Notes    Culture Positive on the 1st day of incubation Abnormal        Haemophilus influenzae nontypable Panic     1 of 2 bottles        Resulting Agency: [facility]       Susceptibility     Haemophilus influenzae nontypable     HANNA     Amoxicillin/Clavulanate 0.75 ug/mL Susceptible     Ampicillin 0.38 ug/mL Susceptible     Ceftriaxone <=0.016 ug/mL Susceptible     Levofloxacin 0.032 ug/mL Susceptible     Meropenem 0.125 ug/mL Susceptible                  Specimen Collected: 04/03/22 11:41 AM Last Resulted: 04/13/22  3:53 PM                  Pertinent Radiology  personally reviewed.

## 2022-04-14 NOTE — PLAN OF CARE
Problem: Respiratory Compromise (Surgery Nonspecified)  Goal: Effective Oxygenation and Ventilation  Outcome: Ongoing, Progressing   Goal Outcome Evaluation:    Patient arrived to unit shortly before 2100.    Patient has a total of 4 chest tubes (3 canisters), all to -20cm suction, no air leaks present.  Dressings clean, dry and intact.  No output from right chest tube; 54cc out from left-side canister #1; 69cc out from left-side canister #2.    Problem: Pain (Surgery Nonspecified)  Goal: Acceptable Pain Control  Outcome: Ongoing, Progressing  IV Dilaudid given x2 during this shift. Pain located in upper, left back. Dressing at site is clean, dry and intact.  Pain improved after administration of Dilaudid.    Problem: Respiratory Compromise (Surgery Nonspecified)  Goal: Effective Oxygenation and Ventilation  Outcome: Ongoing, Progressing  SpO2 stable on 2L - continuous pulse oximetry in place.    Krystal Kincaid RN

## 2022-04-14 NOTE — PLAN OF CARE
Goal Outcome Evaluation:        Problem: Pain (Surgery Nonspecified)  Goal: Acceptable Pain Control  Outcome: Ongoing, Not Progressing  Intervention: Prevent or Manage Pain  Recent Flowsheet Documentation  Taken 4/14/2022 1700 by Macie Paniagua RN  Pain Management Interventions: medication (see MAR)  Taken 4/14/2022 1600 by Macie Paniagua RN  Pain Management Interventions: medication (see MAR)     Back Pain 5/10. Pt states Chest Tubes sites are minimal pain. Pain controlled with Dilaudid PO PRN. Medicated prior to ambulating & pt tolerated well.     *Increased pain to back after walking in the halls tonight for the first time- MD notified - Valium given.    Problem: Respiratory Compromise (Surgery Nonspecified)  Goal: Effective Oxygenation and Ventilation  Outcome: Ongoing, Not Progressing  Intervention: Optimize Oxygenation and Ventilation  Recent Flowsheet Documentation  Taken 4/14/2022 1600 by Macie Paniagua RN  Head of Bed (HOB) Positioning: HOB at 30 degrees   Chest Tubes -20cm sx, 3 tubes on Left, 1 tube Right side. (3 canisters total)   Ok to ambulate on waterseal. No air leak present. Pt ranges from 95%RA  to 2L NC. Cont. Pulse Oximeter.    Macie Paniagua, TIMOTEO

## 2022-04-14 NOTE — CONSULTS
Care Management Initial Consult    General Information  Assessment completed with: Patient, patient  Type of CM/SW Visit: Initial Assessment    Primary Care Provider verified and updated as needed:     Readmission within the last 30 days:           Advance Care Planning:            Communication Assessment  Patient's communication style: spoken language (English or Bilingual)    Hearing Difficulty or Deaf: no   Wear Glasses or Blind: no    Cognitive  Cognitive/Neuro/Behavioral: WDL                      Living Environment:   People in home: child(charlie), dependent, spouse     Current living Arrangements: house      Able to return to prior arrangements:          Description of Support System:    supportive       Current Resources:   Patient receiving home care services: No     Community Resources: None  Equipment currently used at home: none  Supplies currently used at home:      Employment/Financial:  Employment Status: employed full-time        Financial Concerns:             Lifestyle & Psychosocial Needs:  Social Determinants of Health     Tobacco Use: Low Risk      Smoking Tobacco Use: Never Smoker     Smokeless Tobacco Use: Never Used   Alcohol Use: Not on file   Financial Resource Strain: Not on file   Food Insecurity: Not on file   Transportation Needs: Not on file   Physical Activity: Not on file   Stress: Not on file   Social Connections: Not on file   Intimate Partner Violence: Not on file   Depression: Not at risk     PHQ-2 Score: 0   Housing Stability: Not on file       Functional Status:  Prior to admission patient needed assistance:   Dependent ADLs:: Independent  Dependent IADLs:: Independent       Additional Information:  Chart reviewed. SW met with patient in her room,  present. Introduced self, CM role and completed initial assessment. Patient lives with her  and daughter. She is typically independent at baseline. Patient denies needs for discharge at this time, informed pt that care  management will follow progression of care for identified needs. ID is following for antibiotic plan.    Gia Renee LGSW

## 2022-04-14 NOTE — PROGRESS NOTES
PULMONARY MEDICINE PROGRESS NOTE  4/11/2022      Admit Date: 4/3/2022  CODE: Full Code    Reason for Consult: left empyema due to Haemophilus influenzae, suspectedcommon variable immunodeficiency    Assessment/Plan:   41 year old female never smoker with primary hypertension, pneumonia diagnosis since February 2022 who was admitted to [facility] on 4/3/2022 with bilateral pleural effusions and pneumonia, now being treated for Haemophilus influenzae bacteremia and left empyema with Haemophilus influenzae, left pleural catheter in place 4/4-4/9 s/p intrapleural alteplase-dornase, course complicated by 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    Severe community acquired pneumonia, Haemophilus influenzae bacteremia, left empyema: Patient with persistent pneumonia since February 2022, failed 2 rounds of outpatient antibiotic therapy, now admitted with recurrent pneumonia and bilateral pleural effusions (L > R). Underwent thoracentesis 4/3, 800 mL thick yellow fluid extracted, overt empyema with pleural fluid and blood positive for H flu. Chest tube placed 4/4, ~ 750 mL removed soon after insertion then dropped off; received intrapleural alteplase-dornase 4/5-4/7 (BID dosing x 6 doses). CT chest 4/8 with improvement of left empyema, associated atelectasis/possible consolidation; stable right atelectasis and pleural effusion. Limited CT ouput (25 mL in 24 hrs) so removed tube on 4/9.     Suspected CVID vs acquired immunodeficiency: Immunoglobulins sent 4/5, profound reduction in all lines: IgA < 5, IgG < 109, IgE < 2.  IVIG infusion initiated on 4/8, but became febrile and symptomatic soon after and it was discontinued. Has not yet actually received IVIG    Plan:   - asked hematology to reconsult to help with decision on further diagnostic workup of her severe immunodeficiency as well as guidance for pre-medication and reattempt at IVIG infusion.   - continues on  "ceftriaxone and clindamycin--ID is following  - right chest tube to -20 cm H2O suction; output has been minimal. Will consider repeating CT scan 4/15 after CXR in am.   - s/p washout/decortication 4/13 by Dr. Kumar, chest tube managed by him on left.   - Encourage pulmonary hygiene:  IS especially post-operatively with aggressive pain control.   - will follow; Dr. Botello rounds tomorrow 4/15                                                                                                                                                     SUBJECTIVE/INTERVAL HISTORY   Did well yesterday post-operatively. Pain well controlled, performing IS. Chest tube output as anticipated.       Exam/Data:     Vitals  BP (!) 144/95 (BP Location: Right arm)   Pulse 98   Temp 97.8  F (36.6  C) (Oral)   Resp 18   Ht 1.676 m (5' 6\")   Wt 61.8 kg (136 lb 4.8 oz)   LMP 03/01/2022   SpO2 96%   Breastfeeding No   BMI 22.00 kg/m    BP - Mean:  [] 105  I/O last 3 completed shifts:  In: 2845 [P.O.:720; I.V.:1525; IV Piggyback:250]  Out: 1132 [Urine:800; Blood:50; Chest Tube:282]  Weight change:     EXAM:  BP (!) 144/95 (BP Location: Right arm)   Pulse 98   Temp 97.8  F (36.6  C) (Oral)   Resp 18   Ht 1.676 m (5' 6\")   Wt 61.8 kg (136 lb 4.8 oz)   LMP 03/01/2022   SpO2 96%   Breastfeeding No   BMI 22.00 kg/m      Intake/Output last 3 shifts:  I/O last 3 completed shifts:  In: 2845 [P.O.:720; I.V.:1525; IV Piggyback:250]  Out: 1132 [Urine:800; Blood:50; Chest Tube:282]      Physical Exam  Gen: alert, oriented, appears fatigued  HEENT: NT, no HANNAH  CV: tachy, regular, no m/g/r  Resp: diminished left base, chest tubes in place; right pleural pigtail in place,   Abd: soft, nontender, BS+  Skin: no rashes or lesions  Ext: no edema  Neuro: PERRL, nonfocal exam    ROS: A complete 10-system review of systems was obtained and is negative with the exception of what is noted in the history of present illness.    Medications:         " acetaminophen  975 mg Oral Q8H     ferrous sulfate  325 mg Oral Every Other Day     levofloxacin  750 mg Intravenous Q24H     [Held by provider] lisinopril  5 mg Oral Daily     multivitamin RENAL  1 tablet Oral Daily     polyethylene glycol  17 g Oral Daily     senna-docusate  1 tablet Oral BID     sodium chloride (PF)  3 mL Intracatheter Q8H     sodium chloride (PF)  3 mL Intracatheter Q8H         DATA  All laboratory and radiology has been personally reviewed by myself today.  Recent Labs   Lab 04/14/22  0505   WBC 12.7*   HGB 9.3*   HCT 28.2*        Recent Labs   Lab 04/14/22  0505      CO2 24   BUN 7*   ALKPHOS 107   ALT 21   AST 15       IMAGING:   Chest CT (April 2022):  - images directly reviewed, formal interpretation follows:  FINDINGS:   LUNGS AND PLEURA: The left basilar chest tube is in place. There is a small amount of residual left pleural effusion which is decreased from 04/04/2022. The fluid is slightly medial and superior compared to the tip of the pleural drain. There is a small   amount of air in the left pleural space compatible with pleural catheter.  There are a few residual linear opacities in the left upper and lower lobe in the area of the prior consolidation likely atelectasis/scarring. There is persistent but improved   atelectasis in the left lung base compared to prior exam. Relatively similar small right effusion and associated atelectasis.     MEDIASTINUM/AXILLAE: Heart size is normal. No lymphadenopathy. No thoracic aneurysm.     CORONARY ARTERY CALCIFICATION: None.     UPPER ABDOMEN: No significant finding.     MUSCULOSKELETAL: Probable benign bone island in the T8 vertebral body.                                                                      IMPRESSION:   1.  Persistent but significantly decreased left pleural effusion and associated atelectasis when compared to 04/03/2022. There is a small amount of residual fluid slightly superior and medial to the tip of the  left pleural drain. Small amount of air in   the left pleural space is consistent with pleural drain placement.  2.  Similar small right effusion and associated atelectasis.  3.  There are a few linear opacities in the left upper lung and lower lung in the area of the prior consolidation likely a combination of atelectasis and/or scarring.    CXR (4/11/22):  - images directly reviewed, formal interpretation follows:  IMPRESSION: No change in the small left-sided pleural effusion. There is a small air/fluid level in the left paravertebral space consistent with a loculated hydropneumothorax with associated lower lobe basilar opacities with a few air bronchograms. This   is unchanged since the pigtail catheter was in place.      There is a small right effusion with right lower lobe atelectasis as well, unchanged.     Heart and pulmonary vascularity are normal.    Chest CT with contrast (4/12/22):  - images directly reviewed, formal interpretation follows:  FINDINGS:   LUNGS AND PLEURA: The left chest tube seen on the prior CT has been removed. There is a persistent loculated pleural fluid and air collection at the left medial lung base consistent with history of empyema. The loculated left medial collection measures   4.4 x 3.6 x 6 cm. On the prior CT it measured 4.4 x 2.4 x 6.4 cm. There is consolidation or atelectasis in the left lower lobe with air bronchograms and a trace posterior left pleural effusion. There is an additional small loculated pleural and air fluid   collection along the left heart border on image 61 of series 6 measuring 1.5 x 0.7 x 1.5 cm. On the prior CT this measured 2.6 .6 x 0.7 x 2 cm. There is a small right pleural effusion with right lower lobe atelectasis. The right pleural effusion has a   mildly enhancing rim and would be consistent with a small right empyema. This measures 7.3 cm in right-left dimension x 2.2 cm in anterior-posterior dimension and 4.4 cm in craniocaudal extent. There is  mild linear atelectasis in the left upper lobe.     MEDIASTINUM/AXILLAE: No lymphadenopathy. No pericardial effusion. No thoracic aortic aneurysm. Normal heart size.     CORONARY ARTERY CALCIFICATION: None.     UPPER ABDOMEN: Cholelithiasis.     MUSCULOSKELETAL: Probable bone island T8 vertebra. No suspicious bony lesions.                                                                      IMPRESSION:   1.  Left chest tube has been removed. There is a persistent loculated hydropneumothorax/empyema at the left medial lung base 4.4 x 3.6 x 6 cm. This compares to 4.4 x 2.4 x 6.4 cm on 04/08/2022 when the chest tube was in place. There is also a small fluid   and air-containing collection along the left heart border measuring 1.5 x 0.7 x 1.5 cm. This has decreased in size from the prior study when it measured 2.6 x 0.7 x 2 cm.  2.  Small right posterior pleural fluid collection with an enhancing rim. This could represent a right-sided empyema as well.  3.  Atelectasis or consolidation in both lung bases left greater than right. No free pneumothorax.  4.  No lymphadenopathy.    CXR: personally reviewed  EXAM: XR CHEST PORT 1 VIEW  LOCATION: [facility]  DATE/TIME: 4/14/2022 6:28 AM     INDICATION: empyema  COMPARISON: 04/13/2022                                                                      IMPRESSION: Since correlated of chest radiograph there appears to be slightly less dense infiltrates in the left perihilar region, otherwise the exam is stable to include the location of the appliances. There is partial loss left hemidiaphragm consistent   with infiltrate/atelectasis left lower lobe.

## 2022-04-14 NOTE — PLAN OF CARE
Problem: Plan of Care - These are the overarching goals to be used throughout the patient stay.    Goal: Optimal Comfort and Wellbeing  Intervention: Monitor Pain and Promote Comfort  Recent Flowsheet Documentation  Taken 4/14/2022 0421 by Shilpa Perez RN  Pain Management Interventions: medication (see MAR)  Taken 4/13/2022 2325 by Shilpa Perez RN  Pain Management Interventions:   medication (see MAR)   emotional support     Problem: Activity Intolerance (Pulmonary Impairment)  Goal: Improved Activity Tolerance  Outcome: Ongoing, Progressing     Problem: Airway Clearance Ineffective (Pulmonary Impairment)  Goal: Effective Airway Clearance  Outcome: Ongoing, Progressing     Problem: Gas Exchange Impaired (Pulmonary Impairment)  Goal: Optimal Gas Exchange  Outcome: Ongoing, Progressing     Problem: Respiratory Compromise (Surgery Nonspecified)  Goal: Effective Oxygenation and Ventilation  Outcome: Ongoing, Progressing  Intervention: Optimize Oxygenation and Ventilation  Recent Flowsheet Documentation  Taken 4/14/2022 0423 by Shilpa Perez RN  Head of Bed (HOB) Positioning: HOB at 30 degrees  Taken 4/13/2022 2325 by Shilpa Perez RN  Head of Bed (HOB) Positioning: HOB at 30 degrees   Goal Outcome Evaluation:    Plan of Care Reviewed With: patient     Overall Patient Progress: improving    Patient Alert and Orientated X4. States pain and soreness in tube insertion sites. Unable to cough due to fear of pain. States she is sad that she has to be in the hospital due to infection. Denies hunger. Denies SOB, but remains in bed for entirety of shift. Pain managed with PO medications, please see MAR. Slept between cares.Shilpa Perez RN on 4/14/2022 at 4:27 AM

## 2022-04-14 NOTE — PLAN OF CARE
Problem: Gas Exchange Impaired (Pulmonary Impairment)  Goal: Optimal Gas Exchange  Outcome: Ongoing, Progressing     Problem: Activity Intolerance (Pulmonary Impairment)  Goal: Improved Activity Tolerance  Outcome: Ongoing, Progressing   Stable on room air.  Lungs diminished with left posterior lung crackles ausculted in lower half of lung field.  Minimal serosanguinous drainage from left chest tubes, and only in proximal tube of right chest drain.  No air leaks noted.  Maintaining -20 cm suction.         Problem: Pain (Surgery Nonspecified)  Goal: Acceptable Pain Control  Outcome: Ongoing, Progressing  Intervention: Prevent or Manage Pain  Recent Flowsheet Documentation  Taken 4/14/2022 0800 by Arabella Kraft RN  Pain Management Interventions:   medication (see MAR)   emotional support   repositioned  Reports adequate pain control with Dilaudid 2 mg po.  Tolerating bedside commode activity.

## 2022-04-15 ENCOUNTER — APPOINTMENT (OUTPATIENT)
Dept: RADIOLOGY | Facility: HOSPITAL | Age: 42
DRG: 163 | End: 2022-04-15
Attending: SURGERY
Payer: COMMERCIAL

## 2022-04-15 LAB
ANION GAP SERPL CALCULATED.3IONS-SCNC: 7 MMOL/L (ref 5–18)
BUN SERPL-MCNC: 5 MG/DL (ref 8–22)
CALCIUM SERPL-MCNC: 8.7 MG/DL (ref 8.5–10.5)
CHLORIDE BLD-SCNC: 106 MMOL/L (ref 98–107)
CO2 SERPL-SCNC: 24 MMOL/L (ref 22–31)
CREAT SERPL-MCNC: 0.56 MG/DL (ref 0.6–1.1)
ERYTHROCYTE [DISTWIDTH] IN BLOOD BY AUTOMATED COUNT: 20 % (ref 10–15)
GFR SERPL CREATININE-BSD FRML MDRD: >90 ML/MIN/1.73M2
GLUCOSE BLD-MCNC: 95 MG/DL (ref 70–125)
GLUCOSE BLDC GLUCOMTR-MCNC: 101 MG/DL (ref 70–99)
HCT VFR BLD AUTO: 30.1 % (ref 35–47)
HGB BLD-MCNC: 9.1 G/DL (ref 11.7–15.7)
MCH RBC QN AUTO: 24.5 PG (ref 26.5–33)
MCHC RBC AUTO-ENTMCNC: 30.2 G/DL (ref 31.5–36.5)
MCV RBC AUTO: 81 FL (ref 78–100)
PATH REPORT.COMMENTS IMP SPEC: NORMAL
PATH REPORT.COMMENTS IMP SPEC: NORMAL
PATH REPORT.FINAL DX SPEC: NORMAL
PATH REPORT.GROSS SPEC: NORMAL
PATH REPORT.MICROSCOPIC SPEC OTHER STN: NORMAL
PATH REPORT.RELEVANT HX SPEC: NORMAL
PHOTO IMAGE: NORMAL
PLATELET # BLD AUTO: 422 10E3/UL (ref 150–450)
POTASSIUM BLD-SCNC: 4.7 MMOL/L (ref 3.5–5)
RBC # BLD AUTO: 3.71 10E6/UL (ref 3.8–5.2)
SODIUM SERPL-SCNC: 137 MMOL/L (ref 136–145)
WBC # BLD AUTO: 11.6 10E3/UL (ref 4–11)

## 2022-04-15 PROCEDURE — 250N000011 HC RX IP 250 OP 636: Performed by: FAMILY MEDICINE

## 2022-04-15 PROCEDURE — 71045 X-RAY EXAM CHEST 1 VIEW: CPT

## 2022-04-15 PROCEDURE — 250N000013 HC RX MED GY IP 250 OP 250 PS 637: Performed by: SURGERY

## 2022-04-15 PROCEDURE — 99232 SBSQ HOSP IP/OBS MODERATE 35: CPT | Performed by: STUDENT IN AN ORGANIZED HEALTH CARE EDUCATION/TRAINING PROGRAM

## 2022-04-15 PROCEDURE — 85027 COMPLETE CBC AUTOMATED: CPT | Performed by: HOSPITALIST

## 2022-04-15 PROCEDURE — 99233 SBSQ HOSP IP/OBS HIGH 50: CPT | Performed by: FAMILY MEDICINE

## 2022-04-15 PROCEDURE — 99221 1ST HOSP IP/OBS SF/LOW 40: CPT | Performed by: INTERNAL MEDICINE

## 2022-04-15 PROCEDURE — 88305 TISSUE EXAM BY PATHOLOGIST: CPT | Mod: 26 | Performed by: PATHOLOGY

## 2022-04-15 PROCEDURE — 250N000013 HC RX MED GY IP 250 OP 250 PS 637: Performed by: HOSPITALIST

## 2022-04-15 PROCEDURE — 120N000001 HC R&B MED SURG/OB

## 2022-04-15 PROCEDURE — 250N000013 HC RX MED GY IP 250 OP 250 PS 637: Performed by: FAMILY MEDICINE

## 2022-04-15 PROCEDURE — 80048 BASIC METABOLIC PNL TOTAL CA: CPT | Performed by: FAMILY MEDICINE

## 2022-04-15 PROCEDURE — 99232 SBSQ HOSP IP/OBS MODERATE 35: CPT | Performed by: NURSE PRACTITIONER

## 2022-04-15 PROCEDURE — 250N000011 HC RX IP 250 OP 636: Performed by: SURGERY

## 2022-04-15 PROCEDURE — 36415 COLL VENOUS BLD VENIPUNCTURE: CPT | Performed by: FAMILY MEDICINE

## 2022-04-15 RX ORDER — AMLODIPINE BESYLATE 2.5 MG/1
2.5 TABLET ORAL DAILY
Status: DISCONTINUED | OUTPATIENT
Start: 2022-04-15 | End: 2022-04-17

## 2022-04-15 RX ORDER — HYDRALAZINE HYDROCHLORIDE 20 MG/ML
10 INJECTION INTRAMUSCULAR; INTRAVENOUS EVERY 6 HOURS PRN
Status: DISCONTINUED | OUTPATIENT
Start: 2022-04-15 | End: 2022-04-16

## 2022-04-15 RX ADMIN — AMLODIPINE BESYLATE 2.5 MG: 2.5 TABLET ORAL at 17:49

## 2022-04-15 RX ADMIN — ACETAMINOPHEN 975 MG: 325 TABLET ORAL at 05:20

## 2022-04-15 RX ADMIN — B-COMPLEX W/ C & FOLIC ACID TAB 1 MG 1 TABLET: 1 TAB at 08:37

## 2022-04-15 RX ADMIN — MAGNESIUM HYDROXIDE 30 ML: 400 SUSPENSION ORAL at 16:59

## 2022-04-15 RX ADMIN — ACETAMINOPHEN 975 MG: 325 TABLET ORAL at 20:46

## 2022-04-15 RX ADMIN — HYDRALAZINE HYDROCHLORIDE 10 MG: 20 INJECTION INTRAMUSCULAR; INTRAVENOUS at 17:49

## 2022-04-15 RX ADMIN — HYDROMORPHONE HYDROCHLORIDE 4 MG: 4 TABLET ORAL at 02:06

## 2022-04-15 RX ADMIN — HYDROMORPHONE HYDROCHLORIDE 4 MG: 4 TABLET ORAL at 16:59

## 2022-04-15 RX ADMIN — LEVOFLOXACIN 750 MG: 5 INJECTION, SOLUTION INTRAVENOUS at 22:01

## 2022-04-15 RX ADMIN — HYDROCODONE BITARTRATE AND ACETAMINOPHEN 1 TABLET: 5; 325 TABLET ORAL at 19:57

## 2022-04-15 RX ADMIN — HYDROMORPHONE HYDROCHLORIDE 4 MG: 4 TABLET ORAL at 21:11

## 2022-04-15 RX ADMIN — SENNOSIDES AND DOCUSATE SODIUM 1 TABLET: 8.6; 5 TABLET ORAL at 20:47

## 2022-04-15 RX ADMIN — HYDROMORPHONE HYDROCHLORIDE 4 MG: 4 TABLET ORAL at 11:16

## 2022-04-15 RX ADMIN — POLYETHYLENE GLYCOL 3350 17 G: 17 POWDER, FOR SOLUTION ORAL at 08:37

## 2022-04-15 RX ADMIN — ACETAMINOPHEN 975 MG: 325 TABLET ORAL at 12:33

## 2022-04-15 RX ADMIN — SENNOSIDES AND DOCUSATE SODIUM 1 TABLET: 8.6; 5 TABLET ORAL at 08:36

## 2022-04-15 ASSESSMENT — ACTIVITIES OF DAILY LIVING (ADL)
ADLS_ACUITY_SCORE: 9
ADLS_ACUITY_SCORE: 9
ADLS_ACUITY_SCORE: 7
ADLS_ACUITY_SCORE: 9
ADLS_ACUITY_SCORE: 7
ADLS_ACUITY_SCORE: 9
ADLS_ACUITY_SCORE: 7
ADLS_ACUITY_SCORE: 9
ADLS_ACUITY_SCORE: 9
ADLS_ACUITY_SCORE: 7
ADLS_ACUITY_SCORE: 9
ADLS_ACUITY_SCORE: 9
ADLS_ACUITY_SCORE: 7
ADLS_ACUITY_SCORE: 9
ADLS_ACUITY_SCORE: 9
ADLS_ACUITY_SCORE: 7
ADLS_ACUITY_SCORE: 7

## 2022-04-15 NOTE — PROGRESS NOTES
[facility] ID Inpatient follow up       Patient:  Arely Saldaña  Date of birth 1980, Medical record number [id number]  Date of Visit:  04/15/2022  Attending Physician: Suleman Kumar MD         Assessment and Recommendations:   Assessment:  Arely Saldaña is a 41 year old female with   1.  Recurrent sinus infections since August 2021.  2.  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  Not to reduce IgG level <109, IgA < 5.  Has splenomegaly.  Status post IVIG treatment on 4/8/2022 with high-grade fever.  3.  Bacteremia with nontypeable haemophilus influenza.  Positive blood culture on 4/3/2022.  Source is left-sided empyema.  4.  Left-sided empyema with nontypeable haemophilus influenza.  Status post aspiration on 4/3/2022.  Status post chest tube placement on 4/4/2022.  Chest tube removed on 4/9/2022.  Treated with ceftriaxone and clindamycin, changed to Levaquin on 4/12/2022 for lower extremity rash.  Status post left-sided thoracotomy for complete decortication on 4/13/2022 by Dr. Kumar.   5.  Lower extremity rash.  Resolved.        Recommendations:   Continue on Levaquin.  Follow surgical cultures from 4/13/2022.  Chest tube management per surgery.  Will need immunology referral at discharge.    Discussed with the patient, nursing staff.    ID will follow.    Karlos Rae MD.  [facility].   [facility]  Office Telephone [phone].  Fax [phone]  [facility] paging            Interval History:     HPI:  The interval history was reviewed.   Doing okay today.  Reports some pain at the chest tube site.  No fever.  No side effect from Levaquin.    Pertinent cultures include:  No results found for: CULT    Recent Inflammatory Biomarkers:   Recent Labs   Lab Test 04/15/22  0549 04/14/22  0505 04/13/22  0602 04/12/22  0843 04/11/22  0436 04/10/22  0628 04/04/22  0501  "22  1141 03/10/22  0855 22  1435   CRP  --   --   --   --   --   --   --  14.7*  --  1.7*   WBC 11.6* 12.7* 6.0 4.8 3.3* 3.6*   < > 8.8   < > 7.3    < > = values in this interval not displayed.            Review of Systems:   CONSTITUTIONAL:    Temp Max: Temp (24hrs), Av.3  F (36.8  C), Min:97.7  F (36.5  C), Max:99.3  F (37.4  C)   .  Negative except for findings in the HPI.           Current Medications (antimicrobials listed in bold):       acetaminophen  975 mg Oral Q8H     ferrous sulfate  325 mg Oral Every Other Day     levofloxacin  750 mg Intravenous Q24H     [Held by provider] lisinopril  5 mg Oral Daily     multivitamin RENAL  1 tablet Oral Daily     polyethylene glycol  17 g Oral Daily     senna-docusate  1 tablet Oral BID     sodium chloride (PF)  3 mL Intracatheter Q8H     sodium chloride (PF)  3 mL Intracatheter Q8H              Allergies:     Allergies   Allergen Reactions     Immune Globulin (Human) Other (See Comments)     Fevers, chills , tremor     Metronidazole Other (See Comments)     Had \"tremulous chills\" associated with iv infusion.  Resolved after stopping infusion     Penicillins Hives     Sulfamethoxazole-Trimethoprim [Sulfamethoxazole W/Trimethoprim] Rash            Physical Exam:   Vitals were reviewed  Patient Vitals for the past 24 hrs:   BP Temp Temp src Pulse Resp SpO2 Weight   04/15/22 1117 (!) 157/93 98.5  F (36.9  C) Oral 86 18 96 % --   04/15/22 0714 (!) 152/90 98  F (36.7  C) Oral 90 18 93 % --   04/15/22 0616 -- -- -- -- -- -- 64.6 kg (142 lb 6.4 oz)   04/15/22 0437 (!) 146/96 98.4  F (36.9  C) Oral 92 17 97 % --   04/15/22 0000 (!) 134/92 97.7  F (36.5  C) Oral 88 18 95 % --   22 2045 (!) 142/94 99.3  F (37.4  C) Oral 97 16 100 % --   22 1945 -- -- -- -- -- 98 % --   22 1545 (!) 144/95 97.8  F (36.6  C) Oral 98 18 96 % --   22 1233 122/76 98.2  F (36.8  C) Oral 100 16 96 % --       Physical Examination:  Gen: Pleasant in no acute " distress.  HEENT: NCAT. EOMI. PERRL.  Neck: No bruit, JVD or thyromegaly.  Chest: Left chest tubes x3 in place.  Lungs: Decreased breath sounds on the left base.  Card: RRR. NSR. No RMG. Peripheral pulses present and symmetric. No edema.  Abd: Soft NT ND. No mass. Normal bowel sounds.  Skin: No rash.  Extr: No edema.  Neuro: Alert and oriented to place time and person. Cranial nerves II to XII intact.         Laboratory Data:   ID Labs:  Microbiology labs:  Reviewed.    Recent Labs   Lab Test 04/03/22  1141 01/21/22  1435   CRP 14.7* 1.7*     Recent Labs   Lab Test 04/15/22  0549 04/14/22  0505 04/13/22  0602 04/12/22  0843 04/11/22  0436 04/10/22  0628   WBC 11.6* 12.7* 6.0 4.8 3.3* 3.6*     Recent Labs   Lab Test 04/15/22  0549 04/14/22  0505 04/05/22  0506 04/03/22  2312   CR 0.56* 0.56* 0.61 0.65   GFRESTIMATED >90 >90 >90 >90       Hematology Studies  Recent Labs   Lab Test 04/15/22  0549 04/14/22  0505 04/13/22  0602 04/12/22  0843 04/11/22  0436 04/10/22  0628   WBC 11.6* 12.7* 6.0 4.8 3.3* 3.6*   HCT 30.1* 28.2* 25.3* 27.2* 23.0* 22.6*    429 410 417 317 278       Metabolic  Recent Labs   Lab Test 04/15/22  0549 04/14/22  0505 04/05/22  0506    137 139   BUN 5* 7* 13   CO2 24 24 21*   CR 0.56* 0.56* 0.61   GFRESTIMATED >90 >90 >90       Hepatic Studies  Recent Labs   Lab Test 04/14/22  0505 04/06/22  0449 04/05/22  0506   BILITOTAL 0.2 0.4 0.4   ALKPHOS 107 142* 155*   ALBUMIN 2.0* 1.8* 1.9*   AST 15 10 8   ALT 21 16 23       Immunologlobulins  Recent Labs   Lab Test 04/05/22  1739 04/03/22  1141 01/21/22  1435   IGG <16*  <109*  --   --    IGE <2  --   --    IGA <5* <2*  --    SED  --   --  14            Imaging Data:   Reviewed

## 2022-04-15 NOTE — CONSULTS
[facility] Hematology and Oncology Inpatient Consult Note    Patient: Arely Saldaña  MRN: [id number]  Date of Service: 4/15/2022      Reason for Visit        Assessment  1.  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  Left-sided empyema with nontypeable Haemophilus influenza.  Status post chest tube placement  3.  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  Splenomegaly    Plan:  1.  Severe immunodeficiency with low IgG and IgA.  This is probably consistent with CVID.  Further laboratory showed significantly reduced CD19 B cells.  There was no evidence of any monoclonal B cells.  CT scan shows mild splenomegaly which is probably due to underlying CVAT.  Splenomegaly lymphadenopathy are not uncommon in patients with C VID and the significance of this is not known but this does not necessarily represent a lymphoma.  She may need testing for antibody titers for previously received vaccinations.  She will definitely need a referral to immunology.     As far as restarting IVIG infusion is concerned, I reviewed her reactions to it previously.  It is mainly a febrile reaction.  It does not look like she had any anaphylactic reactions.  So potentially we can rechallenge her with appropriate premedications.  She is not currently febrile and does not look like she is having any worsening of her infection.  I would hold off on restarting IVIG for now.  But if she starts to spike a fever again and has any worsening of the infection then potentially we can give her IVIG.  We would probably give her Benadryl and Tylenol as premedications and start infusion slowly and probably give it over 24 hours, depend upon how he tolerates it. Could consider giving methylprednisolone if she were to have any reaction or even as a premedication.  I will hold off on any bone marrow biopsy or other testing to look for underlying hematological malignancy at this point in time since she is not behaving like  1.      Staging History    Cancer Staging  No matching staging information was found for the patient.      History  Ms. Arely Saldaña is a 41 year old female with recurrent infections since August who is currently in the hospital with left-sided empyema secondary to Haemophilus influenza A infection status post chest tube placement who also has significant immunodeficiency with low IgG, IgA and IgM levels concerning for CVAT who has been consulted by the medicine team to discuss IVIG management.    She was seen by my colleague last week.  I have reviewed her history of presenting illness from previous documentations in detail.  I also reviewed the CT scan images and other blood tests including immunoglobulin levels and flow cytometry.  IgG and IgA are extremely low.  Less than 100 and less than 5 respectively.    She was given a dose of IVIG but she had febrile reaction soon after starting infusion.  It was aborted.  She has done well since then.  Has not had any significant fever or evidence of worsening infection.  We have been consulted to see if we can reinitiate IVIG.    Review of systems.    A 14 point review of systems was obtained.  Positive findings noted in the history.  Rest of the review of system is otherwise negative.      Past History  Past Medical History:   Diagnosis Date     Anemia      ASCUS of cervix with negative high risk HPV 2018-2012 NIL annual pap 4/13/15 NIL pap, neg HPV 18 ASCUS, neg HPV 11/10/20 NIL pap, neg HPV     Hypertension     was on nifedipine and HCTZ before pregnancy, had since age early 20's     Sinusitis, chronic      Past Surgical History:   Procedure Laterality Date     C/SECTION, LOW TRANSVERSE  2021    with PPTL     FOOT SURGERY Left age 20    bursa removed from left foot     SALPINGECTOMY Bilateral 2021    with  section     THORACOSCOPY Left 2022    Procedure: LEFT THORACOSCOPY, DECORTICATION;  Surgeon: Suleman Kumar MD;   "Location: [facility] Main OR     WISDOM TOOTH EXTRACTION  age 19     Family History   Problem Relation Age of Onset     Hypertension Mother      Hypertension Father      Diabetes Father      Social History     Socioeconomic History     Marital status:      Spouse name: None     Number of children: 0     Years of education: None     Highest education level: None   Tobacco Use     Smoking status: Never Smoker     Smokeless tobacco: Never Used   Vaping Use     Vaping Use: Never used   Substance and Sexual Activity     Alcohol use: Yes     Comment: once every other month     Drug use: Never   Other Topics Concern     Parent/sibling w/ CABG, MI or angioplasty before 65F 55M? No       Allergies    Allergies   Allergen Reactions     Immune Globulin (Human) Other (See Comments)     Fevers, chills , tremor     Metronidazole Other (See Comments)     Had \"tremulous chills\" associated with iv infusion.  Resolved after stopping infusion     Penicillins Hives     Sulfamethoxazole-Trimethoprim [Sulfamethoxazole W/Trimethoprim] Rash          Physical Exam    BP (!) 178/105 (BP Location: Right arm)   Pulse 102   Temp 98.2  F (36.8  C) (Oral)   Resp 16   Ht 1.676 m (5' 6\")   Wt 64.6 kg (142 lb 6.4 oz)   LMP 03/01/2022   SpO2 96%   Breastfeeding No   BMI 22.98 kg/m      GENERAL: Alert and oriented to time place and person. In no distress.    HEAD: Atraumatic and normocephalic.    EYES: Extraocular movements intact.    LYMPH NODES: No palpable, cervical, or supraclavicular adenopathy    CHEST: clear to auscultation bilaterally.    CVS: S1 and S2 are Regular rate and rhythm.    EXTREMITIES: Warm.    NEUROLOGICAL: Preserved orientation.  Neuromuscular system intact.     SKIN: no rash, or bruising or purpura.  Left-sided chest tube in place.      Lab Results  Recent Results (from the past 24 hour(s))   Glucose by meter    Collection Time: 04/15/22  2:11 AM   Result Value Ref Range    GLUCOSE BY METER POCT 101 (H) 70 - 99 " mg/dL   CBC with platelets    Collection Time: 04/15/22  5:49 AM   Result Value Ref Range    WBC Count 11.6 (H) 4.0 - 11.0 10e3/uL    RBC Count 3.71 (L) 3.80 - 5.20 10e6/uL    Hemoglobin 9.1 (L) 11.7 - 15.7 g/dL    Hematocrit 30.1 (L) 35.0 - 47.0 %    MCV 81 78 - 100 fL    MCH 24.5 (L) 26.5 - 33.0 pg    MCHC 30.2 (L) 31.5 - 36.5 g/dL    RDW 20.0 (H) 10.0 - 15.0 %    Platelet Count 422 150 - 450 10e3/uL   Basic metabolic panel    Collection Time: 04/15/22  5:49 AM   Result Value Ref Range    Sodium 137 136 - 145 mmol/L    Potassium 4.7 3.5 - 5.0 mmol/L    Chloride 106 98 - 107 mmol/L    Carbon Dioxide (CO2) 24 22 - 31 mmol/L    Anion Gap 7 5 - 18 mmol/L    Urea Nitrogen 5 (L) 8 - 22 mg/dL    Creatinine 0.56 (L) 0.60 - 1.10 mg/dL    Calcium 8.7 8.5 - 10.5 mg/dL    Glucose 95 70 - 125 mg/dL    GFR Estimate >90 >60 mL/min/1.73m2        Imaging Results    XR Chest 2 Views    Result Date: 4/11/2022  EXAM: XR CHEST 2 VW LOCATION: [facility] DATE/TIME: 4/11/2022 1:24 PM INDICATION: effusion COMPARISON: 4/10/2022 and older studies, chest CT 04/08/2022 and older studies.     IMPRESSION: No change in the small left-sided pleural effusion. There is a small air/fluid level in the left paravertebral space consistent with a loculated hydropneumothorax with associated lower lobe basilar opacities with a few air bronchograms. This is unchanged since the pigtail catheter was in place. There is a small right effusion with right lower lobe atelectasis as well, unchanged. Heart and pulmonary vascularity are normal. Contacted Dr. Kumar's office re findings as requested  at [phone]. Was able to leave a message with answering service with call back information as he and his nurse were not available.     XR Chest 1 View    Result Date: 4/13/2022  EXAM: XR CHEST 1 VIEW LOCATION: [facility] DATE/TIME: 4/13/2022 10:20 PM INDICATION: empyema COMPARISON: 04/11/2022     IMPRESSION: Left-sided  chest tubes with a small right-sided thoracic catheter. Small bilateral subphrenic pneumothoraces. Asymmetric patchy interstitial opacity in the left upper lobe. Stable cardiac mediastinal silhouette.     XR Chest Port 1 View    Result Date: 4/15/2022  EXAM: XR CHEST PORT 1 VIEW LOCATION: [facility] DATE/TIME: 4/15/2022 5:43 AM INDICATION: empyema COMPARISON: 04/14/2022     IMPRESSION: Since comparative chest radiograph there is been a slight decrease in amount of infiltrate involving the left perihilar region, otherwise the exam is unchanged with the appliances in stable position with no greg pneumothorax identified.    XR Chest Port 1 View    Result Date: 4/14/2022  EXAM: XR CHEST PORT 1 VIEW LOCATION: [facility] DATE/TIME: 4/14/2022 6:28 AM INDICATION: empyema COMPARISON: 04/13/2022     IMPRESSION: Since correlated of chest radiograph there appears to be slightly less dense infiltrates in the left perihilar region, otherwise the exam is stable to include the location of the appliances. There is partial loss left hemidiaphragm consistent  with infiltrate/atelectasis left lower lobe.    XR Chest Port 1 View    Result Date: 4/10/2022  EXAM: XR CHEST PORT 1 VIEW LOCATION: [facility] DATE/TIME: 4/10/2022 7:57 AM INDICATION: Follow-up pleural effusions and pneumonia. Chest tube removal. COMPARISON: 04/07/2020 radiograph. 04/08/2022 CT.     IMPRESSION: Tiny right and small pleural effusions are present (the left pleural effusion has mildly increased versus increased atelectasis). Associated mild right and moderate left basilar atelectasis. No obvious pneumothorax identified. Partial obscuration of the cardiac silhouette from overlying fluid and opacities.     CT Chest w Contrast    Result Date: 4/12/2022  EXAM: CT CHEST W CONTRAST LOCATION: [facility] DATE/TIME: 4/12/2022 7:37 AM INDICATION: Empyema. COMPARISON: Chest  radiograph dated 04/11/2022, CT chest dated 04/08/2022. TECHNIQUE: CT chest with IV contrast. Multiplanar reformats were obtained. Dose reduction techniques were used. CONTRAST: Isovue 370 100ml. FINDINGS: LUNGS AND PLEURA: The left chest tube seen on the prior CT has been removed. There is a persistent loculated pleural fluid and air collection at the left medial lung base consistent with history of empyema. The loculated left medial collection measures 4.4 x 3.6 x 6 cm. On the prior CT it measured 4.4 x 2.4 x 6.4 cm. There is consolidation or atelectasis in the left lower lobe with air bronchograms and a trace posterior left pleural effusion. There is an additional small loculated pleural and air fluid  collection along the left heart border on image 61 of series 6 measuring 1.5 x 0.7 x 1.5 cm. On the prior CT this measured 2.6 .6 x 0.7 x 2 cm. There is a small right pleural effusion with right lower lobe atelectasis. The right pleural effusion has a mildly enhancing rim and would be consistent with a small right empyema. This measures 7.3 cm in right-left dimension x 2.2 cm in anterior-posterior dimension and 4.4 cm in craniocaudal extent. There is mild linear atelectasis in the left upper lobe. MEDIASTINUM/AXILLAE: No lymphadenopathy. No pericardial effusion. No thoracic aortic aneurysm. Normal heart size. CORONARY ARTERY CALCIFICATION: None. UPPER ABDOMEN: Cholelithiasis. MUSCULOSKELETAL: Probable bone island T8 vertebra. No suspicious bony lesions.     IMPRESSION: 1.  Left chest tube has been removed. There is a persistent loculated hydropneumothorax/empyema at the left medial lung base 4.4 x 3.6 x 6 cm. This compares to 4.4 x 2.4 x 6.4 cm on 04/08/2022 when the chest tube was in place. There is also a small fluid  and air-containing collection along the left heart border measuring 1.5 x 0.7 x 1.5 cm. This has decreased in size from the prior study when it measured 2.6 x 0.7 x 2 cm. 2.  Small right posterior  pleural fluid collection with an enhancing rim. This could represent a right-sided empyema as well. 3.  Atelectasis or consolidation in both lung bases left greater than right. No free pneumothorax. 4.  No lymphadenopathy. NOTE: ABNORMAL REPORT THE DICTATION ABOVE DESCRIBES AN ABNORMALITY FOR WHICH FOLLOW-UP IS NEEDED.         CT Chest Tube with Cath Placement    Result Date: 4/12/2022  EXAM: 1. PERCUTANEOUS CHEST TUBE PLACEMENT RIGHT PLEURAL SPACE 2. CT GUIDANCE LOCATION: [facility] DATE/TIME: 4/12/2022 11:27 AM INDICATION: empyema right TECHNIQUE: Dose reduction techniques were used. PROCEDURE: Informed consent obtained. Site marked. Prior images reviewed. Required items made available. Patient identity confirmed verbally and with arm band. Patient reevaluated immediately before beginning the procedure. Universal protocol was followed. Time out performed. The site was prepped and draped in sterile fashion. 5 mL of 1% lidocaine was infused into the local soft tissues. Using standard technique and under direct CT guidance, a 12 [place] chest tube catheter was inserted into the pleural space. The catheter was fixed in place with sutures and adhesive device, and the tubing was banded. Chest tube placed to Pleur-evac suction. SPECIMEN: 1 mL of serosanguinous fluid was aspirated and sent to lab for cultures and Gram stain. BLOOD LOSS: Minimal. The patient tolerated the procedure well. No immediate complications. SEDATION: None. Fentanyl 75 mcg for pain management.     IMPRESSION: 1.  Successful CT-guided chest tube placement into the right pleural space.     A total of 45 min were spent today on this visit which included face to face conversation with the patient, EMR review, counseling and co-ordination of care and medical documentation.      Signed by: Vance Velez MD

## 2022-04-15 NOTE — PROGRESS NOTES
Care Management Follow Up    Length of Stay (days): 2    Expected Discharge Date: 04/17/2022     Concerns to be Addressed: pulm status, post procedure care and monitoring    Patient plan of care discussed at interdisciplinary rounds: Yes    Anticipated Discharge Disposition: Home     Anticipated Discharge Services:  TBD  Anticipated Discharge DME:  CRICKET         Additional Information:  Patient admitted for right sided empyema, pneumonia, s/p washout/decortication 4/13. Chest tube in place. IV antibiotic.    Social History:  Per initial CM consult: Patient lives with her  and daughter. She is typically independent at baseline. Family to transport.     Anticipating return home with spouse. Final discharge needs pending recommendations.          Amy Barbour RN

## 2022-04-15 NOTE — PROGRESS NOTES
[facility]    Medicine Progress Note - Hospitalist Service       Date of Admission:  4/13/2022  Active Problems:    Essential hypertension    Pleural effusion    Empyema lung (H)     Assessment & Plan          40 yo female with a history of hypertension admitted with shortness of breath, found to have large left and small right pleural effusion/empyema.  Deviously diagnosed and treated for community-acquired pneumonia in February and March.    Seen by pulmonology, general surgery, infectious disease, hematology  Had left thoracentesis with 800 cc of purulent fluid removed, cultures positive for H. influenzae, blood culture also positive.  Left chest tube placed, now removed.Found to have very low IgG, IgA, IgE. Felt to have common variable immunodeficiency versus acquired immunodeficiency, given IVIG but had a significant reaction involving temp over 104, shaking chills. Eventually had left chest tube removed, subsequently required chest tube placement on the right.  Repeat imaging showed persistent left-sided empyema.  Treated with ceftriaxone and clindamycin initially, transitioned to Levaquin on the afternoon of 4/12.  Plan now to transfer to [facility] for thoracoscopy for the left thoracic empyema.     Right sided empyema  Pneumonia, bacteremia due to H flu  Left sided empyema, recurred after left chest tube removal on 4/9  Had left thoracentesis on 4/4, with 800 cc of purulent fluid, blood and pleural fluid culture positive for H influenza.  Received intrapleural alteplase on 4/5-4/7.  ET of chest on 4/8 showed improvement of left empyema.  Left chest tube removed on 4/9,  Repeat CT on 4/12 showed persistent left-sided empyema, small possible right empyema as well  Initially treated with Zosyn, vancomycin. Transition to Levaquin on 4/12   Right chest tube placed on 4/12  Now status post flexible bronchoscopy/left thoracoscopy/left thoracotomy for complete decortication,  visceral parietal pleural surfaces and parietal pleurectomy postoperative day 1  3 chest tubes in place, no air leak  Surgery, pulmonology, ID following  Cultures pending from 4/13  Continuing Levaquin    Right pleural effusion-  Chest tube placed on 4/12.  Very little output.  Repeat chest x-ray tomorrow, possible CT  Pulmonology managing    Severe hypogammaglobulinemia  Suspected C VID versus acquired immunodeficiency.  Has significant reduction in all lines: IgA < 5, IgG < 109, IgE < 2.  IVIG infusion initiated on 4/8, but became febrile and symptomatic soon after and it was discontinued.  Not fully completed.  Seen by hematology, will reconsult hematology to consider further diagnostic as well as consideration of reattempt with IVIG infusion with premedication.  Greatly appreciate pulmonology direction     Microcytic anemia, splenomegaly with iron deficient anemia  No evidence of blood loss  Appreciate hematology recs,   Hx of iron deficiency prior to admission, intolerance to oral iron due to abdominal pain  Given IV iron sucrose  No signs of GI loss  On iron sulfate     Hyperkalemia-  Likely iatrogenic from potassium in IV fluid, also on low-dose lisinopril.  Held lisinopril today, gave small bolus, resolved.  Discontinue IV fluid.    Essential hypertension  Stable.  Holding home lisinopril today due to hyperkalemia.  Use hydralazine as needed..        Diet: Combination Diet 2 gm K Diet  Snacks/Supplements Adult: Nepro Oral Supplement; Between Meals    DVT Prophylaxis: Pneumatic Compression Devices  Alexandra Catheter: Not present  Central Lines: None  Code Status: Full Code      Disposition Plan   Expected Discharge: 04/17/2022     Anticipated discharge location: home;home with family    Delays:            The patient's care was discussed with the Bedside Nurse, Care Coordinator/, Patient, Patient's Family and Infectious disease, pulmonology Consultant. for total time 36 minutes with greater than 50%  "of total time spent in counseling and coordination of care.    NAVIN MORALEZ MD  Hospitalist Service  [facility]  Securely message with the [employer] Web Console (learn more here)  Text page via [employer] Paging/Directory        Clinically Significant Risk Factors Present on Admission                 ______________________________________________________________________    Interval History   Remainder of 12 point review of systems negative except as noted below    Subjective:  Patient doing okay.  Left chest tube pain controlled.  No shortness of breath.  Afebrile.    Data reviewed today: I reviewed all medications, new labs and imaging results over the last 24 hours.     Physical Exam   Vital Signs: Temp: 97.8  F (36.6  C) Temp src: Oral BP: (!) 144/95 Pulse: 98   Resp: 18 SpO2: 96 % O2 Device: None (Room air) Oxygen Delivery: 2 LPM  Weight: 136 lbs 4.8 oz  Physical Exam:  Temp:  [96.9  F (36.1  C)-98.2  F (36.8  C)] 97.8  F (36.6  C)  Pulse:  [] 98  Resp:  [16-29] 18  BP: (122-151)/() 144/95  SpO2:  [95 %-100 %] 96 %    BP (!) 144/95 (BP Location: Right arm)   Pulse 98   Temp 97.8  F (36.6  C) (Oral)   Resp 18   Ht 1.676 m (5' 6\")   Wt 61.8 kg (136 lb 4.8 oz)   LMP 03/01/2022   SpO2 96%   Breastfeeding No   BMI 22.00 kg/m    General appearance: alert, appears stated age and cooperative  Head: Normocephalic, without obvious abnormality, atraumatic  Eyes: Clear conjuctiva  Neck: no JVD and supple, symmetrical, trachea midline  Lungs: Decreased breath sounds left lower lobe  Heart: regular rate and rhythm, S1, S2 normal, no murmur, click, rub or gallop  Abdomen: soft, non-tender; bowel sounds normal; no masses,  no organomegaly  Extremities: Negative homans,   Skin: Skin color, texture, turgor normal. No rashes or lesions  Neurologic: Grossly normal  3 chest tubes and left side with no airleak, 1 chest tube in right side with no airleak, minimal output        Data   Recent Labs "   Lab 04/14/22  1207 04/14/22  0505 04/13/22  0602 04/12/22  0843   WBC  --  12.7* 6.0 4.8   HGB  --  9.3* 7.8* 8.2*   MCV  --  74* 73* 75*   PLT  --  429 410 417   NA  --  137  --   --    POTASSIUM 4.3 5.4*  --   --    CHLORIDE  --  106  --   --    CO2  --  24  --   --    BUN  --  7*  --   --    CR  --  0.56*  --   --    ANIONGAP  --  7  --   --    PABLO  --  8.7  --   --    GLC  --  151*  --   --    ALBUMIN  --  2.0*  --   --    PROTTOTAL  --  4.6*  --   --    BILITOTAL  --  0.2  --   --    ALKPHOS  --  107  --   --    ALT  --  21  --   --    AST  --  15  --   --      Recent Results (from the past 24 hour(s))   XR Chest 1 View    Narrative    EXAM: XR CHEST 1 VIEW  LOCATION: [facility]  DATE/TIME: 4/13/2022 10:20 PM    INDICATION: empyema  COMPARISON: 04/11/2022      Impression    IMPRESSION: Left-sided chest tubes with a small right-sided thoracic catheter. Small bilateral subphrenic pneumothoraces. Asymmetric patchy interstitial opacity in the left upper lobe. Stable cardiac mediastinal silhouette.    XR Chest Port 1 View    Narrative    EXAM: XR CHEST PORT 1 VIEW  LOCATION: [facility]  DATE/TIME: 4/14/2022 6:28 AM    INDICATION: empyema  COMPARISON: 04/13/2022      Impression    IMPRESSION: Since correlated of chest radiograph there appears to be slightly less dense infiltrates in the left perihilar region, otherwise the exam is stable to include the location of the appliances. There is partial loss left hemidiaphragm consistent   with infiltrate/atelectasis left lower lobe.     Medications       acetaminophen  975 mg Oral Q8H     ferrous sulfate  325 mg Oral Every Other Day     levofloxacin  750 mg Intravenous Q24H     [Held by provider] lisinopril  5 mg Oral Daily     multivitamin RENAL  1 tablet Oral Daily     polyethylene glycol  17 g Oral Daily     senna-docusate  1 tablet Oral BID     sodium chloride (PF)  3 mL Intracatheter Q8H     sodium chloride (PF)  3  mL Intracatheter Q8H

## 2022-04-15 NOTE — PLAN OF CARE
Problem: Respiratory Compromise (Surgery Nonspecified)  Goal: Effective Oxygenation and Ventilation  Outcome: Ongoing, Progressing  Intervention: Optimize Oxygenation and Ventilation  Recent Flowsheet Documentation  Taken 4/15/2022 0900 by Arabella Kraft RN  Head of Bed (HOB) Positioning: HOB at 30-45 degrees   Chest tubes intact with dressing change today.  No air leaks.  Small amount of serosanguinous drainage from left chest tubes.    Problem: Activity Intolerance (Pulmonary Impairment)  Goal: Improved Activity Tolerance  Outcome: Ongoing, Progressing    Problem: Pain (Surgery Nonspecified)  Goal: Acceptable Pain Control  Outcome: Ongoing, Progressing  Intervention: Prevent or Manage Pain  Recent Flowsheet Documentation  Taken 4/15/2022 1116 by Arabella Kraft RN  Pain Management Interventions:   medication (see MAR)   emotional support   repositioned      Able to increase ambulation to approximately 200 ft this morning.  96% room air O2 sats.  Reports good pain control with po Dilaudid.

## 2022-04-15 NOTE — PLAN OF CARE
Problem: Plan of Care - These are the overarching goals to be used throughout the patient stay.    Goal: Plan of Care Review/Shift Note  Description: The Plan of Care Review/Shift note should be completed every shift.  The Outcome Evaluation is a brief statement about your assessment that the patient is improving, declining, or no change.  This information will be displayed automatically on your shift note.  Outcome: Ongoing, Progressing   Goal Outcome Evaluation:          Back pain managed with scheduled Tylenol and PRN oral dilaudid. Blood sugar 101 at 0200. All chest tubes to -20 cm H2O suction. No air leaks observed. Minimal drainage output in chest tube cannisters (See I & O). No supplemental oxygen used during night. SpO2 95% room air.

## 2022-04-15 NOTE — PROGRESS NOTES
Progress Note    Assessment/Plan  Right side chest tube DC'd.  Left side chest tubes to remain in place no evidence of air leak and minimal fluid output.  Continue to increase activity as tolerated.  Left side chest tubes will be another couple of days.  No restrictions activity.  Chest x-ray is very good today.  Increase insufflation in the left lung.  Right lung without significant fluid.  We will hold off on CT scan today.    Active Problems:    Essential hypertension    Pleural effusion    Empyema lung (H)      Subjective  Very comfortable  Objective    Vital signs in last 24 hours  Temp:  [97.7  F (36.5  C)-99.3  F (37.4  C)] 98.5  F (36.9  C)  Pulse:  [86-97] 86  Resp:  [16-18] 18  BP: (134-157)/(90-96) 157/93  SpO2:  [93 %-100 %] 96 %  Weight:   [unfilled]    Intake/Output last 3 shifts  I/O last 3 completed shifts:  In: 260 [P.O.:260]  Out: 887 [Urine:750; Chest Tube:137]  Intake/Output this shift:  No intake/output data recorded.      Physical Exam  No air leak left side chest tubes significant decrease in fluid output.  Right-sided chest tube no airleak no fluid.    Pertinent Labs   Lab Results   Component Value Date    WBC 11.6 (H) 04/15/2022    HGB 9.1 (L) 04/15/2022    HCT 30.1 (L) 04/15/2022    MCV 81 04/15/2022     04/15/2022             Pertinent Radiology     [unfilled]        Suleman Kumar MD

## 2022-04-15 NOTE — PROGRESS NOTES
[facility]    Medicine Progress Note - Hospitalist Service       Date of Admission:  4/13/2022  Active Problems:    Essential hypertension    Pleural effusion    Empyema lung (H)     Assessment & Plan            42 yo female with a history of hypertension admitted with shortness of breath, found to have large left and small right pleural effusion/empyema.  Deviously diagnosed and treated for community-acquired pneumonia in February and March.    Seen by pulmonology, general surgery, infectious disease, hematology  Had left thoracentesis with 800 cc of purulent fluid removed, cultures positive for H. influenzae, blood culture also positive.  Left chest tube placed, now removed.Found to have very low IgG, IgA, IgE. Felt to have common variable immunodeficiency versus acquired immunodeficiency, given IVIG but had a significant reaction involving temp over 104, shaking chills. Eventually had left chest tube removed, subsequently required chest tube placement on the right.  Repeat imaging showed persistent left-sided empyema.  Treated with ceftriaxone and clindamycin initially, transitioned to Levaquin on the afternoon of 4/12.  Plan now to transfer to [facility] for thoracoscopy for the left thoracic empyema.     Right sided empyema  Pneumonia, bacteremia due to H flu  Left sided empyema, recurred after left chest tube removal on 4/9  Had left thoracentesis on 4/4, with 800 cc of purulent fluid, blood and pleural fluid culture positive for H influenza.    Received intrapleural alteplase on 4/5-4/7.  CT of chest on 4/8 showed improvement of left empyema.  Left chest tube removed on 4/9,  Repeat CT on 4/12 showed persistent left-sided empyema, small possible right empyema as well  Initially treated with Zosyn, vancomycin. Transition to Levaquin on 4/12   Right chest tube placed on 4/12  Now status post flexible bronchoscopy/left thoracoscopy/left thoracotomy for complete decortication,  visceral parietal pleural surfaces and parietal pleurectomy postoperative day 2  3 left chest tubes in place, no air leak  Surgery, pulmonology, ID following  Cultures pending from 4/13  Continuing Levaquin    Right pleural effusion-  Chest tube placed on 4/12.  Very little output.    Pulmonology/surgery managing.  Question when to remove    Severe hypogammaglobulinemia  Suspected C VID versus acquired immunodeficiency.  Has significant reduction in all lines: IgA < 5, IgG < 109, IgE < 2.  IVIG infusion initiated on 4/8, but became febrile and symptomatic soon after and it was discontinued.  Not fully completed.  Seen by hematology, will reconsult hematology to consider further diagnostic as well as consideration of reattempt with IVIG infusion with premedication.  Greatly appreciate pulmonology direction     Microcytic anemia, splenomegaly with iron deficient anemia  No evidence of blood loss  Appreciate hematology recs,   Hx of iron deficiency prior to admission, intolerance to oral iron due to abdominal pain  Given IV iron sucrose  No signs of GI loss  On iron sulfate     Hyperkalemia-  Likely iatrogenic from potassium in IV fluid, also on low-dose lisinopril.  Held lisinopril today, gave small bolus, resolved.  Discontinue IV fluid.    Essential hypertension  Elevated..  Holding home lisinopril today due to hyperkalemia.  Start amlodipine, use hydralazine as needed..        Diet: Combination Diet 2 gm K Diet  Snacks/Supplements Adult: Nepro Oral Supplement; Between Meals    DVT Prophylaxis: Pneumatic Compression Devices  Alexandra Catheter: Not present  Central Lines: None  Code Status: Full Code      Disposition Plan   Expected Discharge: 04/17/2022     Anticipated discharge location: home;home with family    Delays:            The patient's care was discussed with the Bedside Nurse, Care Coordinator/, Patient, Patient's Family and Infectious disease, pulmonology Consultant. for total time 36 minutes  "with greater than 50% of total time spent in counseling and coordination of care.    NAVIN MORALEZ MD  Hospitalist Service  [facility]  Securely message with the [employer] Web Console (learn more here)  Text page via [employer] Paging/Directory        Clinically Significant Risk Factors Present on Admission                 ______________________________________________________________________    Interval History   Remainder of 12 point review of systems negative except as noted below    Subjective:  Patient doing well.  Having minimal chest pain at insertion sites.  Ambulating.  No nausea or vomiting or diarrhea though having bowel movements.  Patient doing okay.  Left chest tube pain controlled.  No shortness of breath.  Afebrile.    Data reviewed today: I reviewed all medications, new labs and imaging results over the last 24 hours.     Physical Exam   Vital Signs: Temp: 98.2  F (36.8  C) Temp src: Oral BP: (!) 178/105 Pulse: 102   Resp: 16 SpO2: 96 % O2 Device: None (Room air) Oxygen Delivery: 2 LPM  Weight: 142 lbs 6.4 oz  Physical Exam:  Temp:  [97.7  F (36.5  C)-99.3  F (37.4  C)] 98.2  F (36.8  C)  Pulse:  [] 102  Resp:  [16-18] 16  BP: (134-178)/() 178/105  SpO2:  [93 %-100 %] 96 %    BP (!) 178/105 (BP Location: Right arm)   Pulse 102   Temp 98.2  F (36.8  C) (Oral)   Resp 16   Ht 1.676 m (5' 6\")   Wt 64.6 kg (142 lb 6.4 oz)   LMP 03/01/2022   SpO2 96%   Breastfeeding No   BMI 22.98 kg/m    General appearance: alert, appears stated age and cooperative  Head: Normocephalic, without obvious abnormality, atraumatic  Eyes: Clear conjuctiva  Neck: no JVD and supple, symmetrical, trachea midline  Lungs: Decreased breath sounds left lower lobe  Heart: regular rate and rhythm, S1, S2 normal, no murmur, click, rub or gallop  Abdomen: soft, non-tender; bowel sounds normal; no masses,  no organomegaly  Extremities: Negative homans,   Skin: Skin color, texture, turgor normal. No " rashes or lesions  Neurologic: Grossly normal  3 chest tubes and left side with no airleak, 1 chest tube in right side with no airleak, minimal output        Data   Recent Labs   Lab 04/15/22  0549 04/15/22  0211 04/14/22  1207 04/14/22  0505 04/13/22  0602   WBC 11.6*  --   --  12.7* 6.0   HGB 9.1*  --   --  9.3* 7.8*   MCV 81  --   --  74* 73*     --   --  429 410     --   --  137  --    POTASSIUM 4.7  --  4.3 5.4*  --    CHLORIDE 106  --   --  106  --    CO2 24  --   --  24  --    BUN 5*  --   --  7*  --    CR 0.56*  --   --  0.56*  --    ANIONGAP 7  --   --  7  --    PABLO 8.7  --   --  8.7  --    GLC 95 101*  --  151*  --    ALBUMIN  --   --   --  2.0*  --    PROTTOTAL  --   --   --  4.6*  --    BILITOTAL  --   --   --  0.2  --    ALKPHOS  --   --   --  107  --    ALT  --   --   --  21  --    AST  --   --   --  15  --      Recent Results (from the past 24 hour(s))   XR Chest Port 1 View    Narrative    EXAM: XR CHEST PORT 1 VIEW  LOCATION: [facility]  DATE/TIME: 4/15/2022 5:43 AM    INDICATION: empyema  COMPARISON: 04/14/2022      Impression    IMPRESSION: Since comparative chest radiograph there is been a slight decrease in amount of infiltrate involving the left perihilar region, otherwise the exam is unchanged with the appliances in stable position with no greg pneumothorax identified.     Medications       acetaminophen  975 mg Oral Q8H     amLODIPine  2.5 mg Oral Daily     ferrous sulfate  325 mg Oral Every Other Day     levofloxacin  750 mg Intravenous Q24H     [Held by provider] lisinopril  5 mg Oral Daily     multivitamin RENAL  1 tablet Oral Daily     polyethylene glycol  17 g Oral Daily     senna-docusate  1 tablet Oral BID     sodium chloride (PF)  3 mL Intracatheter Q8H     sodium chloride (PF)  3 mL Intracatheter Q8H

## 2022-04-15 NOTE — PROGRESS NOTES
Pulmonary Consult Note:    Arely Saldaña is a 41 year old female non-smoker with medical history significant for primary hypertension.  She was treated for pneumonia in Feb of 2022.  She was admitted to [facility] on 4/3/2022 with bilateral pleural effusions and pneumonia.  She is now being treated for Haemophilus influenzae and empyema with H. Influenzae.  A left pleural catheter was placed from 4/4/2022 to 4/9/2022 (also received alteplase-dornase).  Her course is complicated by significant immunodeficiency, suspected CVID.      Assessment/Plan:    Severe community acquired pnemonia, H. Influenzae bacteremia and empyema:    1. Pneumonia since Feb 2022-resistant to 2 treatments of outpatient antibiotic therapy    2.  Admitted on 4/3 with pneumonia, bilateral pleural effusions R > L    3.  Thoracentesis 4/3, thick yellow fluid--fluid + for H flu, as well as blood cultures being + for H flu    4. Chest tube placed 4/4    5.  Alteplase in CT from 4/5 to 4/7    6.  Ct chest 4/8 improved L empyema, CT removed on 4/9    7.  4/12 Right sided chest tube placed-repeat CXR on 4/15, looks much better and also reviewed by Dr. Kumar.  He recommends getting a chest CT at a different time.      8.  Treatment initiated with IV Ceftriaxone and IV Clinda, appreciate ID's assistance.  Patent started on IV Levaquin today    9.  Washout and decortication on 4/13 by DR. Kumar.     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  Trying pre-meds and attempt to re-initiate dosing.       Will await further direction on chest tube management from Dr. Kumar.  Likely chest CT soon.  Continue aggressive pulmonary toilet.

## 2022-04-16 ENCOUNTER — APPOINTMENT (OUTPATIENT)
Dept: RADIOLOGY | Facility: HOSPITAL | Age: 42
DRG: 163 | End: 2022-04-16
Attending: SURGERY
Payer: COMMERCIAL

## 2022-04-16 LAB
ERYTHROCYTE [DISTWIDTH] IN BLOOD BY AUTOMATED COUNT: 20.6 % (ref 10–15)
HCT VFR BLD AUTO: 29.8 % (ref 35–47)
HGB BLD-MCNC: 9.3 G/DL (ref 11.7–15.7)
HOLD SPECIMEN: NORMAL
MCH RBC QN AUTO: 24.2 PG (ref 26.5–33)
MCHC RBC AUTO-ENTMCNC: 31.2 G/DL (ref 31.5–36.5)
MCV RBC AUTO: 78 FL (ref 78–100)
PLATELET # BLD AUTO: 450 10E3/UL (ref 150–450)
RBC # BLD AUTO: 3.84 10E6/UL (ref 3.8–5.2)
WBC # BLD AUTO: 8 10E3/UL (ref 4–11)

## 2022-04-16 PROCEDURE — 99231 SBSQ HOSP IP/OBS SF/LOW 25: CPT | Performed by: NURSE PRACTITIONER

## 2022-04-16 PROCEDURE — 71045 X-RAY EXAM CHEST 1 VIEW: CPT

## 2022-04-16 PROCEDURE — 120N000001 HC R&B MED SURG/OB

## 2022-04-16 PROCEDURE — 250N000011 HC RX IP 250 OP 636: Performed by: SURGERY

## 2022-04-16 PROCEDURE — 99231 SBSQ HOSP IP/OBS SF/LOW 25: CPT | Performed by: INTERNAL MEDICINE

## 2022-04-16 PROCEDURE — 999N000127 HC STATISTIC PERIPHERAL IV START W US GUIDANCE

## 2022-04-16 PROCEDURE — 36415 COLL VENOUS BLD VENIPUNCTURE: CPT | Performed by: HOSPITALIST

## 2022-04-16 PROCEDURE — 250N000013 HC RX MED GY IP 250 OP 250 PS 637: Performed by: FAMILY MEDICINE

## 2022-04-16 PROCEDURE — 99232 SBSQ HOSP IP/OBS MODERATE 35: CPT | Performed by: FAMILY MEDICINE

## 2022-04-16 PROCEDURE — 85027 COMPLETE CBC AUTOMATED: CPT | Performed by: HOSPITALIST

## 2022-04-16 PROCEDURE — 250N000013 HC RX MED GY IP 250 OP 250 PS 637: Performed by: SURGERY

## 2022-04-16 PROCEDURE — 250N000013 HC RX MED GY IP 250 OP 250 PS 637: Performed by: HOSPITALIST

## 2022-04-16 PROCEDURE — 99207 PR NO CHARGE LOS: CPT | Performed by: INTERNAL MEDICINE

## 2022-04-16 RX ORDER — BACITRACIN ZINC 500 [USP'U]/G
OINTMENT TOPICAL ONCE
Status: DISCONTINUED | OUTPATIENT
Start: 2022-04-16 | End: 2022-04-17 | Stop reason: HOSPADM

## 2022-04-16 RX ORDER — ENOXAPARIN SODIUM 100 MG/ML
40 INJECTION SUBCUTANEOUS EVERY 24 HOURS
Status: DISCONTINUED | OUTPATIENT
Start: 2022-04-17 | End: 2022-04-17 | Stop reason: HOSPADM

## 2022-04-16 RX ORDER — HYDRALAZINE HYDROCHLORIDE 20 MG/ML
10 INJECTION INTRAMUSCULAR; INTRAVENOUS EVERY 6 HOURS PRN
Status: DISCONTINUED | OUTPATIENT
Start: 2022-04-16 | End: 2022-04-17 | Stop reason: HOSPADM

## 2022-04-16 RX ADMIN — AMLODIPINE BESYLATE 2.5 MG: 2.5 TABLET ORAL at 08:59

## 2022-04-16 RX ADMIN — SENNOSIDES AND DOCUSATE SODIUM 1 TABLET: 8.6; 5 TABLET ORAL at 21:03

## 2022-04-16 RX ADMIN — ACETAMINOPHEN 975 MG: 325 TABLET ORAL at 12:33

## 2022-04-16 RX ADMIN — ACETAMINOPHEN 650 MG: 325 TABLET ORAL at 22:12

## 2022-04-16 RX ADMIN — POLYETHYLENE GLYCOL 3350 17 G: 17 POWDER, FOR SOLUTION ORAL at 08:58

## 2022-04-16 RX ADMIN — HYDROMORPHONE HYDROCHLORIDE 2 MG: 2 TABLET ORAL at 09:09

## 2022-04-16 RX ADMIN — SENNOSIDES AND DOCUSATE SODIUM 1 TABLET: 8.6; 5 TABLET ORAL at 08:58

## 2022-04-16 RX ADMIN — B-COMPLEX W/ C & FOLIC ACID TAB 1 MG 1 TABLET: 1 TAB at 08:59

## 2022-04-16 RX ADMIN — HYDROMORPHONE HYDROCHLORIDE 2 MG: 2 TABLET ORAL at 17:22

## 2022-04-16 RX ADMIN — ACETAMINOPHEN 975 MG: 325 TABLET ORAL at 05:21

## 2022-04-16 RX ADMIN — LEVOFLOXACIN 750 MG: 5 INJECTION, SOLUTION INTRAVENOUS at 22:13

## 2022-04-16 RX ADMIN — HYDROMORPHONE HYDROCHLORIDE 4 MG: 4 TABLET ORAL at 21:03

## 2022-04-16 RX ADMIN — Medication 325 MG: at 08:59

## 2022-04-16 RX ADMIN — MAGNESIUM HYDROXIDE 30 ML: 400 SUSPENSION ORAL at 15:29

## 2022-04-16 ASSESSMENT — ACTIVITIES OF DAILY LIVING (ADL)
ADLS_ACUITY_SCORE: 7

## 2022-04-16 NOTE — PLAN OF CARE
Problem: Pain (Surgery Nonspecified)  Goal: Acceptable Pain Control  Outcome: Ongoing, Progressing     Problem: Respiratory Compromise (Surgery Nonspecified)  Goal: Effective Oxygenation and Ventilation  Outcome: Ongoing, Progressing   Goal Outcome Evaluation:             Pt alert and oriented x 4. Vs stable. Scheduled tylenol effective for pain control. Chest tube site dressing dry,clean and intact. Chest tube # 1 total output=40 ml & #2=20 ml.

## 2022-04-16 NOTE — PROGRESS NOTES
Pulmonary Consult Note:     Arely Saldaña is a 41 year old female non-smoker with medical history significant for primary hypertension. She was treated for pneumonia in Feb of 2022.  She was admitted to [facility] on 4/3/2022 with bilateral pleural effusions and pneumonia.  She is now being treated for Haemophilus influenzae and empyema with H. Influenzae.  A left pleural catheter was placed from 4/4/2022 to 4/9/2022 (also received alteplase-dornase).  Her course is complicated by significant immunodeficiency, suspected CVID.      Assessment/Plan:     Severe community acquired pnemonia, H. Influenzae bacteremia and empyema:     1. Pneumonia since Feb 2022-resistant to 2 treatments of outpatient antibiotic therapy     2.  Admitted on 4/3 with pneumonia, bilateral pleural effusions R > L     3.  Thoracentesis 4/3, thick yellow fluid--fluid + for H flu, as well as blood cultures being + for H flu     4. Chest tube placed 4/4     5.  Alteplase in CT from 4/5 to 4/7     6.  Ct chest 4/8 improved L empyema, CT removed on 4/9.     7.  4/12 Right sided chest tube placed-repeat CXR on 4/15, looks much better and also reviewed by Dr. Kumar.  Right chest tube pulled by Dr. Kumar 4/15.      8.  Treatment initiated with IV Ceftriaxone and IV Clinda, appreciate ID's assistance.  Patent started on IV Levaquin today     9.  Washout and decortication on 4/13 by DR. Kumar.     10.  Will have follow up CXR on 4/16 am, if it still looks stable, likely will get remaining chest tube on Left pulled.  Seen by Dr. Teran, ID today and he stated could be transitioned from IV to oral Levaquin for discharge tomorrow if CT is pulled.      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  Trying pre-meds and attempt to re-initiate dosing.     Will sign off as there is an above plan regarding chest tube and treatment of pneumonia.  Please let us know if there is anything  else we can do to assist in the care of Ms. Arely Saldaña.     JAKE Sy, CNP  Pulmonary Critical Care  Pager: [phone]

## 2022-04-16 NOTE — PROGRESS NOTES
[facility]    Medicine Progress Note - Hospitalist Service       Date of Admission:  4/13/2022  Active Problems:    Essential hypertension    Pleural effusion    Empyema lung (H)     Assessment & Plan            42 yo female with a history of hypertension admitted with shortness of breath, found to have large left and small right pleural effusion/empyema.    Previously diagnosed and treated for community-acquired pneumonia in February and March.    Seen by pulmonology, general surgery, infectious disease, hematology  Had left thoracentesis with 800 cc of purulent fluid removed, cultures positive for H. influenzae, blood culture also positive.  Left chest tube placed, now removed.  Found to have very low IgG, IgA, IgE. Felt to have common variable immunodeficiency versus acquired immunodeficiency, given IVIG but had a significant reaction involving temp over 104, shaking chills. Eventually had left chest tube removed, subsequently had chest tube placement on the right.  Repeat imaging showed persistent left-sided empyema.  Treated with ceftriaxone and clindamycin initially, transitioned to Levaquin on the afternoon of 4/12.  Transferred to [facility] for thoracoscopy for the left thoracic empyema.     Right sided empyema  Pneumonia, bacteremia due to H flu  Left sided empyema, recurred after left chest tube removal on 4/9  Had left thoracentesis on 4/4, with 800 cc of purulent fluid, blood and pleural fluid culture positive for H influenza.    Received intrapleural alteplase on 4/5-4/7.  CT of chest on 4/8 showed improvement of left empyema.  Left chest tube removed on 4/9,  Repeat CT on 4/12 showed persistent left-sided empyema, small possible right empyema as well  Initially treated with Zosyn, vancomycin. Transition to Levaquin on 4/12   Right chest tube placed on 4/12  Now status post flexible bronchoscopy/left thoracoscopy/left thoracotomy for complete decortication, visceral  parietal pleural surfaces and parietal pleurectomy postoperative day 3  3 left chest tubes in place, no air leak.  Decreased output  Surgery, pulmonology, ID following  Cultures pending from 4/13  Continuing Levaquin  Chest x-ray shows improvement.  Leukocytosis resolved.    Right pleural effusion-  Chest tube placed on 4/12.  Very little output.    Removed by surgery on 4/15.  Tiny pneumothorax seen.    Severe hypogammaglobulinemia  Suspected C VID versus acquired immunodeficiency.  Has significant reduction in all lines: IgA < 5, IgG < 109, IgE < 2.  IVIG infusion initiated on 4/8, but became febrile and symptomatic soon after and it was discontinued.  Not fully completed.  Seen by hematology, they were reconsulted again to consider another attempt at IVIG.  As long as she is not showing worsening infection or fever and is improving they are recommending holding further IVIG and close follow-up with immunology as outpatient.      Microcytic anemia, splenomegaly with iron deficient anemia  No evidence of blood loss  Appreciate hematology recs,   Hx of iron deficiency prior to admission, intolerance to oral iron due to abdominal pain  Given IV iron sucrose  No signs of GI loss  On iron sulfate  Stable hemoglobin.     Hyperkalemia-  Likely iatrogenic from potassium in IV fluid, also on low-dose lisinopril.  Held lisinopril today, gave small bolus, resolved.  Discontinue IV fluid.    Essential hypertension  Elevated..  Holding home lisinopril due to hyperkalemia.  Start amlodipine, use hydralazine as needed.. Improved.        Diet: Combination Diet 2 gm K Diet  Snacks/Supplements Adult: Nepro Oral Supplement; Between Meals    DVT Prophylaxis: Pneumatic Compression Devices  Alexandra Catheter: Not present  Central Lines: None  Code Status: Full Code      Disposition Plan   Expected Discharge: 04/17/2022     Anticipated discharge location: home;home with family    Delays:            The patient's care was discussed with the  "Bedside Nurse, Care Coordinator/, Patient, Patient's Family and Infectious disease, pulmonology Consultant. for total time 36 minutes with greater than 50% of total time spent in counseling and coordination of care.    NAVIN MORALEZ MD  Hospitalist Service  [facility]  Securely message with the [employer] Web Console (learn more here)  Text page via [employer] Paging/Directory        Clinically Significant Risk Factors Present on Admission                 ______________________________________________________________________    Interval History   Remainder of 12 point review of systems negative except as noted below    Subjective:  Patient continues to do well.  Right chest tube removed.  Minimal pain in the left chest.  Ambulating.  No nausea or vomiting or shortness of breath.  Having bowel movements.    Data reviewed today: I reviewed all medications, new labs and imaging results over the last 24 hours.     Physical Exam   Vital Signs: Temp: 98.1  F (36.7  C) Temp src: Oral BP: (!) 139/93 Pulse: 89   Resp: 18 SpO2: 95 % O2 Device: None (Room air)    Weight: 139 lbs 6.4 oz  Physical Exam:  Temp:  [97.4  F (36.3  C)-98.5  F (36.9  C)] 98.1  F (36.7  C)  Pulse:  [] 89  Resp:  [16-18] 18  BP: (139-178)/() 139/93  SpO2:  [95 %-96 %] 95 %    BP (!) 139/93 (BP Location: Right arm)   Pulse 89   Temp 98.1  F (36.7  C) (Oral)   Resp 18   Ht 1.676 m (5' 6\")   Wt 63.2 kg (139 lb 6.4 oz)   LMP 03/01/2022   SpO2 95%   Breastfeeding No   BMI 22.50 kg/m    General appearance: alert, appears stated age and cooperative  Head: Normocephalic, without obvious abnormality, atraumatic  Eyes: Clear conjuctiva  Neck: no JVD and supple, symmetrical, trachea midline  Lungs: Decreased breath sounds left lower lobe  Heart: regular rate and rhythm, S1, S2 normal, no murmur, click, rub or gallop  Abdomen: soft, non-tender; bowel sounds normal; no masses,  no organomegaly  Extremities: Negative " homans,   Skin: Skin color, texture, turgor normal. No rashes or lesions  Neurologic: Grossly normal  3 chest tubes and left side with no airleak, 1 chest tube in right side with no airleak, minimal output        Data   Recent Labs   Lab 04/16/22  0612 04/15/22  0549 04/15/22  0211 04/14/22  1207 04/14/22  0505   WBC 8.0 11.6*  --   --  12.7*   HGB 9.3* 9.1*  --   --  9.3*   MCV 78 81  --   --  74*    422  --   --  429   NA  --  137  --   --  137   POTASSIUM  --  4.7  --  4.3 5.4*   CHLORIDE  --  106  --   --  106   CO2  --  24  --   --  24   BUN  --  5*  --   --  7*   CR  --  0.56*  --   --  0.56*   ANIONGAP  --  7  --   --  7   PABLO  --  8.7  --   --  8.7   GLC  --  95 101*  --  151*   ALBUMIN  --   --   --   --  2.0*   PROTTOTAL  --   --   --   --  4.6*   BILITOTAL  --   --   --   --  0.2   ALKPHOS  --   --   --   --  107   ALT  --   --   --   --  21   AST  --   --   --   --  15     Recent Results (from the past 24 hour(s))   XR Chest Port 1 View    Narrative    EXAM: XR CHEST PORT 1 VIEW  LOCATION: [facility]  DATE/TIME: 4/16/2022 5:49 AM    INDICATION: empyema  COMPARISON: 04/15/2022      Impression    IMPRESSION: Since comparative study the pigtail catheter projected over the inferior aspect of the right hemithorax has been removed with no significant changes in the adjacent tiny pneumothorax. Otherwise the exam is stable to include the location of   the left chest tube with some atelectasis in the left lower lobe.     Medications       acetaminophen  975 mg Oral Q8H     amLODIPine  2.5 mg Oral Daily     bacitracin   Topical Once     ferrous sulfate  325 mg Oral Every Other Day     levofloxacin  750 mg Intravenous Q24H     [Held by provider] lisinopril  5 mg Oral Daily     multivitamin RENAL  1 tablet Oral Daily     polyethylene glycol  17 g Oral Daily     senna-docusate  1 tablet Oral BID     sodium chloride (PF)  3 mL Intracatheter Q8H     sodium chloride (PF)  3 mL  Intracatheter Q8H

## 2022-04-16 NOTE — PLAN OF CARE
Problem: Plan of Care - These are the overarching goals to be used throughout the patient stay.      Problem: Infection (Surgery Nonspecified)     Problem: Pain (Surgery Nonspecified)    Left sided chest tubes to suction with minimal serosanguineous drainage. Dressings dry and intact. SAO2 95% r/a.  Lung sounds diminished. IS=1500 x5. IV levaquin ordered. ID, Pulmonary and Surgery following. CT to water seal with ambulation. Patient ambulated 250ft with SBA this morning and tolerated it well. Maintained SAO2 >92%  Good appetite. Chest discomfort is managed with oral dilaudid. Patient hopes to discharge to home with family soon.  Kenyetta Cantrell RN

## 2022-04-16 NOTE — PROGRESS NOTES
Progress Note    Assessment/Plan  Patient doing very well.  Limited output left chest tubes and no air leak.  Chest x-ray is stable we will probably remove chest tube tomorrow potential discharge tomorrow antibiotic therapy per infectious disease.    Active Problems:    Essential hypertension    Pleural effusion    Empyema lung (H)      Subjective  Comfortable  Objective    Vital signs in last 24 hours  Temp:  [97.4  F (36.3  C)-98.5  F (36.9  C)] 98.1  F (36.7  C)  Pulse:  [] 89  Resp:  [16-18] 18  BP: (139-178)/() 139/93  SpO2:  [95 %-96 %] 95 %  Weight:   [unfilled]    Intake/Output last 3 shifts  I/O last 3 completed shifts:  In: 500 [P.O.:500]  Out: 1930 [Urine:1800; Chest Tube:130]  Intake/Output this shift:  I/O this shift:  In: -   Out: 60 [Chest Tube:60]      Physical Exam  Very good respiratory effort.  As noted no air leak limited output fluid    Pertinent Labs   Lab Results   Component Value Date    WBC 8.0 04/16/2022    HGB 9.3 (L) 04/16/2022    HCT 29.8 (L) 04/16/2022    MCV 78 04/16/2022     04/16/2022             Pertinent Radiology     [unfilled]        Suleman Kumar MD

## 2022-04-16 NOTE — PLAN OF CARE
Patient is A&O throughout shift. Chest tubes dressing reinforced. Dr. Kumar removed Rt chest tube this evening. Small 2x2 dressing over site is CDI. Patient walked 200 ft. Pain is relieved with oral Dilaudid. PRN Hydocodone given for break through pain and before walk with little relief. Patient is able to stand and pivot to commode. She is voiding well. Chest tube #1 output 30 ml and #2 output 40 ml this shift.  BP was elevated at start of shift and prn Hydralazine given with. BP recheck was 149/93. IV abx infusing at end of shift.  Problem: Plan of Care - These are the overarching goals to be used throughout the patient stay.    Goal: Absence of Hospital-Acquired Illness or Injury  Intervention: Identify and Manage Fall Risk  Recent Flowsheet Documentation  Taken 4/15/2022 2146 by Krystal Schultz RN  Safety Promotion/Fall Prevention: bed alarm on  Taken 4/15/2022 1600 by Krystal Schultz RN  Safety Promotion/Fall Prevention: bed alarm on  Intervention: Prevent Skin Injury  Recent Flowsheet Documentation  Taken 4/15/2022 2146 by Krystal Schultz RN  Body Position: position changed independently  Taken 4/15/2022 1957 by Krystal Schultz, RN  Body Position: position changed independently  Intervention: Prevent and Manage VTE (Venous Thromboembolism) Risk  Recent Flowsheet Documentation  Taken 4/15/2022 2146 by Krystal Schultz, RN  Activity Management: bedrest with bathroom privileges  Taken 4/15/2022 1957 by Krystal Schultz, RN  Activity Management: bedrest with bathroom privileges  Goal: Optimal Comfort and Wellbeing  Intervention: Monitor Pain and Promote Comfort  Recent Flowsheet Documentation  Taken 4/15/2022 1957 by Krystal Schultz, RN  Pain Management Interventions: medication (see MAR)  Intervention: Provide Person-Centered Care  Recent Flowsheet Documentation  Taken 4/15/2022 2146 by Krystal Schultz RN  Trust Relationship/Rapport:   care explained   emotional support provided   questions encouraged  Taken  4/15/2022 1600 by Krystal Schultz, RN  Trust Relationship/Rapport:   care explained   emotional support provided   questions encouraged     Problem: Activity Intolerance (Pulmonary Impairment)  Goal: Improved Activity Tolerance  Outcome: Ongoing, Progressing     Problem: Airway Clearance Ineffective (Pulmonary Impairment)  Goal: Effective Airway Clearance  Outcome: Ongoing, Progressing     Problem: Gas Exchange Impaired (Pulmonary Impairment)  Goal: Optimal Gas Exchange  Outcome: Ongoing, Progressing     Problem: Bowel Motility Impaired (Surgery Nonspecified)  Goal: Effective Bowel Elimination  Outcome: Ongoing, Not Progressing     Problem: Infection (Surgery Nonspecified)  Goal: Absence of Infection Signs and Symptoms  Outcome: Ongoing, Progressing     Problem: Ongoing Anesthesia Effects (Surgery Nonspecified)  Goal: Anesthesia/Sedation Recovery  Intervention: Optimize Anesthesia Recovery  Recent Flowsheet Documentation  Taken 4/15/2022 2146 by Krystal Schultz, RN  Safety Promotion/Fall Prevention: bed alarm on  Administration (IS): instruction provided, follow-up  Patient Tolerance (IS): good  Taken 4/15/2022 1600 by Krystal Schultz, RN  Safety Promotion/Fall Prevention: bed alarm on  Administration (IS): instruction provided, follow-up  Level Incentive Spirometer (mL): 1500  Incentive Spirometer Predicted Level (mL): 2000  Number of Repetitions (IS): 5  Patient Tolerance (IS): good     Problem: Pain (Surgery Nonspecified)  Goal: Acceptable Pain Control  Intervention: Prevent or Manage Pain  Recent Flowsheet Documentation  Taken 4/15/2022 1957 by Krystal Schultz, RN  Pain Management Interventions: medication (see MAR)     Problem: Respiratory Compromise (Surgery Nonspecified)  Goal: Effective Oxygenation and Ventilation  Intervention: Optimize Oxygenation and Ventilation  Recent Flowsheet Documentation  Taken 4/15/2022 2146 by Krystal Schultz, RN  Head of Bed (HOB) Positioning: HOB at 20-30 degrees  Taken 4/15/2022  1957 by Krystal Schultz, RN  Head of Bed (HOB) Positioning: HOB at 20-30 degrees   Goal Outcome Evaluation:

## 2022-04-16 NOTE — PROGRESS NOTES
ID chart check    Cultures negative to date from surgery. Generally treat with antibiotics for 4 weeks beyond surgical source control. Levofloxacin can be PO if able to tolerate, same dose. Please call over the weekend if there are questions. We will see her 4/18.     Jace Teran MD

## 2022-04-16 NOTE — PROGRESS NOTES
Care Management Follow Up    Length of Stay (days): 3    Expected Discharge Date: 04/17/2022     Concerns to be Addressed: Surgical Clearance-Chest Tube (potential removal 4/17). ID following-Change to oral antibiotics.      Patient plan of care discussed at interdisciplinary rounds: Yes    Anticipated Discharge Disposition: Home     Anticipated Discharge Services:  None anticipated   Anticipated Discharge DME:  None anticipated    Patient/family educated on Medicare website which has current facility and service quality ratings:  Not needed at this time   Education Provided on the Discharge Plan:  Per Team  Patient/Family in Agreement with the Plan:  Yes    Referrals Placed by CM/SW:  None at this time   Private pay costs discussed: Not applicable    Additional Information:  Chart Reviewed. Per note from surgery potential removal of chest tubes and discharge tomorrow. ID's note indicates patient can transition to oral antibiotics. No needs anticipated. From home with spouse and daughter; independent at baseline. Family to transport. Care manager to follow.       Katelyn Renteria RN

## 2022-04-16 NOTE — PROGRESS NOTES
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  She has significantly decreased immunoglobin levels.  Attempted IVIG once on 4/8/2022 with immediate febrile reaction. We have not reinitiated it yet.  Clinically she is doing well.  Seems to be responding to antibiotic therapy.  Since she is nonfebrile and there is no evidence of worsening infection, I decided to hold off on IVIG treatment now.  IVIG in the setting of CVID is mainly for prophylaxis against infections but can be used in acute infection if it is refractory.  She is also very close to being discharged.  She will need premedications and possibly steroids with next IVIG infusion.  I think she needs to establish care with an immunologist as soon as possible.  There seems to be no evidence of any hematological malignancy.  She had a flow cytometry which showed significantly reduced CD 19 B cells.    Iron deficiency anemia  She has received 1 dose of IV iron sucrose 400 mg for a week or so ago.  Currently on oral iron therapy with ferrous sulfate every other day.  Hemoglobin is 9.3.  MCV 78.  I think she is still iron deficient.  However the setting of an active infection I would hesitate to give her iron through intravenous route due to surgical risk of worsening infection.  Continue oral iron.  If she does not tolerate ferrous sulfate then we can use iron bisglycinate (gentle iron) every other day.    She needs to follow-up with immunology for further management and evaluation of her immunodeficiency state.  If she were to develop any fever then definitely we can initiate IVIG while in the hospital.    Cancer Staging  No matching staging information was found for the patient.    ECOG Performance Status:  "2    Interval history  Has been stable overnight.  No fevers.  Chest tubes in place.  Possible discharge tomorrow.  On antibiotics.  Has been ambulatory.  Hemodynamically stable.    Review of Systems  Denies nausea, vomiting, headache or blurred vision.  No musculoskeletal pain.  No neurological deficits.  Denies any shortness of breath.  No fevers.    Physical Exam    BP (!) 154/100 (BP Location: Right arm, Patient Position: Semi-Camilo's, Cuff Size: Adult Regular)   Pulse 99   Temp 98.2  F (36.8  C) (Oral)   Resp 18   Ht 1.676 m (5' 6\")   Wt 63.2 kg (139 lb 6.4 oz)   LMP 03/01/2022   SpO2 95%   Breastfeeding No   BMI 22.50 kg/m        GENERAL: Alert and oriented to time place and person. Seated comfortably. In no distress.    HEAD: Atraumatic and normocephalic.    EYES: SELINA, EOMI.  No pallor.  No icterus.    NECK: supple.     LYMPH NODES: No palpable cervical adenopathy    CHEST: clear to auscultation bilaterally.  Chest tubes in place    CVS: S1 and S2 are heard. Regular rate and rhythm.    ABDOMEN: Soft. Not tender. Not distended.    EXTREMITIES: Warm.    SKIN: no rash, or bruising or purpura.        Lab Results    Recent Results (from the past 24 hour(s))   CBC with platelets    Collection Time: 04/16/22  6:12 AM   Result Value Ref Range    WBC Count 8.0 4.0 - 11.0 10e3/uL    RBC Count 3.84 3.80 - 5.20 10e6/uL    Hemoglobin 9.3 (L) 11.7 - 15.7 g/dL    Hematocrit 29.8 (L) 35.0 - 47.0 %    MCV 78 78 - 100 fL    MCH 24.2 (L) 26.5 - 33.0 pg    MCHC 31.2 (L) 31.5 - 36.5 g/dL    RDW 20.6 (H) 10.0 - 15.0 %    Platelet Count 450 150 - 450 10e3/uL   Extra Red Top Tube    Collection Time: 04/16/22  6:12 AM   Result Value Ref Range    Hold Specimen JIC         Imaging    XR Chest 1 View    Result Date: 4/13/2022  EXAM: XR CHEST 1 VIEW LOCATION: [facility] DATE/TIME: 4/13/2022 10:20 PM INDICATION: empyema COMPARISON: 04/11/2022     IMPRESSION: Left-sided chest tubes with a small " right-sided thoracic catheter. Small bilateral subphrenic pneumothoraces. Asymmetric patchy interstitial opacity in the left upper lobe. Stable cardiac mediastinal silhouette.     XR Chest Port 1 View    Result Date: 4/16/2022  EXAM: XR CHEST PORT 1 VIEW LOCATION: [facility] DATE/TIME: 4/16/2022 5:49 AM INDICATION: empyema COMPARISON: 04/15/2022     IMPRESSION: Since comparative study the pigtail catheter projected over the inferior aspect of the right hemithorax has been removed with no significant changes in the adjacent tiny pneumothorax. Otherwise the exam is stable to include the location of the left chest tube with some atelectasis in the left lower lobe.    XR Chest Port 1 View    Result Date: 4/15/2022  EXAM: XR CHEST PORT 1 VIEW LOCATION: [facility] DATE/TIME: 4/15/2022 5:43 AM INDICATION: empyema COMPARISON: 04/14/2022     IMPRESSION: Since comparative chest radiograph there is been a slight decrease in amount of infiltrate involving the left perihilar region, otherwise the exam is unchanged with the appliances in stable position with no greg pneumothorax identified.    XR Chest Port 1 View    Result Date: 4/14/2022  EXAM: XR CHEST PORT 1 VIEW LOCATION: [facility] DATE/TIME: 4/14/2022 6:28 AM INDICATION: empyema COMPARISON: 04/13/2022     IMPRESSION: Since correlated of chest radiograph there appears to be slightly less dense infiltrates in the left perihilar region, otherwise the exam is stable to include the location of the appliances. There is partial loss left hemidiaphragm consistent  with infiltrate/atelectasis left lower lobe.       Signed by: Vance Velez MD

## 2022-04-17 ENCOUNTER — APPOINTMENT (OUTPATIENT)
Dept: RADIOLOGY | Facility: HOSPITAL | Age: 42
DRG: 163 | End: 2022-04-17
Attending: SURGERY
Payer: COMMERCIAL

## 2022-04-17 VITALS
WEIGHT: 135.8 LBS | OXYGEN SATURATION: 97 % | TEMPERATURE: 98 F | DIASTOLIC BLOOD PRESSURE: 80 MMHG | RESPIRATION RATE: 16 BRPM | HEART RATE: 90 BPM | HEIGHT: 66 IN | BODY MASS INDEX: 21.83 KG/M2 | SYSTOLIC BLOOD PRESSURE: 150 MMHG

## 2022-04-17 PROBLEM — D80.1 HYPOGAMMAGLOBULINEMIA (H): Status: ACTIVE | Noted: 2022-04-17

## 2022-04-17 LAB
BACTERIA PLR CULT: NO GROWTH
ERYTHROCYTE [DISTWIDTH] IN BLOOD BY AUTOMATED COUNT: 21.1 % (ref 10–15)
GRAM STAIN RESULT: NORMAL
GRAM STAIN RESULT: NORMAL
HCT VFR BLD AUTO: 31.4 % (ref 35–47)
HGB BLD-MCNC: 9.8 G/DL (ref 11.7–15.7)
MCH RBC QN AUTO: 24.6 PG (ref 26.5–33)
MCHC RBC AUTO-ENTMCNC: 31.2 G/DL (ref 31.5–36.5)
MCV RBC AUTO: 79 FL (ref 78–100)
PLATELET # BLD AUTO: 484 10E3/UL (ref 150–450)
POTASSIUM BLD-SCNC: 4.6 MMOL/L (ref 3.5–5)
RBC # BLD AUTO: 3.99 10E6/UL (ref 3.8–5.2)
WBC # BLD AUTO: 5.9 10E3/UL (ref 4–11)

## 2022-04-17 PROCEDURE — 71045 X-RAY EXAM CHEST 1 VIEW: CPT

## 2022-04-17 PROCEDURE — 36415 COLL VENOUS BLD VENIPUNCTURE: CPT | Performed by: HOSPITALIST

## 2022-04-17 PROCEDURE — 85027 COMPLETE CBC AUTOMATED: CPT | Performed by: HOSPITALIST

## 2022-04-17 PROCEDURE — 36415 COLL VENOUS BLD VENIPUNCTURE: CPT | Performed by: FAMILY MEDICINE

## 2022-04-17 PROCEDURE — 250N000011 HC RX IP 250 OP 636: Performed by: INTERNAL MEDICINE

## 2022-04-17 PROCEDURE — 84132 ASSAY OF SERUM POTASSIUM: CPT | Performed by: FAMILY MEDICINE

## 2022-04-17 PROCEDURE — 99207 PR NO CHARGE LOS: CPT | Performed by: INTERNAL MEDICINE

## 2022-04-17 PROCEDURE — 250N000013 HC RX MED GY IP 250 OP 250 PS 637: Performed by: SURGERY

## 2022-04-17 PROCEDURE — 99239 HOSP IP/OBS DSCHRG MGMT >30: CPT | Performed by: FAMILY MEDICINE

## 2022-04-17 PROCEDURE — 250N000013 HC RX MED GY IP 250 OP 250 PS 637: Performed by: FAMILY MEDICINE

## 2022-04-17 RX ORDER — ACETAMINOPHEN 325 MG/1
650 TABLET ORAL EVERY 4 HOURS PRN
COMMUNITY
Start: 2022-04-17 | End: 2022-09-26

## 2022-04-17 RX ORDER — HYDROMORPHONE HYDROCHLORIDE 2 MG/1
2 TABLET ORAL EVERY 4 HOURS PRN
Qty: 12 TABLET | Refills: 0 | Status: SHIPPED | OUTPATIENT
Start: 2022-04-17 | End: 2022-09-26

## 2022-04-17 RX ORDER — LISINOPRIL 5 MG/1
5 TABLET ORAL DAILY
Qty: 30 TABLET | Refills: 0 | Status: SHIPPED | OUTPATIENT
Start: 2022-04-17 | End: 2022-12-02

## 2022-04-17 RX ORDER — LEVOFLOXACIN 750 MG/1
750 TABLET, FILM COATED ORAL
Status: DISCONTINUED | OUTPATIENT
Start: 2022-04-17 | End: 2022-04-17 | Stop reason: HOSPADM

## 2022-04-17 RX ORDER — LEVOFLOXACIN 750 MG/1
750 TABLET, FILM COATED ORAL
Qty: 24 TABLET | Refills: 0 | Status: SHIPPED | OUTPATIENT
Start: 2022-04-17 | End: 2022-04-17

## 2022-04-17 RX ORDER — LEVOFLOXACIN 750 MG/1
750 TABLET, FILM COATED ORAL
Qty: 24 TABLET | Refills: 0 | Status: SHIPPED | OUTPATIENT
Start: 2022-04-17 | End: 2022-09-26

## 2022-04-17 RX ORDER — POLYETHYLENE GLYCOL 3350 17 G/17G
17 POWDER, FOR SOLUTION ORAL 2 TIMES DAILY
Qty: 510 G | Refills: 0 | Status: SHIPPED | OUTPATIENT
Start: 2022-04-17 | End: 2022-04-17

## 2022-04-17 RX ORDER — LISINOPRIL 5 MG/1
5 TABLET ORAL DAILY
Qty: 30 TABLET | Refills: 0 | Status: SHIPPED | OUTPATIENT
Start: 2022-04-17 | End: 2022-04-17

## 2022-04-17 RX ORDER — AMOXICILLIN 250 MG
1 CAPSULE ORAL 2 TIMES DAILY
Qty: 30 TABLET | Refills: 0 | Status: SHIPPED | OUTPATIENT
Start: 2022-04-17 | End: 2022-04-17

## 2022-04-17 RX ORDER — POLYETHYLENE GLYCOL 3350 17 G/17G
17 POWDER, FOR SOLUTION ORAL DAILY
Qty: 510 G | Refills: 0 | Status: SHIPPED | OUTPATIENT
Start: 2022-04-17 | End: 2022-04-17

## 2022-04-17 RX ORDER — POLYETHYLENE GLYCOL 3350 17 G/17G
17 POWDER, FOR SOLUTION ORAL 2 TIMES DAILY
Qty: 510 G | Refills: 0 | Status: SHIPPED | OUTPATIENT
Start: 2022-04-17 | End: 2022-04-22

## 2022-04-17 RX ORDER — AMOXICILLIN 250 MG
1 CAPSULE ORAL 2 TIMES DAILY
Qty: 30 TABLET | Refills: 0 | Status: SHIPPED | OUTPATIENT
Start: 2022-04-17 | End: 2022-04-22

## 2022-04-17 RX ADMIN — HYDRALAZINE HYDROCHLORIDE 10 MG: 20 INJECTION INTRAMUSCULAR; INTRAVENOUS at 00:10

## 2022-04-17 RX ADMIN — HYDROMORPHONE HYDROCHLORIDE 4 MG: 4 TABLET ORAL at 01:07

## 2022-04-17 RX ADMIN — SENNOSIDES AND DOCUSATE SODIUM 1 TABLET: 8.6; 5 TABLET ORAL at 08:00

## 2022-04-17 RX ADMIN — HYDROMORPHONE HYDROCHLORIDE 4 MG: 4 TABLET ORAL at 07:54

## 2022-04-17 RX ADMIN — B-COMPLEX W/ C & FOLIC ACID TAB 1 MG 1 TABLET: 1 TAB at 08:00

## 2022-04-17 RX ADMIN — AMLODIPINE BESYLATE 2.5 MG: 2.5 TABLET ORAL at 08:00

## 2022-04-17 RX ADMIN — POLYETHYLENE GLYCOL 3350 17 G: 17 POWDER, FOR SOLUTION ORAL at 07:56

## 2022-04-17 RX ADMIN — HYDROMORPHONE HYDROCHLORIDE 2 MG: 2 TABLET ORAL at 11:30

## 2022-04-17 ASSESSMENT — ACTIVITIES OF DAILY LIVING (ADL)
ADLS_ACUITY_SCORE: 7

## 2022-04-17 NOTE — PROGRESS NOTES
Progress Note    Assessment/Plan  Patient is doing very well.  No significant pain.  Chest tubes all DC'd.  Chest x-ray reviewed.  No change from last several days.  No air leak noted on Pleur-evac and without significant output.  As per Anna note Levaquin for 1 month postop oral at discharge.  No restrictions in activity or diet.  May bathe and shower.  I will see patient in 2 weeks for follow-up and suture removal.  Patient to be evaluated for immunodeficiency at the [facility] after recovery.    Active Problems:    Essential hypertension    Pleural effusion    Empyema lung (H)      Subjective  As above quite comfortable good respiratory effort  Objective    Vital signs in last 24 hours  Temp:  [97.6  F (36.4  C)-98.2  F (36.8  C)] 97.8  F (36.6  C)  Pulse:  [] 99  Resp:  [16-18] 16  BP: (127-162)/() 134/84  SpO2:  [94 %-97 %] 97 %  Weight:   [unfilled]    Intake/Output last 3 shifts  I/O last 3 completed shifts:  In: 560 [P.O.:560]  Out: 2335 [Urine:2200; Chest Tube:135]  Intake/Output this shift:  No intake/output data recorded.      Physical Exam  Breath sounds equal bilaterally.  No air leak and limited chest tube output.    Pertinent Labs   Lab Results   Component Value Date    WBC 5.9 04/17/2022    HGB 9.8 (L) 04/17/2022    HCT 31.4 (L) 04/17/2022    MCV 79 04/17/2022     (H) 04/17/2022             Pertinent Radiology     [unfilled]        Suleman Kumar MD

## 2022-04-17 NOTE — PROGRESS NOTES
ID chart check    Levofloxacin 4 weeks beyond surgery.  I will have our clinic contact her to set up follow up with us in about 2 weeks.     Jace Teran MD

## 2022-04-17 NOTE — PLAN OF CARE
Problem: Plan of Care - These are the overarching goals to be used throughout the patient stay.    Goal: Plan of Care Review/Shift Note      Chest tubes discontinued by Dr. Kumar this morning and patient tolerated it well. Areas are covered with pressure dressings.  Staples intact to left upper/mid back area. SAO2 96% on r/a. Lung sounds clear and diminished. Pain managed with oral dilaudid. Appetite fair. Patient discharging to home with family later today.  Kenyetta Cantrell RN

## 2022-04-17 NOTE — PLAN OF CARE
Problem: Plan of Care - These are the overarching goals to be used throughout the patient stay.    Goal: Optimal Comfort and Wellbeing  Outcome: Ongoing, Progressing  Intervention: Monitor Pain and Promote Comfort     Problem: Airway Clearance Ineffective (Pulmonary Impairment)  Goal: Effective Airway Clearance  Outcome: Ongoing, Progressing     Problem: Respiratory Compromise (Surgery Nonspecified)  Goal: Effective Oxygenation and Ventilation  Outcome: Ongoing, Progressing    Pt reports breathing feels fine, denies SOB or cough.  Uses IS independently.  CT x3 on suction with minimal OP.  Pain managed with prn dilaudid, given x1.  Pt independent in room.

## 2022-04-17 NOTE — DISCHARGE SUMMARY
[facility]  Hospitalist Discharge Summary      Date of Admission:  4/13/2022  Date of Discharge:  4/17/2022 11:33 AM  Discharging Provider: NAVIN MORALEZ MD      Discharge Diagnoses   Principal Problem:    Empyema lung (H)  Active Problems:    Essential hypertension    Pleural effusion    Elevated LFTs    Chronic maxillary sinusitis    Hypogammaglobulinemia (H)       Discharge Procedure Orders   Reason for your hospital stay   Order Comments: Pneumonia, empyema     Follow-up and recommended labs and tests   Order Comments: Follow up with primary care provider, Ashely Larios, within 3-5 days to evaluate medication change and for hospital follow- up.  The following labs/tests are recommended: BMP, CBC.     Activity   Order Comments: Your activity upon discharge: activity as tolerated     Order Specific Question Answer Comments   Is discharge order? Yes      When to contact your care team   Order Comments: Call your primary doctor if you have any of the following: temperature greater than 100.5,  increased shortness of breath or increased pain.     Discharge Instructions   Order Comments: F/U with Dr Kumar in 2 weeks, [facility] as soon as possible, ID in 1-2 weeks     Wound care and dressings   Order Comments: Instructions to care for your wound at home: as directed.     Diet   Order Comments: Follow this diet upon discharge: Orders Placed This Encounter      Snacks/Supplements Adult: Nepro Oral Supplement; Between Meals      Combination Diet 2 gm K Diet       Order Specific Question Answer Comments   Is discharge order? Yes           Hospital Course   42 yo female with a history of hypertension, previously treated earlier this year twice for community-acquired pneumonia, 9-month history of persistent sinus infection admitted with shortness of breath, found to have large left and small right pleural effusion/empyema.   Initially admitted at [facility], started on  antibiotics and had left thoracentesis with 800 cc of purulent fluid removed, cultures positive for H. influenzae, blood culture also positive for H influenza..  Left chest tube placed, now removed.  Found to have very low IgG, IgA, IgE. Felt to have common variable immunodeficiency versus acquired immunodeficiency, given IVIG but had a significant febrile reaction involving temp over 104, shaking chills. Eventually had left chest tube removed, subsequently had chest tube placement on the right.  Repeat imaging showed persistent left-sided empyema.  Treated with ceftriaxone and clindamycin initially, transitioned to Levaquin on the afternoon of 4/12.  Transferred to [facility] for thoracoscopy for the left thoracic empyema.  Patient has been seen by pulmonology, general surgery, infectious disease, hematology, gastroenterology.    Left sided empyema, pneumonia, bacteremia due to H flu  Had previously been treated in February and March for community-acquired pneumonia with cefuroxime and Levaquin with initial improvement then worsening of symptoms.    Had left thoracentesis on 4/4, with 800 cc of purulent fluid, blood and pleural fluid culture positive for H influenza.   Received intrapleural alteplase on 4/5-4/7.  CT of chest on 4/8 showed improvement of left empyema.  Left chest tube removed on 4/9,  Repeat CT on 4/12 showed persistent left-sided empyema, small possible right empyema as well  Initially treated with Zosyn, vancomycin.  Changed to ceftriaxone and clindamycin on 4/6.  Ceftriaxone and clindamycin discontinued on 4/12 due to concerns of a rash on her arms and legs, changed to Levaquin.  Rash resolved.  Right chest tube placed on 4/12 at [facility], removed on 4/15  Now status post flexible bronchoscopy/left thoracoscopy/left thoracotomy for complete decortication, visceral parietal pleural surfaces and parietal pleurectomy postoperative day 4  3 left chest tubes in place, no air leak.   Decreased output.  Cultures remain negative from [facility]  Surgery, pulmonology, ID following  Chest x-ray shows improvement.  Leukocytosis resolved.  Chest tubes removed today with no complication.  Continue Levaquin for 4-week total course  Follow-up with surgery, infectious disease as outpatient     Right pleural effusion-  Chest tube placed on 4/12.  Very little output.    Removed by surgery on 4/15.  Tiny pneumothorax seen.  Resolved today.     Severe hypogammaglobulinemia  Suspected CVID versus acquired immunodeficiency.  Has significant reduction in all lines: IgA < 5, IgG < 109, IgE < 2.  IVIG infusion initiated on 4/8, but became febrile and symptomatic soon after and it was discontinued.  Not fully completed.  Seen by hematology, they were reconsulted again to consider another attempt at IVIG.  As long as she is not showing worsening infection or fever and is improving they are recommending holding further IVIG and close follow-up with immunology as outpatient.  Patient was instructed to make an appointment with [facility] immunology.  I will also reach out to them to expedite follow-up.     Microcytic anemia, splenomegaly with iron deficient anemia  No evidence of blood loss  Appreciate hematology recs,   Hx of iron deficiency prior to admission, intolerance to oral iron due to abdominal pain  Given IV iron sucrose  Per GI, CVID can cause villous atrophy of hte small bowel, resulting in iron deficiency anemia.    From compromising oral iron absorption.     Pt should have a EGD with small bowel biopsies in about 2 months (after pulmonary status improves).    GI will contact her to help arrange the EGD.   No signs of GI loss  On oral iron sulfate  Stable hemoglobin.     Hyperkalemia-  Likely iatrogenic from potassium in IV fluid, also on low-dose lisinopril.    Held lisinopril, resolved.  Okay to restart lisinopril     Essential hypertension  Elevated..  Holding home lisinopril due to hyperkalemia.   Started amlodipine, will discontinue and restart her home lisinopril since her hyperkalemia was more due to potassium in her IV fluid.     Elevated liver enzymes  GI consulted and evaluated her on April 4, resolved with conservative management.,     Acute respiratory failure with hypoxia  Secondary to empyema   Patient briefly required BiPAP in the ICU at [facility], quickly weaned from BiPAP after a day and currently does not require any oxygen supplemental      Recurrent sinusitis  She has ENT appointment scheduled for nasal polyp on May 2022.  Has been an issue since August 2021.  Stable.    Severe protein caloric malnutrition-  Seen by dietitian, supplements ordered.  Taking good oral intake at discharge.  .    Consultations This Hospital Stay   NUTRITION SERVICES ADULT IP CONSULT  INFECTIOUS DISEASES IP CONSULT  PULMONARY IP CONSULT  SURGERY GENERAL IP CONSULT  SOCIAL WORK IP CONSULT  HEMATOLOGY & ONCOLOGY IP CONSULT    Code Status   Prior         Greater than 35 minutes spent on coordinating discharge, evaluating labs, studies, evaluating patient, evaluating specialist notes    NAVIN MORALEZ MD  [address] [address]  Phone: [phone]  Fax: [phone]  ______________________________________________________________________         Primary Care Physician   Ashely Larios    Discharge Orders      Reason for your hospital stay    Pneumonia, empyema     Follow-up and recommended labs and tests    Follow up with primary care provider, Ashely Larios, within 3-5 days to evaluate medication change and for hospital follow- up.  The following labs/tests are recommended: BMP, CBC.     Activity    Your activity upon discharge: activity as tolerated     When to contact your care team    Call your primary doctor if you have any of the following: temperature greater than 100.5,  increased shortness of breath or increased pain.     Discharge  Instructions    F/U with Dr Kumar in 2 weeks, [facility] as soon as possible, ID in 1-2 weeks     Wound care and dressings    Instructions to care for your wound at home: as directed.     Diet    Follow this diet upon discharge: Orders Placed This Encounter      Snacks/Supplements Adult: Nepro Oral Supplement; Between Meals      Combination Diet 2 gm K Diet         Significant Results and Procedures   Most Recent 3 CBC's:  Recent Labs   Lab Test 04/17/22  0529 04/16/22  0612 04/15/22  0549   WBC 5.9 8.0 11.6*   HGB 9.8* 9.3* 9.1*   MCV 79 78 81   * 450 422     Most Recent 3 BMP's:  Recent Labs   Lab Test 04/17/22  0922 04/15/22  0549 04/15/22  0211 04/14/22  1207 04/14/22  0505 04/05/22  0506   NA  --  137  --   --  137 139   POTASSIUM 4.6 4.7  --  4.3 5.4* 3.8   CHLORIDE  --  106  --   --  106 108*   CO2  --  24  --   --  24 21*   BUN  --  5*  --   --  7* 13   CR  --  0.56*  --   --  0.56* 0.61   ANIONGAP  --  7  --   --  7 10   PABLO  --  8.7  --   --  8.7 8.9   GLC  --  95 101*  --  151* 88     Most Recent 2 LFT's:  Recent Labs   Lab Test 04/14/22  0505 04/06/22  0449   AST 15 10   ALT 21 16   ALKPHOS 107 142*   BILITOTAL 0.2 0.4     Most Recent 6 Bacteria Isolates From Any Culture (See EPIC Reports for Culture Details):No lab results found.  Most Recent TSH and T4:  Recent Labs   Lab Test 04/03/22  1141   TSH 1.86   ,   Results for orders placed or performed during the hospital encounter of 04/13/22   XR Chest 1 View    Narrative    EXAM: XR CHEST 1 VIEW  LOCATION: [facility]  DATE/TIME: 4/13/2022 10:20 PM    INDICATION: empyema  COMPARISON: 04/11/2022      Impression    IMPRESSION: Left-sided chest tubes with a small right-sided thoracic catheter. Small bilateral subphrenic pneumothoraces. Asymmetric patchy interstitial opacity in the left upper lobe. Stable cardiac mediastinal silhouette.    XR Chest Port 1 View    Narrative    EXAM: XR CHEST PORT 1 VIEW  LOCATION: [facility]  DATE/TIME: 4/14/2022 6:28 AM    INDICATION: empyema  COMPARISON: 04/13/2022      Impression    IMPRESSION: Since correlated of chest radiograph there appears to be slightly less dense infiltrates in the left perihilar region, otherwise the exam is stable to include the location of the appliances. There is partial loss left hemidiaphragm consistent   with infiltrate/atelectasis left lower lobe.   XR Chest Port 1 View    Narrative    EXAM: XR CHEST PORT 1 VIEW  LOCATION: [facility]  DATE/TIME: 4/15/2022 5:43 AM    INDICATION: empyema  COMPARISON: 04/14/2022      Impression    IMPRESSION: Since comparative chest radiograph there is been a slight decrease in amount of infiltrate involving the left perihilar region, otherwise the exam is unchanged with the appliances in stable position with no greg pneumothorax identified.   XR Chest Port 1 View    Narrative    EXAM: XR CHEST PORT 1 VIEW  LOCATION: [facility]  DATE/TIME: 4/16/2022 5:49 AM    INDICATION: empyema  COMPARISON: 04/15/2022      Impression    IMPRESSION: Since comparative study the pigtail catheter projected over the inferior aspect of the right hemithorax has been removed with no significant changes in the adjacent tiny pneumothorax. Otherwise the exam is stable to include the location of   the left chest tube with some atelectasis in the left lower lobe.   XR Chest Port 1 View    Narrative    EXAM: XR CHEST PORT 1 VIEW  LOCATION: [facility]  DATE/TIME: 4/17/2022 5:55 AM    INDICATION: empyema  COMPARISON: 04/16/2022      Impression    IMPRESSION: Since previous chest radiograph the prior seen lucency in the right costophrenic angle has appearance of lung with some adjacent atelectasis rather than a small area of pneumothorax. Otherwise the chest is stable with the appliances remain in   satisfactory position with no greg pneumothorax seen on  "today's study. There is persistent loss of the right hemidiaphragm should be typical for infiltrate/atelectasis in the left lower lobe.       Discharge Medications   Discharge Medication List as of 4/17/2022 10:00 AM      START taking these medications    Details   acetaminophen (TYLENOL) 325 MG tablet Take 2 tablets (650 mg) by mouth every 4 hours as needed for mild pain (For optimal non-opioid multimodal pain management to improve pain control.), OTC      HYDROmorphone (DILAUDID) 2 MG tablet Take 1 tablet (2 mg) by mouth every 4 hours as needed for moderate to severe pain, Disp-12 tablet, R-0, Local Print      senna-docusate (SENOKOT-S/PERICOLACE) 8.6-50 MG tablet Take 1 tablet by mouth 2 times daily, Disp-30 tablet, R-0, E-Prescribe         CONTINUE these medications which have CHANGED    Details   levofloxacin (LEVAQUIN) 750 MG tablet Take 1 tablet (750 mg) by mouth daily (with dinner) Do not take with calcium or iron within 6 hours, Disp-24 tablet, R-0, E-Prescribe      lisinopril (ZESTRIL) 5 MG tablet Take 1 tablet (5 mg) by mouth daily, Disp-30 tablet, R-0, E-Prescribe      polyethylene glycol (MIRALAX) 17 GM/Dose powder Take 17 g by mouth 2 times daily, Disp-510 g, R-0, E-Prescribe         CONTINUE these medications which have NOT CHANGED    Details   Calcium Carb-Cholecalciferol (CALTRATE 600+D3 SOFT) 600-800 MG-UNIT CHEW Take 1 tablet by mouth daily , Historical      ferrous sulfate (FEROSUL) 325 (65 Fe) MG tablet Take 325 mg by mouth daily (with breakfast), Historical         STOP taking these medications       cefTRIAXone (ROCEPHIN) 2 GM vial Comments:   Reason for Stopping:         clindamycin (CLEOCIN) 600 MG/50ML infusion Comments:   Reason for Stopping:             Allergies   Allergies   Allergen Reactions     Immune Globulin (Human) Other (See Comments)     Fevers, chills , tremor     Metronidazole Other (See Comments)     Had \"tremulous chills\" associated with iv infusion.  Resolved after stopping " "infusion     Penicillins Hives     Sulfamethoxazole-Trimethoprim [Sulfamethoxazole W/Trimethoprim] Rash       Physical Exam:  Temp:  [97.6  F (36.4  C)-98.1  F (36.7  C)] 98  F (36.7  C)  Pulse:  [] 90  Resp:  [16-18] 16  BP: (127-162)/() 150/80  SpO2:  [94 %-97 %] 97 %    BP (!) 150/80 (BP Location: Left arm, Patient Position: Semi-Camilo's, Cuff Size: Adult Regular)   Pulse 90   Temp 98  F (36.7  C) (Oral)   Resp 16   Ht 1.676 m (5' 6\")   Wt 61.6 kg (135 lb 12.8 oz)   LMP 03/01/2022   SpO2 97%   Breastfeeding No   BMI 21.92 kg/m    General appearance: alert, appears stated age and cooperative  Head: Normocephalic, without obvious abnormality, atraumatic  Eyes: Clear conjuctiva  Neck: no JVD and supple, symmetrical, trachea midline  Lungs: Creased breath sounds left lower lobe, 2 open incisions, 1 cm where chest tubes were just removed on left chest wall, no surrounding erythema.  Small hole on the right posterior chest wall where chest tube was removed 2 days ago with no surrounding erythema.  Heart: regular rate and rhythm, S1, S2 normal, no murmur, click, rub or gallop  Abdomen: soft, non-tender; bowel sounds normal; no masses,  no organomegaly  Extremities: Liz's sign is negative, no sign of DVT  Skin: Skin color, texture, turgor normal. No rashes or lesions  Neurologic: Grossly normal        "

## 2022-04-17 NOTE — PLAN OF CARE
Patient is A&O. She was comfortable at start of shift. Given 2 mg prn oral Dilaudid at 5:30 with some relief. She only ate 75% of dinner. Voiding well. She then c/o pain at 8:30 pm at a level of 6/10. It was too early for more so patient was able to wait until 9 pm and was given 4 mg Dilaudid. Patient walked 250 ft and tolerated well. She was still having pain and was given prn Tylenol with relief. Chest tubes draining 20 ml serosanguinous drainage in each cannister.  Dressing CDI. IV in L AC was painful and slight swelling at site.  New IV placed in R forearm and Levaquin infusing at end of shift.     Problem: Plan of Care - These are the overarching goals to be used throughout the patient stay.    Goal: Absence of Hospital-Acquired Illness or Injury  Outcome: Ongoing, Progressing  Intervention: Identify and Manage Fall Risk  Recent Flowsheet Documentation  Taken 4/16/2022 2000 by Krystal Schultz RN  Safety Promotion/Fall Prevention: patient and family education  Intervention: Prevent Skin Injury  Recent Flowsheet Documentation  Taken 4/16/2022 1722 by Krystal Schultz RN  Body Position: supine, head elevated  Intervention: Prevent and Manage VTE (Venous Thromboembolism) Risk  Recent Flowsheet Documentation  Taken 4/16/2022 1722 by Krystal Schultz RN  Activity Management: bedrest with bathroom privileges  Goal: Optimal Comfort and Wellbeing  Outcome: Ongoing, Progressing  Intervention: Monitor Pain and Promote Comfort  Recent Flowsheet Documentation  Taken 4/16/2022 1722 by Krystal Schultz RN  Pain Management Interventions: medication (see MAR)  Intervention: Provide Person-Centered Care  Recent Flowsheet Documentation  Taken 4/16/2022 1600 by Krystal Schultz RN  Trust Relationship/Rapport:    care explained    emotional support provided     Problem: Activity Intolerance (Pulmonary Impairment)  Goal: Improved Activity Tolerance  Outcome: Ongoing, Progressing     Problem: Airway Clearance Ineffective (Pulmonary  Impairment)  Goal: Effective Airway Clearance  Outcome: Ongoing, Progressing     Problem: Gas Exchange Impaired (Pulmonary Impairment)  Goal: Optimal Gas Exchange  Outcome: Ongoing, Progressing     Problem: Bleeding (Surgery Nonspecified)  Goal: Absence of Bleeding  Outcome: Ongoing, Progressing     Problem: Bowel Motility Impaired (Surgery Nonspecified)  Goal: Effective Bowel Elimination  Outcome: Ongoing, Not Progressing     Problem: Fluid and Electrolyte Imbalance (Surgery Nonspecified)  Goal: Fluid and Electrolyte Balance  Outcome: Ongoing, Progressing     Problem: Ongoing Anesthesia Effects (Surgery Nonspecified)  Goal: Anesthesia/Sedation Recovery  Intervention: Optimize Anesthesia Recovery  Recent Flowsheet Documentation  Taken 4/16/2022 2000 by Krystal Schultz, RN  Safety Promotion/Fall Prevention: patient and family education     Problem: Pain (Surgery Nonspecified)  Goal: Acceptable Pain Control  Outcome: Ongoing, Progressing  Intervention: Prevent or Manage Pain  Recent Flowsheet Documentation  Taken 4/16/2022 1722 by Krystal Schultz, RN  Pain Management Interventions: medication (see MAR)     Problem: Postoperative Urinary Retention (Surgery Nonspecified)  Goal: Effective Urinary Elimination  Outcome: Ongoing, Progressing     Problem: Respiratory Compromise (Surgery Nonspecified)  Goal: Effective Oxygenation and Ventilation  Intervention: Optimize Oxygenation and Ventilation  Recent Flowsheet Documentation  Taken 4/16/2022 1722 by Krystal Schultz, RN  Head of Bed (HOB) Positioning: HOB at 30-45 degrees   Goal Outcome Evaluation:

## 2022-04-17 NOTE — PROGRESS NOTES
Care Management Follow Up    Length of Stay (days): 4    Expected Discharge Date: 04/17/2022 or 04/18/2022     Concerns to be Addressed: pulm status, post procedure care and monitoring, potential removal of chest tube today     Patient plan of care discussed at interdisciplinary rounds: Yes     Anticipated Discharge Disposition: Home     Anticipated Discharge Services:  none  Anticipated Discharge DME:  none           Additional Information:  Patient admitted for right sided empyema, pneumonia, s/p washout/decortication 4/13. Chest tube in place, potential removal today 4/17.  IV antibiotic currently with plan for oral antibiotic at discharge.     Social History:  Per initial CM consult: Patient lives with her  and daughter. She is typically independent at baseline. Family to transport.      Planning return home with spouse.     Amy Barbour RN

## 2022-04-17 NOTE — PROGRESS NOTES
Patent likely to have chest tubes removed by Dr. Kumar today.  Antibiotic therapy plan outlined in ID note.  We will sign off, but please don't hesitate to call out team if there is anything else we can to to assist with her cares.    JAKE Sy, CNP  Pulmonary Critical Care  Pager: [phone]

## 2022-04-17 NOTE — PROGRESS NOTES
Patient discharged to home at this time transported by her spouse. Discharge paperwork reviewed with patient and all questions have been answered. IV discontinued. Prescriptions ready at her pharmacy. Dressing supplies sent home with patient.  Patient stable and eager to get home. Kenyetta Cantrell RN

## 2022-04-18 ENCOUNTER — TELEPHONE (OUTPATIENT)
Dept: INFECTIOUS DISEASES | Facility: CLINIC | Age: 42
End: 2022-04-18
Payer: COMMERCIAL

## 2022-04-18 ENCOUNTER — TELEPHONE (OUTPATIENT)
Dept: OTOLARYNGOLOGY | Facility: CLINIC | Age: 42
End: 2022-04-18
Payer: COMMERCIAL

## 2022-04-18 LAB — BACTERIA PLR CULT: NO GROWTH

## 2022-04-18 NOTE — PROGRESS NOTES
Spoke with: Arely  Call attempt: 1  Message left: No, spoke with patient  Any pain: No  Any fever: No  Any redness/swelling/ abnormal drainage around puncture site: No  Were you instructed well enough to take care of yourself at home: Yes  Are you satisfied with the care you received: Yes  Any additional concerns or questions: No    Post call completed.   April 18, 2022 10:37 AM  Paul Bolivar RN

## 2022-04-18 NOTE — TELEPHONE ENCOUNTER
4/18 - pt discharged home    ----- Message from Jace Teran MD sent at 4/17/2022  9:08 AM CDT -----  Please set up follow up with Dr. Oneil or Dr. Rae in about 2 weeks.     Jace Teran MD

## 2022-04-18 NOTE — TELEPHONE ENCOUNTER
Spoke with Arely today regarding surgery scheduling the new date (5/31/22) , covid test and f/u will be in a letter and sent via My Chart  PT was in agreement with this plan.    We've received instruction to get you RE-scheduled for surgery with Dr Rodriguez. We have that arranged as follows:     Surgery Date: 5/31/2022  [place] [facility] [place] [address] [place] [address]  Arrival Time: 7:30 a.m. (Unless instructed differently by the pre-op call nurse)     Post op Appointment: To be determined.  Prep Instructions:     1. Please schedule a pre-op physical with your primary care doctor within 30 days prior to the surgery date. This may be virtual or face-to-face depending on your doctors preference. Call them right away to schedule this.    2. PCR-Rated COVID19 testing is required within 4 days of surgery. We have this scheduled for you at [facility], [address] [place] [address] on 5/27/22 at 4 p.m. Follow the specific instructions you receive by Mc. If your test is positive, your surgery will be canceled.     3. Nothing to eat or drink for 8 hours before surgery unless instructed differently by the pre-op nurse. (see #9)    4. If the community sees a new COVID19 surge, your procedure may need to be postponed. We will contact you if this happens.     5. You will need an adult to drive you home and stay with you 24 hours after surgery. Public transportation or Medical Van Services are not permitted.    6. You may have one family member wait in the lobby at the surgery center during your surgery. Visitor restrictions are subject to change, please verify with the pre-op nurse when they call.    7. You will be screened for high-risk exposure to Covid-19 during the pre-op call.  We encourage you to quarantine yourself away from any Covid-19 people for 10 days before surgery to avoid possible last minute cancellations.   When you arrive to the surgery center, you will again be screened for COVID19  symptoms. If you screen positive, your surgery will need to be postponed.    8. We always encourage you to notify your insurance any time you have medical tests or procedures scheduled including surgery. The number is usually right on the back of your insurance card. Please call [facility] Cost of Care at [phone] for the surgeon fees, and [facility] Cost of Care [phone] for facility fees if you need pricing information.     9. You will receive a call from a pre-op call nurse 1-3 days prior to surgery. She will go over more details with you.     Call our office if you have any questions! Thank you!           Surgery Letter sent via [employer]

## 2022-04-19 LAB — BACTERIA PLR CULT: NORMAL

## 2022-04-20 LAB — BACTERIA PLR CULT: NORMAL

## 2022-04-22 ENCOUNTER — OFFICE VISIT (OUTPATIENT)
Dept: FAMILY MEDICINE | Facility: CLINIC | Age: 42
End: 2022-04-22
Payer: COMMERCIAL

## 2022-04-22 VITALS
BODY MASS INDEX: 22.56 KG/M2 | WEIGHT: 139.8 LBS | SYSTOLIC BLOOD PRESSURE: 120 MMHG | OXYGEN SATURATION: 98 % | HEART RATE: 108 BPM | DIASTOLIC BLOOD PRESSURE: 76 MMHG

## 2022-04-22 DIAGNOSIS — R79.89 ELEVATED LFTS: ICD-10-CM

## 2022-04-22 DIAGNOSIS — D50.8 OTHER IRON DEFICIENCY ANEMIA: ICD-10-CM

## 2022-04-22 DIAGNOSIS — J14 PNEUMONIA OF BOTH LOWER LOBES DUE TO HAEMOPHILUS INFLUENZAE (H): Primary | ICD-10-CM

## 2022-04-22 DIAGNOSIS — J86.9 EMPYEMA LUNG (H): ICD-10-CM

## 2022-04-22 DIAGNOSIS — J32.0 CHRONIC MAXILLARY SINUSITIS: ICD-10-CM

## 2022-04-22 DIAGNOSIS — D80.1 HYPOGAMMAGLOBULINEMIA (H): ICD-10-CM

## 2022-04-22 DIAGNOSIS — I10 ESSENTIAL HYPERTENSION: ICD-10-CM

## 2022-04-22 DIAGNOSIS — E87.5 HYPERKALEMIA: ICD-10-CM

## 2022-04-22 DIAGNOSIS — J90 PLEURAL EFFUSION: ICD-10-CM

## 2022-04-22 PROBLEM — J45.909 ASTHMA: Status: ACTIVE | Noted: 2022-04-22

## 2022-04-22 LAB
ALBUMIN SERPL-MCNC: 3.3 G/DL (ref 3.5–5)
ALP SERPL-CCNC: 112 U/L (ref 45–120)
ALT SERPL W P-5'-P-CCNC: 11 U/L (ref 0–45)
ANION GAP SERPL CALCULATED.3IONS-SCNC: 12 MMOL/L (ref 5–18)
AST SERPL W P-5'-P-CCNC: 13 U/L (ref 0–40)
BILIRUB SERPL-MCNC: 0.3 MG/DL (ref 0–1)
BUN SERPL-MCNC: 12 MG/DL (ref 8–22)
CALCIUM SERPL-MCNC: 9.7 MG/DL (ref 8.5–10.5)
CHLORIDE BLD-SCNC: 104 MMOL/L (ref 98–107)
CHOLEST SERPL-MCNC: 228 MG/DL
CO2 SERPL-SCNC: 27 MMOL/L (ref 22–31)
CREAT SERPL-MCNC: 0.71 MG/DL (ref 0.6–1.1)
ERYTHROCYTE [DISTWIDTH] IN BLOOD BY AUTOMATED COUNT: 20.7 % (ref 10–15)
FASTING STATUS PATIENT QL REPORTED: ABNORMAL
GFR SERPL CREATININE-BSD FRML MDRD: >90 ML/MIN/1.73M2
GLUCOSE BLD-MCNC: 98 MG/DL (ref 70–125)
HCT VFR BLD AUTO: 36.5 % (ref 35–47)
HDLC SERPL-MCNC: 58 MG/DL
HGB BLD-MCNC: 11.1 G/DL (ref 11.7–15.7)
LDLC SERPL CALC-MCNC: 135 MG/DL
MCH RBC QN AUTO: 24.7 PG (ref 26.5–33)
MCHC RBC AUTO-ENTMCNC: 30.4 G/DL (ref 31.5–36.5)
MCV RBC AUTO: 81 FL (ref 78–100)
PLATELET # BLD AUTO: 387 10E3/UL (ref 150–450)
POTASSIUM BLD-SCNC: 5 MMOL/L (ref 3.5–5)
PROT SERPL-MCNC: 6 G/DL (ref 6–8)
RBC # BLD AUTO: 4.49 10E6/UL (ref 3.8–5.2)
SODIUM SERPL-SCNC: 143 MMOL/L (ref 136–145)
TRIGL SERPL-MCNC: 174 MG/DL
WBC # BLD AUTO: 7.2 10E3/UL (ref 4–11)

## 2022-04-22 PROCEDURE — 80061 LIPID PANEL: CPT | Performed by: FAMILY MEDICINE

## 2022-04-22 PROCEDURE — 80053 COMPREHEN METABOLIC PANEL: CPT | Performed by: FAMILY MEDICINE

## 2022-04-22 PROCEDURE — 36415 COLL VENOUS BLD VENIPUNCTURE: CPT | Performed by: FAMILY MEDICINE

## 2022-04-22 PROCEDURE — 85027 COMPLETE CBC AUTOMATED: CPT | Performed by: FAMILY MEDICINE

## 2022-04-22 PROCEDURE — 99215 OFFICE O/P EST HI 40 MIN: CPT | Performed by: FAMILY MEDICINE

## 2022-04-26 ENCOUNTER — TELEPHONE (OUTPATIENT)
Dept: FAMILY MEDICINE | Facility: CLINIC | Age: 42
End: 2022-04-26
Payer: COMMERCIAL

## 2022-04-26 NOTE — TELEPHONE ENCOUNTER
Reason for Call:  Other FYI    Detailed comments: Wants to let provider know that she has been trying to reach patient after recent Hosp stay. And have not been successful by phone or letter. No need for call back, only if questions    Phone Number Patient can be reached at: Other phone number:  [phone]    Best Time: any    Can we leave a detailed message on this number? YES    Call taken on 4/26/2022 at 9:03 AM by Katharina Ho

## 2022-04-29 DIAGNOSIS — D83.0: Primary | ICD-10-CM

## 2022-05-09 ENCOUNTER — LAB (OUTPATIENT)
Dept: LAB | Facility: CLINIC | Age: 42
End: 2022-05-09
Payer: COMMERCIAL

## 2022-05-09 ENCOUNTER — TELEPHONE (OUTPATIENT)
Dept: INFECTIOUS DISEASES | Facility: CLINIC | Age: 42
End: 2022-05-09

## 2022-05-09 DIAGNOSIS — D83.0: ICD-10-CM

## 2022-05-09 LAB
ALBUMIN SERPL-MCNC: 3.7 G/DL (ref 3.5–5)
ALP SERPL-CCNC: 99 U/L (ref 45–120)
ALT SERPL W P-5'-P-CCNC: 14 U/L (ref 0–45)
ANION GAP SERPL CALCULATED.3IONS-SCNC: 10 MMOL/L (ref 5–18)
AST SERPL W P-5'-P-CCNC: 16 U/L (ref 0–40)
BASOPHILS # BLD AUTO: 0 10E3/UL (ref 0–0.2)
BASOPHILS NFR BLD AUTO: 1 %
BILIRUB SERPL-MCNC: 0.4 MG/DL (ref 0–1)
BUN SERPL-MCNC: 13 MG/DL (ref 8–22)
CALCIUM SERPL-MCNC: 9.6 MG/DL (ref 8.5–10.5)
CHLORIDE BLD-SCNC: 103 MMOL/L (ref 98–107)
CO2 SERPL-SCNC: 25 MMOL/L (ref 22–31)
CREAT SERPL-MCNC: 0.71 MG/DL (ref 0.6–1.1)
EOSINOPHIL # BLD AUTO: 0.4 10E3/UL (ref 0–0.7)
EOSINOPHIL NFR BLD AUTO: 7 %
ERYTHROCYTE [DISTWIDTH] IN BLOOD BY AUTOMATED COUNT: 18.9 % (ref 10–15)
GFR SERPL CREATININE-BSD FRML MDRD: >90 ML/MIN/1.73M2
GLUCOSE BLD-MCNC: 89 MG/DL (ref 70–125)
HCT VFR BLD AUTO: 37.8 % (ref 35–47)
HGB BLD-MCNC: 12.1 G/DL (ref 11.7–15.7)
IGA SERPL-MCNC: <5 MG/DL (ref 65–400)
IGG SERPL-MCNC: <109 MG/DL (ref 700–1700)
IGM SERPL-MCNC: <5 MG/DL (ref 60–280)
IMM GRANULOCYTES # BLD: 0 10E3/UL
IMM GRANULOCYTES NFR BLD: 1 %
LYMPHOCYTES # BLD AUTO: 1.3 10E3/UL (ref 0.8–5.3)
LYMPHOCYTES NFR BLD AUTO: 21 %
MCH RBC QN AUTO: 26.2 PG (ref 26.5–33)
MCHC RBC AUTO-ENTMCNC: 32 G/DL (ref 31.5–36.5)
MCV RBC AUTO: 82 FL (ref 78–100)
MONOCYTES # BLD AUTO: 0.6 10E3/UL (ref 0–1.3)
MONOCYTES NFR BLD AUTO: 10 %
NEUTROPHILS # BLD AUTO: 3.8 10E3/UL (ref 1.6–8.3)
NEUTROPHILS NFR BLD AUTO: 62 %
PLATELET # BLD AUTO: 234 10E3/UL (ref 150–450)
POTASSIUM BLD-SCNC: 4.9 MMOL/L (ref 3.5–5)
PROT SERPL-MCNC: 5.9 G/DL (ref 6–8)
RBC # BLD AUTO: 4.61 10E6/UL (ref 3.8–5.2)
SODIUM SERPL-SCNC: 138 MMOL/L (ref 136–145)
WBC # BLD AUTO: 6.2 10E3/UL (ref 4–11)

## 2022-05-09 PROCEDURE — 86360 T CELL ABSOLUTE COUNT/RATIO: CPT

## 2022-05-09 PROCEDURE — 86787 VARICELLA-ZOSTER ANTIBODY: CPT

## 2022-05-09 PROCEDURE — 86357 NK CELLS TOTAL COUNT: CPT

## 2022-05-09 PROCEDURE — 85025 COMPLETE CBC W/AUTO DIFF WBC: CPT

## 2022-05-09 PROCEDURE — 86355 B CELLS TOTAL COUNT: CPT

## 2022-05-09 PROCEDURE — 86356 MONONUCLEAR CELL ANTIGEN: CPT | Mod: 90

## 2022-05-09 PROCEDURE — 86357 NK CELLS TOTAL COUNT: CPT | Mod: 90

## 2022-05-09 PROCEDURE — 86769 SARS-COV-2 COVID-19 ANTIBODY: CPT | Mod: 90

## 2022-05-09 PROCEDURE — 86359 T CELLS TOTAL COUNT: CPT | Mod: 90

## 2022-05-09 PROCEDURE — 80053 COMPREHEN METABOLIC PANEL: CPT

## 2022-05-09 PROCEDURE — 86317 IMMUNOASSAY INFECTIOUS AGENT: CPT | Mod: 90

## 2022-05-09 PROCEDURE — 36415 COLL VENOUS BLD VENIPUNCTURE: CPT

## 2022-05-09 PROCEDURE — 86360 T CELL ABSOLUTE COUNT/RATIO: CPT | Mod: 90

## 2022-05-09 PROCEDURE — 86359 T CELLS TOTAL COUNT: CPT

## 2022-05-09 PROCEDURE — 99000 SPECIMEN HANDLING OFFICE-LAB: CPT

## 2022-05-09 PROCEDURE — 82784 ASSAY IGA/IGD/IGG/IGM EACH: CPT

## 2022-05-09 PROCEDURE — 86355 B CELLS TOTAL COUNT: CPT | Mod: 90

## 2022-05-09 NOTE — TELEPHONE ENCOUNTER
LVMTCB x1. If patient feeling unwell please schedule f/u with ID Dr Rae or Dr Oneil  For Documentation.

## 2022-05-10 LAB
CD16+CD56+ CELLS # BLD: 226 CELLS/MCL (ref 59–513)
CD19 CELLS # BLD: 1 CELLS/MCL (ref 81–409)
CD19 CELLS NFR BLD: 0 % (ref 7–24)
CD19 CELLS NFR BLD: ABNORMAL %
CD3 CELLS # BLD: 991 CELLS/MCL (ref 550–2202)
CD3 CELLS NFR BLD: 81 % (ref 58–86)
CD3+CD4+ CELLS # BLD: 543 CELLS/MCL (ref 365–1437)
CD3+CD4+ CELLS NFR BLD: 44 % (ref 32–64)
CD3+CD4+ CELLS/CD3+CD8+ CLL BLD: 1.2 %
CD3+CD8+ CELLS # BLD: 449 CELLS/MCL (ref 171–846)
CD3+CD8+ CELLS NFR BLD: 37 % (ref 15–40)
CD3-CD16+CD56+ CELLS NFR BLD: 18 % (ref 4–28)
CD45 CELLS # BLD: 1.22 THOU/MCL (ref 0.82–2.84)
SARS-COV-2 AB SERPL IA-ACNC: 221 U/ML
SARS-COV-2 AB SERPL QL IA: POSITIVE
T-CELL + B-CELL + NK SUBSETS BLD-IMP NAR: ABNORMAL
VZV IGG SER QL IA: 81.4 INDEX
VZV IGG SER QL IA: NORMAL

## 2022-05-11 LAB
BACTERIA PLR CULT: NO GROWTH
C DIPHTHERIAE IGG SER-ACNC: 0 IU/ML
C TETANI TOXOID IGG SERPL IA-ACNC: 0.2 IU/ML
CD19 CELLS # BLD: 1 CELLS/UL (ref 107–698)
CD19 CELLS NFR BLD: <1 % (ref 6–27)
CD3 CELLS # BLD: 1116 CELLS/UL (ref 603–2990)
CD3 CELLS NFR BLD: 81 % (ref 49–84)
CD3+CD4+ CELLS # BLD: 586 CELLS/UL (ref 441–2156)
CD3+CD4+ CELLS NFR BLD: 43 % (ref 28–63)
CD3+CD4+ CELLS/CD3+CD8+ CLL BLD: 1.1 % (ref 1.4–2.6)
CD3+CD8+ CELLS # BLD: 531 CELLS/UL (ref 125–1312)
CD3+CD8+ CELLS NFR BLD: 39 % (ref 10–40)
CD3-CD16+CD56+ CELLS # BLD: 250 CELLS/UL (ref 95–640)
CD3-CD16+CD56+ CELLS NFR BLD: 18 % (ref 4–25)
HAEM INFLU B IGG SER-MCNC: 0.2 UG/ML
S PN DA SERO 19F IGG SER-MCNC: 0.9 MCG/ML
S PNEUM DA 1 IGG SER-MCNC: 0.6 MCG/ML
S PNEUM DA 10A IGG SER-MCNC: <0.4 MCG/ML
S PNEUM DA 11A IGG SER-MCNC: <0.4 MCG/ML
S PNEUM DA 12F IGG SER-MCNC: <0.4 MCG/ML
S PNEUM DA 14 IGG SER-MCNC: <0.4 MCG/ML
S PNEUM DA 15B IGG SER-MCNC: <0.4 MCG/ML
S PNEUM DA 17F IGG SER-MCNC: <0.4 MCG/ML
S PNEUM DA 18C IGG SER-MCNC: <0.4 MCG/ML
S PNEUM DA 19A IGG SER-MCNC: 0.5 MCG/ML
S PNEUM DA 2 IGG SER-MCNC: <0.4 MCG/ML
S PNEUM DA 20A IGG SER-MCNC: <0.4 MCG/ML
S PNEUM DA 22F IGG SER-MCNC: 0.7 MCG/ML
S PNEUM DA 23F IGG SER-MCNC: 1 MCG/ML
S PNEUM DA 3 IGG SER-MCNC: <0.4 MCG/ML
S PNEUM DA 33F IGG SER-MCNC: <0.4 MCG/ML
S PNEUM DA 4 IGG SER-MCNC: <0.4 MCG/ML
S PNEUM DA 5 IGG SER-MCNC: <0.4 MCG/ML
S PNEUM DA 6B IGG SER-MCNC: <0.4 MCG/ML
S PNEUM DA 7F IGG SER-MCNC: <0.4 MCG/ML
S PNEUM DA 8 IGG SER-MCNC: <0.4 MCG/ML
S PNEUM DA 9N IGG SER-MCNC: NORMAL MCG/ML
S PNEUM DA 9V IGG SER-MCNC: <0.4 MCG/ML
T CELL EXTENDED COMMENT: ABNORMAL

## 2022-05-19 DIAGNOSIS — Z11.59 ENCOUNTER FOR SCREENING FOR OTHER VIRAL DISEASES: Primary | ICD-10-CM

## 2022-05-24 RX ORDER — CLINDAMYCIN PHOSPHATE 900 MG/50ML
900 INJECTION, SOLUTION INTRAVENOUS
Status: CANCELLED | OUTPATIENT
Start: 2022-05-24

## 2022-05-24 RX ORDER — DEXAMETHASONE SODIUM PHOSPHATE 10 MG/ML
10 INJECTION, SOLUTION INTRAMUSCULAR; INTRAVENOUS ONCE
Status: CANCELLED | OUTPATIENT
Start: 2022-05-24 | End: 2022-05-24

## 2022-06-04 ENCOUNTER — HOSPITAL ENCOUNTER (OUTPATIENT)
Dept: CT IMAGING | Facility: CLINIC | Age: 42
Discharge: HOME | End: 2022-06-04
Attending: SURGERY | Admitting: SURGERY
Payer: COMMERCIAL

## 2022-06-04 DIAGNOSIS — J86.9 EMPYEMA LUNG (H): ICD-10-CM

## 2022-06-04 PROCEDURE — 71275 CT ANGIOGRAPHY CHEST: CPT

## 2022-06-04 PROCEDURE — 250N000011 HC RX IP 250 OP 636: Performed by: SURGERY

## 2022-06-04 RX ORDER — IOPAMIDOL 755 MG/ML
100 INJECTION, SOLUTION INTRAVASCULAR ONCE
Status: COMPLETED | OUTPATIENT
Start: 2022-06-04 | End: 2022-06-04

## 2022-06-04 RX ADMIN — IOPAMIDOL 100 ML: 755 INJECTION, SOLUTION INTRAVENOUS at 13:13

## 2022-06-16 ENCOUNTER — PATIENT OUTREACH (OUTPATIENT)
Dept: FAMILY MEDICINE | Facility: CLINIC | Age: 42
End: 2022-06-16
Payer: COMMERCIAL

## 2022-06-16 NOTE — LETTER
June 16, 2022      Arely Saldaña  [address] [address]        Dear ,    This letter is to remind you that you are due for your follow-up Pap smear and Human Papillomavirus (HPV) test.    Please call [phone] to schedule your appointment at your earliest convenience.    If you have completed the appointment outside of the [facility] system, please have the records forwarded to our office. We will update your chart for your provider to review before your next annual wellness visit.     Thank you for choosing [facility]!      Sincerely,    Your [facility] Care Team

## 2022-08-05 ENCOUNTER — PREP FOR PROCEDURE (OUTPATIENT)
Dept: OTOLARYNGOLOGY | Facility: CLINIC | Age: 42
End: 2022-08-05

## 2022-08-05 DIAGNOSIS — J32.0 CHRONIC MAXILLARY SINUSITIS: Primary | ICD-10-CM

## 2022-08-08 NOTE — TELEPHONE ENCOUNTER
Hi Dr. Larios,   Patient is lost to pap tracking follow-up. Attempts to contact pt have been made per reminder process and there has been no reply and/or no appt scheduled.       Pap Hx:  [id number] NIL annual pap  4/13/15 NIL pap, neg HPV  7/26/18 ASCUS, neg HPV  11/10/20 NIL pap, neg HPV  6/30/21 Unsatisfactory pap, Neg HPV.  Plan: Repeat pap in 1 year  7/8/21 Mychart results sent / mychart read  06/16/22 Reminder Mychart, Letter  07/8/22 Reminder call-lm  08/8/22 Lost to follow-up for pap tracking, fyi routed to provider

## 2022-08-08 NOTE — TELEPHONE ENCOUNTER
Pt has appt scheduled with Erasmo Pascual on 9/13/22 w/ Erasmo Pascual.    Georgiana Wilks MA on 8/8/2022 at 11:46 AM

## 2022-08-08 NOTE — TELEPHONE ENCOUNTER
Please call patient and let her know that she is overdue for a Pap and physical.  Please assist her with scheduling an appointment at her earliest convenience.

## 2022-08-09 NOTE — TELEPHONE ENCOUNTER
Called and left detailed msg for pt to call and schedule Physical w/ PAP per Dr. Larios at her earliest convenience.    Georgiana Wilks MA on 8/9/2022 at 1:27 PM

## 2022-08-15 ENCOUNTER — TELEPHONE (OUTPATIENT)
Dept: OTOLARYNGOLOGY | Facility: CLINIC | Age: 42
End: 2022-08-15

## 2022-08-15 NOTE — TELEPHONE ENCOUNTER
Left a vm to call me to go over instructions for upcoming Surgery [phone].  Letter sent to read over through [employer].   Will try her tomorrow.

## 2022-08-20 ENCOUNTER — HEALTH MAINTENANCE LETTER (OUTPATIENT)
Age: 42
End: 2022-08-20

## 2022-09-08 ENCOUNTER — TELEPHONE (OUTPATIENT)
Dept: OTOLARYNGOLOGY | Facility: CLINIC | Age: 42
End: 2022-09-08

## 2022-09-08 NOTE — TELEPHONE ENCOUNTER
Arely called back and we spoke about the surgery letter in detail that was sent out.  She understood what needed to be completed and that she would speak to Dr. Rodriguez prior to going into the OR for last minute questions.  She is set for surgery.

## 2022-09-16 ENCOUNTER — TELEPHONE (OUTPATIENT)
Dept: OTOLARYNGOLOGY | Facility: CLINIC | Age: 42
End: 2022-09-16

## 2022-09-16 NOTE — TELEPHONE ENCOUNTER
Spoke with one of Gabriella's physicians, Dr. Crouch, with regards to her upcoming sinus surgery. Since being seen by me, she has been diagnosed with common variable immunodeficiency and is on treatment for that. Certainly, her sinus disease will be related and some of her illness issues may be ameliorated by the sinus procedure coming up. From and Allergy/Immunology standpoint, she is okay to proceed with surgery as planned per her treatment team.

## 2022-09-23 ASSESSMENT — ASTHMA QUESTIONNAIRES
QUESTION_4 LAST FOUR WEEKS HOW OFTEN HAVE YOU USED YOUR RESCUE INHALER OR NEBULIZER MEDICATION (SUCH AS ALBUTEROL): NOT AT ALL
QUESTION_2 LAST FOUR WEEKS HOW OFTEN HAVE YOU HAD SHORTNESS OF BREATH: NOT AT ALL
ACT_TOTALSCORE: 25
QUESTION_1 LAST FOUR WEEKS HOW MUCH OF THE TIME DID YOUR ASTHMA KEEP YOU FROM GETTING AS MUCH DONE AT WORK, SCHOOL OR AT HOME: NONE OF THE TIME
QUESTION_5 LAST FOUR WEEKS HOW WOULD YOU RATE YOUR ASTHMA CONTROL: COMPLETELY CONTROLLED
ACT_TOTALSCORE: 25
QUESTION_3 LAST FOUR WEEKS HOW OFTEN DID YOUR ASTHMA SYMPTOMS (WHEEZING, COUGHING, SHORTNESS OF BREATH, CHEST TIGHTNESS OR PAIN) WAKE YOU UP AT NIGHT OR EARLIER THAN USUAL IN THE MORNING: NOT AT ALL

## 2022-09-26 ENCOUNTER — OFFICE VISIT (OUTPATIENT)
Dept: FAMILY MEDICINE | Facility: CLINIC | Age: 42
End: 2022-09-26
Payer: COMMERCIAL

## 2022-09-26 VITALS
DIASTOLIC BLOOD PRESSURE: 86 MMHG | RESPIRATION RATE: 16 BRPM | OXYGEN SATURATION: 100 % | HEART RATE: 93 BPM | BODY MASS INDEX: 24.11 KG/M2 | HEIGHT: 66 IN | WEIGHT: 150 LBS | SYSTOLIC BLOOD PRESSURE: 122 MMHG

## 2022-09-26 DIAGNOSIS — R87.610 ASCUS OF CERVIX WITH NEGATIVE HIGH RISK HPV: ICD-10-CM

## 2022-09-26 DIAGNOSIS — Z01.818 PREOPERATIVE EXAMINATION: Primary | ICD-10-CM

## 2022-09-26 DIAGNOSIS — J33.9 NASAL POLYP: ICD-10-CM

## 2022-09-26 DIAGNOSIS — J32.0 CHRONIC MAXILLARY SINUSITIS: ICD-10-CM

## 2022-09-26 DIAGNOSIS — R00.0 TACHYCARDIA: ICD-10-CM

## 2022-09-26 DIAGNOSIS — D80.1 HYPOGAMMAGLOBULINEMIA (H): ICD-10-CM

## 2022-09-26 DIAGNOSIS — D50.8 OTHER IRON DEFICIENCY ANEMIA: ICD-10-CM

## 2022-09-26 DIAGNOSIS — I10 ESSENTIAL HYPERTENSION: ICD-10-CM

## 2022-09-26 LAB
ALBUMIN SERPL BCG-MCNC: 4 G/DL (ref 3.5–5.2)
ALBUMIN UR-MCNC: NEGATIVE MG/DL
ALP SERPL-CCNC: 138 U/L (ref 35–104)
ALT SERPL W P-5'-P-CCNC: 20 U/L (ref 10–35)
ANION GAP SERPL CALCULATED.3IONS-SCNC: 6 MMOL/L (ref 7–15)
APPEARANCE UR: CLEAR
AST SERPL W P-5'-P-CCNC: 29 U/L (ref 10–35)
BILIRUB SERPL-MCNC: 0.2 MG/DL
BILIRUB UR QL STRIP: NEGATIVE
BUN SERPL-MCNC: 10.9 MG/DL (ref 6–20)
CALCIUM SERPL-MCNC: 9.3 MG/DL (ref 8.6–10)
CHLORIDE SERPL-SCNC: 102 MMOL/L (ref 98–107)
CHOLEST SERPL-MCNC: 187 MG/DL
COLOR UR AUTO: YELLOW
CREAT SERPL-MCNC: 0.73 MG/DL (ref 0.51–0.95)
DEPRECATED HCO3 PLAS-SCNC: 28 MMOL/L (ref 22–29)
ERYTHROCYTE [DISTWIDTH] IN BLOOD BY AUTOMATED COUNT: 13.7 % (ref 10–15)
FERRITIN SERPL-MCNC: 41 NG/ML (ref 6–175)
GFR SERPL CREATININE-BSD FRML MDRD: >90 ML/MIN/1.73M2
GLUCOSE SERPL-MCNC: 119 MG/DL (ref 70–99)
GLUCOSE UR STRIP-MCNC: NEGATIVE MG/DL
HCG UR QL: NEGATIVE
HCT VFR BLD AUTO: 38.7 % (ref 35–47)
HDLC SERPL-MCNC: 44 MG/DL
HGB BLD-MCNC: 13.1 G/DL (ref 11.7–15.7)
HGB UR QL STRIP: ABNORMAL
IRON BINDING CAPACITY (ROCHE): 401 UG/DL (ref 240–430)
IRON SATN MFR SERPL: 9 % (ref 15–46)
IRON SERPL-MCNC: 38 UG/DL (ref 37–145)
KETONES UR STRIP-MCNC: NEGATIVE MG/DL
LDLC SERPL CALC-MCNC: 82 MG/DL
LEUKOCYTE ESTERASE UR QL STRIP: ABNORMAL
MCH RBC QN AUTO: 26.6 PG (ref 26.5–33)
MCHC RBC AUTO-ENTMCNC: 33.9 G/DL (ref 31.5–36.5)
MCV RBC AUTO: 79 FL (ref 78–100)
NITRATE UR QL: NEGATIVE
NONHDLC SERPL-MCNC: 143 MG/DL
PH UR STRIP: 6 [PH] (ref 5–8)
PLATELET # BLD AUTO: 215 10E3/UL (ref 150–450)
POTASSIUM SERPL-SCNC: 3.8 MMOL/L (ref 3.4–5.3)
PROT SERPL-MCNC: 7.1 G/DL (ref 6.4–8.3)
RBC # BLD AUTO: 4.92 10E6/UL (ref 3.8–5.2)
RBC #/AREA URNS AUTO: ABNORMAL /HPF
SODIUM SERPL-SCNC: 136 MMOL/L (ref 136–145)
SP GR UR STRIP: <=1.005 (ref 1–1.03)
SQUAMOUS #/AREA URNS AUTO: ABNORMAL /LPF
TRIGL SERPL-MCNC: 303 MG/DL
UROBILINOGEN UR STRIP-ACNC: 0.2 E.U./DL
WBC # BLD AUTO: 4.8 10E3/UL (ref 4–11)
WBC #/AREA URNS AUTO: ABNORMAL /HPF

## 2022-09-26 PROCEDURE — 83540 ASSAY OF IRON: CPT | Performed by: FAMILY MEDICINE

## 2022-09-26 PROCEDURE — 81001 URINALYSIS AUTO W/SCOPE: CPT | Performed by: FAMILY MEDICINE

## 2022-09-26 PROCEDURE — 99214 OFFICE O/P EST MOD 30 MIN: CPT | Performed by: FAMILY MEDICINE

## 2022-09-26 PROCEDURE — 36415 COLL VENOUS BLD VENIPUNCTURE: CPT | Performed by: FAMILY MEDICINE

## 2022-09-26 PROCEDURE — 81025 URINE PREGNANCY TEST: CPT | Performed by: FAMILY MEDICINE

## 2022-09-26 PROCEDURE — 85027 COMPLETE CBC AUTOMATED: CPT | Performed by: FAMILY MEDICINE

## 2022-09-26 PROCEDURE — 83550 IRON BINDING TEST: CPT | Performed by: FAMILY MEDICINE

## 2022-09-26 PROCEDURE — 80053 COMPREHEN METABOLIC PANEL: CPT | Performed by: FAMILY MEDICINE

## 2022-09-26 PROCEDURE — 82728 ASSAY OF FERRITIN: CPT | Performed by: FAMILY MEDICINE

## 2022-09-26 PROCEDURE — 80061 LIPID PANEL: CPT | Performed by: FAMILY MEDICINE

## 2022-09-26 NOTE — PROGRESS NOTES
[facility]  [address]  [place]  [facility] [address]  Phone: [phone]  Fax: [phone]  Primary Provider: Ashely Tobias  Pre-op Performing Provider: ASHELY TOBIAS      PREOPERATIVE EVALUATION:  Today's date: 9/26/2022    Arely Saldaña is a 42 year old female who presents for a preoperative evaluation.    Surgical Information:  Surgery/Procedure: POLYPECTOMY, NASAL CAVITY, ENDOSCOPIC  Surgery Location: [facility]   Surgeon: Elvi Rodriguez MD  Surgery Date: 10/4/22  Time of Surgery: 7:30 AM   Where patient plans to recover: At home with family  Fax number for surgical facility: Note does not need to be faxed, will be available electronically in Epic.    Type of Anesthesia Anticipated: to be determined    Assessment & Plan      Assessment: Arely Saldaña is a 42 year old female who presents for pre-op history and physical. She is scheduled to undergo a nasal polyp removal and endoscopy.       Plan:    She appears to be medically optimized for the planned procedure.   . Labs were done as indicated below.  Recommendations were reviewed with the patient.   She will stop her iron therapy as well as her vitamins since is now a week prior to surgery  She does have a history of hypertension and electrolytes and lipid panel were drawn today.  She does have a history of iron deficiency anemia however lab work today including iron studies ferritin and a CBC all looked essentially normal.  She does have a history of normal Pap smear and does have a Pap smear scheduled in December.   We did discuss COVID vaccination as well as flu vaccination as well as Prevnar 20 vaccination.  We will get those following her recovery from her surgery.    The patient is approved for surgery and is considered to be low anesthetic risk.   Follow up as needed.    Please page me at [phone] if you have any questions or concerns regarding  the care of this patient.     The proposed surgical procedure is considered LOW risk.    Preoperative examination  - CBC with platelets  - HCG qualitative urine  - UA reflex to Microscopic and Culture  - CBC with platelets  - HCG qualitative urine  - UA reflex to Microscopic and Culture  - Urine Microscopic    Nasal polyp  - CBC with platelets  - HCG qualitative urine  - UA reflex to Microscopic and Culture  - CBC with platelets  - HCG qualitative urine  - UA reflex to Microscopic and Culture  - Urine Microscopic    Chronic maxillary sinusitis  - CBC with platelets  - HCG qualitative urine  - UA reflex to Microscopic and Culture  - CBC with platelets  - HCG qualitative urine  - UA reflex to Microscopic and Culture  - Urine Microscopic    Essential hypertension  - Comprehensive metabolic panel  - Lipid panel reflex to direct LDL Fasting  - Comprehensive metabolic panel  - Lipid panel reflex to direct LDL Fasting    Hypogammaglobulinemia (H)    Other iron deficiency anemia  - CBC with platelets  - Ferritin  - Iron & Iron Binding Capacity  - CBC with platelets  - Ferritin  - Iron & Iron Binding Capacity    ASCUS of cervix with negative high risk HPV    Tachycardia  - CBC with platelets  - Comprehensive metabolic panel  - CBC with platelets  - Comprehensive metabolic panel        Risks and Recommendations:  The patient has the following additional risks and recommendations for perioperative complications:   - No identified additional risk factors other than previously addressed      RECOMMENDATION:  APPROVAL GIVEN to proceed with proposed procedure, without further diagnostic evaluation.          Subjective     HPI related to upcoming procedure:   HPI: Arely is a 42 year old female here for a pre-operative consultation. The exam is requested by Dr. Rodriguez in preparation for nasal polyp removal and endoscopy to be performed at [facility] on 10/4/22. Today s examination on 9/26/2022 is done to review the underlying  surgical condition of chronic sinus infections as nasal polyps as well as to clear for anesthesia and review her medical problems and make appropriate changes in medications. Patient has had chronic sinusitis for many years. She has continued to have sinus congestion and symptoms of sinus infections for many years. She did see Dr Rodriguez and was found to have a nasal polyp. She now presents for surgical removal of the polyp as well as endoscopic evaluation of the nasal cavity. Patient does have a history of hypertension and continues on lisinopril on a daily basis. Her hypertension has been well controlled on this medication. She was hospitalized for a lung empyema in April 2022 and was found to have hypogammaglobinemia. She underwent a thorascopy and has resolution of her symptoms. She has been found to have iron deficiency anemia and is on iron on a regular basis. She has had tachycardia however that has not been bothersome recently.     Preop Questions 9/23/2022   1. Have you ever had a heart attack or stroke? No   2. Have you ever had surgery on your heart or blood vessels, such as a stent placement, a coronary artery bypass, or surgery on an artery in your head, neck, heart, or legs? No   3. Do you have chest pain with activity? No   4. Do you have a history of  heart failure? No   5. Do you currently have a cold, bronchitis or symptoms of other infection? No   6. Do you have a cough, shortness of breath, or wheezing? No   7. Do you or anyone in your family have previous history of blood clots? No   8. Do you or does anyone in your family have a serious bleeding problem such as prolonged bleeding following surgeries or cuts? No   9. Have you ever had problems with anemia or been told to take iron pills? YES - currently on iron supplements due to iron deficiency anemia   10. Have you had any abnormal blood loss such as black, tarry or bloody stools, or abnormal vaginal bleeding? No   11. Have you ever had a blood  transfusion? No   12. Are you willing to have a blood transfusion if it is medically needed before, during, or after your surgery? Yes   13. Have you or any of your relatives ever had problems with anesthesia? No   14. Do you have sleep apnea, excessive snoring or daytime drowsiness? No   15. Do you have any artifical heart valves or other implanted medical devices like a pacemaker, defibrillator, or continuous glucose monitor? No   16. Do you have artificial joints? No   17. Are you allergic to latex? No   18. Is there any chance that you may be pregnant? No       Health Care Directive:  Patient does not have a Health Care Directive or Living Will: Discussed advance care planning with patient; however, patient declined at this time.    Preoperative Review of :   reviewed - no record of controlled substances prescribed.      History of Present Illness  Recent Health  Fever: no  Chills: no  Fatigue: no  Chest Pain: no  Cough: no  Dyspnea: no  Urinary Frequency: no  Nausea: no  Vomiting: no  Diarrhea: no  Abdominal Pain: no  Easy Bruising: no  Lower Extremity Swelling: no  Poor Exercise Tolerance: no    Pertinent History  Prior Anesthesia: yes  Previous Anesthesia Reaction:  no  Diabetes: no  Cardiovascular Disease: no  Pulmonary Disease: no  Renal Disease: no  GI Disease: no  Sleep Apnea: no  Thromboembolic Problems: no  Clotting Disorder: no  Bleeding Disorder: no  Transfusion Reaction: no  Impaired Immunity: no  Steroid use in the last 6 months: no  Frequent Aspirin use: no    No family history of anesthesia reaction, seizure, aneurysm, sudden death, clotting disorder or bleeding disorder.       Review of Systems  See below    Patient Active Problem List    Diagnosis Date Noted     Asthma 04/22/2022     Priority: Medium     Hypogammaglobulinemia (H) 04/17/2022     Priority: Medium     Empyema lung (H) 04/13/2022     Priority: Medium     Chronic maxillary sinusitis 04/06/2022     Priority: Medium     Added  automatically from request for surgery [id number]       Tachypnea 2022     Priority: Medium     Anemia 2022     Priority: Medium     Pleural effusion 2022     Priority: Medium     Tachycardia 2022     Priority: Medium     Elevated LFTs 2022     Priority: Medium     Dyspnea 2022     Priority: Medium     Essential hypertension 2019     Priority: Medium     ASCUS of cervix with negative high risk HPV 2018     Priority: Medium     [id number] NIL annual pap  4/13/15 NIL pap, neg HPV  18 ASCUS, neg HPV  11/10/20 NIL pap, neg HPV  21 Unsatisfactory pap, Neg HPV.  Plan: Repeat pap in 1 year  21 Mychart results sent / mychart read  22 Lost to follow-up for pap tracking, fyi routed to provider        Past Medical History:   Diagnosis Date     Anemia     iron deficiency     ASCUS of cervix with negative high risk HPV 2018-2012 NIL annual pap 4/13/15 NIL pap, neg HPV 18 ASCUS, neg HPV 11/10/20 NIL pap, neg HPV     Hypertension     was on nifedipine and HCTZ before pregnancy, had since age early 20's     Sinusitis, chronic      Past Surgical History:   Procedure Laterality Date     C/SECTION, LOW TRANSVERSE  2021    with PPTL     FOOT SURGERY Left age 20    bursa removed from left foot     SALPINGECTOMY Bilateral 2021    with  section     THORACOSCOPY Left 2022    Procedure: LEFT THORACOSCOPY, DECORTICATION;  Surgeon: Suleman Kumar MD;  [place] [facility]     WISDOM TOOTH EXTRACTION  age 19     Current Outpatient Medications   Medication Sig Dispense Refill     ferrous sulfate (FEROSUL) 325 (65 Fe) MG tablet Take 325 mg by mouth daily (with breakfast)       lisinopril (ZESTRIL) 5 MG tablet Take 1 tablet (5 mg) by mouth daily 30 tablet 0     Multiple Vitamin (MULTIVITAMIN ADULT PO)          Allergies   Allergen Reactions     Immune Globulin (Human) Other (See Comments)     Fevers, chills , tremor     Metronidazole  "Other (See Comments)     Had \"tremulous chills\" associated with iv infusion.  Resolved after stopping infusion     Penicillins Hives     Sulfamethoxazole-Trimethoprim [Sulfamethoxazole W/Trimethoprim] Rash        Social History     Tobacco Use     Smoking status: Never Smoker     Smokeless tobacco: Never Used   Substance Use Topics     Alcohol use: Not Currently     Comment: once every other month     Family History   Problem Relation Age of Onset     Hypertension Mother      Hypertension Father      Diabetes Father      History   Drug Use Unknown         Objective     /86 (BP Location: Left arm, Patient Position: Sitting, Cuff Size: Adult Regular)   Pulse 93   Resp 16   Ht 1.67 m (5' 5.75\")   Wt 68 kg (150 lb)   LMP 09/10/2022 (Exact Date)   SpO2 100%   BMI 24.40 kg/m      Physical Exam  Review of Systems:  Denies fever, chills, visual changes, fatigue, myalgias, nasal congestion, rhinorrhea, ear pain or discharge, sore throat, swollen glands, breast mass, nipple discharge, breast changes, abdominal pain, nausea, vomiting, diarrhea, constipation, cough, shortness of breath, chest pain, weight change, change in bowel habits, melena, rectal bleeding, dysuria, frequency, urgency, hematuria, polyuria, polydipsia, polyphagia, joint pain or swelling or erythema, edema, rash, weakness, paresthesias, vaginal discharge or bleeding or mood changes.  Remainder of review of systems was negative.    Positives: feeling overall well      Objective:   /86 (BP Location: Left arm, Patient Position: Sitting, Cuff Size: Adult Regular)   Pulse 93   Resp 16   Ht 1.67 m (5' 5.75\")   Wt 68 kg (150 lb)   LMP 09/10/2022 (Exact Date)   SpO2 100%   BMI 24.40 kg/m        Physical Exam:  General Appearance: Alert, cooperative, no distress, appears stated age  Head: Normocephalic, without obvious abnormality, atraumatic  Eyes: PERRL, conjunctiva/corneas clear, EOM's intact  Ears: Normal TM's and external ear canals, " both ears  Nose: Nares normal, septum midline,mucosa normal, no drainage  Throat: Lips, mucosa, and tongue normal; teeth and gums normal  Neck: Supple, symmetrical, trachea midline, no adenopathy;  thyroid: not enlarged, symmetric, no tenderness/mass/nodules  Back: Symmetric, no curvature, ROM normal, no CVA tenderness  Lungs: Clear to auscultation bilaterally, respirations unlabored  Heart: Regular rate and rhythm, S1 and S2 normal, no murmur, rub, or gallop  Abdomen: Soft, non-tender, bowel sounds active all four quadrants,  no masses, no organomegaly  Extremities: Extremities normal, atraumatic, no cyanosis or edema  Skin: Skin color, texture, turgor normal, no rashes or lesions  Lymph nodes: Cervical and supraclavicular nodes normal  Neurologic: Normal       Recent Results (from the past 240 hour(s))   CBC with platelets    Collection Time: 09/26/22  3:36 PM   Result Value Ref Range    WBC Count 4.8 4.0 - 11.0 10e3/uL    RBC Count 4.92 3.80 - 5.20 10e6/uL    Hemoglobin 13.1 11.7 - 15.7 g/dL    Hematocrit 38.7 35.0 - 47.0 %    MCV 79 78 - 100 fL    MCH 26.6 26.5 - 33.0 pg    MCHC 33.9 31.5 - 36.5 g/dL    RDW 13.7 10.0 - 15.0 %    Platelet Count 215 150 - 450 10e3/uL   Ferritin    Collection Time: 09/26/22  3:36 PM   Result Value Ref Range    Ferritin 41 6 - 175 ng/mL   Iron & Iron Binding Capacity    Collection Time: 09/26/22  3:36 PM   Result Value Ref Range    Iron 38 37 - 145 ug/dL    Iron Sat Index 9 (L) 15 - 46 %    Iron Binding Capacity 401 240 - 430 ug/dL   Comprehensive metabolic panel    Collection Time: 09/26/22  3:36 PM   Result Value Ref Range    Sodium 136 136 - 145 mmol/L    Potassium 3.8 3.4 - 5.3 mmol/L    Chloride 102 98 - 107 mmol/L    Carbon Dioxide (CO2) 28 22 - 29 mmol/L    Anion Gap 6 (L) 7 - 15 mmol/L    Urea Nitrogen 10.9 6.0 - 20.0 mg/dL    Creatinine 0.73 0.51 - 0.95 mg/dL    Calcium 9.3 8.6 - 10.0 mg/dL    Glucose 119 (H) 70 - 99 mg/dL    Alkaline Phosphatase 138 (H) 35 - 104 U/L    AST  29 10 - 35 U/L    ALT 20 10 - 35 U/L    Protein Total 7.1 6.4 - 8.3 g/dL    Albumin 4.0 3.5 - 5.2 g/dL    Bilirubin Total 0.2 <=1.2 mg/dL    GFR Estimate >90 >60 mL/min/1.73m2   Lipid panel reflex to direct LDL Fasting    Collection Time: 09/26/22  3:36 PM   Result Value Ref Range    Cholesterol 187 <200 mg/dL    Triglycerides 303 (H) <150 mg/dL    Direct Measure HDL 44 (L) >=50 mg/dL    LDL Cholesterol Calculated 82 <=100 mg/dL    Non HDL Cholesterol 143 (H) <130 mg/dL   UA reflex to Microscopic and Culture    Collection Time: 09/26/22  3:43 PM    Specimen: Urine, Clean Catch   Result Value Ref Range    Color Urine Yellow Colorless, Straw, Light Yellow, Yellow    Appearance Urine Clear Clear    Glucose Urine Negative Negative mg/dL    Bilirubin Urine Negative Negative    Ketones Urine Negative Negative mg/dL    Specific Gravity Urine <=1.005 1.005 - 1.030    Blood Urine Trace (A) Negative    pH Urine 6.0 5.0 - 8.0    Protein Albumin Urine Negative Negative mg/dL    Urobilinogen Urine 0.2 0.2, 1.0 E.U./dL    Nitrite Urine Negative Negative    Leukocyte Esterase Urine Small (A) Negative   Urine Microscopic    Collection Time: 09/26/22  3:43 PM   Result Value Ref Range    RBC Urine 0-2 0-2 /HPF /HPF    WBC Urine 0-5 0-5 /HPF /HPF    Squamous Epithelials Urine Few (A) None Seen /LPF   HCG qualitative urine    Collection Time: 09/26/22  3:44 PM   Result Value Ref Range    hCG Urine Qualitative Negative Negative         Ashely Larios M.D.  [address]  [id number]  [place] [address]  Ph: [phone] Fax: [phone]  Pager [phone]    Recent Labs   Lab Test 05/09/22  1156 04/22/22  1656 04/03/22  2312 04/03/22  1651 06/30/21  1518 02/19/21 2010 02/19/21  1642 11/10/20  1501   HGB 12.1 11.1*   < >  --    < > 11.5*   < > 11.9*    387   < >  --    < > 221  --  197   INR  --   --   --  1.20*  --  0.95  --   --     143   < >  --    < >  --   --  137   POTASSIUM 4.9 5.0   < >  --    < >  --    --  4.1   CR 0.71 0.71   < >  --    < > 0.64  --  0.62   A1C  --   --   --   --   --   --   --  4.8    < > = values in this interval not displayed.        Revised Cardiac Risk Index (RCRI):  The patient has the following serious cardiovascular risks for perioperative complications:   - No serious cardiac risks = 0 points     RCRI Interpretation: 0 points: Class I (very low risk - 0.4% complication rate)           Signed Electronically by: Ashely Larios MD  Copy of this evaluation report is provided to requesting physician.

## 2022-09-26 NOTE — H&P (VIEW-ONLY)
[facility]  [address]  [place]  [facility] [address]  Phone: [phone]  Fax: [phone]  Primary Provider: Ashely Tobias  Pre-op Performing Provider: ASHELY TOBIAS      PREOPERATIVE EVALUATION:  Today's date: 9/26/2022    Arely Saldaña is a 42 year old female who presents for a preoperative evaluation.    Surgical Information:  Surgery/Procedure: POLYPECTOMY, NASAL CAVITY, ENDOSCOPIC  Surgery Location: [facility]   Surgeon: Elvi Rodriguez MD  Surgery Date: 10/4/22  Time of Surgery: 7:30 AM   Where patient plans to recover: At home with family  Fax number for surgical facility: Note does not need to be faxed, will be available electronically in Epic.    Type of Anesthesia Anticipated: to be determined    Assessment & Plan      Assessment: Arely Saldaña is a 42 year old female who presents for pre-op history and physical. She is scheduled to undergo a nasal polyp removal and endoscopy.       Plan:    She appears to be medically optimized for the planned procedure.   . Labs were done as indicated below.  Recommendations were reviewed with the patient.   She will stop her iron therapy as well as her vitamins since is now a week prior to surgery  She does have a history of hypertension and electrolytes and lipid panel were drawn today.  She does have a history of iron deficiency anemia however lab work today including iron studies ferritin and a CBC all looked essentially normal.  She does have a history of normal Pap smear and does have a Pap smear scheduled in December.   We did discuss COVID vaccination as well as flu vaccination as well as Prevnar 20 vaccination.  We will get those following her recovery from her surgery.    The patient is approved for surgery and is considered to be low anesthetic risk.   Follow up as needed.    Please page me at [phone] if you have any questions or concerns regarding  the care of this patient.     The proposed surgical procedure is considered LOW risk.    Preoperative examination  - CBC with platelets  - HCG qualitative urine  - UA reflex to Microscopic and Culture  - CBC with platelets  - HCG qualitative urine  - UA reflex to Microscopic and Culture  - Urine Microscopic    Nasal polyp  - CBC with platelets  - HCG qualitative urine  - UA reflex to Microscopic and Culture  - CBC with platelets  - HCG qualitative urine  - UA reflex to Microscopic and Culture  - Urine Microscopic    Chronic maxillary sinusitis  - CBC with platelets  - HCG qualitative urine  - UA reflex to Microscopic and Culture  - CBC with platelets  - HCG qualitative urine  - UA reflex to Microscopic and Culture  - Urine Microscopic    Essential hypertension  - Comprehensive metabolic panel  - Lipid panel reflex to direct LDL Fasting  - Comprehensive metabolic panel  - Lipid panel reflex to direct LDL Fasting    Hypogammaglobulinemia (H)    Other iron deficiency anemia  - CBC with platelets  - Ferritin  - Iron & Iron Binding Capacity  - CBC with platelets  - Ferritin  - Iron & Iron Binding Capacity    ASCUS of cervix with negative high risk HPV    Tachycardia  - CBC with platelets  - Comprehensive metabolic panel  - CBC with platelets  - Comprehensive metabolic panel        Risks and Recommendations:  The patient has the following additional risks and recommendations for perioperative complications:   - No identified additional risk factors other than previously addressed      RECOMMENDATION:  APPROVAL GIVEN to proceed with proposed procedure, without further diagnostic evaluation.          Subjective     HPI related to upcoming procedure:   HPI: Arely is a 42 year old female here for a pre-operative consultation. The exam is requested by Dr. Rodriguez in preparation for nasal polyp removal and endoscopy to be performed at [facility] on 10/4/22. Today s examination on 9/26/2022 is done to review the underlying  surgical condition of chronic sinus infections as nasal polyps as well as to clear for anesthesia and review her medical problems and make appropriate changes in medications. Patient has had chronic sinusitis for many years. She has continued to have sinus congestion and symptoms of sinus infections for many years. She did see Dr Rodriguez and was found to have a nasal polyp. She now presents for surgical removal of the polyp as well as endoscopic evaluation of the nasal cavity. Patient does have a history of hypertension and continues on lisinopril on a daily basis. Her hypertension has been well controlled on this medication. She was hospitalized for a lung empyema in April 2022 and was found to have hypogammaglobinemia. She underwent a thorascopy and has resolution of her symptoms. She has been found to have iron deficiency anemia and is on iron on a regular basis. She has had tachycardia however that has not been bothersome recently.     Preop Questions 9/23/2022   1. Have you ever had a heart attack or stroke? No   2. Have you ever had surgery on your heart or blood vessels, such as a stent placement, a coronary artery bypass, or surgery on an artery in your head, neck, heart, or legs? No   3. Do you have chest pain with activity? No   4. Do you have a history of  heart failure? No   5. Do you currently have a cold, bronchitis or symptoms of other infection? No   6. Do you have a cough, shortness of breath, or wheezing? No   7. Do you or anyone in your family have previous history of blood clots? No   8. Do you or does anyone in your family have a serious bleeding problem such as prolonged bleeding following surgeries or cuts? No   9. Have you ever had problems with anemia or been told to take iron pills? YES - currently on iron supplements due to iron deficiency anemia   10. Have you had any abnormal blood loss such as black, tarry or bloody stools, or abnormal vaginal bleeding? No   11. Have you ever had a blood  transfusion? No   12. Are you willing to have a blood transfusion if it is medically needed before, during, or after your surgery? Yes   13. Have you or any of your relatives ever had problems with anesthesia? No   14. Do you have sleep apnea, excessive snoring or daytime drowsiness? No   15. Do you have any artifical heart valves or other implanted medical devices like a pacemaker, defibrillator, or continuous glucose monitor? No   16. Do you have artificial joints? No   17. Are you allergic to latex? No   18. Is there any chance that you may be pregnant? No       Health Care Directive:  Patient does not have a Health Care Directive or Living Will: Discussed advance care planning with patient; however, patient declined at this time.    Preoperative Review of :   reviewed - no record of controlled substances prescribed.      History of Present Illness  Recent Health  Fever: no  Chills: no  Fatigue: no  Chest Pain: no  Cough: no  Dyspnea: no  Urinary Frequency: no  Nausea: no  Vomiting: no  Diarrhea: no  Abdominal Pain: no  Easy Bruising: no  Lower Extremity Swelling: no  Poor Exercise Tolerance: no    Pertinent History  Prior Anesthesia: yes  Previous Anesthesia Reaction:  no  Diabetes: no  Cardiovascular Disease: no  Pulmonary Disease: no  Renal Disease: no  GI Disease: no  Sleep Apnea: no  Thromboembolic Problems: no  Clotting Disorder: no  Bleeding Disorder: no  Transfusion Reaction: no  Impaired Immunity: no  Steroid use in the last 6 months: no  Frequent Aspirin use: no    No family history of anesthesia reaction, seizure, aneurysm, sudden death, clotting disorder or bleeding disorder.       Review of Systems  See below    Patient Active Problem List    Diagnosis Date Noted     Asthma 04/22/2022     Priority: Medium     Hypogammaglobulinemia (H) 04/17/2022     Priority: Medium     Empyema lung (H) 04/13/2022     Priority: Medium     Chronic maxillary sinusitis 04/06/2022     Priority: Medium     Added  automatically from request for surgery [id number]       Tachypnea 2022     Priority: Medium     Anemia 2022     Priority: Medium     Pleural effusion 2022     Priority: Medium     Tachycardia 2022     Priority: Medium     Elevated LFTs 2022     Priority: Medium     Dyspnea 2022     Priority: Medium     Essential hypertension 2019     Priority: Medium     ASCUS of cervix with negative high risk HPV 2018     Priority: Medium     5030-2003 NIL annual pap  4/13/15 NIL pap, neg HPV  18 ASCUS, neg HPV  11/10/20 NIL pap, neg HPV  21 Unsatisfactory pap, Neg HPV.  Plan: Repeat pap in 1 year  21 Mychart results sent / mychart read  22 Lost to follow-up for pap tracking, fyi routed to provider        Past Medical History:   Diagnosis Date     Anemia     iron deficiency     ASCUS of cervix with negative high risk HPV 2018-2012 NIL annual pap 4/13/15 NIL pap, neg HPV 18 ASCUS, neg HPV 11/10/20 NIL pap, neg HPV     Hypertension     was on nifedipine and HCTZ before pregnancy, had since age early 20's     Sinusitis, chronic      Past Surgical History:   Procedure Laterality Date     C/SECTION, LOW TRANSVERSE  2021    with PPTL     FOOT SURGERY Left age 20    bursa removed from left foot     SALPINGECTOMY Bilateral 2021    with  section     THORACOSCOPY Left 2022    Procedure: LEFT THORACOSCOPY, DECORTICATION;  Surgeon: Suleman Kuamr MD;  [place] [facility]     [place] TOOTH EXTRACTION  age 19     Current Outpatient Medications   Medication Sig Dispense Refill     ferrous sulfate (FEROSUL) 325 (65 Fe) MG tablet Take 325 mg by mouth daily (with breakfast)       lisinopril (ZESTRIL) 5 MG tablet Take 1 tablet (5 mg) by mouth daily 30 tablet 0     Multiple Vitamin (MULTIVITAMIN ADULT PO)          Allergies   Allergen Reactions     Immune Globulin (Human) Other (See Comments)     Fevers, chills , tremor     Metronidazole  "Other (See Comments)     Had \"tremulous chills\" associated with iv infusion.  Resolved after stopping infusion     Penicillins Hives     Sulfamethoxazole-Trimethoprim [Sulfamethoxazole W/Trimethoprim] Rash        Social History     Tobacco Use     Smoking status: Never Smoker     Smokeless tobacco: Never Used   Substance Use Topics     Alcohol use: Not Currently     Comment: once every other month     Family History   Problem Relation Age of Onset     Hypertension Mother      Hypertension Father      Diabetes Father      History   Drug Use Unknown         Objective     /86 (BP Location: Left arm, Patient Position: Sitting, Cuff Size: Adult Regular)   Pulse 93   Resp 16   Ht 1.67 m (5' 5.75\")   Wt 68 kg (150 lb)   LMP 09/10/2022 (Exact Date)   SpO2 100%   BMI 24.40 kg/m      Physical Exam  Review of Systems:  Denies fever, chills, visual changes, fatigue, myalgias, nasal congestion, rhinorrhea, ear pain or discharge, sore throat, swollen glands, breast mass, nipple discharge, breast changes, abdominal pain, nausea, vomiting, diarrhea, constipation, cough, shortness of breath, chest pain, weight change, change in bowel habits, melena, rectal bleeding, dysuria, frequency, urgency, hematuria, polyuria, polydipsia, polyphagia, joint pain or swelling or erythema, edema, rash, weakness, paresthesias, vaginal discharge or bleeding or mood changes.  Remainder of review of systems was negative.    Positives: feeling overall well      Objective:   /86 (BP Location: Left arm, Patient Position: Sitting, Cuff Size: Adult Regular)   Pulse 93   Resp 16   Ht 1.67 m (5' 5.75\")   Wt 68 kg (150 lb)   LMP 09/10/2022 (Exact Date)   SpO2 100%   BMI 24.40 kg/m        Physical Exam:  General Appearance: Alert, cooperative, no distress, appears stated age  Head: Normocephalic, without obvious abnormality, atraumatic  Eyes: PERRL, conjunctiva/corneas clear, EOM's intact  Ears: Normal TM's and external ear canals, " both ears  Nose: Nares normal, septum midline,mucosa normal, no drainage  Throat: Lips, mucosa, and tongue normal; teeth and gums normal  Neck: Supple, symmetrical, trachea midline, no adenopathy;  thyroid: not enlarged, symmetric, no tenderness/mass/nodules  Back: Symmetric, no curvature, ROM normal, no CVA tenderness  Lungs: Clear to auscultation bilaterally, respirations unlabored  Heart: Regular rate and rhythm, S1 and S2 normal, no murmur, rub, or gallop  Abdomen: Soft, non-tender, bowel sounds active all four quadrants,  no masses, no organomegaly  Extremities: Extremities normal, atraumatic, no cyanosis or edema  Skin: Skin color, texture, turgor normal, no rashes or lesions  Lymph nodes: Cervical and supraclavicular nodes normal  Neurologic: Normal       Recent Results (from the past 240 hour(s))   CBC with platelets    Collection Time: 09/26/22  3:36 PM   Result Value Ref Range    WBC Count 4.8 4.0 - 11.0 10e3/uL    RBC Count 4.92 3.80 - 5.20 10e6/uL    Hemoglobin 13.1 11.7 - 15.7 g/dL    Hematocrit 38.7 35.0 - 47.0 %    MCV 79 78 - 100 fL    MCH 26.6 26.5 - 33.0 pg    MCHC 33.9 31.5 - 36.5 g/dL    RDW 13.7 10.0 - 15.0 %    Platelet Count 215 150 - 450 10e3/uL   Ferritin    Collection Time: 09/26/22  3:36 PM   Result Value Ref Range    Ferritin 41 6 - 175 ng/mL   Iron & Iron Binding Capacity    Collection Time: 09/26/22  3:36 PM   Result Value Ref Range    Iron 38 37 - 145 ug/dL    Iron Sat Index 9 (L) 15 - 46 %    Iron Binding Capacity 401 240 - 430 ug/dL   Comprehensive metabolic panel    Collection Time: 09/26/22  3:36 PM   Result Value Ref Range    Sodium 136 136 - 145 mmol/L    Potassium 3.8 3.4 - 5.3 mmol/L    Chloride 102 98 - 107 mmol/L    Carbon Dioxide (CO2) 28 22 - 29 mmol/L    Anion Gap 6 (L) 7 - 15 mmol/L    Urea Nitrogen 10.9 6.0 - 20.0 mg/dL    Creatinine 0.73 0.51 - 0.95 mg/dL    Calcium 9.3 8.6 - 10.0 mg/dL    Glucose 119 (H) 70 - 99 mg/dL    Alkaline Phosphatase 138 (H) 35 - 104 U/L    AST  29 10 - 35 U/L    ALT 20 10 - 35 U/L    Protein Total 7.1 6.4 - 8.3 g/dL    Albumin 4.0 3.5 - 5.2 g/dL    Bilirubin Total 0.2 <=1.2 mg/dL    GFR Estimate >90 >60 mL/min/1.73m2   Lipid panel reflex to direct LDL Fasting    Collection Time: 09/26/22  3:36 PM   Result Value Ref Range    Cholesterol 187 <200 mg/dL    Triglycerides 303 (H) <150 mg/dL    Direct Measure HDL 44 (L) >=50 mg/dL    LDL Cholesterol Calculated 82 <=100 mg/dL    Non HDL Cholesterol 143 (H) <130 mg/dL   UA reflex to Microscopic and Culture    Collection Time: 09/26/22  3:43 PM    Specimen: Urine, Clean Catch   Result Value Ref Range    Color Urine Yellow Colorless, Straw, Light Yellow, Yellow    Appearance Urine Clear Clear    Glucose Urine Negative Negative mg/dL    Bilirubin Urine Negative Negative    Ketones Urine Negative Negative mg/dL    Specific Gravity Urine <=1.005 1.005 - 1.030    Blood Urine Trace (A) Negative    pH Urine 6.0 5.0 - 8.0    Protein Albumin Urine Negative Negative mg/dL    Urobilinogen Urine 0.2 0.2, 1.0 E.U./dL    Nitrite Urine Negative Negative    Leukocyte Esterase Urine Small (A) Negative   Urine Microscopic    Collection Time: 09/26/22  3:43 PM   Result Value Ref Range    RBC Urine 0-2 0-2 /HPF /HPF    WBC Urine 0-5 0-5 /HPF /HPF    Squamous Epithelials Urine Few (A) None Seen /LPF   HCG qualitative urine    Collection Time: 09/26/22  3:44 PM   Result Value Ref Range    hCG Urine Qualitative Negative Negative         Ashely Larios M.D.  [address]  [id number]  [place] [address]  Ph: [phone] Fax: [phone]  Pager [phone]    Recent Labs   Lab Test 05/09/22  1156 04/22/22  1656 04/03/22  2312 04/03/22  1651 06/30/21  1518 02/19/21 2010 02/19/21  1642 11/10/20  1501   HGB 12.1 11.1*   < >  --    < > 11.5*   < > 11.9*    387   < >  --    < > 221  --  197   INR  --   --   --  1.20*  --  0.95  --   --     143   < >  --    < >  --   --  137   POTASSIUM 4.9 5.0   < >  --    < >  --    --  4.1   CR 0.71 0.71   < >  --    < > 0.64  --  0.62   A1C  --   --   --   --   --   --   --  4.8    < > = values in this interval not displayed.        Revised Cardiac Risk Index (RCRI):  The patient has the following serious cardiovascular risks for perioperative complications:   - No serious cardiac risks = 0 points     RCRI Interpretation: 0 points: Class I (very low risk - 0.4% complication rate)           Signed Electronically by: Ashely Larios MD  Copy of this evaluation report is provided to requesting physician.

## 2022-10-03 ENCOUNTER — ANESTHESIA EVENT (OUTPATIENT)
Dept: SURGERY | Facility: CLINIC | Age: 42
End: 2022-10-03
Payer: COMMERCIAL

## 2022-10-04 ENCOUNTER — HOSPITAL ENCOUNTER (OUTPATIENT)
Facility: CLINIC | Age: 42
Discharge: HOME | End: 2022-10-04
Attending: OTOLARYNGOLOGY | Admitting: OTOLARYNGOLOGY
Payer: COMMERCIAL

## 2022-10-04 ENCOUNTER — ANESTHESIA (OUTPATIENT)
Dept: SURGERY | Facility: CLINIC | Age: 42
End: 2022-10-04
Payer: COMMERCIAL

## 2022-10-04 VITALS
WEIGHT: 150 LBS | SYSTOLIC BLOOD PRESSURE: 158 MMHG | HEART RATE: 69 BPM | BODY MASS INDEX: 24.4 KG/M2 | TEMPERATURE: 97.1 F | DIASTOLIC BLOOD PRESSURE: 92 MMHG | RESPIRATION RATE: 14 BRPM | OXYGEN SATURATION: 98 %

## 2022-10-04 DIAGNOSIS — J32.0 CHRONIC MAXILLARY SINUSITIS: Primary | ICD-10-CM

## 2022-10-04 PROCEDURE — 258N000003 HC RX IP 258 OP 636: Performed by: ANESTHESIOLOGY

## 2022-10-04 PROCEDURE — 370N000017 HC ANESTHESIA TECHNICAL FEE, PER MIN: Performed by: OTOLARYNGOLOGY

## 2022-10-04 PROCEDURE — 258N000003 HC RX IP 258 OP 636: Performed by: NURSE ANESTHETIST, CERTIFIED REGISTERED

## 2022-10-04 PROCEDURE — 31267 ENDOSCOPY MAXILLARY SINUS: CPT | Mod: 50 | Performed by: OTOLARYNGOLOGY

## 2022-10-04 PROCEDURE — 250N000025 HC SEVOFLURANE, PER MIN: Performed by: OTOLARYNGOLOGY

## 2022-10-04 PROCEDURE — 250N000011 HC RX IP 250 OP 636: Performed by: NURSE ANESTHETIST, CERTIFIED REGISTERED

## 2022-10-04 PROCEDURE — C2625 STENT, NON-COR, TEM W/DEL SY: HCPCS | Performed by: OTOLARYNGOLOGY

## 2022-10-04 PROCEDURE — 360N000076 HC SURGERY LEVEL 3, PER MIN: Performed by: OTOLARYNGOLOGY

## 2022-10-04 PROCEDURE — 999N000141 HC STATISTIC PRE-PROCEDURE NURSING ASSESSMENT: Performed by: OTOLARYNGOLOGY

## 2022-10-04 PROCEDURE — 31253 NSL/SINS NDSC TOTAL: CPT | Mod: 50 | Performed by: OTOLARYNGOLOGY

## 2022-10-04 PROCEDURE — 250N000009 HC RX 250: Performed by: OTOLARYNGOLOGY

## 2022-10-04 PROCEDURE — 250N000013 HC RX MED GY IP 250 OP 250 PS 637: Performed by: OTOLARYNGOLOGY

## 2022-10-04 PROCEDURE — 710N000012 HC RECOVERY PHASE 2, PER MINUTE: Performed by: OTOLARYNGOLOGY

## 2022-10-04 PROCEDURE — 258N000001 HC RX 258: Performed by: OTOLARYNGOLOGY

## 2022-10-04 PROCEDURE — 250N000011 HC RX IP 250 OP 636: Performed by: OTOLARYNGOLOGY

## 2022-10-04 PROCEDURE — 250N000011 HC RX IP 250 OP 636: Performed by: ANESTHESIOLOGY

## 2022-10-04 PROCEDURE — 710N000010 HC RECOVERY PHASE 1, LEVEL 2, PER MIN: Performed by: OTOLARYNGOLOGY

## 2022-10-04 PROCEDURE — 272N000001 HC OR GENERAL SUPPLY STERILE: Performed by: OTOLARYNGOLOGY

## 2022-10-04 PROCEDURE — 250N000009 HC RX 250: Performed by: NURSE ANESTHETIST, CERTIFIED REGISTERED

## 2022-10-04 DEVICE — PROPEL SINUS IMPLANT
Type: IMPLANTABLE DEVICE | Site: NOSE | Status: FUNCTIONAL
Brand: PROPEL

## 2022-10-04 RX ORDER — LIDOCAINE HYDROCHLORIDE AND EPINEPHRINE 10; 10 MG/ML; UG/ML
INJECTION, SOLUTION INFILTRATION; PERINEURAL PRN
Status: DISCONTINUED | OUTPATIENT
Start: 2022-10-04 | End: 2022-10-04 | Stop reason: HOSPADM

## 2022-10-04 RX ORDER — ONDANSETRON 2 MG/ML
4 INJECTION INTRAMUSCULAR; INTRAVENOUS EVERY 30 MIN PRN
Status: DISCONTINUED | OUTPATIENT
Start: 2022-10-04 | End: 2022-10-04 | Stop reason: HOSPADM

## 2022-10-04 RX ORDER — FENTANYL CITRATE 50 UG/ML
25 INJECTION, SOLUTION INTRAMUSCULAR; INTRAVENOUS EVERY 5 MIN PRN
Status: DISCONTINUED | OUTPATIENT
Start: 2022-10-04 | End: 2022-10-04 | Stop reason: HOSPADM

## 2022-10-04 RX ORDER — ONDANSETRON 4 MG/1
4 TABLET, ORALLY DISINTEGRATING ORAL EVERY 30 MIN PRN
Status: DISCONTINUED | OUTPATIENT
Start: 2022-10-04 | End: 2022-10-04 | Stop reason: HOSPADM

## 2022-10-04 RX ORDER — CLARITHROMYCIN 250 MG/1
250 TABLET, FILM COATED ORAL 2 TIMES DAILY
Qty: 14 TABLET | Refills: 0 | Status: SHIPPED | OUTPATIENT
Start: 2022-10-04 | End: 2022-10-11

## 2022-10-04 RX ORDER — DEXAMETHASONE SODIUM PHOSPHATE 10 MG/ML
10 INJECTION, SOLUTION INTRAMUSCULAR; INTRAVENOUS ONCE
Status: COMPLETED | OUTPATIENT
Start: 2022-10-04 | End: 2022-10-04

## 2022-10-04 RX ORDER — CLINDAMYCIN PHOSPHATE 900 MG/50ML
900 INJECTION, SOLUTION INTRAVENOUS
Status: COMPLETED | OUTPATIENT
Start: 2022-10-04 | End: 2022-10-04

## 2022-10-04 RX ORDER — HYDROMORPHONE HCL IN WATER/PF 6 MG/30 ML
0.2 PATIENT CONTROLLED ANALGESIA SYRINGE INTRAVENOUS EVERY 5 MIN PRN
Status: DISCONTINUED | OUTPATIENT
Start: 2022-10-04 | End: 2022-10-04 | Stop reason: HOSPADM

## 2022-10-04 RX ORDER — GINSENG 100 MG
CAPSULE ORAL
Status: DISCONTINUED
Start: 2022-10-04 | End: 2022-10-04 | Stop reason: WASHOUT

## 2022-10-04 RX ORDER — LIDOCAINE HYDROCHLORIDE 10 MG/ML
INJECTION, SOLUTION INFILTRATION; PERINEURAL PRN
Status: DISCONTINUED | OUTPATIENT
Start: 2022-10-04 | End: 2022-10-04

## 2022-10-04 RX ORDER — MEPERIDINE HYDROCHLORIDE 25 MG/ML
12.5 INJECTION INTRAMUSCULAR; INTRAVENOUS; SUBCUTANEOUS
Status: DISCONTINUED | OUTPATIENT
Start: 2022-10-04 | End: 2022-10-04 | Stop reason: HOSPADM

## 2022-10-04 RX ORDER — SODIUM CHLORIDE, SODIUM LACTATE, POTASSIUM CHLORIDE, CALCIUM CHLORIDE 600; 310; 30; 20 MG/100ML; MG/100ML; MG/100ML; MG/100ML
INJECTION, SOLUTION INTRAVENOUS CONTINUOUS
Status: DISCONTINUED | OUTPATIENT
Start: 2022-10-04 | End: 2022-10-04 | Stop reason: HOSPADM

## 2022-10-04 RX ORDER — ONDANSETRON 2 MG/ML
INJECTION INTRAMUSCULAR; INTRAVENOUS PRN
Status: DISCONTINUED | OUTPATIENT
Start: 2022-10-04 | End: 2022-10-04

## 2022-10-04 RX ORDER — OXYCODONE HYDROCHLORIDE 5 MG/1
5 TABLET ORAL EVERY 4 HOURS PRN
Status: DISCONTINUED | OUTPATIENT
Start: 2022-10-04 | End: 2022-10-04 | Stop reason: HOSPADM

## 2022-10-04 RX ORDER — PROPOFOL 10 MG/ML
INJECTION, EMULSION INTRAVENOUS PRN
Status: DISCONTINUED | OUTPATIENT
Start: 2022-10-04 | End: 2022-10-04

## 2022-10-04 RX ORDER — OXYMETAZOLINE HYDROCHLORIDE 0.05 G/100ML
SPRAY NASAL PRN
Status: DISCONTINUED | OUTPATIENT
Start: 2022-10-04 | End: 2022-10-04 | Stop reason: HOSPADM

## 2022-10-04 RX ORDER — FENTANYL CITRATE 50 UG/ML
25 INJECTION, SOLUTION INTRAMUSCULAR; INTRAVENOUS
Status: DISCONTINUED | OUTPATIENT
Start: 2022-10-04 | End: 2022-10-04 | Stop reason: HOSPADM

## 2022-10-04 RX ORDER — LIDOCAINE 40 MG/G
CREAM TOPICAL
Status: DISCONTINUED | OUTPATIENT
Start: 2022-10-04 | End: 2022-10-04 | Stop reason: HOSPADM

## 2022-10-04 RX ORDER — PROPOFOL 10 MG/ML
INJECTION, EMULSION INTRAVENOUS CONTINUOUS PRN
Status: DISCONTINUED | OUTPATIENT
Start: 2022-10-04 | End: 2022-10-04

## 2022-10-04 RX ORDER — LABETALOL HYDROCHLORIDE 5 MG/ML
INJECTION, SOLUTION INTRAVENOUS PRN
Status: DISCONTINUED | OUTPATIENT
Start: 2022-10-04 | End: 2022-10-04

## 2022-10-04 RX ORDER — FENTANYL CITRATE 50 UG/ML
INJECTION, SOLUTION INTRAMUSCULAR; INTRAVENOUS PRN
Status: DISCONTINUED | OUTPATIENT
Start: 2022-10-04 | End: 2022-10-04

## 2022-10-04 RX ORDER — OXYMETAZOLINE HYDROCHLORIDE 0.05 G/100ML
SPRAY NASAL
Status: DISCONTINUED
Start: 2022-10-04 | End: 2022-10-04 | Stop reason: HOSPADM

## 2022-10-04 RX ORDER — IBUPROFEN 600 MG/1
600 TABLET, FILM COATED ORAL EVERY 6 HOURS PRN
Qty: 28 TABLET | Refills: 0 | Status: SHIPPED | OUTPATIENT
Start: 2022-10-04 | End: 2022-10-11

## 2022-10-04 RX ADMIN — FENTANYL CITRATE 50 MCG: 50 INJECTION, SOLUTION INTRAMUSCULAR; INTRAVENOUS at 08:14

## 2022-10-04 RX ADMIN — LABETALOL HYDROCHLORIDE 10 MG: 5 INJECTION, SOLUTION INTRAVENOUS at 08:19

## 2022-10-04 RX ADMIN — ONDANSETRON 4 MG: 2 INJECTION INTRAMUSCULAR; INTRAVENOUS at 08:50

## 2022-10-04 RX ADMIN — SUGAMMADEX 120 MG: 100 INJECTION, SOLUTION INTRAVENOUS at 08:51

## 2022-10-04 RX ADMIN — CLINDAMYCIN PHOSPHATE 900 MG: 900 INJECTION, SOLUTION INTRAVENOUS at 07:11

## 2022-10-04 RX ADMIN — LIDOCAINE HYDROCHLORIDE 20 MG: 10 INJECTION, SOLUTION INFILTRATION; PERINEURAL at 07:36

## 2022-10-04 RX ADMIN — ROCURONIUM BROMIDE 40 MG: 10 INJECTION, SOLUTION INTRAVENOUS at 07:36

## 2022-10-04 RX ADMIN — FENTANYL CITRATE 25 MCG: 50 INJECTION, SOLUTION INTRAMUSCULAR; INTRAVENOUS at 10:00

## 2022-10-04 RX ADMIN — PROPOFOL 50 MCG/KG/MIN: 10 INJECTION, EMULSION INTRAVENOUS at 07:50

## 2022-10-04 RX ADMIN — PHENYLEPHRINE HYDROCHLORIDE 50 MCG: 10 INJECTION INTRAVENOUS at 08:39

## 2022-10-04 RX ADMIN — FENTANYL CITRATE 50 MCG: 50 INJECTION, SOLUTION INTRAMUSCULAR; INTRAVENOUS at 07:36

## 2022-10-04 RX ADMIN — PROPOFOL 140 MG: 10 INJECTION, EMULSION INTRAVENOUS at 07:36

## 2022-10-04 RX ADMIN — IBUPROFEN 600 MG: 400 TABLET ORAL at 10:32

## 2022-10-04 RX ADMIN — FENTANYL CITRATE 50 MCG: 50 INJECTION, SOLUTION INTRAMUSCULAR; INTRAVENOUS at 08:02

## 2022-10-04 RX ADMIN — SODIUM CHLORIDE, POTASSIUM CHLORIDE, SODIUM LACTATE AND CALCIUM CHLORIDE: 600; 310; 30; 20 INJECTION, SOLUTION INTRAVENOUS at 06:35

## 2022-10-04 RX ADMIN — MIDAZOLAM 2 MG: 1 INJECTION INTRAMUSCULAR; INTRAVENOUS at 07:28

## 2022-10-04 RX ADMIN — DEXAMETHASONE SODIUM PHOSPHATE 10 MG: 10 INJECTION INTRAMUSCULAR; INTRAVENOUS at 06:35

## 2022-10-04 RX ADMIN — PROPOFOL 60 MG: 10 INJECTION, EMULSION INTRAVENOUS at 08:14

## 2022-10-04 RX ADMIN — FENTANYL CITRATE 50 MCG: 50 INJECTION, SOLUTION INTRAMUSCULAR; INTRAVENOUS at 07:57

## 2022-10-04 ASSESSMENT — ACTIVITIES OF DAILY LIVING (ADL)
ADLS_ACUITY_SCORE: 35

## 2022-10-04 NOTE — ANESTHESIA PREPROCEDURE EVALUATION
"Anesthesia Pre-Procedure Evaluation    Patient: Arely Saldaña   MRN: [id number] : 1980        Procedure : Procedure(s):  POLYPECTOMY, NASAL CAVITY, ENDOSCOPIC          Past Medical History:   Diagnosis Date     Anemia     iron deficiency     ASCUS of cervix with negative high risk HPV 2018-2012 NIL annual pap 4/13/15 NIL pap, neg HPV 18 ASCUS, neg HPV 11/10/20 NIL pap, neg HPV     Hypertension     was on nifedipine and HCTZ before pregnancy, had since age early 20's     Sinusitis, chronic       Past Surgical History:   Procedure Laterality Date     C/SECTION, LOW TRANSVERSE  2021    with PPTL     FOOT SURGERY Left age 20    bursa removed from left foot     SALPINGECTOMY Bilateral 2021    with  section     THORACOSCOPY Left 2022    Procedure: LEFT THORACOSCOPY, DECORTICATION;  Surgeon: Suleman Kumar MD;  Location: [facility] OR     [place] TOOTH EXTRACTION  age 19      Allergies   Allergen Reactions     Immune Globulin (Human) Other (See Comments)     Fevers, chills , tremor     Metronidazole Other (See Comments)     Had \"tremulous chills\" associated with iv infusion.  Resolved after stopping infusion     Penicillins Hives     Sulfamethoxazole-Trimethoprim [Sulfamethoxazole W/Trimethoprim] Rash      Social History     Tobacco Use     Smoking status: Never Smoker     Smokeless tobacco: Never Used   Substance Use Topics     Alcohol use: Not Currently     Comment: once every other month      Wt Readings from Last 1 Encounters:   10/04/22 68 kg (150 lb)        Anesthesia Evaluation            ROS/MED HX  ENT/Pulmonary:  - neg pulmonary ROS   (+) asthma     Neurologic:  - neg neurologic ROS     Cardiovascular:  - neg cardiovascular ROS   (+) hypertension-----    METS/Exercise Tolerance:     Hematologic:  - neg hematologic  ROS     Musculoskeletal:  - neg musculoskeletal ROS     GI/Hepatic:  - neg GI/hepatic ROS     Renal/Genitourinary:  - neg Renal ROS     Endo:  - " neg endo ROS     Psychiatric/Substance Use:  - neg psychiatric ROS     Infectious Disease:  - neg infectious disease ROS     Malignancy:  - neg malignancy ROS     Other:  - neg other ROS          Physical Exam    Airway  airway exam normal      Mallampati: II   TM distance: > 3 FB   Neck ROM: full   Mouth opening: > 3 cm    Respiratory Devices and Support         Dental  no notable dental history         Cardiovascular   cardiovascular exam normal       Rhythm and rate: regular     Pulmonary   pulmonary exam normal        breath sounds clear to auscultation           OUTSIDE LABS:  CBC:   Lab Results   Component Value Date    WBC 4.8 09/26/2022    WBC 6.2 05/09/2022    HGB 13.1 09/26/2022    HGB 12.1 05/09/2022    HCT 38.7 09/26/2022    HCT 37.8 05/09/2022     09/26/2022     05/09/2022     BMP:   Lab Results   Component Value Date     09/26/2022     05/09/2022    POTASSIUM 3.8 09/26/2022    POTASSIUM 4.9 05/09/2022    CHLORIDE 102 09/26/2022    CHLORIDE 103 05/09/2022    CO2 28 09/26/2022    CO2 25 05/09/2022    BUN 10.9 09/26/2022    BUN 13 05/09/2022    CR 0.73 09/26/2022    CR 0.71 05/09/2022     (H) 09/26/2022    GLC 89 05/09/2022     COAGS:   Lab Results   Component Value Date    PTT 24 02/19/2021    INR 1.20 (H) 04/03/2022     POC:   Lab Results   Component Value Date    HCG Negative 09/26/2022    HCGS Negative 04/03/2022     HEPATIC:   Lab Results   Component Value Date    ALBUMIN 4.0 09/26/2022    PROTTOTAL 7.1 09/26/2022    ALT 20 09/26/2022    AST 29 09/26/2022    ALKPHOS 138 (H) 09/26/2022    BILITOTAL 0.2 09/26/2022     OTHER:   Lab Results   Component Value Date    LACT 1.4 04/07/2022    A1C 4.8 11/10/2020    PABLO 9.3 09/26/2022    PHOS 3.9 04/07/2022    MAG 2.4 04/14/2022    LIPASE <9 03/10/2022    TSH 1.86 04/03/2022    CRP 14.7 (H) 04/03/2022    SED 14 01/21/2022       Anesthesia Plan    ASA Status:  2   NPO Status:  NPO Appropriate    Anesthesia Type: General.     -  Airway: ETT   Induction: Intravenous.   Maintenance: Balanced.        Consents    Anesthesia Plan(s) and associated risks, benefits, and realistic alternatives discussed. Questions answered and patient/representative(s) expressed understanding.     - Discussed: Risks, Benefits and Alternatives for BOTH SEDATION and the PROCEDURE were discussed     - Discussed with:  Patient      - Extended Intubation/Ventilatory Support Discussed: No.         Postoperative Care    Pain management: IV analgesics.   PONV prophylaxis: Ondansetron (or other 5HT-3), Dexamethasone or Solumedrol     Comments:                Ede Molina MD

## 2022-10-04 NOTE — ANESTHESIA CARE TRANSFER NOTE
Patient: Arely Saldaña    Procedure: Procedure(s):  POLYPECTOMY, NASAL CAVITY, ENDOSCOPIC       Diagnosis: Chronic maxillary sinusitis [J32.0]  Diagnosis Additional Information: No value filed.    Anesthesia Type:   General     Note:    Oropharynx: oral airway in place  Level of Consciousness: unresponsive  Oxygen Supplementation: face mask  Level of Supplemental Oxygen (L/min / FiO2): 8  Independent Airway: airway patency not satisfactory and stable  Dentition: dentition unchanged  Vital Signs Stable: post-procedure vital signs reviewed and stable  Report to RN Given: handoff report given  Patient transferred to: PACU    Handoff Report: Identifed the Patient, Identified the Reponsible Provider, Reviewed the pertinent medical history, Discussed the surgical course, Reviewed Intra-OP anesthesia mangement and issues during anesthesia, Set expectations for post-procedure period and Allowed opportunity for questions and acknowledgement of understanding      Vitals:  Vitals Value Taken Time   /73 10/04/22 0911   Temp 35.9  C (96.6  F) 10/04/22 0909   Pulse 65 10/04/22 0913   Resp 15 10/04/22 0913   SpO2 99 % 10/04/22 0913   Vitals shown include unvalidated device data.    Electronically Signed By: JAKE Rogers CRNA  October 4, 2022  9:14 AM

## 2022-10-04 NOTE — OP NOTE
Otolaryngology Full Operative Report    Date of Operation:  10/4/22    Pre-operative Diagnosis:  Bilateral chronic pansinusitis  Post-operative Diagnosis:  Same  Procedure(s):  Bilateral endoscopic sinus surgery including maxillary antrostomies with tissue removal, total ethmoidectomies, and frontal sinusotomies    Surgeon: Elvi Rodriguez MD  Assistant(s):  none  Anesthesia:  GET    Indications for Procedure:  Arely is a 43yo F with chronic pansinusitis on a background of SCID. The risks and the benefits of the procedure were discussed with the patient. A consent form was then signed and placed into the chart.    Procedure in Detail:  The patient was brought into the operating room and placed on the table in a supine position. Anesthesia intubated without any difficulties. A time out was performed with all pertinent personnel in the room. The bed was then rotated 90 degrees. Anterior rhinoscopy was performed and afrin-soaked nasal pledgets were placed. The patient was then prepped and draped in the usual fashion.     The nasal pledgets were removed from the nose and a 0 degree rigid nasal endoscope was used to inspect both nasal passages. The left side was normal in appearance. The right side was normal in appearance. 4.5cc total of 1% lidocaine with 1:200,000 epinephrine was injected into the anterior attachment of the middle turbinates bilaterally.    Attention was turned to the left side first. A freer elevator was used to medialize the middle turbinate, and a [place]ita was used to incise the uncinate process and remove it.  A ball-ended seeker was used to palpate for the natural maxillary sinus os and enlarge it. A microdebrider was used to finish performing the maxillary antrostomy. The contents of the sinus were suctioned and irrigated with normal saline. The microdebrider was then used to enter the ethmoid bulla and systematically take down the anterior and posterior ethmoid cells. The frontoethmoidal recess  was palpated with a curved suction and used to outfracture the agar nasi. The remaining ethmoid cells along the skull base and lamina papyracea were addressed with a curette. The surgical field was then irrigated and suctioned. Afrin-soaked pledgets were replaced into the nose and attention was turned to the right side. The antrostomy, ethmoidectomy, and frontal sinusotomy were performed in an identical fashion. Propel stents were placed into the operative fields bilaterally and hemostasis was achieved with Julia. A subnasal dressing was placed. The final OR counts were correct at the end of the case.     Findings:  Bilateral chronic mucosal inflammation, no polyps or infection    Specimen(s):  none    EBL:  50cc    Complications:  none    Disposition: The patient was extubated in the operating room and was transferred to the PACU in stable condition.     Elvi Rodriguez MD  [facility]  [phone] (o)

## 2022-10-04 NOTE — ANESTHESIA PROCEDURE NOTES
Airway       Patient location during procedure: OR       Procedure Start/Stop Times: 10/4/2022 7:39 AM  Staff -        CRNA: Tammy Calvert APRN CRNA       Performed By: CRNA  Consent for Airway        Urgency: elective  Indications and Patient Condition       Indications for airway management: prema-procedural       Induction type:intravenous       Mask difficulty assessment: 1 - vent by mask    Final Airway Details       Final airway type: endotracheal airway       Successful airway: Oral and LEYDA  Endotracheal Airway Details        ETT size (mm): 7.0       Cuffed: yes       Successful intubation technique: direct laryngoscopy       DL Blade Type: Goldman 2       Grade View of Cords: 1       Adjucts: stylet and tooth guard       Position: Center       Measured from: lips (BEND)       Bite block used: None    Post intubation assessment        Placement verified by: capnometry, equal breath sounds and chest rise        Number of attempts at approach: 1       Number of other approaches attempted: 0       Secured with: silk tape       Ease of procedure: easy       Dentition: Intact    Medication(s) Administered   Medication Administration Time: 10/4/2022 7:39 AM

## 2022-10-04 NOTE — ANESTHESIA POSTPROCEDURE EVALUATION
Patient: Arely Saldaña    Procedure: Procedure(s):  POLYPECTOMY, NASAL CAVITY, ENDOSCOPIC       Anesthesia Type:  General    Note:  Disposition: Outpatient   Postop Pain Control: Uneventful            Sign Out: Well controlled pain   PONV: No   Neuro/Psych: Uneventful            Sign Out: Acceptable/Baseline neuro status   Airway/Respiratory: Uneventful            Sign Out: Acceptable/Baseline resp. status   CV/Hemodynamics: Uneventful            Sign Out: Acceptable CV status; No obvious hypovolemia; No obvious fluid overload   Other NRE: NONE   DID A NON-ROUTINE EVENT OCCUR? No           Last vitals:  Vitals Value Taken Time   /97 10/04/22 1000   Temp 36.2  C (97.1  F) 10/04/22 1000   Pulse 72 10/04/22 1005   Resp 18 10/04/22 1004   SpO2 97 % 10/04/22 1005   Vitals shown include unvalidated device data.    Electronically Signed By: Ede Molina MD  October 4, 2022  10:18 AM

## 2022-10-04 NOTE — DISCHARGE INSTRUCTIONS
Discharge Instructions: After Your Surgery  You ve just had surgery. During surgery, you were given medicine called anesthesia to keep you relaxed and free of pain. After surgery, you may have some pain or nausea. This is common. Here are some tips for feeling better and getting well after surgery.     Stay on schedule with your medicine.   Going home  Your healthcare provider will show you how to take care of yourself when you go home. He or she will also answer your questions. Have an adult family member or friend drive you home. For the first 24 hours after your surgery:  Don't drive or use heavy equipment.  Don't make important decisions or sign legal papers.  Don't drink alcohol.  Have someone stay with you, if needed. He or she can watch for problems and help keep you safe.  Be sure to go to all follow-up visits with your healthcare provider. And rest after your surgery for as long as your healthcare provider tells you to.  Coping with pain  If you have pain after surgery, pain medicine will help you feel better. Take it as told, before pain becomes severe. Also, ask your healthcare provider or pharmacist about other ways to control pain. This might be with heat, ice, or relaxation. And follow any other instructions your surgeon or nurse gives you.  Tips for taking pain medicine  To get the best relief possible, remember these points:  Pain medicines can upset your stomach. Taking them with a little food may help.  Most pain relievers taken by mouth need at least 20 to 30 minutes to start to work.  Don't wait till your pain becomes severe before you take your medicine. Try to time your medicine so that you can take it before starting an activity. This might be before you get dressed, go for a walk, or sit down for dinner.  Constipation is a common side effect of pain medicines. Call your healthcare provider before taking any medicines such as laxatives or stool softeners to help ease constipation. Also ask  if you should skip any foods. Drinking lots of fluids and eating foods such as fruits and vegetables that are high in fiber can also help. Remember, don't take laxatives unless your surgeon has prescribed them.  Drinking alcohol and taking pain medicine can cause dizziness and slow your breathing. It can even be deadly. Don't drink alcohol while taking pain medicine.  Pain medicine can make you react more slowly to things. Don't drive or run machinery while taking pain medicine.  Your healthcare provider may tell you to take acetaminophen to help ease your pain. Ask him or her how much you are supposed to take each day. Acetaminophen or other pain relievers may interact with your prescription medicines or other over-the-counter (OTC) medicines. Some prescription medicines have acetaminophen and other ingredients. Using both prescription and OTC acetaminophen for pain can cause you to overdose. Read the labels on your OTC medicines with care. This will help you to clearly know the list of ingredients, how much to take, and any warnings. It may also help you not take too much acetaminophen. If you have questions or don't understand the information, ask your pharmacist or healthcare provider to explain it to you before you take the OTC medicine.  Managing nausea  Some people have an upset stomach after surgery. This is often because of anesthesia, pain, or pain medicine, or the stress of surgery. These tips will help you handle nausea and eat healthy foods as you get better. If you were on a special food plan before surgery, ask your healthcare provider if you should follow it while you get better. These tips may help:  Don't push yourself to eat. Your body will tell you when to eat and how much.  Start off with clear liquids and soup. They are easier to digest.  Next try semi-solid foods, such as mashed potatoes, applesauce, and gelatin, as you feel ready.  Slowly move to solid foods. Don t eat fatty, rich, or spicy  foods at first.  Don't force yourself to have 3 large meals a day. Instead eat smaller amounts more often.  Take pain medicines with a small amount of solid food, such as crackers or toast, to prevent nausea.  When to call your healthcare provider  Call your healthcare provider if:  You still have intolerable pain an hour after taking medicine. The medicine may not be strong enough.  You feel too sleepy, dizzy, or groggy. The medicine may be too strong.  You have side effects such as nausea or vomiting, or skin changes such as rash, itching, or hives. Your healthcare provider may suggest other medicines to control side effects.  Rash, itching, or hives may mean you have an allergic reaction. Report this right away. If you have trouble breathing or facial swelling, call 911 right away.  If you have obstructive sleep apnea  You were given anesthesia medicine during surgery to keep you comfortable and free of pain. After surgery, you may have more apnea spells because of this medicine and other medicines you were given. The spells may last longer than usual.   At home:  Keep using the continuous positive airway pressure (CPAP) device when you sleep. Unless your healthcare provider tells you not to, use it when you sleep, day or night. CPAP is a common device used to treat obstructive sleep apnea.  Talk with your provider before taking any pain medicine, muscle relaxants, or sedatives. Your provider will tell you about the possible dangers of taking these medicines.  [employer] last reviewed this educational content on 3/1/2019    0084-5939 [employer]. All rights reserved. This information is not intended as a substitute for professional medical care. Always follow your healthcare professional's instructions.

## 2022-10-18 ENCOUNTER — OFFICE VISIT (OUTPATIENT)
Dept: OTOLARYNGOLOGY | Facility: CLINIC | Age: 42
End: 2022-10-18
Payer: COMMERCIAL

## 2022-10-18 DIAGNOSIS — J32.0 CHRONIC MAXILLARY SINUSITIS: Primary | ICD-10-CM

## 2022-10-18 PROCEDURE — 31231 NASAL ENDOSCOPY DX: CPT | Performed by: OTOLARYNGOLOGY

## 2022-10-18 NOTE — PROGRESS NOTES
HPI: This patient is a 43yo F who presents for a post-op visit s/p FESS. Doing well overall. Reports improved nasal breathing, sense of smell, and denies problems with bleeding.    Past medical history, past surgical history, social history, medications, and allergies have been reviewed with the patient and are documented above.    Review of Systems: an ENT specific review of systems is normal    PHYSICAL EXAMINATION:  GEN: no acute distress, normocephalic  NOSE: nares patent, septum non-obstucting  NASAL ENDOSCOPY: Patent OMCs, propel stents present. Minimal, non-obstructive crusting L>R  NECK: soft and supple. No lymphadenopathy or masses. Airway is midline.  PULM: breathing comfortably on room air, normal chest expansion with respiration    MEDICAL DECISION-MAKING: doing well s/p FESS. Will be okay to resume full activity and nasal steroid sprays. Continue nasal saline irrigation. RTC in 1 month.

## 2022-10-18 NOTE — LETTER
10/18/2022         RE: Arely Saldaña  [address] [place]  [facility] [address]        Dear Colleague,    Thank you for referring your patient, Arely Saldaña, to the [facility]. Please see a copy of my visit note below.    HPI: This patient is a 41yo F who presents for a post-op visit s/p FESS. Doing well overall. Reports improved nasal breathing, sense of smell, and denies problems with bleeding.    Past medical history, past surgical history, social history, medications, and allergies have been reviewed with the patient and are documented above.    Review of Systems: an ENT specific review of systems is normal    PHYSICAL EXAMINATION:  GEN: no acute distress, normocephalic  NOSE: nares patent, septum non-obstucting  NASAL ENDOSCOPY: Patent OMCs, propel stents present. Minimal, non-obstructive crusting L>R  NECK: soft and supple. No lymphadenopathy or masses. Airway is midline.  PULM: breathing comfortably on room air, normal chest expansion with respiration    MEDICAL DECISION-MAKING: doing well s/p FESS. Will be okay to resume full activity and nasal steroid sprays. Continue nasal saline irrigation. RTC in 1 month.        Again, thank you for allowing me to participate in the care of your patient.        Sincerely,        Elvi Rodriguez MD

## 2022-11-17 ENCOUNTER — OFFICE VISIT (OUTPATIENT)
Dept: OTOLARYNGOLOGY | Facility: CLINIC | Age: 42
End: 2022-11-17
Payer: COMMERCIAL

## 2022-11-17 DIAGNOSIS — J32.0 CHRONIC MAXILLARY SINUSITIS: Primary | ICD-10-CM

## 2022-11-17 PROCEDURE — 31231 NASAL ENDOSCOPY DX: CPT | Performed by: OTOLARYNGOLOGY

## 2022-11-17 NOTE — PROGRESS NOTES
HPI: This patient is a 43yo F who presents for a post-op visit s/p FESS. Doing well overall. Reports improved nasal breathing, sense of smell, and denies problems with bleeding.     Past medical history, past surgical history, social history, medications, and allergies have been reviewed with the patient and are documented above.     Review of Systems: an ENT specific review of systems is normal     PHYSICAL EXAMINATION:  GEN: no acute distress, normocephalic  NOSE: nares patent, septum non-obstucting  NASAL ENDOSCOPY: Patent OMCs, no visible stent material remains. No crusting or polyp recurrence.  NECK: soft and supple. No lymphadenopathy or masses. Airway is midline.  PULM: breathing comfortably on room air, normal chest expansion with respiration     MEDICAL DECISION-MAKING: doing well s/p FESS. Continue nasal saline and nasal steroid sprays. RTC PRN.

## 2022-11-17 NOTE — LETTER
11/17/2022         RE: Arely Saldaña  [address] [place]  [facility] [address]        Dear Colleague,    Thank you for referring your patient, Arely Saldaña, to the [facility]. Please see a copy of my visit note below.    HPI: This patient is a 41yo F who presents for a post-op visit s/p FESS. Doing well overall. Reports improved nasal breathing, sense of smell, and denies problems with bleeding.     Past medical history, past surgical history, social history, medications, and allergies have been reviewed with the patient and are documented above.     Review of Systems: an ENT specific review of systems is normal     PHYSICAL EXAMINATION:  GEN: no acute distress, normocephalic  NOSE: nares patent, septum non-obstucting  NASAL ENDOSCOPY: Patent OMCs, no visible stent material remains. No crusting or polyp recurrence.  NECK: soft and supple. No lymphadenopathy or masses. Airway is midline.  PULM: breathing comfortably on room air, normal chest expansion with respiration     MEDICAL DECISION-MAKING: doing well s/p FESS. Continue nasal saline and nasal steroid sprays. RTC PRN.      Again, thank you for allowing me to participate in the care of your patient.        Sincerely,        Elvi Rodriguez MD

## 2022-11-20 ENCOUNTER — HEALTH MAINTENANCE LETTER (OUTPATIENT)
Age: 42
End: 2022-11-20

## 2022-12-01 PROBLEM — J45.909 ASTHMA: Status: RESOLVED | Noted: 2022-04-22 | Resolved: 2022-12-01

## 2022-12-02 ENCOUNTER — OFFICE VISIT (OUTPATIENT)
Dept: FAMILY MEDICINE | Facility: CLINIC | Age: 42
End: 2022-12-02
Payer: COMMERCIAL

## 2022-12-02 VITALS
OXYGEN SATURATION: 100 % | HEART RATE: 75 BPM | SYSTOLIC BLOOD PRESSURE: 124 MMHG | WEIGHT: 149.8 LBS | DIASTOLIC BLOOD PRESSURE: 80 MMHG | RESPIRATION RATE: 14 BRPM | BODY MASS INDEX: 23.51 KG/M2 | HEIGHT: 67 IN | TEMPERATURE: 98 F

## 2022-12-02 DIAGNOSIS — D50.8 OTHER IRON DEFICIENCY ANEMIA: ICD-10-CM

## 2022-12-02 DIAGNOSIS — I10 ESSENTIAL HYPERTENSION: ICD-10-CM

## 2022-12-02 DIAGNOSIS — Z00.00 ROUTINE GENERAL MEDICAL EXAMINATION AT A HEALTH CARE FACILITY: Primary | ICD-10-CM

## 2022-12-02 DIAGNOSIS — Z23 NEED FOR HEPATITIS A VACCINATION: ICD-10-CM

## 2022-12-02 DIAGNOSIS — R87.610 ASCUS OF CERVIX WITH NEGATIVE HIGH RISK HPV: ICD-10-CM

## 2022-12-02 DIAGNOSIS — R79.89 ELEVATED LFTS: ICD-10-CM

## 2022-12-02 DIAGNOSIS — D80.1 HYPOGAMMAGLOBULINEMIA (H): ICD-10-CM

## 2022-12-02 DIAGNOSIS — J32.0 CHRONIC MAXILLARY SINUSITIS: ICD-10-CM

## 2022-12-02 DIAGNOSIS — Z23 NEED FOR VARICELLA VACCINE: ICD-10-CM

## 2022-12-02 DIAGNOSIS — Z23 NEEDS FLU SHOT: ICD-10-CM

## 2022-12-02 DIAGNOSIS — Z23 NEED FOR HEPATITIS B VACCINATION: ICD-10-CM

## 2022-12-02 DIAGNOSIS — R21 RASH: ICD-10-CM

## 2022-12-02 LAB
ALBUMIN UR-MCNC: NEGATIVE MG/DL
APPEARANCE UR: CLEAR
BACTERIA #/AREA URNS HPF: ABNORMAL /HPF
BILIRUB UR QL STRIP: NEGATIVE
COLOR UR AUTO: YELLOW
ERYTHROCYTE [DISTWIDTH] IN BLOOD BY AUTOMATED COUNT: 13 % (ref 10–15)
GLUCOSE UR STRIP-MCNC: NEGATIVE MG/DL
HCT VFR BLD AUTO: 38.9 % (ref 35–47)
HGB BLD-MCNC: 12.8 G/DL (ref 11.7–15.7)
HGB UR QL STRIP: ABNORMAL
KETONES UR STRIP-MCNC: NEGATIVE MG/DL
LEUKOCYTE ESTERASE UR QL STRIP: NEGATIVE
MCH RBC QN AUTO: 26.5 PG (ref 26.5–33)
MCHC RBC AUTO-ENTMCNC: 32.9 G/DL (ref 31.5–36.5)
MCV RBC AUTO: 81 FL (ref 78–100)
NITRATE UR QL: NEGATIVE
PH UR STRIP: 6 [PH] (ref 5–8)
PLATELET # BLD AUTO: 191 10E3/UL (ref 150–450)
RBC # BLD AUTO: 4.83 10E6/UL (ref 3.8–5.2)
RBC #/AREA URNS AUTO: ABNORMAL /HPF
SP GR UR STRIP: 1.02 (ref 1–1.03)
SQUAMOUS #/AREA URNS AUTO: ABNORMAL /LPF
UROBILINOGEN UR STRIP-ACNC: 0.2 E.U./DL
WBC # BLD AUTO: 4.4 10E3/UL (ref 4–11)
WBC #/AREA URNS AUTO: ABNORMAL /HPF

## 2022-12-02 PROCEDURE — 90686 IIV4 VACC NO PRSV 0.5 ML IM: CPT | Performed by: FAMILY MEDICINE

## 2022-12-02 PROCEDURE — 81001 URINALYSIS AUTO W/SCOPE: CPT | Performed by: FAMILY MEDICINE

## 2022-12-02 PROCEDURE — 90472 IMMUNIZATION ADMIN EACH ADD: CPT | Performed by: FAMILY MEDICINE

## 2022-12-02 PROCEDURE — 83550 IRON BINDING TEST: CPT | Performed by: FAMILY MEDICINE

## 2022-12-02 PROCEDURE — 80061 LIPID PANEL: CPT | Performed by: FAMILY MEDICINE

## 2022-12-02 PROCEDURE — 36415 COLL VENOUS BLD VENIPUNCTURE: CPT | Performed by: FAMILY MEDICINE

## 2022-12-02 PROCEDURE — 80053 COMPREHEN METABOLIC PANEL: CPT | Performed by: FAMILY MEDICINE

## 2022-12-02 PROCEDURE — 90471 IMMUNIZATION ADMIN: CPT | Performed by: FAMILY MEDICINE

## 2022-12-02 PROCEDURE — 90716 VAR VACCINE LIVE SUBQ: CPT | Performed by: FAMILY MEDICINE

## 2022-12-02 PROCEDURE — 99214 OFFICE O/P EST MOD 30 MIN: CPT | Mod: 25 | Performed by: FAMILY MEDICINE

## 2022-12-02 PROCEDURE — 99396 PREV VISIT EST AGE 40-64: CPT | Mod: 25 | Performed by: FAMILY MEDICINE

## 2022-12-02 PROCEDURE — 83540 ASSAY OF IRON: CPT | Performed by: FAMILY MEDICINE

## 2022-12-02 PROCEDURE — 90746 HEPB VACCINE 3 DOSE ADULT IM: CPT | Performed by: FAMILY MEDICINE

## 2022-12-02 PROCEDURE — 90632 HEPA VACCINE ADULT IM: CPT | Performed by: FAMILY MEDICINE

## 2022-12-02 PROCEDURE — 85027 COMPLETE CBC AUTOMATED: CPT | Performed by: FAMILY MEDICINE

## 2022-12-02 PROCEDURE — 82728 ASSAY OF FERRITIN: CPT | Performed by: FAMILY MEDICINE

## 2022-12-02 RX ORDER — LISINOPRIL 5 MG/1
5 TABLET ORAL DAILY
Qty: 90 TABLET | Refills: 1 | Status: SHIPPED | OUTPATIENT
Start: 2022-12-02

## 2022-12-02 ASSESSMENT — ANXIETY QUESTIONNAIRES
2. NOT BEING ABLE TO STOP OR CONTROL WORRYING: NOT AT ALL
6. BECOMING EASILY ANNOYED OR IRRITABLE: NOT AT ALL
GAD7 TOTAL SCORE: 0
7. FEELING AFRAID AS IF SOMETHING AWFUL MIGHT HAPPEN: NOT AT ALL
GAD7 TOTAL SCORE: 0
GAD7 TOTAL SCORE: 0
4. TROUBLE RELAXING: NOT AT ALL
7. FEELING AFRAID AS IF SOMETHING AWFUL MIGHT HAPPEN: NOT AT ALL
5. BEING SO RESTLESS THAT IT IS HARD TO SIT STILL: NOT AT ALL
3. WORRYING TOO MUCH ABOUT DIFFERENT THINGS: NOT AT ALL
1. FEELING NERVOUS, ANXIOUS, OR ON EDGE: NOT AT ALL

## 2022-12-02 ASSESSMENT — ENCOUNTER SYMPTOMS
DIARRHEA: 0
HEADACHES: 0
NAUSEA: 0
SHORTNESS OF BREATH: 0
ARTHRALGIAS: 0
ABDOMINAL PAIN: 0
MYALGIAS: 0
HEMATOCHEZIA: 0
FREQUENCY: 0
SORE THROAT: 0
COUGH: 1
HEARTBURN: 0
WEAKNESS: 0
PARESTHESIAS: 0
DYSURIA: 0
CHILLS: 0
JOINT SWELLING: 0
HEMATURIA: 0
DIZZINESS: 0
BREAST MASS: 0
NERVOUS/ANXIOUS: 0
PALPITATIONS: 0
FEVER: 0
EYE PAIN: 0
CONSTIPATION: 0

## 2022-12-02 ASSESSMENT — PATIENT HEALTH QUESTIONNAIRE - PHQ9
SUM OF ALL RESPONSES TO PHQ QUESTIONS 1-9: 0
SUM OF ALL RESPONSES TO PHQ QUESTIONS 1-9: 0

## 2022-12-02 ASSESSMENT — PAIN SCALES - GENERAL: PAINLEVEL: NO PAIN (0)

## 2022-12-02 NOTE — PROGRESS NOTES
SUBJECTIVE:   CC: Arely is an 42 year old who presents for preventive health visit.   Patient has been advised of split billing requirements and indicates understanding: Yes  Healthy Habits:     Getting at least 3 servings of Calcium per day:  Yes    Bi-annual eye exam:  NO    Dental care twice a year:  Yes    Sleep apnea or symptoms of sleep apnea:  None    Diet:  Other    Frequency of exercise:  None    Taking medications regularly:  Yes    Medication side effects:  None    PHQ-2 Total Score: 0    Additional concerns today:  No    Patient presents today for a physical exam.  She overall is feeling well.  She does have a history of hypertension and is on lisinopril for that.  She is tolerating that well.  Her blood pressure today looks excellent at 124/80.  She is not any side effects to that medication.  She denies that she is having any chest pain or shortness of breath.  She does need a refill of lisinopril today which we will give.  She otherwise seems to be doing well.  She also has a history of hypogammaglobulinemia which was found after she had a lung empyema in April 2022.  At that time she also underwent a thoracoscopy for treatment of that.  It was thought that this was a pneumonia that because of her low immune system became an empyema and she needed surgical treatment for it.  She had quite the ordeal during that time but now things are going well.  She does have a history of chronic sinusitis and did have surgery on 10/4/2022 for her sinuses.  She notes that things are about the same as they were prior to her surgery but she is happy that she did the surgery.  She does need a Pap follow-up today as she had an ASCUS Pap in 2018.  We did a Pap last year but it was unsatisfactory.  The chart has previously noted that she has asthma but she does not.  The chart also noted that she probably needed a pneumonia shot and I think that is a reasonable choice for the future given her immune status however  today we chose to give other immunizations.  We did talk about hepatitis A and hepatitis B vaccination and she would like to start both of those today.  She declines COVID vaccination but she would like the flu vaccination.  She had blood work drawn in the spring when she was hospitalized which showed that she is not immune to varicella and I think it is more important for her to get the varicella vaccination then the Prevnar vaccination at this time.  She also has a history of tachycardia but that has not been an issue recently.  She did have a tubal ligation at the time of her  about a year and a half ago so contraception is not an issue either.  Her only other concern is that she has had a rash on her left abdomen for about the last 3 weeks or so.  It is raised bumps and she is not having any pain but she admits that that is the area where the thoracoscopy was done and therefore she does not have a whole lot of feeling in that area.  She does have a history of elevated liver function tests in the past but we should be able to recheck those with a metabolic panel that were in a follow-up on because of her hypertension medication.  She also has a history of anemia and we will recheck blood work today to make sure that that is not resurfaced.  Today's PHQ-2 Score:   PHQ-2 (  [employer]) 2022   Q1: Little interest or pleasure in doing things 0   Q2: Feeling down, depressed or hopeless 0   PHQ-2 Score 0   Q1: Little interest or pleasure in doing things Not at all   Q2: Feeling down, depressed or hopeless Not at all   PHQ-2 Score 0       Have you ever done Advance Care Planning? (For example, a Health Directive, POLST, or a discussion with a medical provider or your loved ones about your wishes): No, advance care planning information given to patient to review.  Patient declined advance care planning discussion at this time.    Social History     Tobacco Use     Smoking status: Never     Passive exposure:  Never     Smokeless tobacco: Never   Substance Use Topics     Alcohol use: Not Currently     Comment: once every other month     If you drink alcohol do you typically have >3 drinks per day or >7 drinks per week? No    Alcohol Use 12/2/2022   Prescreen: >3 drinks/day or >7 drinks/week? No   Prescreen: >3 drinks/day or >7 drinks/week? -       Reviewed orders with patient.  Reviewed health maintenance and updated orders accordingly - Yes    Breast Cancer Screening:    Breast CA Risk Assessment (FHS-7) 9/23/2022   Do you have a family history of breast, colon, or ovarian cancer? No / Unknown         Mammogram Screening - Offered annual screening and updated Health Maintenance for mutual plan based on risk factor consideration    Pertinent mammograms are reviewed under the imaging tab.    History of abnormal Pap smear: NO - age 30- 65 PAP every 3 years recommended  PAP / HPV Latest Ref Rng & Units 6/30/2021 11/10/2020 7/26/2018   PAP Negative for squamous intraepithelial lesion or malignancy. Unsatisfactory for evaluation  Electronically signed by Josafat Love CT (ASCP) on 7/7/2021 at  1:30 PM   Negative for squamous intraepithelial lesion or malignancy  Electronically signed by Macie Petty CT (ASCP) on 11/18/2020 at  4:31 PM   -   HPV16 NEG Negative Negative -   HPV18 NEG Negative Negative -   HPV - - - See Scanned Report   HRHPV NEG Negative Negative -     Reviewed and updated as needed this visit by clinical staff   Tobacco  Allergies  Meds  Problems  Med Hx  Surg Hx  Fam Hx          Reviewed and updated as needed this visit by Provider   Tobacco  Allergies  Meds  Problems  Med Hx  Surg Hx  Fam Hx           Current Outpatient Medications:      ferrous sulfate (FEROSUL) 325 (65 Fe) MG tablet, Take 325 mg by mouth daily (with breakfast), Disp: , Rfl:      lisinopril (ZESTRIL) 5 MG tablet, Take 1 tablet (5 mg) by mouth daily, Disp: 90 tablet, Rfl: 1     Multiple Vitamin (MULTIVITAMIN  "ADULT PO), , Disp: , Rfl:      Allergies   Allergen Reactions     Immune Globulin (Human) Other (See Comments)     Fevers, chills , tremor     Metronidazole Other (See Comments)     Had \"tremulous chills\" associated with iv infusion.  Resolved after stopping infusion     Penicillins Hives     Sulfamethoxazole-Trimethoprim [Sulfamethoxazole W/Trimethoprim] Rash        Past Medical History:   Diagnosis Date     Anemia     iron deficiency     ASCUS of cervix with negative high risk HPV 2018-2012 NIL annual pap 4/13/15 NIL pap, neg HPV 18 ASCUS, neg HPV 11/10/20 NIL pap, neg HPV     Hypertension     on lisinopril, had since age early 20's     Miscarriage     x2     Sinusitis, chronic         Past Surgical History:   Procedure Laterality Date     C/SECTION, LOW TRANSVERSE  2021    with PPTL     ENDOSCOPIC POLYPECTOMY NASAL Bilateral 10/4/2022    Procedure: POLYPECTOMY, NASAL CAVITY, ENDOSCOPIC;  Surgeon: Elvi Rodriguez MD;  Location: [facility] [place] OR     FOOT SURGERY Left age 20    bursa removed from left foot     SALPINGECTOMY Bilateral 2021    with  section     THORACOSCOPY Left 2022    Procedure: LEFT THORACOSCOPY, DECORTICATION;  Surgeon: Sulemna Kumar MD;  [place] [facility] [place]     WISDOM TOOTH EXTRACTION  age 19        Family History   Problem Relation Age of Onset     Hypertension Mother      Cerebrovascular Disease Mother         stroke     Hypertension Father      Diabetes Father         Social History     Socioeconomic History     Marital status:      Spouse name: Arvin Saldaña     Number of children: 1     Years of education: Not on file     Highest education level: Not on file   Occupational History     Occupation:      Comment: [employer]   Tobacco Use     Smoking status: Never     Passive exposure: Never     Smokeless tobacco: Never   Vaping Use     Vaping Use: Never used   Substance and Sexual Activity     Alcohol use: " Not Currently     Comment: once every other month     Drug use: Never     Sexual activity: Yes     Partners: Male     Birth control/protection: Female Surgical     Comment: tubal ligation   Other Topics Concern     Parent/sibling w/ CABG, MI or angioplasty before 65F 55M? No   Social History Narrative     Not on file     Social Determinants of Health     Financial Resource Strain: Not on file   Food Insecurity: Not on file   Transportation Needs: Not on file   Physical Activity: Not on file   Stress: Not on file   Social Connections: Not on file   Intimate Partner Violence: Not on file   Housing Stability: Not on file        Immunization History   Administered Date(s) Administered     COVID-19 Vaccine 18+ (Moderna) 2021, 2021     HepA-Adult 2022     HepB-Adult 2022     Historical DTP/aP 1982, 1982, 1983, 09/10/1986, 1987     Influenza (IIV3) PF 2020     Influenza Vaccine >6 months (Alfuria,Fluzone) 11/10/2020, 2022     MMR 09/10/1986, 1994, 2020, 04/15/2021     Meningococcal (Menomune ) 2003     OPV, trivalent, live 1982, 1982, 1983, 09/10/1986     Td (Adult), Adsorbed 1998, 2000     Tdap (Adacel,Boostrix) 2010, 2021     Varicella 2022        OB History    Para Term  AB Living   3 1 0 1 2 1   SAB IAB Ectopic Multiple Live Births   2 0 0 0 1      # Outcome Date GA Lbr Sigifredo/2nd Weight Sex Delivery Anes PTL Lv   3  21 34w0d  1.44 kg (3 lb 2.8 oz) F CS-LTranv Spinal N ENRIQUE      Birth Comments: hospitalized for severe HTN at 27 wks, delivered at 34 wks after hypertensive crisis, IUGR in baby,      Name: Reinier      Apgar1: 9  Apgar5: 9   2 SAB 19 6w0d             Birth Comments: pos test at home, then had bleeding before even seen for pregnancy confirmation   1 SAB 05/10/19 7w2d    SAB         Birth Comments: bled spontaneously, u/s confirmed no IUP, no ectopic,  "no complications          Review of Systems   Constitutional: Negative for chills and fever.   HENT: Positive for congestion. Negative for ear pain, hearing loss and sore throat.    Eyes: Negative for pain and visual disturbance.   Respiratory: Positive for cough. Negative for shortness of breath.    Cardiovascular: Negative for chest pain, palpitations and peripheral edema.   Gastrointestinal: Negative for abdominal pain, constipation, diarrhea, heartburn, hematochezia and nausea.   Breasts:  Negative for tenderness, breast mass and discharge.   Genitourinary: Negative for dysuria, frequency, genital sores, hematuria, pelvic pain, urgency, vaginal bleeding and vaginal discharge.   Musculoskeletal: Negative for arthralgias, joint swelling and myalgias.   Skin: Positive for rash.   Neurological: Negative for dizziness, weakness, headaches and paresthesias.   Psychiatric/Behavioral: Negative for mood changes. The patient is not nervous/anxious.           OBJECTIVE:   /80   Pulse 75   Temp 98  F (36.7  C) (Oral)   Resp 14   Ht 1.694 m (5' 6.7\")   Wt 67.9 kg (149 lb 12.8 oz)   LMP 11/21/2022   SpO2 100%   Breastfeeding No   BMI 23.67 kg/m    Physical Exam  REVIEW OF SYSTEMS:   Denies fever, chills, visual changes, fatigue, myalgias, nasal congestion, rhinorrhea, ear pain or discharge, sore throat, swollen glands, breast mass, nipple discharge, breast changes, abdominal pain, nausea, vomiting, diarrhea, constipation, cough, shortness of breath, chest pain, weight change, change in bowel habits, melena, rectal bleeding, dysuria, frequency, urgency, hematuria, polyuria, polydipsia, polyphagia, joint pain or swelling or erythema, edema, rash, weakness, paresthesias, vaginal discharge or bleeding or mood changes other than that mentioned above in HPI.   Remainder of review of systems was negative.      PHYSICAL EXAM:  On exam, patient is a WD, WN 42 year old female in NAD.  /80   Pulse 75   Temp 98  F " "(36.7  C) (Oral)   Resp 14   Ht 1.694 m (5' 6.7\")   Wt 67.9 kg (149 lb 12.8 oz)   LMP 11/21/2022   SpO2 100%   Breastfeeding No   BMI 23.67 kg/m    Head normocephalic, atraumatic.  Eyes PERRL, ears TM s clear bilaterally.  Throat without significant erythemia or exudate.  Neck was supple, full range of motion. No significant lymphadenopathy or thyromegaly was appreciated.  Lungs clear to auscultation  Heart regular rate and rhythm.  Breast exam was done. No masses were appreciated. Axilla were clear bilaterally. No nipple discharge was appreciated.   Abdomen was soft, nontender, nondistended. No masses or organomegaly were palpated. Positive bowel sounds were appreciated.  Extremities with full range of motion of all 4 extremities were noted.  Deep tendon reflexes were equal and symmetrical. Motor and sensation were intact to both the upper and lower extremities.  Cranial nerves 2 through 12 were grossly intact.  EOM were intact.  Pelvic exam was done.  External genitalia appeared normal. Speculum was introduced and the Pap smear was attempted.  The cervix is very deep in the pelvis and very posterior and I was unable to get an adequate sample.  Bimanual exam revealed uterus to be normal size and adenexa without palpable masses.   A&O x3, thought processes congruent, non-tangential. No hallucinations/delusions. Insight/judgment: intact. Denies suicidal/homicidal ideations.  On exam of her left abdomen in the front of her abdomen just below her breast and rib cage she has a healed rough sandpaper type rash which is of course raised and mildly red in color but not actively draining at this time.  There are red raised bumps that have coalesced into certain areas of her abdomen.  Is nontender to patient and there is no evidence for secondary infection at all.    PHQ-9:  Last PHQ-9 12/2/2022   1.  Little interest or pleasure in doing things 0   2.  Feeling down, depressed, or hopeless 0   3.  Trouble falling or " staying asleep, or sleeping too much 0   4.  Feeling tired or having little energy 0   5.  Poor appetite or overeating 0   6.  Feeling bad about yourself 0   7.  Trouble concentrating 0   8.  Moving slowly or restless 0   Q9: Thoughts of better off dead/self-harm past 2 weeks 0   PHQ-9 Total Score 0       GAD7:  KAVON-7  12/2/2022   1. Feeling nervous, anxious, or on edge 0   2. Not being able to stop or control worrying 0   3. Worrying too much about different things 0   4. Trouble relaxing 0   5. Being so restless that it is hard to sit still 0   6. Becoming easily annoyed or irritable 0   7. Feeling afraid, as if something awful might happen 0   KAVON-7 Total Score 0         LABS:    Recent Results (from the past 240 hour(s))   CBC with platelets    Collection Time: 12/02/22  5:09 PM   Result Value Ref Range    WBC Count 4.4 4.0 - 11.0 10e3/uL    RBC Count 4.83 3.80 - 5.20 10e6/uL    Hemoglobin 12.8 11.7 - 15.7 g/dL    Hematocrit 38.9 35.0 - 47.0 %    MCV 81 78 - 100 fL    MCH 26.5 26.5 - 33.0 pg    MCHC 32.9 31.5 - 36.5 g/dL    RDW 13.0 10.0 - 15.0 %    Platelet Count 191 150 - 450 10e3/uL   UA reflex to Microscopic and Culture    Collection Time: 12/02/22  5:14 PM    Specimen: Urine, Midstream   Result Value Ref Range    Color Urine Yellow Colorless, Straw, Light Yellow, Yellow    Appearance Urine Clear Clear    Glucose Urine Negative Negative mg/dL    Bilirubin Urine Negative Negative    Ketones Urine Negative Negative mg/dL    Specific Gravity Urine 1.020 1.005 - 1.030    Blood Urine Moderate (A) Negative    pH Urine 6.0 5.0 - 8.0    Protein Albumin Urine Negative Negative mg/dL    Urobilinogen Urine 0.2 0.2, 1.0 E.U./dL    Nitrite Urine Negative Negative    Leukocyte Esterase Urine Negative Negative   Urine Microscopic    Collection Time: 12/02/22  5:14 PM   Result Value Ref Range    Bacteria Urine Few (A) None Seen /HPF    RBC Urine 2-5 (A) 0-2 /HPF /HPF    WBC Urine None Seen 0-5 /HPF /HPF    Squamous  Epithelials Urine Few (A) None Seen /LPF       ASSESSMENT/PLAN:   ASSESSMENT: 42 year old female physical exam.     PLAN:     Routine general medical examination at a health care facility [Z00.00]    1. Routine general medical examination at a health care facility  - CBC with platelets; Future  - Lipid panel reflex to direct LDL Fasting; Future  - UA reflex to Microscopic and Culture; Future  - CBC with platelets  - Lipid panel reflex to direct LDL Fasting  - UA reflex to Microscopic and Culture  - Urine Microscopic    2. Essential hypertension  - Comprehensive metabolic panel; Future  - Lipid panel reflex to direct LDL Fasting; Future  - lisinopril (ZESTRIL) 5 MG tablet; Take 1 tablet (5 mg) by mouth daily  Dispense: 90 tablet; Refill: 1  - Comprehensive metabolic panel  - Lipid panel reflex to direct LDL Fasting  Patient has a longstanding history of hypertension.  She overall is doing well.  She is currently taking lisinopril which is working well.  Her blood pressure today looks fine at 124/80.  We will go ahead and recheck metabolic panel as well as lipid panel to follow-up on her hypertension.  We will also give her a refill of the lisinopril.  3-month supply was given with 1 refill which should take her through the next 6 months.  Is tolerating this well and denies that she having any chest pain or shortness of breath  3. Rash  Patient does have a rash underneath her right breast/rib cage area.  Is been there for about the last 3 weeks or so.  It is raised and slightly reddish in color and does seem to have some red raised areas within it.  It seems like it is scratchy and like a dry skin type thing.  It is definitely follows a dermatome and looks as if it might be healing shingles however we did check in the chart and she is not immune to varicella and so I think the chances of it being shingles are remote given the fact that she is not immune to varicella.  We discussed that at great length.  At this point  it may just be dry skin.  I suggested using some lotions on a regular basis for the next couple of weeks if it does not seem like it improves then she should certainly let me know and I be happy to refer her to dermatology for further evaluation.  She is not bothered by this at all and it does not seem to be secondarily infected in any way which is great.  4. Other iron deficiency anemia  - CBC with platelets; Future  - Ferritin; Future  - Iron & Iron Binding Capacity; Future  - CBC with platelets  - Ferritin  - Iron & Iron Binding Capacity  Patient does have a history of iron deficiency anemia in the past.  We will go ahead and check a CBC as well as ferritin and iron to recheck those levels today.  She is having no symptoms to be suggestive of her iron levels being off her having anemia recurrently  5. ASCUS of cervix with negative high risk HPV  - Ob/Gyn Referral; Future  Patient does have a history of ASCUS Pap smear in 2018.  We did try to do a Pap smear in the last year and it was unsatisfactory and today I did attempt a Pap smear however was unable to get an adequate sample.  I did cause some bleeding and therefore did abort the procedure.  Will refer to OB/GYN for further evaluation as I do think that she does need a Pap smear given the fact that she has had the ASCUS Pap in the past.  She otherwise has no other high risk factors but certainly with a history of that I would definitely like her to be seen by the gynecologist for further evaluation and a Pap smear.   6. Elevated LFTs  -comprehensive metabolic panel  Patient has had a history of liver function test that have been elevated.  We will recheck metabolic panel today which will include liver function test,  7. Hypogammaglobulinemia (H)  - CBC with platelets; Future  - CBC with platelets  Patient was found to have hypogammaglobulinemia in the work-up for her lung empyema in April 2022.  We will go ahead and check a CBC today to follow-up on that and  make sure there is no sign of infection.  She may need Prevnar 20 vaccination in the future given the low immune status but did defer that today  8. Chronic maxillary sinusitis  -cbc  Patient has had chronic sinusitis and did have sinus surgery at the beginning of October and an attempt to help with that.  She had the sinus surgery and she thinks that things are about the same as they were before.  She seems to have healed from the surgery well.  9. Needs flu shot  - INFLUENZA VACCINE IM > 6 MONTHS VALENT IIV4 (AFLURIA/FLUZONE)  Patient did request flu vaccination which was given today  10. Need for hepatitis A vaccination  - HEPATITIS A VACCINE (ADULT)  Patient did request her first hepatitis A vaccination today.  Will return in 6 months at which time we can give her second hepatitis A vaccination  11. Need for hepatitis B vaccination  - HEPATITIS B VACCINE, ADULT, IM  Patient did request hepatitis B vaccinations today.  We did give her her first vaccination today.  She will return in 2 months and then in 6 months for her subsequent vaccinations.  This was all explained to her today.  12. Need for varicella vaccine  - VARICELLA/CHICKEN POX VAC LIVE SQ  Patient did have blood work in May 2022 that showed that she was not immune to chickenpox.  We will go ahead and give a varicella vaccination today.    Patient is a 42 year old female who is overall doing well.  She will continue on her lisinopril for hypertension.  We will continue to monitor her for any sort of infection especially given the fact that she has the hypogammaglobulinemia.  She does have chronic sinusitis which seems like it is overall fairly stable.  She does have a rash which she will continue to monitor and hopefully with the use of regular lotions that she will have improvement in this rash.  If she is not she certainly should let me know and I be happy to refer to the dermatologist I did attempt to get a Pap smear today and unfortunately was  unable to.  Will refer to gynecology for further evaluation.  All of her questions and concerns were addressed today.  If she has additional problems or concerns should let me know.    Will contact her with the results of the labs when available.  She is to return in 6 months for follow-up of her chronic medical issues.   Voice recognition software was used in the creation of this note. Any grammatical or nonsensical errors are due to inherent errors with the software and are not the intention of the writer.      Ashely Larios MD      Patient has been advised of split billing requirements and indicates understanding: Yes      COUNSELING:  Reviewed preventive health counseling, as reflected in patient instructions       Regular exercise       Healthy diet/nutrition       Immunizations    Vaccinated for: Hepatitis A, Hepatitis B, Influenza and Varicella             Consider Hep C screening for all patients one time for ages 18-79 years        She reports that she has never smoked. She has never been exposed to tobacco smoke. She has never used smokeless tobacco.            Ashely Larios MD  [facility]  Answers for HPI/ROS submitted by the patient on 12/2/2022  PHQ9 TOTAL SCORE: 0  KAVON 7 TOTAL SCORE: 0

## 2022-12-03 DIAGNOSIS — R82.90 ABNORMAL URINALYSIS: Primary | ICD-10-CM

## 2022-12-05 LAB
ALBUMIN SERPL BCG-MCNC: 4.2 G/DL (ref 3.5–5.2)
ALP SERPL-CCNC: 161 U/L (ref 35–104)
ALT SERPL W P-5'-P-CCNC: 40 U/L (ref 10–35)
ANION GAP SERPL CALCULATED.3IONS-SCNC: 13 MMOL/L (ref 7–15)
AST SERPL W P-5'-P-CCNC: 47 U/L (ref 10–35)
BILIRUB SERPL-MCNC: 0.2 MG/DL
BUN SERPL-MCNC: 9.5 MG/DL (ref 6–20)
CALCIUM SERPL-MCNC: 9.6 MG/DL (ref 8.6–10)
CHLORIDE SERPL-SCNC: 101 MMOL/L (ref 98–107)
CHOLEST SERPL-MCNC: 175 MG/DL
CREAT SERPL-MCNC: 0.73 MG/DL (ref 0.51–0.95)
DEPRECATED HCO3 PLAS-SCNC: 23 MMOL/L (ref 22–29)
FERRITIN SERPL-MCNC: 28 NG/ML (ref 6–175)
GFR SERPL CREATININE-BSD FRML MDRD: >90 ML/MIN/1.73M2
GLUCOSE SERPL-MCNC: 88 MG/DL (ref 70–99)
HDLC SERPL-MCNC: 35 MG/DL
IRON BINDING CAPACITY (ROCHE): 473 UG/DL (ref 240–430)
IRON SATN MFR SERPL: 8 % (ref 15–46)
IRON SERPL-MCNC: 37 UG/DL (ref 37–145)
LDLC SERPL CALC-MCNC: 92 MG/DL
NONHDLC SERPL-MCNC: 140 MG/DL
POTASSIUM SERPL-SCNC: 4.9 MMOL/L (ref 3.4–5.3)
PROT SERPL-MCNC: 7.1 G/DL (ref 6.4–8.3)
SODIUM SERPL-SCNC: 137 MMOL/L (ref 136–145)
TRIGL SERPL-MCNC: 242 MG/DL

## 2022-12-06 DIAGNOSIS — R79.89 ELEVATED LIVER FUNCTION TESTS: Primary | ICD-10-CM

## 2022-12-13 ENCOUNTER — TRANSFERRED RECORDS (OUTPATIENT)
Dept: HEALTH INFORMATION MANAGEMENT | Facility: CLINIC | Age: 42
End: 2022-12-13

## 2022-12-13 ENCOUNTER — LAB REQUISITION (OUTPATIENT)
Dept: LAB | Facility: CLINIC | Age: 42
End: 2022-12-13

## 2022-12-13 DIAGNOSIS — R19.00 INTRA-ABDOMINAL AND PELVIC SWELLING, MASS AND LUMP, UNSPECIFIED SITE: ICD-10-CM

## 2022-12-13 PROCEDURE — 88305 TISSUE EXAM BY PATHOLOGIST: CPT | Performed by: PATHOLOGY

## 2022-12-15 LAB
PATH REPORT.COMMENTS IMP SPEC: NORMAL
PATH REPORT.COMMENTS IMP SPEC: NORMAL
PATH REPORT.FINAL DX SPEC: NORMAL
PATH REPORT.GROSS SPEC: NORMAL
PATH REPORT.MICROSCOPIC SPEC OTHER STN: NORMAL
PATH REPORT.RELEVANT HX SPEC: NORMAL
PHOTO IMAGE: NORMAL

## 2023-04-26 ENCOUNTER — OFFICE VISIT (OUTPATIENT)
Dept: FAMILY MEDICINE | Facility: CLINIC | Age: 43
End: 2023-04-26
Payer: COMMERCIAL

## 2023-04-26 VITALS
RESPIRATION RATE: 16 BRPM | HEIGHT: 66 IN | BODY MASS INDEX: 25.62 KG/M2 | SYSTOLIC BLOOD PRESSURE: 130 MMHG | WEIGHT: 159.4 LBS | DIASTOLIC BLOOD PRESSURE: 88 MMHG | TEMPERATURE: 98.5 F | HEART RATE: 120 BPM | OXYGEN SATURATION: 99 %

## 2023-04-26 DIAGNOSIS — J32.0 CHRONIC MAXILLARY SINUSITIS: ICD-10-CM

## 2023-04-26 DIAGNOSIS — D64.9 ANEMIA, UNSPECIFIED TYPE: ICD-10-CM

## 2023-04-26 DIAGNOSIS — D80.1 HYPOGAMMAGLOBULINEMIA (H): ICD-10-CM

## 2023-04-26 DIAGNOSIS — R50.9 FEVER, UNSPECIFIED FEVER CAUSE: ICD-10-CM

## 2023-04-26 DIAGNOSIS — I10 ESSENTIAL HYPERTENSION: ICD-10-CM

## 2023-04-26 DIAGNOSIS — R68.83 CHILLS: ICD-10-CM

## 2023-04-26 DIAGNOSIS — R82.90 ABNORMAL URINALYSIS: ICD-10-CM

## 2023-04-26 DIAGNOSIS — R79.89 ELEVATED LFTS: ICD-10-CM

## 2023-04-26 DIAGNOSIS — R82.90 ABNORMAL URINALYSIS: Primary | ICD-10-CM

## 2023-04-26 DIAGNOSIS — J01.10 ACUTE NON-RECURRENT FRONTAL SINUSITIS: Primary | ICD-10-CM

## 2023-04-26 DIAGNOSIS — R19.00 PELVIC MASS: ICD-10-CM

## 2023-04-26 LAB
ALBUMIN SERPL BCG-MCNC: 3.4 G/DL (ref 3.5–5.2)
ALBUMIN UR-MCNC: 30 MG/DL
ALP SERPL-CCNC: 201 U/L (ref 35–104)
ALT SERPL W P-5'-P-CCNC: 44 U/L (ref 10–35)
ANION GAP SERPL CALCULATED.3IONS-SCNC: 10 MMOL/L (ref 7–15)
APPEARANCE UR: CLEAR
AST SERPL W P-5'-P-CCNC: 54 U/L (ref 10–35)
BACTERIA #/AREA URNS HPF: ABNORMAL /HPF
BASOPHILS # BLD MANUAL: 0.1 10E3/UL (ref 0–0.2)
BASOPHILS NFR BLD MANUAL: 1 %
BILIRUB SERPL-MCNC: 0.2 MG/DL
BILIRUB UR QL STRIP: NEGATIVE
BUN SERPL-MCNC: 9.8 MG/DL (ref 6–20)
CALCIUM SERPL-MCNC: 9 MG/DL (ref 8.6–10)
CHLORIDE SERPL-SCNC: 104 MMOL/L (ref 98–107)
CHOLEST SERPL-MCNC: 108 MG/DL
COLOR UR AUTO: YELLOW
CREAT SERPL-MCNC: 0.76 MG/DL (ref 0.51–0.95)
DEPRECATED HCO3 PLAS-SCNC: 25 MMOL/L (ref 22–29)
EOSINOPHIL # BLD MANUAL: 0 10E3/UL (ref 0–0.7)
EOSINOPHIL NFR BLD MANUAL: 0 %
ERYTHROCYTE [DISTWIDTH] IN BLOOD BY AUTOMATED COUNT: 15.4 % (ref 10–15)
FERRITIN SERPL-MCNC: 50 NG/ML (ref 6–175)
GFR SERPL CREATININE-BSD FRML MDRD: >90 ML/MIN/1.73M2
GLUCOSE SERPL-MCNC: 124 MG/DL (ref 70–99)
GLUCOSE UR STRIP-MCNC: NEGATIVE MG/DL
HCT VFR BLD AUTO: 34.6 % (ref 35–47)
HDLC SERPL-MCNC: 19 MG/DL
HGB BLD-MCNC: 10.7 G/DL (ref 11.7–15.7)
HGB UR QL STRIP: NEGATIVE
IRON BINDING CAPACITY (ROCHE): 381 UG/DL (ref 240–430)
IRON SATN MFR SERPL: 6 % (ref 15–46)
IRON SERPL-MCNC: 23 UG/DL (ref 37–145)
KETONES UR STRIP-MCNC: NEGATIVE MG/DL
LDLC SERPL CALC-MCNC: 40 MG/DL
LEUKOCYTE ESTERASE UR QL STRIP: NEGATIVE
LYMPHOCYTES # BLD MANUAL: 4.3 10E3/UL (ref 0.8–5.3)
LYMPHOCYTES NFR BLD MANUAL: 64 %
MCH RBC QN AUTO: 24.5 PG (ref 26.5–33)
MCHC RBC AUTO-ENTMCNC: 30.9 G/DL (ref 31.5–36.5)
MCV RBC AUTO: 79 FL (ref 78–100)
MONOCYTES # BLD MANUAL: 0.6 10E3/UL (ref 0–1.3)
MONOCYTES NFR BLD MANUAL: 9 %
MUCOUS THREADS #/AREA URNS LPF: PRESENT /LPF
NEUTROPHILS # BLD MANUAL: 1.7 10E3/UL (ref 1.6–8.3)
NEUTROPHILS NFR BLD MANUAL: 26 %
NITRATE UR QL: NEGATIVE
NONHDLC SERPL-MCNC: 89 MG/DL
PH UR STRIP: 6 [PH] (ref 5–8)
PLAT MORPH BLD: NORMAL
PLATELET # BLD AUTO: 85 10E3/UL (ref 150–450)
POTASSIUM SERPL-SCNC: 4.6 MMOL/L (ref 3.4–5.3)
PROT SERPL-MCNC: 6.1 G/DL (ref 6.4–8.3)
RBC # BLD AUTO: 4.37 10E6/UL (ref 3.8–5.2)
RBC #/AREA URNS AUTO: ABNORMAL /HPF
RBC MORPH BLD: NORMAL
SODIUM SERPL-SCNC: 139 MMOL/L (ref 136–145)
SP GR UR STRIP: 1.02 (ref 1–1.03)
TRIGL SERPL-MCNC: 247 MG/DL
UROBILINOGEN UR STRIP-ACNC: 2 E.U./DL
WBC # BLD AUTO: 6.7 10E3/UL (ref 4–11)
WBC #/AREA URNS AUTO: ABNORMAL /HPF

## 2023-04-26 PROCEDURE — 85027 COMPLETE CBC AUTOMATED: CPT | Performed by: FAMILY MEDICINE

## 2023-04-26 PROCEDURE — 83540 ASSAY OF IRON: CPT | Performed by: FAMILY MEDICINE

## 2023-04-26 PROCEDURE — 36415 COLL VENOUS BLD VENIPUNCTURE: CPT | Performed by: FAMILY MEDICINE

## 2023-04-26 PROCEDURE — 82728 ASSAY OF FERRITIN: CPT | Performed by: FAMILY MEDICINE

## 2023-04-26 PROCEDURE — 81001 URINALYSIS AUTO W/SCOPE: CPT | Performed by: FAMILY MEDICINE

## 2023-04-26 PROCEDURE — 85007 BL SMEAR W/DIFF WBC COUNT: CPT | Performed by: FAMILY MEDICINE

## 2023-04-26 PROCEDURE — 80053 COMPREHEN METABOLIC PANEL: CPT | Performed by: FAMILY MEDICINE

## 2023-04-26 PROCEDURE — 99214 OFFICE O/P EST MOD 30 MIN: CPT | Performed by: FAMILY MEDICINE

## 2023-04-26 PROCEDURE — 80061 LIPID PANEL: CPT | Performed by: FAMILY MEDICINE

## 2023-04-26 PROCEDURE — 83550 IRON BINDING TEST: CPT | Performed by: FAMILY MEDICINE

## 2023-04-26 RX ORDER — AZITHROMYCIN 250 MG/1
TABLET, FILM COATED ORAL
Qty: 6 TABLET | Refills: 0 | Status: SHIPPED | OUTPATIENT
Start: 2023-04-26 | End: 2023-05-01

## 2023-04-26 ASSESSMENT — PAIN SCALES - GENERAL: PAINLEVEL: NO PAIN (0)

## 2023-04-26 NOTE — PROGRESS NOTES
PROGRESS NOTE   4/26/2023    SUBJECTIVE:  Arely Saldaña is a 42 year old female who presents for     Chief Complaint   Patient presents with     Health Maintenance     Low grade fever, chills, cough x 6 days.      Patient comes in today because she has not been feeling well for about the last 6 days or so.  She has a history of hypogammaglobulinemia and had a significant infection around this time last year.  She ended up being hospitalized after being treated twice for what they thought was sinusitis.  She ended up having an empyema and had to have a thoracoscopy and then ultimately a thoracotomy.  She recovered slowly but did recover fine.  She currently is seeing an immunologist and is on IVIG doses every 3 weeks.  She notes that ever since she had her surgery she has had chronic thick green discharge in the morning and then it seems like it clears as the day goes on.  She continues to have a cough that is been present since her surgery as well.  She notes that most recently last Thursday she started to not feel well.  She notes that her daughter came home from  the day before with a fever.  She was sent home from  and mom thinks it probably was to heaving but in the middle of the night mom started to have a fever as well.  Patient has had a fever up to 100.8 or so since then on an occasional basis.  It comes and goes.  Patient is a difficult historian and so is difficult to get a straight story as to how long this has been going on or how often fever is this high.  As far as I understand she has not had a fever beyond 100.8.  She has chills just before the fever comes and then will take ibuprofen or Tylenol and that seems to help.  When she has her fever she does not feel well but after her fever goes away she feels absolutely fine.  She does not feel like she has any significant cough or change in her cough that I can appreciate from talking with her.  She does not have any shortness of breath  she does not have any other illness type symptoms.  She has chronic sinusitis in her maxillary sinuses it was thought that perhaps that was the source of the infection last year.  She had sinus surgery in October and notes that it really did not seem to change much and she does not really feel like it is any different now other than the fact that she does have a little bit of tenderness in her forehead area.  She has been having some allergy type symptoms which Claritin-D has helped although she is not taking that currently.  Her and her  note that she seems to get sick around this time of year every year.  She is eating and drinking normally and can function and do what she needs to do.  Patient does have a history of hypertension and her blood pressure today looks fine at 130/88.  She is due for lab work to be done today including a lipid panel and a CMP and we will go ahead and do that.  She also has a history of anemia so we will go ahead and check iron panel as well as ferritin and CBC today as well.  The last time she was here in December she had some blood in her urine and so we will recheck a UA today as well.  She has had elevated liver function tests when they were last checked in December and those need to be followed up on as well.   When she was here in December for a physical exam I did attempt to do a Pap smear and we are unable to get a Pap smear.  She was sent to gynecology and they were unable to obtain a Pap smear as well.  I asked further about the follow-up on that and patient notes that she saw them in March and they suggested a surgery to try to figure out what was going on and to do a biopsy to begin with.  She was not sure if she should proceed with that or not.    Patient Active Problem List   Diagnosis     Essential hypertension     ASCUS of cervix with negative high risk HPV     Anemia     Pleural effusion     Elevated LFTs     Chronic maxillary sinusitis     Empyema lung (H)      "Hypogammaglobulinemia (H)       Current Outpatient Medications   Medication Sig Dispense Refill     azithromycin (ZITHROMAX) 250 MG tablet Take 2 tablets (500 mg) by mouth daily for 1 day, THEN 1 tablet (250 mg) daily for 4 days. 6 tablet 0     ferrous sulfate (FEROSUL) 325 (65 Fe) MG tablet Take 325 mg by mouth daily (with breakfast)       lisinopril (ZESTRIL) 5 MG tablet Take 1 tablet (5 mg) by mouth daily 90 tablet 1     Multiple Vitamin (MULTIVITAMIN ADULT PO)          Allergies   Allergen Reactions     Immune Globulin (Human) Other (See Comments)     Fevers, chills , tremor     Metronidazole Other (See Comments)     Had \"tremulous chills\" associated with iv infusion.  Resolved after stopping infusion     Penicillins Hives     Sulfamethoxazole-Trimethoprim [Sulfamethoxazole-Trimethoprim] Rash       Past Medical History:   Diagnosis Date     Anemia     iron deficiency     ASCUS of cervix with negative high risk HPV 2018-2012 NIL annual pap 4/13/15 NIL pap, neg HPV 18 ASCUS, neg HPV 11/10/20 NIL pap, neg HPV     Hypertension     on lisinopril, had since age early 20's     Miscarriage     x2     Sinusitis, chronic        Past Surgical History:   Procedure Laterality Date     C/SECTION, LOW TRANSVERSE  2021    with PPTL     ENDOSCOPIC POLYPECTOMY NASAL Bilateral 10/4/2022    Procedure: POLYPECTOMY, NASAL CAVITY, ENDOSCOPIC;  Surgeon: Elvi Rodriguez MD;  Location: [facility] OR     FOOT SURGERY Left age 20    bursa removed from left foot     SALPINGECTOMY Bilateral 2021    with  section     THORACOSCOPY Left 2022    Procedure: LEFT THORACOSCOPY, DECORTICATION;  Surgeon: Suleman Kumar MD;  Location: [facility] OR     WISDOM TOOTH EXTRACTION  age 19       History   Smoking Status     Never   Smokeless Tobacco     Never       OBJECTIVE:     /88   Pulse 120   Temp 98.5  F (36.9  C) (Oral)   Resp 16   Ht 1.676 m (5' 6\")   Wt 72.3 kg (159 lb 6.4 " oz)   LMP 04/13/2023 (Exact Date)   SpO2 99%   Breastfeeding No   BMI 25.73 kg/m      Physical Exam:  GENERAL APPEARANCE: A&A, NAD, well hydrated, well nourished  SKIN:  Normal skin turgor, no lesions/rashes   HEENT: moist mucous membranes, no rhinorrhea, PERRLA, TM's clear bilaterally, Throat without significant erythema or exudate.  She did have tenderness over the frontal sinuses bilaterally but not the maxillary sinuses.  NECK: Supple, full ROM, no significant lymphadenopathy or thyromegaly  CV: RRR, no M/G/R   LUNGS: CTAB, no wheezing or crackles was noted.  ABDOMEN: S&NT, no masses or organomegaly, positive bowel sounds.  No CVA tenderness was appreciated.  EXTREMITY: no swelling noted.  Full range of motion of all 4 extremities.   NEURO: no gross deficits   PSYCHIATRIC;  Mood appropriate, memory intact    LABS:     Recent Results (from the past 240 hour(s))   Comprehensive metabolic panel    Collection Time: 04/26/23 10:40 AM   Result Value Ref Range    Sodium 139 136 - 145 mmol/L    Potassium 4.6 3.4 - 5.3 mmol/L    Chloride 104 98 - 107 mmol/L    Carbon Dioxide (CO2) 25 22 - 29 mmol/L    Anion Gap 10 7 - 15 mmol/L    Urea Nitrogen 9.8 6.0 - 20.0 mg/dL    Creatinine 0.76 0.51 - 0.95 mg/dL    Calcium 9.0 8.6 - 10.0 mg/dL    Glucose 124 (H) 70 - 99 mg/dL    Alkaline Phosphatase 201 (H) 35 - 104 U/L    AST 54 (H) 10 - 35 U/L    ALT 44 (H) 10 - 35 U/L    Protein Total 6.1 (L) 6.4 - 8.3 g/dL    Albumin 3.4 (L) 3.5 - 5.2 g/dL    Bilirubin Total 0.2 <=1.2 mg/dL    GFR Estimate >90 >60 mL/min/1.73m2   Lipid panel reflex to direct LDL Fasting    Collection Time: 04/26/23 10:40 AM   Result Value Ref Range    Cholesterol 108 <200 mg/dL    Triglycerides 247 (H) <150 mg/dL    Direct Measure HDL 19 (L) >=50 mg/dL    LDL Cholesterol Calculated 40 <=100 mg/dL    Non HDL Cholesterol 89 <130 mg/dL   Ferritin    Collection Time: 04/26/23 10:40 AM   Result Value Ref Range    Ferritin 50 6 - 175 ng/mL   Iron & Iron Binding  Capacity    Collection Time: 04/26/23 10:40 AM   Result Value Ref Range    Iron 23 (L) 37 - 145 ug/dL    Iron Binding Capacity 381 240 - 430 ug/dL    Iron Sat Index 6 (L) 15 - 46 %   CBC with platelets and differential    Collection Time: 04/26/23 10:40 AM   Result Value Ref Range    WBC Count 6.7 4.0 - 11.0 10e3/uL    RBC Count 4.37 3.80 - 5.20 10e6/uL    Hemoglobin 10.7 (L) 11.7 - 15.7 g/dL    Hematocrit 34.6 (L) 35.0 - 47.0 %    MCV 79 78 - 100 fL    MCH 24.5 (L) 26.5 - 33.0 pg    MCHC 30.9 (L) 31.5 - 36.5 g/dL    RDW 15.4 (H) 10.0 - 15.0 %    Platelet Count 85 (L) 150 - 450 10e3/uL   Manual Differential    Collection Time: 04/26/23 10:40 AM   Result Value Ref Range    % Neutrophils 26 %    % Lymphocytes 64 %    % Monocytes 9 %    % Eosinophils 0 %    % Basophils 1 %    Absolute Neutrophils 1.7 1.6 - 8.3 10e3/uL    Absolute Lymphocytes 4.3 0.8 - 5.3 10e3/uL    Absolute Monocytes 0.6 0.0 - 1.3 10e3/uL    Absolute Eosinophils 0.0 0.0 - 0.7 10e3/uL    Absolute Basophils 0.1 0.0 - 0.2 10e3/uL    RBC Morphology Confirmed RBC Indices     Platelet Assessment  Automated Count Confirmed. Platelet morphology is normal.     Automated Count Confirmed. Platelet morphology is normal.   UA Macroscopic with reflex to Microscopic and Culture    Collection Time: 04/26/23 10:43 AM    Specimen: Urine, NOS   Result Value Ref Range    Color Urine Yellow Colorless, Straw, Light Yellow, Yellow    Appearance Urine Clear Clear    Glucose Urine Negative Negative mg/dL    Bilirubin Urine Negative Negative    Ketones Urine Negative Negative mg/dL    Specific Gravity Urine 1.025 1.005 - 1.030    Blood Urine Negative Negative    pH Urine 6.0 5.0 - 8.0    Protein Albumin Urine 30 (A) Negative mg/dL    Urobilinogen Urine 2.0 (A) 0.2, 1.0 E.U./dL    Nitrite Urine Negative Negative    Leukocyte Esterase Urine Negative Negative   UA Microscopic with Reflex to Culture    Collection Time: 04/26/23 10:43 AM   Result Value Ref Range    Bacteria Urine  Moderate (A) None Seen /HPF    RBC Urine None Seen 0-2 /HPF /HPF    WBC Urine None Seen 0-5 /HPF /HPF    Mucus Urine Present (A) None Seen /LPF          ASSESSMENT/PLAN:     Acute non-recurrent frontal sinusitis  - azithromycin (ZITHROMAX) 250 MG tablet  Dispense: 6 tablet; Refill: 0    Fever, unspecified fever cause  - CBC with Platelets & Differential  - Comprehensive metabolic panel  - CBC with Platelets & Differential    Chills  - CBC with Platelets & Differential  - Comprehensive metabolic panel  - CBC with Platelets & Differential    Hypogammaglobulinemia (H)  - CBC with Platelets & Differential  - Comprehensive metabolic panel  - CBC with Platelets & Differential    Essential hypertension  - Comprehensive metabolic panel  - Lipid panel reflex to direct LDL Fasting  - Lipid panel reflex to direct LDL Fasting    Chronic maxillary sinusitis  - azithromycin (ZITHROMAX) 250 MG tablet  Dispense: 6 tablet; Refill: 0    Elevated LFTs  - Comprehensive metabolic panel  - Comprehensive metabolic panel    Anemia, unspecified type  - CBC with Platelets & Differential  - Ferritin  - Iron & Iron Binding Capacity  - CBC with Platelets & Differential  - Ferritin  - Iron & Iron Binding Capacity    Pelvic mass    Abnormal urinalysis  - UA Macroscopic with reflex to Microscopic and Culture  - UA Macroscopic with reflex to Microscopic and Culture  - UA Microscopic with Reflex to Culture    Patient comes in today because of fever and chills as well as not feeling up to par for about the last 6 days or so.  She has a history of hypogammaglobulinemia and had an empyema and had to have major surgery and a long hospitalization this time last year.  She of course is concerned about that history.  She currently is on IVIG to boost her immune system because she was found to have lowered immune system.  She is hoping that that will help to not get infections however she notes that her daughter came home with a fever last Wednesday from   and said ever since then she has had low-grade fevers.  She gets chills before she gets the fever and her fever goes up to maybe 100.8 at the maximum.  She does not really have any other symptoms and Tylenol and ibuprofen seem to bring it down but she is concerned about the fact that now its been about 6 days since this started.  She does not really seem to be getting any worse but she also does not seem to be getting any better.  She does have some tenderness over the frontal sinuses today.  She has history of chronic maxillary sinus infections however had sinus surgery last October.  She notes that it really does not seem like it is changed a whole lot since her surgery but she definitely notes that she has new frontal sinus tenderness.  We will go ahead and treat her with an antibiotic because of her history of hypogammaglobulinemia as well as the findings of what are suspicious for frontal sinusitis.  We will go over and give her prescription for Zithromax today.  I did talk to her about the fact that this is a 5-day antibiotic and then it should last another 5 days beyond that.  If she is feeling better but just barely better than she should call me and I be happy to give her another refill of this medication.  Patient has a allergy to penicillin as well as sulfa so that is why Zithromax was chosen.  She should keep me up-to-date as to how things progress in terms of her illness and certainly if things get worse she needs to let me know right away.  I did ask her to contact her immunologist as well regarding the fact that she is ill at the moment does not seem to be getting any better.  I am encouraged that she is not getting any worse but hopefully the antibiotic will help her to get better in terms of her acute symptoms.  She does have a history of elevated liver function test when she was here in December so we will recheck metabolic panel to recheck that.  We will check CBC as well as iron studies  and ferritin to follow-up on her history of anemia as well as to follow-up on her current illness and the low-grade fever and chills that she is having.  She does continue on lisinopril for her hypertension.  Her blood pressure today looks fine at 130/88.  She is due for follow-up lab work including metabolic panel and lipid panel we will draw that today as well.  She does note that she continues to have issues with her gynecological area.  She saw the gynecologist again in March and they recommended surgery to go in and do a biopsy as something is blocking them from being able to do a Pap smear.  We talked today about the fact that I think is very important that she do that because we need to figure out what is going on.  She was wondering why she needed a surgery for that we discussed the fact that they need to know what is going on before they can definitively decide what they are going to do about it.  She voiced understanding and will contact the office and get that scheduled in the near future.  She did have blood in her urine when she was here in December and so we will recheck a urinalysis today as well.  Urinalysis was also done to evaluate the low-grade fever that she has been having.  Patient has not had a chronic cough since she had surgery last year.  It does not sound like it is that much different now than it has been in the past but I did suggest that she contact the pulmonary doctors to discuss this further especially given the fact that this has been a chronic issue for her now since she had surgery a year ago.  All of her questions were answered today.  I will hear back from her as far as how she is doing at the beginning of the week next week when her antibiotic has had a few days to work.  If she has worsening of her symptoms prior to that she certainly should let us know.  Ashely Larios MD    This note was dictated using voice recognition software. Any grammatical errors, context  distortions, or spelling errors are not intentional       4/26/2023    10:13 AM   Additional Questions   Roomed by Cintia MORALES LPN     History of Present Illness       Reason for visit:  Chills and low grade fever  Symptom onset:  3-7 days ago  Symptom intensity:  Mild  Symptom progression:  Staying the same  Had these symptoms before:  Yes  Has tried/received treatment for these symptoms:  Yes  Previous treatment was successful:  Yes  Prior treatment description:  Thoracoscopy  What makes it better:  Ibuprofen/Tylenol    She eats 2-3 servings of fruits and vegetables daily.She consumes 2 sweetened beverage(s) daily.She exercises with enough effort to increase her heart rate 9 or less minutes per day.  She exercises with enough effort to increase her heart rate 3 or less days per week. She is missing 1 dose(s) of medications per week.  She is not taking prescribed medications regularly due to remembering to take.

## 2023-04-27 ENCOUNTER — TELEPHONE (OUTPATIENT)
Dept: FAMILY MEDICINE | Facility: CLINIC | Age: 43
End: 2023-04-27
Payer: COMMERCIAL

## 2023-04-27 DIAGNOSIS — R50.9 FEVER, UNSPECIFIED FEVER CAUSE: Primary | ICD-10-CM

## 2023-04-27 DIAGNOSIS — D69.6 LOW PLATELET COUNT (H): ICD-10-CM

## 2023-04-27 NOTE — TELEPHONE ENCOUNTER
I did call and speak with patient regarding the results of her lab work.  Her hemoglobin is down to 10.7.  She notes that she really does not have heavy menstrual cycles but her iron studies definitely support that she has iron deficiency.  She is supposed to be on iron but has not taken it in the last few days because she has not been feeling well.  She will start taking that again after she is starting to feel little bit better.  Her platelet count was 85,000 however as well.  I would like her to come in tomorrow and repeat a CBC to see if this is a lab error if indeed her platelet count is 85,000.  Patient does have a history of hypogammaglobulinemia and has been on injections through [facility] of IVIG every 3 weeks.  She started did not feel well last Thursday and continues with a low-grade fever on and off.  She was started on Zithromax yesterday.  I asked her to MyChart me a message on Monday as to how she is doing.  She voiced understanding of that.  I will contact her with results of the CBC that has been scheduled for tomorrow morning at 8:15 AM.  All of her questions were answered.

## 2023-04-28 ENCOUNTER — LAB (OUTPATIENT)
Dept: LAB | Facility: CLINIC | Age: 43
End: 2023-04-28
Payer: COMMERCIAL

## 2023-04-28 DIAGNOSIS — R50.9 FEVER, UNSPECIFIED FEVER CAUSE: ICD-10-CM

## 2023-04-28 DIAGNOSIS — D69.6 LOW PLATELET COUNT (H): ICD-10-CM

## 2023-04-28 LAB
BASOPHILS # BLD MANUAL: 0 10E3/UL (ref 0–0.2)
BASOPHILS NFR BLD MANUAL: 0 %
EOSINOPHIL # BLD MANUAL: 0 10E3/UL (ref 0–0.7)
EOSINOPHIL NFR BLD MANUAL: 0 %
ERYTHROCYTE [DISTWIDTH] IN BLOOD BY AUTOMATED COUNT: 15.7 % (ref 10–15)
HCT VFR BLD AUTO: 34.1 % (ref 35–47)
HGB BLD-MCNC: 10.2 G/DL (ref 11.7–15.7)
LYMPHOCYTES # BLD MANUAL: 4.8 10E3/UL (ref 0.8–5.3)
LYMPHOCYTES NFR BLD MANUAL: 72 %
MCH RBC QN AUTO: 23.8 PG (ref 26.5–33)
MCHC RBC AUTO-ENTMCNC: 29.9 G/DL (ref 31.5–36.5)
MCV RBC AUTO: 80 FL (ref 78–100)
MONOCYTES # BLD MANUAL: 0.5 10E3/UL (ref 0–1.3)
MONOCYTES NFR BLD MANUAL: 7 %
NEUTROPHILS # BLD MANUAL: 1.4 10E3/UL (ref 1.6–8.3)
NEUTROPHILS NFR BLD MANUAL: 21 %
PLAT MORPH BLD: ABNORMAL
PLATELET # BLD AUTO: 93 10E3/UL (ref 150–450)
POLYCHROMASIA BLD QL SMEAR: SLIGHT
RBC # BLD AUTO: 4.29 10E6/UL (ref 3.8–5.2)
RBC MORPH BLD: ABNORMAL
VARIANT LYMPHS BLD QL SMEAR: PRESENT
WBC # BLD AUTO: 6.7 10E3/UL (ref 4–11)

## 2023-04-28 PROCEDURE — 36415 COLL VENOUS BLD VENIPUNCTURE: CPT

## 2023-04-28 PROCEDURE — 85007 BL SMEAR W/DIFF WBC COUNT: CPT

## 2023-04-28 PROCEDURE — 85027 COMPLETE CBC AUTOMATED: CPT

## 2023-05-01 ENCOUNTER — MYC MEDICAL ADVICE (OUTPATIENT)
Dept: FAMILY MEDICINE | Facility: CLINIC | Age: 43
End: 2023-05-01
Payer: COMMERCIAL

## 2023-05-01 DIAGNOSIS — D64.9 ANEMIA, UNSPECIFIED TYPE: ICD-10-CM

## 2023-05-01 DIAGNOSIS — R79.89 ELEVATED LIVER FUNCTION TESTS: Primary | ICD-10-CM

## 2023-05-01 DIAGNOSIS — D69.6 LOW PLATELET COUNT (H): ICD-10-CM

## 2023-05-01 NOTE — TELEPHONE ENCOUNTER
I called and spoke with patient.  She is feeling a lot better.  She still getting mild fevers but they are not nearly as high as they were in their only happening 1-2 times a day as opposed to much more frequently than that.  She feels like her cough is better and she is feeling so much better than she was last week when she was in.   I did review her lab work with her.  Her cholesterol looks fine.  Her metabolic panel that was drawn last week looks fine other than her liver function tests continue to be mildly elevated and her glucose was slightly elevated as well.  This may be due to a viral process that is causing her to have these abnormalities.  Given the fact that she is feeling better I do not think we need to evaluate this further at this time but rather continue to monitor this.  Her hemoglobin both last Wednesday and on last Friday when I had her come back in continues to be low.  It was 10.7 and then down to 10.2.  She has had a history of iron deficiency anemia and her iron studies are drawn last week also confirm that she has low iron.  She will start iron therapy and then probably needs to recheck her hemoglobin in about 2 or 3 months.  Her platelet count was 85,000 last Wednesday and 93,000 last Friday.  Given the fact that she is feeling better and this seems like it may be a viral process I think it is reasonable to have her continue to monitor her symptoms and if she gets worse she needs to let me know right away otherwise we will have her repeat her lab work in about 3 weeks and see if her platelet count is improving.  If she has worsening of any of her symptoms prior to that she certainly should let me know.  All of her questions were answered today.

## 2024-01-17 NOTE — PROGRESS NOTES
Refill Decision Note   Gudelia Jonny  is requesting a refill authorization.  Brief Assessment and Rationale for Refill:  Approve     Medication Therapy Plan:         Comments:     Note composed:8:33 AM 01/17/2024            "Please see \"Imaging\" tab under Chart Review for full report.  This ultrasound was performed in the [facility], and may be located under Care Everywhere.    Kitty Roque MD  Maternal Fetal Medicine    "

## 2024-01-28 ENCOUNTER — HEALTH MAINTENANCE LETTER (OUTPATIENT)
Age: 44
End: 2024-01-28

## 2024-02-02 NOTE — PROGRESS NOTES
Caller: MAY    Relationship:     CALLER FROM [facility] Patient Name: Arely Saldaña  Medical Record Number: [id number]  Today's Date: 4/12/2022    Procedure: Image Guided Right Chest Tube placement  Proceduralist: Dr. Lester Fahrner  Pathology present: n/a, cultures sent by CT technologist    Procedure Start: 1155  Procedure end: 1230  Sedation medications administered: Fentanyl 100 mcg     Report given to: TIMOTEO Pritchard [address]  : n/a    Other Notes: Pt arrived to CT room #1 from [address] [place] [address]. Consent reviewed. Pt denies any questions or concerns regarding procedure. Pt positioned prone and monitored per protocol. Pt tolerated procedure without any noted complications. Pt transferred back to [address] [place] [address].

## 2024-04-07 ENCOUNTER — HEALTH MAINTENANCE LETTER (OUTPATIENT)
Age: 44
End: 2024-04-07

## 2024-10-08 ENCOUNTER — TRANSFERRED RECORDS (OUTPATIENT)
Dept: HEALTH INFORMATION MANAGEMENT | Facility: CLINIC | Age: 44
End: 2024-10-08

## 2024-12-31 ENCOUNTER — TRANSCRIBE ORDERS (OUTPATIENT)
Dept: OTHER | Age: 44
End: 2024-12-31

## 2024-12-31 DIAGNOSIS — D72.819 DECREASED WHITE BLOOD CELL COUNT: ICD-10-CM

## 2024-12-31 DIAGNOSIS — O14.23: Primary | ICD-10-CM

## 2025-01-02 ENCOUNTER — PATIENT OUTREACH (OUTPATIENT)
Dept: ONCOLOGY | Facility: CLINIC | Age: 45
End: 2025-01-02
Payer: COMMERCIAL

## 2025-01-02 ENCOUNTER — PRE VISIT (OUTPATIENT)
Dept: ONCOLOGY | Facility: CLINIC | Age: 45
End: 2025-01-02
Payer: COMMERCIAL

## 2025-01-02 NOTE — PROGRESS NOTES
New Patient Oncology Nurse Navigator Note     Referral Received: 01/02/25      Referring provider: Tuyet Allen NP     Referring Clinic/Organization: Other - [facility]      Referred to: Benign Hematology    Requested provider (if applicable): Dr. Schmidt     Evaluation for :   Diagnosis   O14.23 (ICD-10-CM) - Hemolytic anemia, elevated liver enzymes, low platelets in pregnancy, third trimester   D72.819 (ICD-10-CM) - Decreased white blood cell count      Clinical History (per Nurse review of records provided):      ?    Records Location: No records received     Records Needed:     Labs x1 year  [facility] OV notes    Additional testing needed prior to consult:     ?    Referral updates and Plan:     01/02/2025 10:33 AM -  Referral received and reviewed. Waiting on records.         Hetal Valentin, ADARSHN, RN  Hematology/Oncology Nurse Navigator  [facility]  [phone] / [phone]

## 2025-01-02 NOTE — TELEPHONE ENCOUNTER
Action January 2, 2025 1:33 PM ABT   Action Taken Records from [facility] received and sent to HIM for upload and nn email for review     RECORDS STATUS - ALL OTHER DIAGNOSIS      RECORDS RECEIVED FROM:    NOTES STATUS DETAILS   OFFICE NOTE from referring provider Ext: [facility] 10/08/24: VIRGINIE Regan   OFFICE NOTE from medical oncologist     OFFICE NOTE from other specialist     DISCHARGE SUMMARY from hospital     DISCHARGE REPORT from the ER     OPERATIVE REPORT     MEDICATION LIST Ext: [facility]    LABS     PATHOLOGY REPORTS     ANYTHING RELATED TO DIAGNOSIS Ext: [facility] 12/17/24   PATHOLOGY FEDEX TRACKING   Tracking #:   GENONOMIC TESTING     TYPE:     IMAGING (NEED IMAGES & REPORT)     CT SCANS     MRI     XRAYS     ULTRASOUND     PET     IMAGE DISC FEDEX TRACKING   Tracking #:

## 2025-02-01 ENCOUNTER — HEALTH MAINTENANCE LETTER (OUTPATIENT)
Age: 45
End: 2025-02-01

## 2025-07-23 DIAGNOSIS — D83.0 PREDOMINANTLY B-CELL DEFECT (H): Primary | ICD-10-CM

## (undated) DEVICE — SUTURE VICRYL+ 3-0 18IN PS-2 UND VCP497H

## (undated) DEVICE — SUTURE SILK 0 PSL 580H

## (undated) DEVICE — TUBING SUCTION MEDI-VAC 1/4"X20' N620A - HE

## (undated) DEVICE — CATH THORACIC RT ANGLE CLOTSTOP 32FR

## (undated) DEVICE — Device

## (undated) DEVICE — TUBING SMOKE EVAC PNEUMOCLEAR HIGH FLOW 0620050250

## (undated) DEVICE — SU VICRYL+ 0 27IN CT-2 UND VCP270H

## (undated) DEVICE — BLADE TRICUT 4MMX13CM  1884080HR

## (undated) DEVICE — SUCTION MANIFOLD NEPTUNE 2 SYS 1 PORT 702-025-000

## (undated) DEVICE — HLSTR JET MULTI-INST SFTY STRL FIRE-SFE LF 7212

## (undated) DEVICE — CONNECTOR 5 TO 1 STRL 271502

## (undated) DEVICE — DRSG DRAIN 4X4" 7086

## (undated) DEVICE — SYR 03ML LL W/O NDL 309657

## (undated) DEVICE — PREP CHLORAPREP 26ML TINTED HI-LITE ORANGE 930815

## (undated) DEVICE — SOL WATER IRRIG 1000ML BOTTLE 2F7114

## (undated) DEVICE — ESU LIGASURE MARYLAND LAPAROSCOPIC SLR/DVDR 5MMX37CM LF1937

## (undated) DEVICE — STPL SKIN 35W 6.9MM  PXW35

## (undated) DEVICE — DRAPE IOBAN INCISE 23X17" 6650EZ

## (undated) DEVICE — DRSG GAUZE 4X4" TRAY 6939

## (undated) DEVICE — PLATE GROUNDING ADULT W/CORD 9165L

## (undated) DEVICE — GOWN IMPERVIOUS BREATHABLE SMART LG 89015

## (undated) DEVICE — TUBING IRRIG TUR Y TYPE 96" LF 6543-01

## (undated) DEVICE — ESU PENCIL SMOKE EVAC W/ROCKER SWITCH 0703-047-000

## (undated) DEVICE — SOL NACL 0.9% INJ 500ML BAG 2B1323Q

## (undated) DEVICE — DRSG TEGADERM 2 3/8X2 3/4" 1624W

## (undated) DEVICE — ENDO TROCAR SLEEVE KII Z-THREADED 11X100MM CTS12

## (undated) DEVICE — SUCTION MANIFOLD NEPTUNE 2 SYS 4 PORT 0702-020-000

## (undated) DEVICE — DRSG GAUZE 4X4" 8044

## (undated) DEVICE — SURGICEL POWDER ABSORBABLE HEMOSTAT 3GM 3013SP

## (undated) DEVICE — DRAPE U SPLIT 74X120" 29440

## (undated) DEVICE — SPONGE NEURO 1/2X3 WECK 200104

## (undated) DEVICE — GLOVE BIOGEL PI ULTRATOUCH G SZ 6.5 42165

## (undated) DEVICE — DECANTER VIAL 2006S

## (undated) DEVICE — MAT INST MAGNETIC 16X20

## (undated) DEVICE — SYSTEM LAPAROVUE VISIBILITY LAPVUE10

## (undated) DEVICE — SPONGE LAP 18X18" X8435

## (undated) DEVICE — TUBING SUCTION DRAINAGE PLEURAL DUAL 8884714200

## (undated) DEVICE — GLOVE BIOGEL PI ULTRATOUCH G SZ 7.5 42175

## (undated) DEVICE — CATH THORACIC STRAIGHT CLOTSTOP 28FR

## (undated) DEVICE — SUCTION DRY CHEST DRAIN OASIS 3600-100

## (undated) DEVICE — NDL 27GA 1.25" 305136

## (undated) DEVICE — SOL NACL 0.9% IRRIG 1000ML BOTTLE 2F7124

## (undated) DEVICE — BLADE KNIFE SURG 10 371110

## (undated) DEVICE — SU PROLENE 1-0 TP-1  30" BLUE 8824G

## (undated) DEVICE — SOL RINGERS LACTATED 1000ML BAG 2B2324X

## (undated) DEVICE — SU VICRYL 2 TP-1 4X27" J649G

## (undated) DEVICE — CUSTOM PACK LAP CHOLE SBA5BLCHEA

## (undated) DEVICE — CUSTOM PACK MINOR SBA5BMNHEA

## (undated) DEVICE — CATH THORACIC STRAIGHT CLOTSTOP 32FR

## (undated) DEVICE — KIT DEFOGGING-FOG INHIBITOR FOG1001

## (undated) DEVICE — STPL ENDO LINEAR CUT ECHELON 60MMX28CM SHORT SC60A

## (undated) DEVICE — CONTAINER URINE SPEC 4OZ STRL 1053

## (undated) DEVICE — SYR 30ML LL W/O NDL 302832

## (undated) DEVICE — SUCTION TIP POOLE STERILE 35040

## (undated) DEVICE — ENDO TROCAR SHIELDED BLADED KII Z-THRD 11X100MM CTB33

## (undated) RX ORDER — EPHEDRINE SULFATE 50 MG/ML
INJECTION, SOLUTION INTRAMUSCULAR; INTRAVENOUS; SUBCUTANEOUS
Status: DISPENSED
Start: 2022-04-13

## (undated) RX ORDER — FENTANYL CITRATE 50 UG/ML
INJECTION, SOLUTION INTRAMUSCULAR; INTRAVENOUS
Status: DISPENSED
Start: 2022-04-04

## (undated) RX ORDER — LABETALOL HYDROCHLORIDE 5 MG/ML
INJECTION, SOLUTION INTRAVENOUS
Status: DISPENSED
Start: 2022-10-04

## (undated) RX ORDER — LIDOCAINE HYDROCHLORIDE 10 MG/ML
INJECTION, SOLUTION EPIDURAL; INFILTRATION; INTRACAUDAL; PERINEURAL
Status: DISPENSED
Start: 2022-10-04

## (undated) RX ORDER — FENTANYL CITRATE 50 UG/ML
INJECTION, SOLUTION INTRAMUSCULAR; INTRAVENOUS
Status: DISPENSED
Start: 2022-04-12

## (undated) RX ORDER — BUPIVACAINE HYDROCHLORIDE 5 MG/ML
INJECTION, SOLUTION PERINEURAL
Status: DISPENSED
Start: 2022-04-13

## (undated) RX ORDER — FENTANYL CITRATE 50 UG/ML
INJECTION, SOLUTION INTRAMUSCULAR; INTRAVENOUS
Status: DISPENSED
Start: 2022-10-04

## (undated) RX ORDER — FENTANYL CITRATE 50 UG/ML
INJECTION, SOLUTION INTRAMUSCULAR; INTRAVENOUS
Status: DISPENSED
Start: 2022-04-13

## (undated) RX ORDER — PROPOFOL 10 MG/ML
INJECTION, EMULSION INTRAVENOUS
Status: DISPENSED
Start: 2022-10-04

## (undated) RX ORDER — LIDOCAINE HYDROCHLORIDE 10 MG/ML
INJECTION, SOLUTION EPIDURAL; INFILTRATION; INTRACAUDAL; PERINEURAL
Status: DISPENSED
Start: 2022-04-12

## (undated) RX ORDER — BACITRACIN ZINC 500 [USP'U]/G
OINTMENT TOPICAL
Status: DISPENSED
Start: 2022-04-13

## (undated) RX ORDER — KETAMINE HYDROCHLORIDE 10 MG/ML
INJECTION INTRAMUSCULAR; INTRAVENOUS
Status: DISPENSED
Start: 2022-04-13

## (undated) RX ORDER — ONDANSETRON 2 MG/ML
INJECTION INTRAMUSCULAR; INTRAVENOUS
Status: DISPENSED
Start: 2022-10-04